# Patient Record
Sex: MALE | Race: BLACK OR AFRICAN AMERICAN | Employment: OTHER | URBAN - NONMETROPOLITAN AREA
[De-identification: names, ages, dates, MRNs, and addresses within clinical notes are randomized per-mention and may not be internally consistent; named-entity substitution may affect disease eponyms.]

---

## 2017-01-17 ENCOUNTER — OFFICE VISIT (OUTPATIENT)
Dept: URGENT CARE | Age: 47
End: 2017-01-17
Payer: MEDICAID

## 2017-01-17 VITALS
SYSTOLIC BLOOD PRESSURE: 160 MMHG | DIASTOLIC BLOOD PRESSURE: 100 MMHG | RESPIRATION RATE: 22 BRPM | TEMPERATURE: 98.8 F | HEIGHT: 72 IN | HEART RATE: 116 BPM | BODY MASS INDEX: 42.66 KG/M2 | WEIGHT: 315 LBS | OXYGEN SATURATION: 94 %

## 2017-01-17 DIAGNOSIS — E11.9 TYPE 2 DIABETES MELLITUS WITHOUT COMPLICATION, WITH LONG-TERM CURRENT USE OF INSULIN (HCC): Chronic | ICD-10-CM

## 2017-01-17 DIAGNOSIS — Z79.4 TYPE 2 DIABETES MELLITUS WITHOUT COMPLICATION, WITH LONG-TERM CURRENT USE OF INSULIN (HCC): Chronic | ICD-10-CM

## 2017-01-17 DIAGNOSIS — E66.01 MORBID OBESITY WITH BMI OF 45.0-49.9, ADULT (HCC): Chronic | ICD-10-CM

## 2017-01-17 DIAGNOSIS — I10 ESSENTIAL HYPERTENSION: Primary | ICD-10-CM

## 2017-01-17 DIAGNOSIS — E03.9 ACQUIRED HYPOTHYROIDISM: Chronic | ICD-10-CM

## 2017-01-17 PROCEDURE — 99213 OFFICE O/P EST LOW 20 MIN: CPT | Performed by: NURSE PRACTITIONER

## 2017-01-17 RX ORDER — MELOXICAM 15 MG/1
15 TABLET ORAL DAILY
Refills: 2 | COMMUNITY
Start: 2017-01-06 | End: 2019-06-27 | Stop reason: ALTCHOICE

## 2017-01-17 RX ORDER — SIMVASTATIN 20 MG
TABLET ORAL
Refills: 0 | COMMUNITY
Start: 2016-12-12 | End: 2017-01-17 | Stop reason: ALTCHOICE

## 2017-01-17 RX ORDER — LEVOTHYROXINE SODIUM 300 UG/1
300 TABLET ORAL DAILY
Qty: 30 TABLET | Refills: 0 | Status: SHIPPED | OUTPATIENT
Start: 2017-01-17 | End: 2017-02-22 | Stop reason: CLARIF

## 2017-01-17 RX ORDER — BLOOD-GLUCOSE METER
KIT MISCELLANEOUS
Refills: 11 | COMMUNITY
Start: 2016-10-17 | End: 2019-08-07

## 2017-01-17 RX ORDER — PEN NEEDLE, DIABETIC 31 GX5/16"
NEEDLE, DISPOSABLE MISCELLANEOUS
Refills: 1 | COMMUNITY
Start: 2017-01-07 | End: 2017-06-21 | Stop reason: SDUPTHER

## 2017-01-17 RX ORDER — FENOFIBRATE 145 MG/1
145 TABLET, COATED ORAL DAILY
Qty: 30 TABLET | Refills: 3 | Status: SHIPPED | OUTPATIENT
Start: 2017-01-17 | End: 2017-11-02 | Stop reason: SDUPTHER

## 2017-01-17 RX ORDER — OMEPRAZOLE 20 MG/1
20 CAPSULE, DELAYED RELEASE ORAL DAILY
Refills: 5 | COMMUNITY
Start: 2017-01-06 | End: 2017-01-31 | Stop reason: SDUPTHER

## 2017-01-17 RX ORDER — SIMVASTATIN 40 MG
20 TABLET ORAL EVERY EVENING
Qty: 30 TABLET | Refills: 0 | Status: SHIPPED | OUTPATIENT
Start: 2017-01-17 | End: 2017-03-25

## 2017-01-17 RX ORDER — ASPIRIN 81 MG/1
TABLET, COATED ORAL
Refills: 0 | COMMUNITY
Start: 2016-10-15 | End: 2017-01-31

## 2017-01-17 RX ORDER — INSULIN HUMAN 100 [IU]/ML
INJECTION, SUSPENSION SUBCUTANEOUS
Refills: 1 | COMMUNITY
Start: 2016-11-24 | End: 2017-01-17 | Stop reason: ALTCHOICE

## 2017-01-17 RX ORDER — HYDRALAZINE HYDROCHLORIDE 25 MG/1
25 TABLET, FILM COATED ORAL 3 TIMES DAILY
Qty: 90 TABLET | Refills: 0 | Status: SHIPPED | OUTPATIENT
Start: 2017-01-17 | End: 2017-02-22 | Stop reason: SDUPTHER

## 2017-01-17 ASSESSMENT — ENCOUNTER SYMPTOMS
GASTROINTESTINAL NEGATIVE: 1
RESPIRATORY NEGATIVE: 1

## 2017-01-31 ENCOUNTER — OFFICE VISIT (OUTPATIENT)
Dept: PRIMARY CARE CLINIC | Age: 47
End: 2017-01-31
Payer: MEDICAID

## 2017-01-31 VITALS
WEIGHT: 315 LBS | HEIGHT: 72 IN | HEART RATE: 102 BPM | BODY MASS INDEX: 42.66 KG/M2 | DIASTOLIC BLOOD PRESSURE: 90 MMHG | OXYGEN SATURATION: 96 % | SYSTOLIC BLOOD PRESSURE: 140 MMHG | RESPIRATION RATE: 20 BRPM

## 2017-01-31 DIAGNOSIS — E11.9 TYPE 2 DIABETES MELLITUS WITHOUT COMPLICATION, WITH LONG-TERM CURRENT USE OF INSULIN (HCC): Primary | Chronic | ICD-10-CM

## 2017-01-31 DIAGNOSIS — E03.9 HYPOTHYROIDISM, UNSPECIFIED TYPE: ICD-10-CM

## 2017-01-31 DIAGNOSIS — E66.01 MORBID OBESITY WITH BMI OF 45.0-49.9, ADULT (HCC): Chronic | ICD-10-CM

## 2017-01-31 DIAGNOSIS — K44.9 PARAESOPHAGEAL HIATAL HERNIA: ICD-10-CM

## 2017-01-31 DIAGNOSIS — Z79.4 TYPE 2 DIABETES MELLITUS WITHOUT COMPLICATION, WITH LONG-TERM CURRENT USE OF INSULIN (HCC): Primary | Chronic | ICD-10-CM

## 2017-01-31 DIAGNOSIS — I10 ESSENTIAL HYPERTENSION: Chronic | ICD-10-CM

## 2017-01-31 PROCEDURE — 99214 OFFICE O/P EST MOD 30 MIN: CPT | Performed by: FAMILY MEDICINE

## 2017-01-31 RX ORDER — OMEPRAZOLE 20 MG/1
20 CAPSULE, DELAYED RELEASE ORAL DAILY
Qty: 30 CAPSULE | Refills: 5 | Status: SHIPPED | OUTPATIENT
Start: 2017-01-31 | End: 2017-07-31 | Stop reason: SDUPTHER

## 2017-01-31 ASSESSMENT — PATIENT HEALTH QUESTIONNAIRE - PHQ9
5. POOR APPETITE OR OVEREATING: 3
6. FEELING BAD ABOUT YOURSELF - OR THAT YOU ARE A FAILURE OR HAVE LET YOURSELF OR YOUR FAMILY DOWN: 3
8. MOVING OR SPEAKING SO SLOWLY THAT OTHER PEOPLE COULD HAVE NOTICED. OR THE OPPOSITE, BEING SO FIGETY OR RESTLESS THAT YOU HAVE BEEN MOVING AROUND A LOT MORE THAN USUAL: 0
10. IF YOU CHECKED OFF ANY PROBLEMS, HOW DIFFICULT HAVE THESE PROBLEMS MADE IT FOR YOU TO DO YOUR WORK, TAKE CARE OF THINGS AT HOME, OR GET ALONG WITH OTHER PEOPLE: 3
9. THOUGHTS THAT YOU WOULD BE BETTER OFF DEAD, OR OF HURTING YOURSELF: 1
SUM OF ALL RESPONSES TO PHQ QUESTIONS 1-9: 16
4. FEELING TIRED OR HAVING LITTLE ENERGY: 0
1. LITTLE INTEREST OR PLEASURE IN DOING THINGS: 0
2. FEELING DOWN, DEPRESSED OR HOPELESS: 3
7. TROUBLE CONCENTRATING ON THINGS, SUCH AS READING THE NEWSPAPER OR WATCHING TELEVISION: 3
SUM OF ALL RESPONSES TO PHQ9 QUESTIONS 1 & 2: 3
3. TROUBLE FALLING OR STAYING ASLEEP: 3

## 2017-02-05 ASSESSMENT — ENCOUNTER SYMPTOMS
VOMITING: 0
BLOOD IN STOOL: 0
ANAL BLEEDING: 0
WHEEZING: 0
EYE PAIN: 0
NAUSEA: 0
SHORTNESS OF BREATH: 1
COLOR CHANGE: 0
ABDOMINAL PAIN: 1

## 2017-02-06 DIAGNOSIS — K44.9 PARAESOPHAGEAL HIATAL HERNIA: Primary | ICD-10-CM

## 2017-02-06 DIAGNOSIS — R06.02 SOB (SHORTNESS OF BREATH): ICD-10-CM

## 2017-02-06 RX ORDER — BLOOD-GLUCOSE METER
1 KIT MISCELLANEOUS DAILY
Qty: 1 KIT | Refills: 0 | Status: SHIPPED | OUTPATIENT
Start: 2017-02-06 | End: 2019-08-07

## 2017-02-07 ENCOUNTER — TRANSCRIBE ORDERS (OUTPATIENT)
Dept: ADMINISTRATIVE | Facility: HOSPITAL | Age: 47
End: 2017-02-07

## 2017-02-07 ENCOUNTER — HOSPITAL ENCOUNTER (OUTPATIENT)
Dept: GENERAL RADIOLOGY | Facility: HOSPITAL | Age: 47
Discharge: HOME | End: 2017-02-07
Attending: PAIN MEDICINE | Admitting: PAIN MEDICINE

## 2017-02-07 ENCOUNTER — APPOINTMENT (OUTPATIENT)
Dept: LAB | Facility: HOSPITAL | Age: 47
End: 2017-02-07
Attending: FAMILY MEDICINE

## 2017-02-07 DIAGNOSIS — Z79.4 ENCOUNTER FOR LONG-TERM (CURRENT) USE OF INSULIN (HCC): ICD-10-CM

## 2017-02-07 DIAGNOSIS — E11.9 DIABETES MELLITUS WITHOUT COMPLICATION (HCC): Primary | ICD-10-CM

## 2017-02-07 DIAGNOSIS — M51.36 DEGENERATION OF LUMBAR INTERVERTEBRAL DISC: Primary | ICD-10-CM

## 2017-02-07 DIAGNOSIS — E03.9 UNSPECIFIED HYPOTHYROIDISM: ICD-10-CM

## 2017-02-07 DIAGNOSIS — M51.37 DEGENERATION OF LUMBAR OR LUMBOSACRAL INTERVERTEBRAL DISC: ICD-10-CM

## 2017-02-07 LAB
ALBUMIN SERPL-MCNC: 4.3 G/DL (ref 3.5–5)
ALBUMIN/GLOB SERPL: 1.1 G/DL (ref 1.1–2.5)
ALP SERPL-CCNC: 76 U/L (ref 24–120)
ALT SERPL W P-5'-P-CCNC: 43 U/L (ref 0–54)
ANION GAP SERPL CALCULATED.3IONS-SCNC: 12 MMOL/L (ref 4–13)
AST SERPL-CCNC: 36 U/L (ref 7–45)
BILIRUB SERPL-MCNC: 0.4 MG/DL (ref 0.1–1)
BUN BLD-MCNC: 24 MG/DL (ref 5–21)
BUN/CREAT SERPL: 17.4 (ref 7–25)
CALCIUM SPEC-SCNC: 9.6 MG/DL (ref 8.4–10.4)
CHLORIDE SERPL-SCNC: 101 MMOL/L (ref 98–110)
CO2 SERPL-SCNC: 28 MMOL/L (ref 24–31)
CREAT BLD-MCNC: 1.38 MG/DL (ref 0.5–1.4)
GFR SERPL CREATININE-BSD FRML MDRD: 68 ML/MIN/1.73
GLOBULIN UR ELPH-MCNC: 3.8 GM/DL
GLUCOSE BLD-MCNC: 177 MG/DL (ref 70–100)
HBA1C MFR BLD: 8.6 %
POTASSIUM BLD-SCNC: 4.5 MMOL/L (ref 3.5–5.3)
PROT SERPL-MCNC: 8.1 G/DL (ref 6.3–8.7)
SODIUM BLD-SCNC: 141 MMOL/L (ref 135–145)
T4 FREE SERPL-MCNC: 1.61 NG/DL (ref 0.78–2.19)
TSH SERPL DL<=0.05 MIU/L-ACNC: 0.33 MIU/ML (ref 0.47–4.68)

## 2017-02-07 PROCEDURE — 84439 ASSAY OF FREE THYROXINE: CPT | Performed by: FAMILY MEDICINE

## 2017-02-07 PROCEDURE — 72110 X-RAY EXAM L-2 SPINE 4/>VWS: CPT

## 2017-02-07 PROCEDURE — 80053 COMPREHEN METABOLIC PANEL: CPT | Performed by: FAMILY MEDICINE

## 2017-02-07 PROCEDURE — 82570 ASSAY OF URINE CREATININE: CPT | Performed by: FAMILY MEDICINE

## 2017-02-07 PROCEDURE — 82043 UR ALBUMIN QUANTITATIVE: CPT | Performed by: FAMILY MEDICINE

## 2017-02-07 PROCEDURE — 84443 ASSAY THYROID STIM HORMONE: CPT | Performed by: FAMILY MEDICINE

## 2017-02-07 PROCEDURE — 36415 COLL VENOUS BLD VENIPUNCTURE: CPT | Performed by: FAMILY MEDICINE

## 2017-02-07 PROCEDURE — 83036 HEMOGLOBIN GLYCOSYLATED A1C: CPT | Performed by: FAMILY MEDICINE

## 2017-02-08 LAB
CREAT 24H UR-MCNC: 79.6 MG/DL
MICROALB/CRT. RATIO UR: 37.1 MG/G CREAT (ref 0–30)
MICROALBUMIN UR-MCNC: 29.5 UG/ML

## 2017-02-22 ENCOUNTER — TELEPHONE (OUTPATIENT)
Dept: PRIMARY CARE CLINIC | Age: 47
End: 2017-02-22

## 2017-02-22 DIAGNOSIS — I10 ESSENTIAL HYPERTENSION: Chronic | ICD-10-CM

## 2017-02-22 DIAGNOSIS — E03.9 ACQUIRED HYPOTHYROIDISM: Primary | Chronic | ICD-10-CM

## 2017-02-22 RX ORDER — HYDRALAZINE HYDROCHLORIDE 25 MG/1
25 TABLET, FILM COATED ORAL 3 TIMES DAILY
Qty: 90 TABLET | Refills: 0 | Status: SHIPPED | OUTPATIENT
Start: 2017-02-22 | End: 2017-03-23 | Stop reason: SDUPTHER

## 2017-02-22 RX ORDER — LEVOTHYROXINE SODIUM 300 UG/1
300 TABLET ORAL DAILY
Qty: 30 TABLET | Refills: 0 | Status: SHIPPED | OUTPATIENT
Start: 2017-02-22 | End: 2017-03-23 | Stop reason: SDUPTHER

## 2017-02-23 DIAGNOSIS — E11.9 TYPE 2 DIABETES MELLITUS WITHOUT COMPLICATION, WITH LONG-TERM CURRENT USE OF INSULIN (HCC): Chronic | ICD-10-CM

## 2017-02-23 DIAGNOSIS — Z79.4 TYPE 2 DIABETES MELLITUS WITHOUT COMPLICATION, WITH LONG-TERM CURRENT USE OF INSULIN (HCC): Chronic | ICD-10-CM

## 2017-02-24 ENCOUNTER — TELEPHONE (OUTPATIENT)
Dept: PRIMARY CARE CLINIC | Age: 47
End: 2017-02-24

## 2017-03-02 ENCOUNTER — OFFICE VISIT (OUTPATIENT)
Dept: PRIMARY CARE CLINIC | Age: 47
End: 2017-03-02
Payer: MEDICAID

## 2017-03-02 ENCOUNTER — TELEPHONE (OUTPATIENT)
Dept: PRIMARY CARE CLINIC | Age: 47
End: 2017-03-02

## 2017-03-02 VITALS
SYSTOLIC BLOOD PRESSURE: 142 MMHG | RESPIRATION RATE: 20 BRPM | HEIGHT: 72 IN | HEART RATE: 107 BPM | OXYGEN SATURATION: 96 % | WEIGHT: 315 LBS | DIASTOLIC BLOOD PRESSURE: 102 MMHG | BODY MASS INDEX: 42.66 KG/M2

## 2017-03-02 DIAGNOSIS — Z79.4 TYPE 2 DIABETES MELLITUS WITHOUT COMPLICATION, WITH LONG-TERM CURRENT USE OF INSULIN (HCC): Chronic | ICD-10-CM

## 2017-03-02 DIAGNOSIS — E11.9 TYPE 2 DIABETES MELLITUS WITHOUT COMPLICATION, WITH LONG-TERM CURRENT USE OF INSULIN (HCC): Chronic | ICD-10-CM

## 2017-03-02 DIAGNOSIS — M54.12 CERVICAL RADICULOPATHY: ICD-10-CM

## 2017-03-02 DIAGNOSIS — M54.2 NECK PAIN: Primary | ICD-10-CM

## 2017-03-02 PROCEDURE — 99214 OFFICE O/P EST MOD 30 MIN: CPT | Performed by: FAMILY MEDICINE

## 2017-03-02 RX ORDER — METHYLPREDNISOLONE SODIUM SUCCINATE 40 MG/ML
40 INJECTION, POWDER, LYOPHILIZED, FOR SOLUTION INTRAMUSCULAR; INTRAVENOUS ONCE
Status: COMPLETED | OUTPATIENT
Start: 2017-03-02 | End: 2017-03-02

## 2017-03-02 RX ORDER — PREDNISONE 20 MG/1
40 TABLET ORAL DAILY
Qty: 10 TABLET | Refills: 0 | Status: SHIPPED | OUTPATIENT
Start: 2017-03-02 | End: 2017-03-07

## 2017-03-02 RX ADMIN — METHYLPREDNISOLONE SODIUM SUCCINATE 40 MG: 40 INJECTION, POWDER, LYOPHILIZED, FOR SOLUTION INTRAMUSCULAR; INTRAVENOUS at 16:56

## 2017-03-05 ASSESSMENT — ENCOUNTER SYMPTOMS
BACK PAIN: 1
SHORTNESS OF BREATH: 0
COUGH: 0

## 2017-03-06 ENCOUNTER — TELEPHONE (OUTPATIENT)
Dept: PRIMARY CARE CLINIC | Age: 47
End: 2017-03-06

## 2017-03-14 ENCOUNTER — TELEPHONE (OUTPATIENT)
Dept: PRIMARY CARE CLINIC | Age: 47
End: 2017-03-14

## 2017-03-14 DIAGNOSIS — F41.0 PANIC ATTACKS: Primary | ICD-10-CM

## 2017-03-14 RX ORDER — DIAZEPAM 10 MG/1
10 TABLET ORAL EVERY 8 HOURS PRN
Qty: 90 TABLET | Refills: 0 | OUTPATIENT
Start: 2017-03-14 | End: 2017-03-24

## 2017-03-16 ENCOUNTER — TELEPHONE (OUTPATIENT)
Dept: PRIMARY CARE CLINIC | Age: 47
End: 2017-03-16

## 2017-03-22 ENCOUNTER — APPOINTMENT (OUTPATIENT)
Dept: GENERAL RADIOLOGY | Age: 47
End: 2017-03-22
Payer: MEDICAID

## 2017-03-22 ENCOUNTER — HOSPITAL ENCOUNTER (EMERGENCY)
Age: 47
Discharge: HOME | End: 2017-03-22
Attending: EMERGENCY MEDICINE
Payer: MEDICAID

## 2017-03-22 VITALS
BODY MASS INDEX: 42.66 KG/M2 | WEIGHT: 315 LBS | OXYGEN SATURATION: 93 % | SYSTOLIC BLOOD PRESSURE: 160 MMHG | RESPIRATION RATE: 18 BRPM | DIASTOLIC BLOOD PRESSURE: 92 MMHG | TEMPERATURE: 98.6 F | HEIGHT: 72 IN | HEART RATE: 102 BPM

## 2017-03-22 DIAGNOSIS — K44.9 HIATAL HERNIA: ICD-10-CM

## 2017-03-22 DIAGNOSIS — J18.9 PNEUMONIA DUE TO ORGANISM: Primary | ICD-10-CM

## 2017-03-22 LAB
ALBUMIN SERPL-MCNC: 4.2 G/DL (ref 3.5–5.2)
ALP BLD-CCNC: 58 U/L (ref 40–130)
ALT SERPL-CCNC: 29 U/L (ref 5–41)
ANION GAP SERPL CALCULATED.3IONS-SCNC: 18 MMOL/L (ref 7–19)
AST SERPL-CCNC: 28 U/L (ref 5–40)
BASOPHILS ABSOLUTE: 0 K/UL (ref 0–0.2)
BASOPHILS RELATIVE PERCENT: 0.2 % (ref 0–1)
BILIRUB SERPL-MCNC: <0.2 MG/DL (ref 0.2–1.2)
BUN BLDV-MCNC: 14 MG/DL (ref 6–20)
CALCIUM SERPL-MCNC: 9.2 MG/DL (ref 8.6–10)
CHLORIDE BLD-SCNC: 98 MMOL/L (ref 98–111)
CO2: 22 MMOL/L (ref 22–29)
CREAT SERPL-MCNC: 1.3 MG/DL (ref 0.5–1.2)
EOSINOPHILS ABSOLUTE: 0.1 K/UL (ref 0–0.6)
EOSINOPHILS RELATIVE PERCENT: 2.2 % (ref 0–5)
GFR NON-AFRICAN AMERICAN: 59
GLOBULIN: 2.7 G/DL
GLUCOSE BLD-MCNC: 171 MG/DL (ref 74–109)
HCT VFR BLD CALC: 43.2 % (ref 42–52)
HEMOGLOBIN: 14.4 G/DL (ref 14–18)
LIPASE: 32 U/L (ref 13–60)
LYMPHOCYTES ABSOLUTE: 2.2 K/UL (ref 1.1–4.5)
LYMPHOCYTES RELATIVE PERCENT: 40.5 % (ref 20–40)
MCH RBC QN AUTO: 29.1 PG (ref 27–31)
MCHC RBC AUTO-ENTMCNC: 33.3 G/DL (ref 33–37)
MCV RBC AUTO: 87.4 FL (ref 80–94)
MONOCYTES ABSOLUTE: 0.3 K/UL (ref 0–0.9)
MONOCYTES RELATIVE PERCENT: 6.2 % (ref 0–10)
NEUTROPHILS ABSOLUTE: 2.8 K/UL (ref 1.5–7.5)
NEUTROPHILS RELATIVE PERCENT: 50.9 % (ref 50–65)
PDW BLD-RTO: 12.6 % (ref 11.5–14.5)
PERFORMED ON: NORMAL
PERFORMED ON: NORMAL
PLATELET # BLD: 180 K/UL (ref 130–400)
PMV BLD AUTO: 11.6 FL (ref 7.4–10.4)
POC TROPONIN I: 0 NG/ML (ref 0–0.08)
POC TROPONIN I: 0.01 NG/ML (ref 0–0.08)
POTASSIUM SERPL-SCNC: 4.1 MMOL/L (ref 3.5–5)
PRO-BNP: 32 PG/ML (ref 0–450)
RAPID INFLUENZA  B AGN: NEGATIVE
RAPID INFLUENZA A AGN: NEGATIVE
RBC # BLD: 4.94 M/UL (ref 4.7–6.1)
SODIUM BLD-SCNC: 138 MMOL/L (ref 136–145)
TOTAL PROTEIN: 6.9 G/DL (ref 6.6–8.7)
WBC # BLD: 5.5 K/UL (ref 4.8–10.8)

## 2017-03-22 PROCEDURE — 87804 INFLUENZA ASSAY W/OPTIC: CPT

## 2017-03-22 PROCEDURE — 87040 BLOOD CULTURE FOR BACTERIA: CPT

## 2017-03-22 PROCEDURE — 6370000000 HC RX 637 (ALT 250 FOR IP): Performed by: PHYSICIAN ASSISTANT

## 2017-03-22 PROCEDURE — 2580000003 HC RX 258: Performed by: PHYSICIAN ASSISTANT

## 2017-03-22 PROCEDURE — 6360000002 HC RX W HCPCS: Performed by: PHYSICIAN ASSISTANT

## 2017-03-22 PROCEDURE — 83690 ASSAY OF LIPASE: CPT

## 2017-03-22 PROCEDURE — 83880 ASSAY OF NATRIURETIC PEPTIDE: CPT

## 2017-03-22 PROCEDURE — 94640 AIRWAY INHALATION TREATMENT: CPT

## 2017-03-22 PROCEDURE — 71010 XR CHEST PORTABLE: CPT

## 2017-03-22 PROCEDURE — 85025 COMPLETE CBC W/AUTO DIFF WBC: CPT

## 2017-03-22 PROCEDURE — 99285 EMERGENCY DEPT VISIT HI MDM: CPT

## 2017-03-22 PROCEDURE — 80053 COMPREHEN METABOLIC PANEL: CPT

## 2017-03-22 PROCEDURE — 36415 COLL VENOUS BLD VENIPUNCTURE: CPT

## 2017-03-22 PROCEDURE — 96365 THER/PROPH/DIAG IV INF INIT: CPT

## 2017-03-22 PROCEDURE — 84484 ASSAY OF TROPONIN QUANT: CPT

## 2017-03-22 PROCEDURE — 93005 ELECTROCARDIOGRAM TRACING: CPT

## 2017-03-22 PROCEDURE — 99284 EMERGENCY DEPT VISIT MOD MDM: CPT | Performed by: PHYSICIAN ASSISTANT

## 2017-03-22 RX ORDER — AZITHROMYCIN 250 MG/1
500 TABLET, FILM COATED ORAL ONCE
Status: COMPLETED | OUTPATIENT
Start: 2017-03-22 | End: 2017-03-22

## 2017-03-22 RX ORDER — CEFDINIR 300 MG/1
300 CAPSULE ORAL 2 TIMES DAILY
Qty: 20 CAPSULE | Refills: 0 | Status: SHIPPED | OUTPATIENT
Start: 2017-03-22 | End: 2017-04-01

## 2017-03-22 RX ORDER — BENZONATATE 100 MG/1
100 CAPSULE ORAL 3 TIMES DAILY PRN
Qty: 20 CAPSULE | Refills: 0 | Status: SHIPPED | OUTPATIENT
Start: 2017-03-22 | End: 2017-03-29

## 2017-03-22 RX ORDER — NITROGLYCERIN 0.4 MG/1
0.4 TABLET SUBLINGUAL ONCE
Status: COMPLETED | OUTPATIENT
Start: 2017-03-22 | End: 2017-03-22

## 2017-03-22 RX ORDER — ALBUTEROL SULFATE 2.5 MG/3ML
2.5 SOLUTION RESPIRATORY (INHALATION) EVERY 4 HOURS PRN
Status: DISCONTINUED | OUTPATIENT
Start: 2017-03-22 | End: 2017-03-22

## 2017-03-22 RX ORDER — AZITHROMYCIN 250 MG/1
250 TABLET, FILM COATED ORAL DAILY
Qty: 1 PACKET | Refills: 0 | Status: SHIPPED | OUTPATIENT
Start: 2017-03-22 | End: 2017-03-28

## 2017-03-22 RX ORDER — ASPIRIN 325 MG
325 TABLET ORAL ONCE
Status: COMPLETED | OUTPATIENT
Start: 2017-03-22 | End: 2017-03-22

## 2017-03-22 RX ORDER — IPRATROPIUM BROMIDE AND ALBUTEROL SULFATE 2.5; .5 MG/3ML; MG/3ML
1 SOLUTION RESPIRATORY (INHALATION) ONCE
Status: COMPLETED | OUTPATIENT
Start: 2017-03-22 | End: 2017-03-22

## 2017-03-22 RX ORDER — ALBUTEROL SULFATE 90 UG/1
2 AEROSOL, METERED RESPIRATORY (INHALATION) EVERY 6 HOURS PRN
Qty: 1 INHALER | Refills: 1 | Status: SHIPPED | OUTPATIENT
Start: 2017-03-22 | End: 2018-01-09

## 2017-03-22 RX ORDER — ALBUTEROL SULFATE 2.5 MG/3ML
2.5 SOLUTION RESPIRATORY (INHALATION) ONCE
Status: COMPLETED | OUTPATIENT
Start: 2017-03-22 | End: 2017-03-22

## 2017-03-22 RX ORDER — PREDNISONE 10 MG/1
50 TABLET ORAL DAILY
Qty: 25 TABLET | Refills: 0 | Status: SHIPPED | OUTPATIENT
Start: 2017-03-22 | End: 2017-03-27

## 2017-03-22 RX ADMIN — CEFTRIAXONE 1 G: 1 INJECTION, POWDER, FOR SOLUTION INTRAMUSCULAR; INTRAVENOUS at 12:30

## 2017-03-22 RX ADMIN — IPRATROPIUM BROMIDE AND ALBUTEROL SULFATE 1 AMPULE: .5; 3 SOLUTION RESPIRATORY (INHALATION) at 09:47

## 2017-03-22 RX ADMIN — AZITHROMYCIN 500 MG: 250 TABLET, FILM COATED ORAL at 12:30

## 2017-03-22 RX ADMIN — ASPIRIN 325 MG ORAL TABLET 325 MG: 325 PILL ORAL at 09:55

## 2017-03-22 RX ADMIN — ALBUTEROL SULFATE 2.5 MG: 2.5 SOLUTION RESPIRATORY (INHALATION) at 12:46

## 2017-03-22 RX ADMIN — NITROGLYCERIN 0.4 MG: 0.4 TABLET SUBLINGUAL at 09:55

## 2017-03-22 ASSESSMENT — PAIN DESCRIPTION - DESCRIPTORS: DESCRIPTORS: TIGHTNESS

## 2017-03-22 ASSESSMENT — PAIN DESCRIPTION - PAIN TYPE: TYPE: ACUTE PAIN

## 2017-03-22 ASSESSMENT — PAIN DESCRIPTION - LOCATION: LOCATION: CHEST

## 2017-03-22 ASSESSMENT — ENCOUNTER SYMPTOMS
WHEEZING: 0
BACK PAIN: 0
RHINORRHEA: 0
COUGH: 0
CHEST TIGHTNESS: 1
STRIDOR: 0
ABDOMINAL PAIN: 0
ABDOMINAL DISTENTION: 0
COLOR CHANGE: 0
VOMITING: 0
SHORTNESS OF BREATH: 1
SORE THROAT: 0
CONSTIPATION: 0
NAUSEA: 0

## 2017-03-22 ASSESSMENT — PAIN SCALES - GENERAL: PAINLEVEL_OUTOF10: 7

## 2017-03-22 ASSESSMENT — PAIN DESCRIPTION - FREQUENCY: FREQUENCY: CONTINUOUS

## 2017-03-23 LAB
EKG P AXIS: 68 DEGREES
EKG P AXIS: 71 DEGREES
EKG P-R INTERVAL: 130 MS
EKG P-R INTERVAL: 138 MS
EKG Q-T INTERVAL: 298 MS
EKG Q-T INTERVAL: 320 MS
EKG QRS DURATION: 78 MS
EKG QRS DURATION: 96 MS
EKG QTC CALCULATION (BAZETT): 389 MS
EKG QTC CALCULATION (BAZETT): 397 MS
EKG T AXIS: -69 DEGREES
EKG T AXIS: -76 DEGREES

## 2017-03-23 RX ORDER — SIMVASTATIN 40 MG
TABLET ORAL
Qty: 30 TABLET | Refills: 0 | OUTPATIENT
Start: 2017-03-23

## 2017-03-25 RX ORDER — SIMVASTATIN 20 MG
20 TABLET ORAL NIGHTLY
Qty: 30 TABLET | Refills: 5 | Status: SHIPPED | OUTPATIENT
Start: 2017-03-25 | End: 2017-09-11 | Stop reason: SDUPTHER

## 2017-03-27 LAB
BLOOD CULTURE, ROUTINE: NORMAL
CULTURE, BLOOD 2: NORMAL

## 2017-04-10 ENCOUNTER — TELEPHONE (OUTPATIENT)
Dept: PRIMARY CARE CLINIC | Age: 47
End: 2017-04-10

## 2017-04-13 ENCOUNTER — TELEPHONE (OUTPATIENT)
Dept: PRIMARY CARE CLINIC | Age: 47
End: 2017-04-13

## 2017-04-17 ENCOUNTER — TELEPHONE (OUTPATIENT)
Dept: PRIMARY CARE CLINIC | Age: 47
End: 2017-04-17

## 2017-04-17 RX ORDER — DIAZEPAM 10 MG/1
TABLET ORAL
Qty: 90 TABLET | Refills: 0 | Status: SHIPPED | OUTPATIENT
Start: 2017-04-17 | End: 2017-05-22 | Stop reason: SDUPTHER

## 2017-04-27 ENCOUNTER — OFFICE VISIT (OUTPATIENT)
Dept: PRIMARY CARE CLINIC | Age: 47
End: 2017-04-27
Payer: MEDICAID

## 2017-04-27 VITALS
TEMPERATURE: 97.5 F | DIASTOLIC BLOOD PRESSURE: 86 MMHG | SYSTOLIC BLOOD PRESSURE: 144 MMHG | BODY MASS INDEX: 42.66 KG/M2 | OXYGEN SATURATION: 95 % | HEART RATE: 110 BPM | WEIGHT: 315 LBS | HEIGHT: 72 IN | RESPIRATION RATE: 18 BRPM

## 2017-04-27 DIAGNOSIS — M54.12 CERVICAL RADICULOPATHY: Primary | ICD-10-CM

## 2017-04-27 DIAGNOSIS — Z79.4 TYPE 2 DIABETES MELLITUS WITHOUT COMPLICATION, WITH LONG-TERM CURRENT USE OF INSULIN (HCC): Chronic | ICD-10-CM

## 2017-04-27 DIAGNOSIS — Z23 NEED FOR PROPHYLACTIC VACCINATION AGAINST DIPHTHERIA-TETANUS-PERTUSSIS (DTP): ICD-10-CM

## 2017-04-27 DIAGNOSIS — Z23 NEED FOR PROPHYLACTIC VACCINATION AGAINST STREPTOCOCCUS PNEUMONIAE (PNEUMOCOCCUS): ICD-10-CM

## 2017-04-27 DIAGNOSIS — E11.9 TYPE 2 DIABETES MELLITUS WITHOUT COMPLICATION, WITH LONG-TERM CURRENT USE OF INSULIN (HCC): Chronic | ICD-10-CM

## 2017-04-27 LAB — HBA1C MFR BLD: 12.6 %

## 2017-04-27 PROCEDURE — 90472 IMMUNIZATION ADMIN EACH ADD: CPT | Performed by: NURSE PRACTITIONER

## 2017-04-27 PROCEDURE — 90715 TDAP VACCINE 7 YRS/> IM: CPT | Performed by: NURSE PRACTITIONER

## 2017-04-27 PROCEDURE — 90471 IMMUNIZATION ADMIN: CPT | Performed by: NURSE PRACTITIONER

## 2017-04-27 PROCEDURE — 83036 HEMOGLOBIN GLYCOSYLATED A1C: CPT | Performed by: NURSE PRACTITIONER

## 2017-04-27 PROCEDURE — 90670 PCV13 VACCINE IM: CPT | Performed by: NURSE PRACTITIONER

## 2017-04-27 PROCEDURE — 99214 OFFICE O/P EST MOD 30 MIN: CPT | Performed by: NURSE PRACTITIONER

## 2017-04-27 RX ORDER — GUAIFENESIN 600 MG/1
1200 TABLET, EXTENDED RELEASE ORAL 2 TIMES DAILY
Qty: 14 TABLET | Refills: 1 | Status: SHIPPED | OUTPATIENT
Start: 2017-04-27 | End: 2017-05-22 | Stop reason: SDUPTHER

## 2017-04-27 ASSESSMENT — ENCOUNTER SYMPTOMS
EYES NEGATIVE: 1
RESPIRATORY NEGATIVE: 1
ALLERGIC/IMMUNOLOGIC NEGATIVE: 1
GASTROINTESTINAL NEGATIVE: 1

## 2017-05-22 RX ORDER — DIAZEPAM 10 MG/1
TABLET ORAL
Qty: 90 TABLET | Refills: 0 | Status: SHIPPED | OUTPATIENT
Start: 2017-05-22 | End: 2017-06-17 | Stop reason: SDUPTHER

## 2017-06-12 DIAGNOSIS — E11.9 TYPE 2 DIABETES MELLITUS WITHOUT COMPLICATION, WITH LONG-TERM CURRENT USE OF INSULIN (HCC): Chronic | ICD-10-CM

## 2017-06-12 DIAGNOSIS — Z79.4 TYPE 2 DIABETES MELLITUS WITHOUT COMPLICATION, WITH LONG-TERM CURRENT USE OF INSULIN (HCC): Chronic | ICD-10-CM

## 2017-06-13 RX ORDER — DIAZEPAM 10 MG/1
TABLET ORAL
Qty: 90 TABLET | Refills: 0 | OUTPATIENT
Start: 2017-06-13

## 2017-06-15 RX ORDER — EMPAGLIFLOZIN 10 MG/1
TABLET, FILM COATED ORAL
Qty: 60 TABLET | Refills: 3 | Status: SHIPPED | OUTPATIENT
Start: 2017-06-15 | End: 2017-07-26 | Stop reason: DRUGHIGH

## 2017-06-15 RX ORDER — INSULIN GLARGINE 100 [IU]/ML
INJECTION, SOLUTION SUBCUTANEOUS
Qty: 15 ML | Refills: 3 | Status: SHIPPED | OUTPATIENT
Start: 2017-06-15 | End: 2017-10-12 | Stop reason: SDUPTHER

## 2017-06-20 ENCOUNTER — PATIENT MESSAGE (OUTPATIENT)
Dept: PRIMARY CARE CLINIC | Age: 47
End: 2017-06-20

## 2017-06-20 RX ORDER — DIAZEPAM 10 MG/1
TABLET ORAL
Qty: 90 TABLET | Refills: 0 | Status: SHIPPED | OUTPATIENT
Start: 2017-06-20 | End: 2017-07-26 | Stop reason: SDUPTHER

## 2017-06-21 ENCOUNTER — TELEPHONE (OUTPATIENT)
Dept: PRIMARY CARE CLINIC | Age: 47
End: 2017-06-21

## 2017-06-21 RX ORDER — PEN NEEDLE, DIABETIC 31 GX5/16"
NEEDLE, DISPOSABLE MISCELLANEOUS
Qty: 100 EACH | Refills: 1 | Status: SHIPPED | OUTPATIENT
Start: 2017-06-21 | End: 2017-09-22 | Stop reason: SDUPTHER

## 2017-06-28 ENCOUNTER — TELEPHONE (OUTPATIENT)
Dept: PRIMARY CARE CLINIC | Age: 47
End: 2017-06-28

## 2017-07-07 ENCOUNTER — TELEPHONE (OUTPATIENT)
Dept: PRIMARY CARE CLINIC | Age: 47
End: 2017-07-07

## 2017-07-17 RX ORDER — BLOOD-GLUCOSE METER
KIT MISCELLANEOUS
Qty: 1 DEVICE | Refills: 0 | Status: SHIPPED | OUTPATIENT
Start: 2017-07-17 | End: 2019-08-07

## 2017-07-25 ENCOUNTER — TELEPHONE (OUTPATIENT)
Dept: PRIMARY CARE CLINIC | Age: 47
End: 2017-07-25

## 2017-07-26 ENCOUNTER — OFFICE VISIT (OUTPATIENT)
Dept: PRIMARY CARE CLINIC | Age: 47
End: 2017-07-26
Payer: MEDICAID

## 2017-07-26 VITALS
OXYGEN SATURATION: 96 % | DIASTOLIC BLOOD PRESSURE: 86 MMHG | WEIGHT: 315 LBS | TEMPERATURE: 98.5 F | BODY MASS INDEX: 42.66 KG/M2 | HEART RATE: 89 BPM | HEIGHT: 72 IN | SYSTOLIC BLOOD PRESSURE: 126 MMHG

## 2017-07-26 DIAGNOSIS — R53.83 OTHER FATIGUE: ICD-10-CM

## 2017-07-26 DIAGNOSIS — Z23 NEED FOR PROPHYLACTIC VACCINATION AGAINST STREPTOCOCCUS PNEUMONIAE (PNEUMOCOCCUS): ICD-10-CM

## 2017-07-26 DIAGNOSIS — Z79.4 TYPE 2 DIABETES MELLITUS WITHOUT COMPLICATION, WITH LONG-TERM CURRENT USE OF INSULIN (HCC): Primary | Chronic | ICD-10-CM

## 2017-07-26 DIAGNOSIS — F41.9 ANXIETY: ICD-10-CM

## 2017-07-26 DIAGNOSIS — E11.9 TYPE 2 DIABETES MELLITUS WITHOUT COMPLICATION, WITH LONG-TERM CURRENT USE OF INSULIN (HCC): Primary | Chronic | ICD-10-CM

## 2017-07-26 DIAGNOSIS — Z13.220 SCREENING FOR HYPERLIPIDEMIA: ICD-10-CM

## 2017-07-26 LAB
AMPHETAMINE SCREEN, URINE: NORMAL
BARBITURATE SCREEN, URINE: NORMAL
BENZODIAZEPINE SCREEN, URINE: NORMAL
COCAINE METABOLITE SCREEN URINE: NORMAL
MDMA URINE: NORMAL
METHADONE SCREEN, URINE: NORMAL
METHAMPHETAMINE, URINE: NORMAL
OPIATE SCREEN URINE: NORMAL
OXYCODONE SCREEN URINE: NORMAL
PHENCYCLIDINE SCREEN URINE: NORMAL
PROPOXYPHENE SCREEN, URINE: NORMAL
THC: NORMAL
TRICYCLIC ANTIDEPRESSANTS, UR: NORMAL

## 2017-07-26 PROCEDURE — 80305 DRUG TEST PRSMV DIR OPT OBS: CPT | Performed by: NURSE PRACTITIONER

## 2017-07-26 PROCEDURE — 99214 OFFICE O/P EST MOD 30 MIN: CPT | Performed by: NURSE PRACTITIONER

## 2017-07-26 RX ORDER — DIAZEPAM 10 MG/1
TABLET ORAL
Qty: 90 TABLET | Refills: 0 | OUTPATIENT
Start: 2017-07-26

## 2017-07-26 RX ORDER — DIAZEPAM 10 MG/1
10 TABLET ORAL EVERY 8 HOURS PRN
Qty: 90 TABLET | Refills: 0 | Status: SHIPPED | OUTPATIENT
Start: 2017-07-26 | End: 2017-08-22 | Stop reason: SDUPTHER

## 2017-07-26 RX ORDER — PREGABALIN 25 MG/1
25 CAPSULE ORAL 2 TIMES DAILY
Qty: 60 CAPSULE | Refills: 0 | Status: SHIPPED | OUTPATIENT
Start: 2017-07-26 | End: 2017-09-26 | Stop reason: SDUPTHER

## 2017-07-26 ASSESSMENT — ENCOUNTER SYMPTOMS
RESPIRATORY NEGATIVE: 1
GASTROINTESTINAL NEGATIVE: 1
EYES NEGATIVE: 1

## 2017-07-27 ENCOUNTER — TELEPHONE (OUTPATIENT)
Dept: PRIMARY CARE CLINIC | Age: 47
End: 2017-07-27

## 2017-07-28 ENCOUNTER — TELEPHONE (OUTPATIENT)
Dept: PRIMARY CARE CLINIC | Age: 47
End: 2017-07-28

## 2017-07-31 ENCOUNTER — TELEPHONE (OUTPATIENT)
Dept: PRIMARY CARE CLINIC | Age: 47
End: 2017-07-31

## 2017-07-31 ENCOUNTER — APPOINTMENT (OUTPATIENT)
Dept: LAB | Facility: HOSPITAL | Age: 47
End: 2017-07-31

## 2017-07-31 ENCOUNTER — TRANSCRIBE ORDERS (OUTPATIENT)
Dept: ADMINISTRATIVE | Facility: HOSPITAL | Age: 47
End: 2017-07-31

## 2017-07-31 DIAGNOSIS — E11.9 TYPE 2 DIABETES MELLITUS WITHOUT COMPLICATION, WITH LONG-TERM CURRENT USE OF INSULIN (HCC): Chronic | ICD-10-CM

## 2017-07-31 DIAGNOSIS — Z13.220 SCREENING FOR HYPERLIPIDEMIA: ICD-10-CM

## 2017-07-31 DIAGNOSIS — E11.9 DIABETES MELLITUS WITHOUT COMPLICATION (HCC): ICD-10-CM

## 2017-07-31 DIAGNOSIS — R53.83 OTHER FATIGUE: Primary | ICD-10-CM

## 2017-07-31 DIAGNOSIS — Z79.4 TYPE 2 DIABETES MELLITUS WITHOUT COMPLICATION, WITH LONG-TERM CURRENT USE OF INSULIN (HCC): Chronic | ICD-10-CM

## 2017-07-31 LAB
25(OH)D3 SERPL-MCNC: 19.6 NG/ML (ref 30–100)
ALBUMIN SERPL-MCNC: 4.4 G/DL (ref 3.5–5)
ALBUMIN/GLOB SERPL: 1.5 G/DL (ref 1.1–2.5)
ALP SERPL-CCNC: 63 U/L (ref 24–120)
ALT SERPL W P-5'-P-CCNC: 53 U/L (ref 0–54)
ANION GAP SERPL CALCULATED.3IONS-SCNC: 14 MMOL/L (ref 4–13)
ARTICHOKE IGE QN: 82 MG/DL (ref 0–99)
AST SERPL-CCNC: 38 U/L (ref 7–45)
BILIRUB SERPL-MCNC: 0.5 MG/DL (ref 0.1–1)
BUN BLD-MCNC: 13 MG/DL (ref 5–21)
BUN/CREAT SERPL: 12.7 (ref 7–25)
CALCIUM SPEC-SCNC: 9.3 MG/DL (ref 8.4–10.4)
CHLORIDE SERPL-SCNC: 106 MMOL/L (ref 98–110)
CHOLEST SERPL-MCNC: 198 MG/DL (ref 130–200)
CO2 SERPL-SCNC: 22 MMOL/L (ref 24–31)
CREAT BLD-MCNC: 1.02 MG/DL (ref 0.5–1.4)
DEPRECATED RDW RBC AUTO: 40.3 FL (ref 40–54)
ERYTHROCYTE [DISTWIDTH] IN BLOOD BY AUTOMATED COUNT: 12.7 % (ref 12–15)
GFR SERPL CREATININE-BSD FRML MDRD: 95 ML/MIN/1.73
GLOBULIN UR ELPH-MCNC: 2.9 GM/DL
GLUCOSE BLD-MCNC: 177 MG/DL (ref 70–100)
HBA1C MFR BLD: 10.1 %
HCT VFR BLD AUTO: 46.1 % (ref 40–52)
HDLC SERPL-MCNC: 36 MG/DL
HGB BLD-MCNC: 15.4 G/DL (ref 14–18)
LDLC/HDLC SERPL: ABNORMAL {RATIO}
MCH RBC QN AUTO: 28.9 PG (ref 28–32)
MCHC RBC AUTO-ENTMCNC: 33.4 G/DL (ref 33–36)
MCV RBC AUTO: 86.7 FL (ref 82–95)
PLATELET # BLD AUTO: 260 10*3/MM3 (ref 130–400)
PMV BLD AUTO: 11.8 FL (ref 6–12)
POTASSIUM BLD-SCNC: 4 MMOL/L (ref 3.5–5.3)
PROT SERPL-MCNC: 7.3 G/DL (ref 6.3–8.7)
RBC # BLD AUTO: 5.32 10*6/MM3 (ref 4.8–5.9)
SODIUM BLD-SCNC: 142 MMOL/L (ref 135–145)
TRIGL SERPL-MCNC: 498 MG/DL (ref 0–149)
TSH SERPL DL<=0.05 MIU/L-ACNC: <0.02 MIU/ML (ref 0.47–4.68)
VIT B12 BLD-MCNC: 356 PG/ML (ref 239–931)
WBC NRBC COR # BLD: 6.04 10*3/MM3 (ref 4.8–10.8)

## 2017-07-31 PROCEDURE — 80053 COMPREHEN METABOLIC PANEL: CPT | Performed by: NURSE PRACTITIONER

## 2017-07-31 PROCEDURE — 85027 COMPLETE CBC AUTOMATED: CPT | Performed by: NURSE PRACTITIONER

## 2017-07-31 PROCEDURE — 82306 VITAMIN D 25 HYDROXY: CPT | Performed by: NURSE PRACTITIONER

## 2017-07-31 PROCEDURE — 36415 COLL VENOUS BLD VENIPUNCTURE: CPT | Performed by: NURSE PRACTITIONER

## 2017-07-31 PROCEDURE — 83036 HEMOGLOBIN GLYCOSYLATED A1C: CPT | Performed by: NURSE PRACTITIONER

## 2017-07-31 PROCEDURE — 80061 LIPID PANEL: CPT | Performed by: NURSE PRACTITIONER

## 2017-07-31 PROCEDURE — 80050 GENERAL HEALTH PANEL: CPT | Performed by: NURSE PRACTITIONER

## 2017-07-31 PROCEDURE — 82570 ASSAY OF URINE CREATININE: CPT | Performed by: NURSE PRACTITIONER

## 2017-07-31 PROCEDURE — 82043 UR ALBUMIN QUANTITATIVE: CPT | Performed by: NURSE PRACTITIONER

## 2017-07-31 PROCEDURE — 84403 ASSAY OF TOTAL TESTOSTERONE: CPT | Performed by: NURSE PRACTITIONER

## 2017-07-31 PROCEDURE — 82607 VITAMIN B-12: CPT | Performed by: NURSE PRACTITIONER

## 2017-07-31 RX ORDER — OMEPRAZOLE 20 MG/1
CAPSULE, DELAYED RELEASE ORAL
Qty: 30 CAPSULE | Refills: 5 | Status: SHIPPED | OUTPATIENT
Start: 2017-07-31 | End: 2018-01-01 | Stop reason: SDUPTHER

## 2017-08-01 ENCOUNTER — TELEPHONE (OUTPATIENT)
Dept: PRIMARY CARE CLINIC | Age: 47
End: 2017-08-01

## 2017-08-01 LAB
CREAT 24H UR-MCNC: 116.5 MG/DL
MICROALB/CRT. RATIO UR: 23.7 MG/G CREAT (ref 0–30)
MICROALBUMIN UR-MCNC: 27.6 UG/ML
TESTOST SERPL-MCNC: 401 NG/DL (ref 264–916)

## 2017-08-08 ENCOUNTER — TELEPHONE (OUTPATIENT)
Dept: PRIMARY CARE CLINIC | Age: 47
End: 2017-08-08

## 2017-08-08 DIAGNOSIS — E03.9 ACQUIRED HYPOTHYROIDISM: Chronic | ICD-10-CM

## 2017-08-08 RX ORDER — LEVOTHYROXINE SODIUM 0.2 MG/1
200 TABLET ORAL DAILY
Qty: 30 TABLET | Refills: 1 | Status: SHIPPED | OUTPATIENT
Start: 2017-08-08 | End: 2017-09-27 | Stop reason: SDUPTHER

## 2017-08-08 RX ORDER — ERGOCALCIFEROL (VITAMIN D2) 1250 MCG
50000 CAPSULE ORAL WEEKLY
Qty: 4 CAPSULE | Refills: 3 | Status: SHIPPED | OUTPATIENT
Start: 2017-08-08 | End: 2017-11-20 | Stop reason: SDUPTHER

## 2017-08-09 RX ORDER — DULOXETIN HYDROCHLORIDE 30 MG/1
30 CAPSULE, DELAYED RELEASE ORAL DAILY
Qty: 30 CAPSULE | Refills: 3 | Status: SHIPPED | OUTPATIENT
Start: 2017-08-09 | End: 2017-09-26

## 2017-08-09 RX ORDER — PREGABALIN 25 MG/1
25 CAPSULE ORAL 2 TIMES DAILY
Qty: 60 CAPSULE | Refills: 0 | OUTPATIENT
Start: 2017-08-09

## 2017-08-22 DIAGNOSIS — F41.9 ANXIETY: ICD-10-CM

## 2017-08-23 ENCOUNTER — TELEPHONE (OUTPATIENT)
Dept: PRIMARY CARE CLINIC | Age: 47
End: 2017-08-23

## 2017-08-24 RX ORDER — DIAZEPAM 10 MG/1
TABLET ORAL
Qty: 90 TABLET | Refills: 0 | Status: SHIPPED | OUTPATIENT
Start: 2017-08-24 | End: 2017-09-22 | Stop reason: SDUPTHER

## 2017-09-12 RX ORDER — SIMVASTATIN 20 MG
TABLET ORAL
Qty: 30 TABLET | Refills: 5 | Status: SHIPPED | OUTPATIENT
Start: 2017-09-12 | End: 2018-01-01 | Stop reason: SDUPTHER

## 2017-09-20 ENCOUNTER — TELEPHONE (OUTPATIENT)
Dept: PRIMARY CARE CLINIC | Age: 47
End: 2017-09-20

## 2017-09-22 DIAGNOSIS — F41.9 ANXIETY: ICD-10-CM

## 2017-09-26 ENCOUNTER — OFFICE VISIT (OUTPATIENT)
Dept: PRIMARY CARE CLINIC | Age: 47
End: 2017-09-26
Payer: MEDICAID

## 2017-09-26 VITALS
WEIGHT: 315 LBS | OXYGEN SATURATION: 97 % | BODY MASS INDEX: 42.66 KG/M2 | SYSTOLIC BLOOD PRESSURE: 124 MMHG | HEIGHT: 72 IN | DIASTOLIC BLOOD PRESSURE: 88 MMHG | TEMPERATURE: 97.5 F | HEART RATE: 75 BPM

## 2017-09-26 DIAGNOSIS — E03.9 ACQUIRED HYPOTHYROIDISM: Chronic | ICD-10-CM

## 2017-09-26 DIAGNOSIS — I10 ESSENTIAL HYPERTENSION: Chronic | ICD-10-CM

## 2017-09-26 DIAGNOSIS — Z71.3 DIETARY COUNSELING: ICD-10-CM

## 2017-09-26 DIAGNOSIS — E11.9 TYPE 2 DIABETES MELLITUS WITHOUT COMPLICATION, WITH LONG-TERM CURRENT USE OF INSULIN (HCC): Primary | Chronic | ICD-10-CM

## 2017-09-26 DIAGNOSIS — Z79.4 TYPE 2 DIABETES MELLITUS WITHOUT COMPLICATION, WITH LONG-TERM CURRENT USE OF INSULIN (HCC): Primary | Chronic | ICD-10-CM

## 2017-09-26 DIAGNOSIS — E66.01 MORBID OBESITY WITH BMI OF 45.0-49.9, ADULT (HCC): Chronic | ICD-10-CM

## 2017-09-26 DIAGNOSIS — E11.9 TYPE 2 DIABETES MELLITUS WITHOUT COMPLICATION, WITH LONG-TERM CURRENT USE OF INSULIN (HCC): Chronic | ICD-10-CM

## 2017-09-26 DIAGNOSIS — Z79.4 TYPE 2 DIABETES MELLITUS WITHOUT COMPLICATION, WITH LONG-TERM CURRENT USE OF INSULIN (HCC): Chronic | ICD-10-CM

## 2017-09-26 LAB
HBA1C MFR BLD: 10.5 %
TSH SERPL DL<=0.05 MIU/L-ACNC: 2.28 UIU/ML (ref 0.27–4.2)

## 2017-09-26 PROCEDURE — 99401 PREV MED CNSL INDIV APPRX 15: CPT | Performed by: NURSE PRACTITIONER

## 2017-09-26 PROCEDURE — 99214 OFFICE O/P EST MOD 30 MIN: CPT | Performed by: NURSE PRACTITIONER

## 2017-09-26 RX ORDER — PREGABALIN 25 MG/1
25 CAPSULE ORAL 2 TIMES DAILY
Qty: 60 CAPSULE | Refills: 0 | Status: SHIPPED | OUTPATIENT
Start: 2017-09-26 | End: 2017-11-29 | Stop reason: SDUPTHER

## 2017-09-26 RX ORDER — PREGABALIN 25 MG/1
25 CAPSULE ORAL 2 TIMES DAILY
Qty: 60 CAPSULE | Refills: 0 | Status: SHIPPED | OUTPATIENT
Start: 2017-09-26 | End: 2017-09-26 | Stop reason: CLARIF

## 2017-09-26 ASSESSMENT — ENCOUNTER SYMPTOMS
RESPIRATORY NEGATIVE: 1
GASTROINTESTINAL NEGATIVE: 1
EYES NEGATIVE: 1

## 2017-09-27 RX ORDER — PEN NEEDLE, DIABETIC 31 GX5/16"
NEEDLE, DISPOSABLE MISCELLANEOUS
Qty: 100 EACH | Refills: 1 | Status: SHIPPED | OUTPATIENT
Start: 2017-09-27 | End: 2017-12-15 | Stop reason: SDUPTHER

## 2017-09-27 RX ORDER — DIAZEPAM 10 MG/1
TABLET ORAL
Qty: 90 TABLET | Refills: 2 | Status: SHIPPED | OUTPATIENT
Start: 2017-09-27 | End: 2017-12-30 | Stop reason: SDUPTHER

## 2017-09-28 ENCOUNTER — TELEPHONE (OUTPATIENT)
Dept: PRIMARY CARE CLINIC | Age: 47
End: 2017-09-28

## 2017-10-03 RX ORDER — SILDENAFIL CITRATE 20 MG/1
20 TABLET ORAL PRN
Qty: 30 TABLET | Refills: 3 | Status: SHIPPED | OUTPATIENT
Start: 2017-10-03 | End: 2018-04-01 | Stop reason: ALTCHOICE

## 2017-10-12 RX ORDER — INSULIN GLARGINE 100 [IU]/ML
INJECTION, SOLUTION SUBCUTANEOUS
Qty: 15 ML | Refills: 3 | Status: SHIPPED | OUTPATIENT
Start: 2017-10-12 | End: 2017-11-07 | Stop reason: SDUPTHER

## 2017-11-07 ENCOUNTER — OFFICE VISIT (OUTPATIENT)
Dept: PRIMARY CARE CLINIC | Age: 47
End: 2017-11-07
Payer: MEDICAID

## 2017-11-07 VITALS
BODY MASS INDEX: 42.66 KG/M2 | OXYGEN SATURATION: 94 % | HEART RATE: 110 BPM | HEIGHT: 72 IN | TEMPERATURE: 97.4 F | SYSTOLIC BLOOD PRESSURE: 126 MMHG | DIASTOLIC BLOOD PRESSURE: 82 MMHG | WEIGHT: 315 LBS

## 2017-11-07 DIAGNOSIS — K44.9 HIATAL HERNIA WITH GERD: Chronic | ICD-10-CM

## 2017-11-07 DIAGNOSIS — E66.01 MORBID OBESITY WITH BMI OF 45.0-49.9, ADULT (HCC): Chronic | ICD-10-CM

## 2017-11-07 DIAGNOSIS — K21.9 HIATAL HERNIA WITH GERD: Chronic | ICD-10-CM

## 2017-11-07 DIAGNOSIS — E11.9 TYPE 2 DIABETES MELLITUS WITHOUT COMPLICATION, WITH LONG-TERM CURRENT USE OF INSULIN (HCC): Primary | Chronic | ICD-10-CM

## 2017-11-07 DIAGNOSIS — Z79.4 TYPE 2 DIABETES MELLITUS WITHOUT COMPLICATION, WITH LONG-TERM CURRENT USE OF INSULIN (HCC): Primary | Chronic | ICD-10-CM

## 2017-11-07 DIAGNOSIS — Z23 NEEDS FLU SHOT: ICD-10-CM

## 2017-11-07 DIAGNOSIS — R26.9 GAIT ABNORMALITY: ICD-10-CM

## 2017-11-07 PROCEDURE — G8484 FLU IMMUNIZE NO ADMIN: HCPCS | Performed by: FAMILY MEDICINE

## 2017-11-07 PROCEDURE — G8417 CALC BMI ABV UP PARAM F/U: HCPCS | Performed by: FAMILY MEDICINE

## 2017-11-07 PROCEDURE — 90471 IMMUNIZATION ADMIN: CPT | Performed by: FAMILY MEDICINE

## 2017-11-07 PROCEDURE — 99213 OFFICE O/P EST LOW 20 MIN: CPT | Performed by: FAMILY MEDICINE

## 2017-11-07 PROCEDURE — 1036F TOBACCO NON-USER: CPT | Performed by: FAMILY MEDICINE

## 2017-11-07 PROCEDURE — 3046F HEMOGLOBIN A1C LEVEL >9.0%: CPT | Performed by: FAMILY MEDICINE

## 2017-11-07 PROCEDURE — G8427 DOCREV CUR MEDS BY ELIG CLIN: HCPCS | Performed by: FAMILY MEDICINE

## 2017-11-07 PROCEDURE — 90688 IIV4 VACCINE SPLT 0.5 ML IM: CPT | Performed by: FAMILY MEDICINE

## 2017-11-07 ASSESSMENT — ENCOUNTER SYMPTOMS
SHORTNESS OF BREATH: 1
WHEEZING: 0
ABDOMINAL PAIN: 0
COUGH: 0

## 2017-11-07 NOTE — PROGRESS NOTES
After obtaining consent, and per orders of Dr. Oul Dasilva, injection of Flu given in Left deltoid by [facility]. Patient instructed to remain in clinic for 20 minutes afterwards, and to report any adverse reaction to me immediately.
SC injection Inject 1.8 mg into the skin daily 9/26/17  Yes DEMETRIUS Adames   pregabalin (LYRICA) 25 MG capsule Take 1 capsule by mouth 2 times daily 9/26/17  Yes DEMETRIUS Adames   simvastatin (ZOCOR) 20 MG tablet TAKE 1 TABLET BY MOUTH NIGHTLY 9/12/17  Yes Rohini Valencia MD   ergocalciferol (ERGOCALCIFEROL) 16141 units capsule Take 1 capsule by mouth once a week 8/8/17  Yes DEMETRIUS Adames   omeprazole (PRILOSEC) 20 MG delayed release capsule TAKE 1 CAPSULE BY MOUTH DAILY 7/31/17  Yes Rohini Valencia MD   metFORMIN (GLUCOPHAGE) 1000 MG tablet TAKE 1 TABLET BY MOUTH 2 TIMES DAILY (WITH MEALS) 7/31/17  Yes Rohini Valencia MD   empagliflozin (JARDIANCE) 25 MG tablet Take 25 mg by mouth daily 7/26/17  Yes DEMETRIUS Adames   Blood Glucose Monitoring Suppl (FREESTYLE LITE) DON USE AS DIRECTED 7/17/17  Yes Rohini Valencia MD   hydrALAZINE (APRESOLINE) 25 MG tablet TAKE 1 TABLET BY MOUTH 3 TIMES DAILY 3/23/17  Yes Rohini Valencia MD   albuterol sulfate HFA (PROAIR HFA) 108 (90 BASE) MCG/ACT inhaler Inhale 2 puffs into the lungs every 6 hours as needed for Wheezing 3/22/17  Yes MARIANO Seay   glucose monitoring kit (FREESTYLE) monitoring kit 1 kit by Does not apply route daily 2/6/17  Yes Rohini Valencia MD   FREESTYLE LITE strip  10/17/16  Yes Historical Provider, MD   meloxicam (MOBIC) 15 MG tablet Take 15 mg by mouth daily  1/6/17  Yes Historical Provider, MD   allopurinol (ZYLOPRIM) 300 MG tablet Take 300 mg by mouth daily   Yes Historical Provider, MD   tiZANidine (ZANAFLEX) 4 MG tablet Take 4 mg by mouth every 8 hours as needed   Yes Historical Provider, MD   HYDROcodone-acetaminophen (NORCO)  MG per tablet Take 1 tablet by mouth every 8 hours as needed for Pain   Yes Historical Provider, MD       Past Medical History:   Diagnosis Date    Depression     Diabetes mellitus ([facility]     GERD (gastroesophageal reflux disease)     Gout     Hernia    

## 2017-11-08 DIAGNOSIS — E66.01 MORBID OBESITY WITH BMI OF 45.0-49.9, ADULT (HCC): Primary | Chronic | ICD-10-CM

## 2017-11-15 DIAGNOSIS — E11.9 TYPE 2 DIABETES MELLITUS WITHOUT COMPLICATION, WITH LONG-TERM CURRENT USE OF INSULIN (HCC): Chronic | ICD-10-CM

## 2017-11-15 DIAGNOSIS — Z79.4 TYPE 2 DIABETES MELLITUS WITHOUT COMPLICATION, WITH LONG-TERM CURRENT USE OF INSULIN (HCC): Chronic | ICD-10-CM

## 2017-11-16 RX ORDER — EMPAGLIFLOZIN 25 MG/1
TABLET, FILM COATED ORAL
Qty: 30 TABLET | Refills: 3 | Status: SHIPPED | OUTPATIENT
Start: 2017-11-16 | End: 2018-01-01 | Stop reason: SDUPTHER

## 2017-11-20 RX ORDER — ERGOCALCIFEROL 1.25 MG/1
50000 CAPSULE ORAL WEEKLY
Qty: 4 CAPSULE | Refills: 3 | Status: SHIPPED | OUTPATIENT
Start: 2017-11-20 | End: 2018-01-01 | Stop reason: SDUPTHER

## 2017-12-15 RX ORDER — PEN NEEDLE, DIABETIC 31 GX5/16"
NEEDLE, DISPOSABLE MISCELLANEOUS
Qty: 100 EACH | Refills: 1 | Status: SHIPPED | OUTPATIENT
Start: 2017-12-15 | End: 2018-01-01 | Stop reason: SDUPTHER

## 2017-12-30 DIAGNOSIS — F41.9 ANXIETY: ICD-10-CM

## 2018-01-01 DIAGNOSIS — E11.9 TYPE 2 DIABETES MELLITUS WITHOUT COMPLICATION, WITH LONG-TERM CURRENT USE OF INSULIN (HCC): Chronic | ICD-10-CM

## 2018-01-01 DIAGNOSIS — I10 ESSENTIAL HYPERTENSION: Chronic | ICD-10-CM

## 2018-01-01 DIAGNOSIS — Z79.4 TYPE 2 DIABETES MELLITUS WITHOUT COMPLICATION, WITH LONG-TERM CURRENT USE OF INSULIN (HCC): Chronic | ICD-10-CM

## 2018-01-01 DIAGNOSIS — F41.9 ANXIETY: ICD-10-CM

## 2018-01-01 DIAGNOSIS — E03.9 ACQUIRED HYPOTHYROIDISM: Chronic | ICD-10-CM

## 2018-01-01 NOTE — TELEPHONE ENCOUNTER
From: Juanita Rivas  Sent: 12/29/2017 10:27 PM CST  Subject: Medication Renewal Request    Kavin Lock would like a refill of the following medications:  Liraglutide (VICTOZA) 18 MG/3ML SOPN SC injection Jamin Rubi, APRN]  levothyroxine (SYNTHROID) 200 MCG tablet Jamin Rubi, APRN]  JARDIANCE 25 MG tablet Jamin Rubi, APRN]  vitamin D (ERGOCALCIFEROL) 68129 units CAPS capsule Jamin Rubi, APRN]  MUCINEX 600 MG extended release tablet Jamin Rubi, APRN]    Preferred pharmacy: [facility]    Comment:      Medication renewals requested in this message routed separately:  hydrALAZINE (APRESOLINE) 25 MG tablet [Deion Haney MD]  omeprazole (PRILOSEC) 20 MG delayed release capsule [Deion Haney MD]  metFORMIN (GLUCOPHAGE) 1000 MG tablet [Deion Haney MD]  simvastatin (ZOCOR) 20 MG tablet [Deion Haney MD]  diazepam (VALIUM) 10 MG tablet [Deion Haney MD]  fenofibrate (TRICOR) 145 MG tablet [Deion Haney MD]  insulin glargine (BASAGLAR KWIKPEN) 100 UNIT/ML injection pen [Deion Haney MD]  LYRICA 25 MG capsule [Deion Haney MD]  B-D ULTRAFINE III SHORT PEN 31G X 8 MM MISC [Deion Haney MD]

## 2018-01-01 NOTE — TELEPHONE ENCOUNTER
From: Magan Joseluis  Sent: 12/29/2017 10:27 PM CST  Subject: Medication Renewal Request    Kavin Lock would like a refill of the following medications:  hydrALAZINE (APRESOLINE) 25 MG tablet [Deion Cabrera MD]  omeprazole (PRILOSEC) 20 MG delayed release capsule [Deion Cabrera MD]  metFORMIN (GLUCOPHAGE) 1000 MG tablet [Deion Cabrera MD]  simvastatin (ZOCOR) 20 MG tablet [Deion Cabrera MD]  diazepam (VALIUM) 10 MG tablet [Deion Cabrera MD]  fenofibrate (TRICOR) 145 MG tablet [Deion Cabrera MD]  insulin glargine (BASAGLAR KWIKPEN) 100 UNIT/ML injection pen [Deion Cabrera MD]  LYRICA 25 MG capsule [Deion Cabrera MD]  B-D ULTRAFINE III SHORT PEN 31G X 8 MM MISC [Deion Cabrera MD]    Preferred pharmacy: [address], [address] [facility] [address] [phone] - F [phone]    Comment:      Medication renewals requested in this message routed separately:  Liraglutide ([address]) 18 MG/3ML SOPN SC injection DEMETRIUS Meadows]  levothyroxine (SYNTHROID) 200 MCG tablet DEMETRIUS Meadows]  JARDIANCE 25 MG tablet DEMETRIUS Meadows]  vitamin D (ERGOCALCIFEROL) 07278 units CAPS capsule DEMETRIUS Meadows]  MUCINEX 600 MG extended release tablet DEMETRIUS Meadows]

## 2018-01-02 RX ORDER — ERGOCALCIFEROL 1.25 MG/1
50000 CAPSULE ORAL WEEKLY
Qty: 4 CAPSULE | Refills: 3 | Status: SHIPPED | OUTPATIENT
Start: 2018-01-02 | End: 2018-06-14

## 2018-01-02 RX ORDER — DIAZEPAM 10 MG/1
TABLET ORAL
Qty: 90 TABLET | Refills: 2 | Status: SHIPPED | OUTPATIENT
Start: 2018-01-02 | End: 2018-04-09 | Stop reason: SDUPTHER

## 2018-01-02 RX ORDER — PREGABALIN 25 MG/1
25 CAPSULE ORAL 2 TIMES DAILY
Qty: 60 CAPSULE | Refills: 0 | OUTPATIENT
Start: 2018-01-02 | End: 2018-02-01

## 2018-01-02 RX ORDER — OMEPRAZOLE 20 MG/1
20 CAPSULE, DELAYED RELEASE ORAL DAILY
Qty: 30 CAPSULE | Refills: 5 | Status: SHIPPED | OUTPATIENT
Start: 2018-01-02 | End: 2018-04-01

## 2018-01-02 RX ORDER — LEVOTHYROXINE SODIUM 0.2 MG/1
200 TABLET ORAL DAILY
Qty: 30 TABLET | Refills: 11 | Status: SHIPPED | OUTPATIENT
Start: 2018-01-02 | End: 2019-01-24 | Stop reason: SDUPTHER

## 2018-01-02 RX ORDER — PEN NEEDLE, DIABETIC 31 GX5/16"
NEEDLE, DISPOSABLE MISCELLANEOUS
Qty: 100 EACH | Refills: 1 | Status: SHIPPED | OUTPATIENT
Start: 2018-01-02 | End: 2019-02-05 | Stop reason: SDUPTHER

## 2018-01-02 RX ORDER — HYDRALAZINE HYDROCHLORIDE 25 MG/1
25 TABLET, FILM COATED ORAL 3 TIMES DAILY
Qty: 90 TABLET | Refills: 11 | Status: SHIPPED | OUTPATIENT
Start: 2018-01-02 | End: 2018-01-09 | Stop reason: SDUPTHER

## 2018-01-02 RX ORDER — SIMVASTATIN 20 MG
20 TABLET ORAL NIGHTLY
Qty: 30 TABLET | Refills: 5 | Status: SHIPPED | OUTPATIENT
Start: 2018-01-02 | End: 2018-05-02 | Stop reason: ALTCHOICE

## 2018-01-02 RX ORDER — FENOFIBRATE 145 MG/1
145 TABLET, COATED ORAL DAILY
Qty: 30 TABLET | Refills: 3 | Status: SHIPPED | OUTPATIENT
Start: 2018-01-02 | End: 2018-07-31 | Stop reason: SDUPTHER

## 2018-01-02 RX ORDER — DIAZEPAM 10 MG/1
10 TABLET ORAL EVERY 8 HOURS PRN
Qty: 90 TABLET | Refills: 2 | OUTPATIENT
Start: 2018-01-02 | End: 2018-02-01

## 2018-01-02 RX ORDER — GUAIFENESIN 600 MG/1
600 TABLET, EXTENDED RELEASE ORAL 2 TIMES DAILY
Qty: 14 TABLET | Refills: 1 | Status: SHIPPED | OUTPATIENT
Start: 2018-01-02 | End: 2018-04-01 | Stop reason: ALTCHOICE

## 2018-01-09 ENCOUNTER — OFFICE VISIT (OUTPATIENT)
Dept: PRIMARY CARE CLINIC | Age: 48
End: 2018-01-09
Payer: MEDICAID

## 2018-01-09 VITALS
HEIGHT: 72 IN | BODY MASS INDEX: 42.66 KG/M2 | OXYGEN SATURATION: 97 % | SYSTOLIC BLOOD PRESSURE: 138 MMHG | DIASTOLIC BLOOD PRESSURE: 88 MMHG | TEMPERATURE: 97 F | HEART RATE: 103 BPM | WEIGHT: 315 LBS

## 2018-01-09 DIAGNOSIS — Z11.4 ENCOUNTER FOR SCREENING FOR HIV: ICD-10-CM

## 2018-01-09 DIAGNOSIS — E66.01 MORBID OBESITY WITH BMI OF 45.0-49.9, ADULT (HCC): Chronic | ICD-10-CM

## 2018-01-09 DIAGNOSIS — Z79.4 TYPE 2 DIABETES MELLITUS WITHOUT COMPLICATION, WITH LONG-TERM CURRENT USE OF INSULIN (HCC): Primary | Chronic | ICD-10-CM

## 2018-01-09 DIAGNOSIS — E11.9 TYPE 2 DIABETES MELLITUS WITHOUT COMPLICATION, WITH LONG-TERM CURRENT USE OF INSULIN (HCC): Primary | Chronic | ICD-10-CM

## 2018-01-09 DIAGNOSIS — J44.9 CHRONIC OBSTRUCTIVE PULMONARY DISEASE, UNSPECIFIED COPD TYPE (HCC): ICD-10-CM

## 2018-01-09 DIAGNOSIS — I10 ESSENTIAL HYPERTENSION: Chronic | ICD-10-CM

## 2018-01-09 LAB — HBA1C MFR BLD: 7.9 %

## 2018-01-09 PROCEDURE — 3045F PR MOST RECENT HEMOGLOBIN A1C LEVEL 7.0-9.0%: CPT | Performed by: NURSE PRACTITIONER

## 2018-01-09 PROCEDURE — 99214 OFFICE O/P EST MOD 30 MIN: CPT | Performed by: NURSE PRACTITIONER

## 2018-01-09 PROCEDURE — 3023F SPIROM DOC REV: CPT | Performed by: NURSE PRACTITIONER

## 2018-01-09 PROCEDURE — G8926 SPIRO NO PERF OR DOC: HCPCS | Performed by: NURSE PRACTITIONER

## 2018-01-09 PROCEDURE — 1036F TOBACCO NON-USER: CPT | Performed by: NURSE PRACTITIONER

## 2018-01-09 PROCEDURE — 90471 IMMUNIZATION ADMIN: CPT | Performed by: NURSE PRACTITIONER

## 2018-01-09 PROCEDURE — G8427 DOCREV CUR MEDS BY ELIG CLIN: HCPCS | Performed by: NURSE PRACTITIONER

## 2018-01-09 PROCEDURE — 90732 PPSV23 VACC 2 YRS+ SUBQ/IM: CPT | Performed by: NURSE PRACTITIONER

## 2018-01-09 PROCEDURE — 83036 HEMOGLOBIN GLYCOSYLATED A1C: CPT | Performed by: NURSE PRACTITIONER

## 2018-01-09 PROCEDURE — G8484 FLU IMMUNIZE NO ADMIN: HCPCS | Performed by: NURSE PRACTITIONER

## 2018-01-09 PROCEDURE — G8417 CALC BMI ABV UP PARAM F/U: HCPCS | Performed by: NURSE PRACTITIONER

## 2018-01-09 RX ORDER — HYDRALAZINE HYDROCHLORIDE 50 MG/1
50 TABLET, FILM COATED ORAL 3 TIMES DAILY
Qty: 90 TABLET | Refills: 1 | Status: SHIPPED | OUTPATIENT
Start: 2018-01-09 | End: 2018-05-01 | Stop reason: SDUPTHER

## 2018-01-09 RX ORDER — ALBUTEROL SULFATE 90 UG/1
2 AEROSOL, METERED RESPIRATORY (INHALATION) EVERY 6 HOURS PRN
Qty: 1 INHALER | Refills: 3 | Status: SHIPPED | OUTPATIENT
Start: 2018-01-09 | End: 2019-08-07 | Stop reason: ALTCHOICE

## 2018-01-09 ASSESSMENT — ENCOUNTER SYMPTOMS
RESPIRATORY NEGATIVE: 1
GASTROINTESTINAL NEGATIVE: 1
EYES NEGATIVE: 1

## 2018-01-22 DIAGNOSIS — E11.9 TYPE 2 DIABETES MELLITUS WITHOUT COMPLICATION, WITH LONG-TERM CURRENT USE OF INSULIN (HCC): Chronic | ICD-10-CM

## 2018-01-22 DIAGNOSIS — Z79.4 TYPE 2 DIABETES MELLITUS WITHOUT COMPLICATION, WITH LONG-TERM CURRENT USE OF INSULIN (HCC): Chronic | ICD-10-CM

## 2018-01-22 NOTE — TELEPHONE ENCOUNTER
From: Rony Keyes  Sent: 1/17/2018 9:01 PM CST  Subject: Medication Renewal Request    Kavin Lock would like a refill of the following medications:  Liraglutide (VICTOZA) 18 MG/3ML SOPN SC injection Gloria Fairbanks MD]    Preferred pharmacy: [address]    Comment:

## 2018-02-08 NOTE — TELEPHONE ENCOUNTER
[facility] sent request for refill on lyrica-.  Last office visit 1-9 with next scheduled appointment 4-10    Last UDS 7-26  Last fill 1-2  Component Results     Component Collected Lab   Amphetamine Screen, Urine 07/26/2017 12:18 PM Unknown   Neg    Barbiturate Screen, Urine 07/26/2017 12:18 PM Unknown   Neg    Benzodiazepine Screen, Urine 07/26/2017 12:18 PM Unknown   Pos    COCAINE METABOLITE SCREEN URINE 07/26/2017 12:18 PM Unknown   Neg    THC 07/26/2017 12:18 PM Unknown   Neg    MDMA URINE 07/26/2017 12:18 PM Unknown   Neg    Methadone Screen, Urine 07/26/2017 12:18 PM Unknown   Neg    Opiate Scrn, Ur 07/26/2017 12:18 PM Unknown   Pos    Oxycodone Screen, Ur 07/26/2017 12:18 PM Unknown   Neg    PCP Scrn, Ur 07/26/2017 12:18 PM Unknown   Neg    Propoxyphene Screen, Urine 07/26/2017 12:18 PM Unknown   Neg    Tricyclic Antidepressants, Ur 07/26/2017 12:18 PM Unknown   Neg    Methamphetamine, Urine 07/26/2017 12:18 PM Unknown   Neg    Lab and Collection     POCT Rapid Drug Screen on 7/26/2017

## 2018-02-09 NOTE — TELEPHONE ENCOUNTER
KURT was reviewed today per office protocol. Report shows No discrepancies. Fill pattern is consistent from single provider(s) at single pharmacy(s). Prescription escribed.  Patient is aware to check within the pharmacy

## 2018-02-10 RX ORDER — PREGABALIN 25 MG/1
CAPSULE ORAL
Qty: 60 CAPSULE | Refills: 0 | Status: CANCELLED | OUTPATIENT
Start: 2018-02-10 | End: 2018-03-12

## 2018-02-10 NOTE — TELEPHONE ENCOUNTER
From: Marci Pill  Sent: 2/8/2018 5:08 PM CST  Subject: Medication Renewal Request    K[place]uille Pelaezse would like a refill of the following medications:  LYRICA 25 MG capsule Sarah Gee MD]    Preferred pharmacy: [facility]    Comment:  6 DAYS PAST DUE

## 2018-02-13 ENCOUNTER — OFFICE VISIT (OUTPATIENT)
Dept: PRIMARY CARE CLINIC | Age: 48
End: 2018-02-13
Payer: MEDICAID

## 2018-02-13 VITALS
DIASTOLIC BLOOD PRESSURE: 82 MMHG | TEMPERATURE: 96.7 F | BODY MASS INDEX: 42.66 KG/M2 | HEIGHT: 72 IN | WEIGHT: 315 LBS | HEART RATE: 92 BPM | SYSTOLIC BLOOD PRESSURE: 130 MMHG

## 2018-02-13 DIAGNOSIS — E66.01 MORBID OBESITY WITH BMI OF 45.0-49.9, ADULT (HCC): Chronic | ICD-10-CM

## 2018-02-13 DIAGNOSIS — E11.9 TYPE 2 DIABETES MELLITUS WITHOUT COMPLICATION, WITHOUT LONG-TERM CURRENT USE OF INSULIN (HCC): Chronic | ICD-10-CM

## 2018-02-13 DIAGNOSIS — G62.9 NEUROPATHY: Primary | ICD-10-CM

## 2018-02-13 DIAGNOSIS — K44.9 HIATAL HERNIA WITH GERD: Chronic | ICD-10-CM

## 2018-02-13 DIAGNOSIS — K21.9 HIATAL HERNIA WITH GERD: Chronic | ICD-10-CM

## 2018-02-13 PROCEDURE — 3045F PR MOST RECENT HEMOGLOBIN A1C LEVEL 7.0-9.0%: CPT | Performed by: NURSE PRACTITIONER

## 2018-02-13 PROCEDURE — 1036F TOBACCO NON-USER: CPT | Performed by: NURSE PRACTITIONER

## 2018-02-13 PROCEDURE — 99214 OFFICE O/P EST MOD 30 MIN: CPT | Performed by: NURSE PRACTITIONER

## 2018-02-13 PROCEDURE — G8484 FLU IMMUNIZE NO ADMIN: HCPCS | Performed by: NURSE PRACTITIONER

## 2018-02-13 PROCEDURE — G8427 DOCREV CUR MEDS BY ELIG CLIN: HCPCS | Performed by: NURSE PRACTITIONER

## 2018-02-13 PROCEDURE — G8417 CALC BMI ABV UP PARAM F/U: HCPCS | Performed by: NURSE PRACTITIONER

## 2018-02-13 RX ORDER — PREGABALIN 25 MG/1
25 CAPSULE ORAL 2 TIMES DAILY
Qty: 60 CAPSULE | Refills: 2 | Status: SHIPPED | OUTPATIENT
Start: 2018-02-13 | End: 2018-06-14

## 2018-02-13 ASSESSMENT — ENCOUNTER SYMPTOMS
DIARRHEA: 0
VOMITING: 0
CONSTIPATION: 0
BLOOD IN STOOL: 0
ABDOMINAL PAIN: 0
SHORTNESS OF BREATH: 0
EYE REDNESS: 0
COUGH: 0
VOICE CHANGE: 0
TROUBLE SWALLOWING: 1
CHEST TIGHTNESS: 0
NAUSEA: 0
RHINORRHEA: 0
SORE THROAT: 0
WHEEZING: 0

## 2018-02-14 ENCOUNTER — OUTSIDE FACILITY SERVICE (OUTPATIENT)
Dept: CARDIOLOGY | Facility: CLINIC | Age: 48
End: 2018-02-14

## 2018-02-14 PROCEDURE — 93010 ELECTROCARDIOGRAM REPORT: CPT | Performed by: INTERNAL MEDICINE

## 2018-03-02 ENCOUNTER — OUTSIDE FACILITY SERVICE (OUTPATIENT)
Dept: CARDIOLOGY | Facility: CLINIC | Age: 48
End: 2018-03-02

## 2018-03-02 PROCEDURE — 99254 IP/OBS CNSLTJ NEW/EST MOD 60: CPT | Performed by: NURSE PRACTITIONER

## 2018-03-05 ENCOUNTER — OUTSIDE FACILITY SERVICE (OUTPATIENT)
Dept: CARDIOLOGY | Facility: CLINIC | Age: 48
End: 2018-03-05

## 2018-03-05 PROCEDURE — 99232 SBSQ HOSP IP/OBS MODERATE 35: CPT | Performed by: NURSE PRACTITIONER

## 2018-03-06 ENCOUNTER — OUTSIDE FACILITY SERVICE (OUTPATIENT)
Dept: CARDIOLOGY | Facility: CLINIC | Age: 48
End: 2018-03-06

## 2018-03-06 PROCEDURE — 99232 SBSQ HOSP IP/OBS MODERATE 35: CPT | Performed by: NURSE PRACTITIONER

## 2018-03-06 PROCEDURE — 93306 TTE W/DOPPLER COMPLETE: CPT | Performed by: INTERNAL MEDICINE

## 2018-03-07 ENCOUNTER — OUTSIDE FACILITY SERVICE (OUTPATIENT)
Dept: CARDIOLOGY | Facility: CLINIC | Age: 48
End: 2018-03-07

## 2018-03-07 PROCEDURE — 99232 SBSQ HOSP IP/OBS MODERATE 35: CPT | Performed by: INTERNAL MEDICINE

## 2018-04-01 ENCOUNTER — APPOINTMENT (OUTPATIENT)
Dept: GENERAL RADIOLOGY | Age: 48
End: 2018-04-01
Payer: MEDICAID

## 2018-04-01 ENCOUNTER — HOSPITAL ENCOUNTER (EMERGENCY)
Facility: HOSPITAL | Age: 48
Discharge: LEFT BEFORE DISCHARGE | End: 2018-04-01
Admitting: NURSE PRACTITIONER

## 2018-04-01 ENCOUNTER — HOSPITAL ENCOUNTER (EMERGENCY)
Age: 48
Discharge: HOME | End: 2018-04-02
Attending: EMERGENCY MEDICINE
Payer: MEDICAID

## 2018-04-01 ENCOUNTER — APPOINTMENT (OUTPATIENT)
Dept: CT IMAGING | Facility: HOSPITAL | Age: 48
End: 2018-04-01

## 2018-04-01 ENCOUNTER — APPOINTMENT (OUTPATIENT)
Dept: GENERAL RADIOLOGY | Facility: HOSPITAL | Age: 48
End: 2018-04-01

## 2018-04-01 VITALS
WEIGHT: 315 LBS | RESPIRATION RATE: 24 BRPM | OXYGEN SATURATION: 95 % | TEMPERATURE: 102.2 F | DIASTOLIC BLOOD PRESSURE: 86 MMHG | HEART RATE: 127 BPM | SYSTOLIC BLOOD PRESSURE: 137 MMHG | HEIGHT: 72 IN | BODY MASS INDEX: 42.66 KG/M2

## 2018-04-01 DIAGNOSIS — J18.9 PNEUMONIA OF RIGHT LOWER LOBE DUE TO INFECTIOUS ORGANISM: Primary | ICD-10-CM

## 2018-04-01 DIAGNOSIS — J18.9 PNEUMONIA OF RIGHT LUNG DUE TO INFECTIOUS ORGANISM, UNSPECIFIED PART OF LUNG: Primary | ICD-10-CM

## 2018-04-01 LAB
ALBUMIN SERPL-MCNC: 3.4 G/DL (ref 3.5–5.2)
ALP BLD-CCNC: 85 U/L (ref 40–130)
ALT SERPL-CCNC: 19 U/L (ref 5–41)
ANION GAP SERPL CALCULATED.3IONS-SCNC: 12 MMOL/L (ref 7–19)
APTT: 33.2 SEC (ref 26–36.2)
AST SERPL-CCNC: 20 U/L (ref 5–40)
BILIRUB SERPL-MCNC: 0.4 MG/DL (ref 0.2–1.2)
BILIRUB UR QL STRIP: NEGATIVE
BUN BLDV-MCNC: 6 MG/DL (ref 6–20)
CALCIUM SERPL-MCNC: 9 MG/DL (ref 8.6–10)
CHLORIDE BLD-SCNC: 95 MMOL/L (ref 98–111)
CLARITY UR: CLEAR
CO2: 28 MMOL/L (ref 22–29)
COLOR UR: YELLOW
CREAT SERPL-MCNC: 0.8 MG/DL (ref 0.5–1.2)
FLUAV AG NPH QL: NEGATIVE
FLUBV AG NPH QL IA: NEGATIVE
GFR NON-AFRICAN AMERICAN: >60
GLUCOSE BLD-MCNC: 196 MG/DL (ref 74–109)
GLUCOSE UR STRIP-MCNC: ABNORMAL MG/DL
HCT VFR BLD CALC: 39.6 % (ref 42–52)
HEMOGLOBIN: 12.5 G/DL (ref 14–18)
HGB UR QL STRIP.AUTO: NEGATIVE
INR BLD: 1 (ref 0.88–1.18)
KETONES UR QL STRIP: NEGATIVE
LEUKOCYTE ESTERASE UR QL STRIP.AUTO: NEGATIVE
MCH RBC QN AUTO: 28.7 PG (ref 27–31)
MCHC RBC AUTO-ENTMCNC: 31.6 G/DL (ref 33–37)
MCV RBC AUTO: 91 FL (ref 80–94)
NITRITE UR QL STRIP: NEGATIVE
PDW BLD-RTO: 14.4 % (ref 11.5–14.5)
PH UR STRIP.AUTO: 8 [PH] (ref 5–8)
PLATELET # BLD: 325 K/UL (ref 130–400)
PMV BLD AUTO: 10.4 FL (ref 9.4–12.4)
POTASSIUM SERPL-SCNC: 4.8 MMOL/L (ref 3.5–5)
PRO-BNP: 177 PG/ML (ref 0–450)
PROT UR QL STRIP: NEGATIVE
PROTHROMBIN TIME: 13.1 SEC (ref 12–14.6)
RBC # BLD: 4.35 M/UL (ref 4.7–6.1)
SODIUM BLD-SCNC: 135 MMOL/L (ref 136–145)
SP GR UR STRIP: 1.02 (ref 1–1.03)
TOTAL PROTEIN: 6.5 G/DL (ref 6.6–8.7)
TROPONIN: 0.02 NG/ML (ref 0–0.03)
UROBILINOGEN UR QL STRIP: ABNORMAL
WBC # BLD: 9.3 K/UL (ref 4.8–10.8)

## 2018-04-01 PROCEDURE — 93005 ELECTROCARDIOGRAM TRACING: CPT | Performed by: NURSE PRACTITIONER

## 2018-04-01 PROCEDURE — 93005 ELECTROCARDIOGRAM TRACING: CPT

## 2018-04-01 PROCEDURE — 85610 PROTHROMBIN TIME: CPT

## 2018-04-01 PROCEDURE — 85027 COMPLETE CBC AUTOMATED: CPT

## 2018-04-01 PROCEDURE — 71045 X-RAY EXAM CHEST 1 VIEW: CPT

## 2018-04-01 PROCEDURE — 85730 THROMBOPLASTIN TIME PARTIAL: CPT

## 2018-04-01 PROCEDURE — 99285 EMERGENCY DEPT VISIT HI MDM: CPT

## 2018-04-01 PROCEDURE — 93010 ELECTROCARDIOGRAM REPORT: CPT | Performed by: INTERNAL MEDICINE

## 2018-04-01 PROCEDURE — 80053 COMPREHEN METABOLIC PANEL: CPT

## 2018-04-01 PROCEDURE — 99283 EMERGENCY DEPT VISIT LOW MDM: CPT

## 2018-04-01 PROCEDURE — 6360000002 HC RX W HCPCS: Performed by: EMERGENCY MEDICINE

## 2018-04-01 PROCEDURE — 81003 URINALYSIS AUTO W/O SCOPE: CPT | Performed by: NURSE PRACTITIONER

## 2018-04-01 PROCEDURE — 36415 COLL VENOUS BLD VENIPUNCTURE: CPT

## 2018-04-01 PROCEDURE — 87804 INFLUENZA ASSAY W/OPTIC: CPT | Performed by: NURSE PRACTITIONER

## 2018-04-01 PROCEDURE — 83880 ASSAY OF NATRIURETIC PEPTIDE: CPT

## 2018-04-01 PROCEDURE — 87040 BLOOD CULTURE FOR BACTERIA: CPT

## 2018-04-01 PROCEDURE — 84484 ASSAY OF TROPONIN QUANT: CPT

## 2018-04-01 PROCEDURE — 96365 THER/PROPH/DIAG IV INF INIT: CPT

## 2018-04-01 PROCEDURE — 36415 COLL VENOUS BLD VENIPUNCTURE: CPT | Performed by: NURSE PRACTITIONER

## 2018-04-01 PROCEDURE — 87040 BLOOD CULTURE FOR BACTERIA: CPT | Performed by: NURSE PRACTITIONER

## 2018-04-01 RX ORDER — SODIUM CHLORIDE 0.9 % (FLUSH) 0.9 %
10 SYRINGE (ML) INJECTION AS NEEDED
Status: DISCONTINUED | OUTPATIENT
Start: 2018-04-01 | End: 2018-04-01 | Stop reason: HOSPADM

## 2018-04-01 RX ORDER — PANTOPRAZOLE SODIUM 40 MG/1
40 TABLET, DELAYED RELEASE ORAL DAILY
COMMUNITY
End: 2018-06-14

## 2018-04-01 RX ORDER — IBUPROFEN 200 MG
800 TABLET ORAL ONCE
Status: COMPLETED | OUTPATIENT
Start: 2018-04-01 | End: 2018-04-02

## 2018-04-01 RX ORDER — METOCLOPRAMIDE 10 MG/1
10 TABLET ORAL 4 TIMES DAILY
COMMUNITY
End: 2018-06-14

## 2018-04-01 RX ORDER — MECLIZINE HYDROCHLORIDE 25 MG/1
25 TABLET ORAL 3 TIMES DAILY PRN
COMMUNITY
End: 2018-05-02 | Stop reason: ALTCHOICE

## 2018-04-01 RX ORDER — PROMETHAZINE HYDROCHLORIDE 12.5 MG/1
12.5 TABLET ORAL EVERY 4 HOURS
COMMUNITY

## 2018-04-01 RX ORDER — PANTOPRAZOLE SODIUM 40 MG/1
40 TABLET, DELAYED RELEASE ORAL DAILY
COMMUNITY
End: 2019-08-23

## 2018-04-01 RX ORDER — OXYCODONE AND ACETAMINOPHEN 10; 325 MG/1; MG/1
1 TABLET ORAL EVERY 4 HOURS PRN
COMMUNITY
End: 2018-04-24

## 2018-04-01 RX ORDER — PROMETHAZINE HYDROCHLORIDE 25 MG/1
25 TABLET ORAL EVERY 6 HOURS PRN
COMMUNITY
End: 2018-06-14

## 2018-04-01 RX ORDER — METOCLOPRAMIDE 10 MG/1
10 TABLET ORAL
COMMUNITY
End: 2019-04-24

## 2018-04-01 RX ORDER — LEVOFLOXACIN 5 MG/ML
750 INJECTION, SOLUTION INTRAVENOUS ONCE
Status: DISCONTINUED | OUTPATIENT
Start: 2018-04-01 | End: 2018-04-01 | Stop reason: HOSPADM

## 2018-04-01 RX ORDER — ACETAMINOPHEN 500 MG
1000 TABLET ORAL ONCE
Status: DISCONTINUED | OUTPATIENT
Start: 2018-04-01 | End: 2018-04-01 | Stop reason: HOSPADM

## 2018-04-01 RX ORDER — ONDANSETRON 4 MG/1
4 TABLET, ORALLY DISINTEGRATING ORAL EVERY 8 HOURS PRN
COMMUNITY
End: 2019-04-24

## 2018-04-01 RX ORDER — ONDANSETRON 4 MG/1
4 TABLET, FILM COATED ORAL EVERY 8 HOURS PRN
COMMUNITY
End: 2018-05-02 | Stop reason: ALTCHOICE

## 2018-04-01 RX ORDER — ACETAMINOPHEN 500 MG
1000 TABLET ORAL ONCE
Status: COMPLETED | OUTPATIENT
Start: 2018-04-01 | End: 2018-04-02

## 2018-04-01 RX ADMIN — TAZOBACTAM SODIUM AND PIPERACILLIN SODIUM 4.5 G: 500; 4 INJECTION, SOLUTION INTRAVENOUS at 22:43

## 2018-04-01 ASSESSMENT — ENCOUNTER SYMPTOMS
DIARRHEA: 0
COLOR CHANGE: 0
ABDOMINAL PAIN: 0
NAUSEA: 0
CONSTIPATION: 0
PHOTOPHOBIA: 0
BACK PAIN: 0
SHORTNESS OF BREATH: 1
EYE PAIN: 0
TROUBLE SWALLOWING: 0
SINUS PRESSURE: 0
CHEST TIGHTNESS: 0
WHEEZING: 0
VOMITING: 0
COUGH: 1

## 2018-04-01 ASSESSMENT — PAIN SCALES - GENERAL: PAINLEVEL_OUTOF10: 8

## 2018-04-01 ASSESSMENT — PAIN DESCRIPTION - LOCATION: LOCATION: ABDOMEN;CHEST

## 2018-04-02 VITALS
TEMPERATURE: 98.4 F | WEIGHT: 315 LBS | BODY MASS INDEX: 42.66 KG/M2 | HEART RATE: 90 BPM | DIASTOLIC BLOOD PRESSURE: 83 MMHG | SYSTOLIC BLOOD PRESSURE: 111 MMHG | OXYGEN SATURATION: 95 % | HEIGHT: 72 IN | RESPIRATION RATE: 16 BRPM

## 2018-04-02 PROCEDURE — 6360000002 HC RX W HCPCS: Performed by: EMERGENCY MEDICINE

## 2018-04-02 PROCEDURE — 99284 EMERGENCY DEPT VISIT MOD MDM: CPT | Performed by: EMERGENCY MEDICINE

## 2018-04-02 PROCEDURE — 96375 TX/PRO/DX INJ NEW DRUG ADDON: CPT

## 2018-04-02 PROCEDURE — 6370000000 HC RX 637 (ALT 250 FOR IP): Performed by: EMERGENCY MEDICINE

## 2018-04-02 RX ORDER — METOCLOPRAMIDE HYDROCHLORIDE 5 MG/ML
10 INJECTION INTRAMUSCULAR; INTRAVENOUS ONCE
Status: COMPLETED | OUTPATIENT
Start: 2018-04-02 | End: 2018-04-02

## 2018-04-02 RX ORDER — PROMETHAZINE HYDROCHLORIDE 25 MG/ML
25 INJECTION, SOLUTION INTRAMUSCULAR; INTRAVENOUS ONCE
Status: COMPLETED | OUTPATIENT
Start: 2018-04-02 | End: 2018-04-02

## 2018-04-02 RX ORDER — AZITHROMYCIN 250 MG/1
TABLET, FILM COATED ORAL
Qty: 1 PACKET | Refills: 0 | Status: SHIPPED | OUTPATIENT
Start: 2018-04-02 | End: 2018-04-12

## 2018-04-02 RX ADMIN — METOCLOPRAMIDE 10 MG: 5 INJECTION, SOLUTION INTRAMUSCULAR; INTRAVENOUS at 00:44

## 2018-04-02 RX ADMIN — IBUPROFEN 800 MG: 200 TABLET, FILM COATED ORAL at 00:31

## 2018-04-02 RX ADMIN — PROMETHAZINE HYDROCHLORIDE 25 MG: 25 INJECTION INTRAMUSCULAR; INTRAVENOUS at 00:44

## 2018-04-02 RX ADMIN — ACETAMINOPHEN 1000 MG: 500 TABLET ORAL at 00:31

## 2018-04-02 ASSESSMENT — PAIN SCALES - GENERAL
PAINLEVEL_OUTOF10: 8
PAINLEVEL_OUTOF10: 2

## 2018-04-02 NOTE — ED PROVIDER NOTES
Subjective   Patient is a 48-year-old -American male that presents to the ER today with complaint of fever and cough.  The patient reports that he had a hiatal hernia repair and gastric sleeve surgery done on 02/27/2018 at [facility] by Drs. the patient states that since that surgery he has been in and out of the hospital and has been admitted for a total of 23 days.  He states that he was admitted last week for fever, tachycardia, and nausea vomiting.  Patient reports that he feels like there is something into his esophagus.  He states that when his hernia was repaired visit that mesh in his esophagus to help stretch this.  Patient states that he did talk with the surgeon, Dr. Cox about this.  The patient states that he continues to feel like something is caught in his esophagus.  Reports that for the past 3 days he has had a cough with thick green phlegm.  The patient reports that today he had a fever of 102.  He states since his surgery he has had fevers.  The patient reports that he is having pain to his epigastric area.  He states that he again has had recent surgery however has been worse over the past several days.  He states that he is still having some vomiting at home however it is not as bad as last week.  The patient states that today he developed a temperature of 102 at home and so he was brought to this ER for further evaluation.  Patient has history of hypertension, morbid obesity, diabetes, and atrial fibrillation.  He states that he is not currently on any medications for this illness.  The following up with his primary care provider, Dr. Escoto, next week for this.  Patient presents to the ER today for further evaluation.         History provided by:  Patient   used: No    Fever   Max temp prior to arrival:  102.0  Temp source:  Oral  Severity:  Mild  Onset quality:  Sudden  Duration: intermittently for 1 week.  Timing:  Intermittent  Progression:   Unchanged  Chronicity:  New  Relieved by:  Nothing  Worsened by:  Nothing  Ineffective treatments:  None tried  Associated symptoms: chest pain, cough and sore throat    Associated symptoms: no chills, no confusion, no congestion, no diarrhea, no dysuria, no ear pain, no headaches, no myalgias, no nausea, no rash, no rhinorrhea, no somnolence and no vomiting    Risk factors: no contaminated food, no contaminated water, no hx of cancer, no immunosuppression, no occupational exposure, no recent sickness, no recent travel and no sick contacts        Review of Systems   Constitutional: Positive for fever. Negative for chills.   HENT: Positive for sore throat. Negative for congestion, ear pain and rhinorrhea.    Respiratory: Positive for cough.    Cardiovascular: Positive for chest pain.   Gastrointestinal: Negative for diarrhea, nausea and vomiting.   Genitourinary: Negative for dysuria.   Musculoskeletal: Negative for myalgias.   Skin: Negative for rash.   Neurological: Negative for headaches.   Psychiatric/Behavioral: Negative for confusion.   All other systems reviewed and are negative.      Past Medical History:   Diagnosis Date   • Arthritis    • Diabetes mellitus    • Disease of thyroid gland    • GERD (gastroesophageal reflux disease)    • Hernia    • Hyperlipidemia    • Hypertension        No Known Allergies    Past Surgical History:   Procedure Laterality Date   • GASTRIC SLEEVE LAPAROSCOPIC     • NISSEN FUNDOPLICATION         Family History   Problem Relation Age of Onset   • No Known Problems Mother    • Diabetes Father    • Hyperlipidemia Father    • Hypertension Father    • No Known Problems Sister    • Cancer Brother        Social History     Social History   • Marital status:      Social History Main Topics   • Smoking status: Never Smoker   • Alcohol use No   • Drug use: No   • Sexual activity: Yes     Partners: Female     Other Topics Concern   • Not on file           Objective   Physical Exam    Constitutional: He is oriented to person, place, and time. He appears well-developed and well-nourished.   HENT:   Head: Normocephalic and atraumatic.   Cardiovascular: Regular rhythm and normal heart sounds.  Tachycardia present.    Pulmonary/Chest: Effort normal. He has wheezes in the right middle field, the right lower field and the left lower field.   Abdominal: Soft. Bowel sounds are normal.       Neurological: He is alert and oriented to person, place, and time.   Skin: Skin is warm and dry.   Psychiatric: He has a normal mood and affect.   Nursing note and vitals reviewed.      Procedures         ED Course  ED Course   Comment By Time   The nursing staff has attempted several time for IV placement; unable to obtain IV access or lab work. Discussed with pt and discussed placing central line for labs, IV fluids and ABX. The pt has declined. He states that he will not have a central line placed. The pt states that he was admitted at [facility] without an IV placed and that he does not feel like a central line is needed. The pt states that he will go to [facility] instead at this time. I have advised the pt that Xray shows probably pnemonia and that his HR is elevated, is febrile, he feels short of breath and is having epigastric pain and chest pain.  Edith Arreguin, APRN 04/01 2103   Both I and Dr. Mcfadden went in to speak with the pt about his decision to leave. I, as well as Dr. Mcfadden, have advised the pt that I would recommend that he stay for further treatment. This was discussed with pt and his wife. Both understand the risk of leaving AMA including permanent impairement or death. They both understand this , are competent to make this decision and have decided to leave to go to [facility]. Pt has refused to sign AMA form, as has pt wife per charge nurse and will leave A at this time.  Edith Arreguin, APRN 04/01 2107      XR Chest 1 View   Final Result   Findings concerning for developing  pneumonia on the right.   This report was finalized on 04/01/2018 19:37 by Dr. Faheem Gamboa MD.      CT Angiogram Chest With Contrast    (Results Pending)   CT Abdomen Pelvis With Contrast    (Results Pending)     Lab Results (last 24 hours)     Procedure Component Value Units Date/Time    Influenza Antigen, Rapid - Swab, Nasopharynx [[id number]]  (Normal) Collected:  04/01/18 2031    Specimen:  Swab from Nasopharynx Updated:  04/01/18 2057     Influenza A Ag, EIA Negative     Influenza B Ag, EIA Negative    Narrative:         Recommend confirmation of negative results by viral culture or molecular assay.    Urinalysis With / Culture If Indicated - Urine, Clean Catch [[id number]]  (Abnormal) Collected:  04/01/18 2033    Specimen:  Urine from Urine, Clean Catch Updated:  04/01/18 2047     Color, UA Yellow     Appearance, UA Clear     pH, UA 8.0     Specific Gravity, UA 1.020     Glucose, UA >=1000 mg/dL (3+) (A)     Ketones, UA Negative     Bilirubin, UA Negative     Blood, UA Negative     Protein, UA Negative     Leuk Esterase, UA Negative     Nitrite, UA Negative     Urobilinogen, UA 0.2 E.U./dL    Narrative:       Urine microscopic not indicated.    Blood Culture - Blood, [[id number]] Collected:  04/01/18 2059    Specimen:  Blood from Arm, Right Updated:  04/01/18 2105    Blood Culture - Blood, [[id number]] Collected:  04/01/18 2059    Specimen:  Blood from Arm, Right Updated:  04/01/18 2105                  MDM  Number of Diagnoses or Management Options  Pneumonia of right lung due to infectious organism, unspecified part of lung: new and requires workup     Amount and/or Complexity of Data Reviewed  Clinical lab tests: ordered  Tests in the radiology section of CPT®: ordered and reviewed  Tests in the medicine section of CPT®: ordered and reviewed  Discuss the patient with other providers: yes    Risk of Complications, Morbidity, and/or Mortality  General comments: Pt left AMA        Final diagnoses:   Pneumonia  of right lung due to infectious organism, unspecified part of lung            Edith Arreguin, APRN  04/01/18 9277

## 2018-04-02 NOTE — ED NOTES
"Pt had been stuck multiple times by multiple staff members. Pt very agitated. Explained to pt that we need to get IV access. Pt states \"Nope I'm going to [place] I didn't know you all was quitters. If you aren't getting it in my arms then you aren't getting it.\" Dr. Mcfadden and MEME Sharma at bedside to explain risks of leaving. Pt still very agitated and putting on clothes. Pt presented with AMA paper, and states \"I ain't signing that.\" This was documented on the AMA form and the pt left ambulatory with a family member.      Joe Cabrales RN  04/01/18 2115    "

## 2018-04-04 LAB
EKG P AXIS: 53 DEGREES
EKG P-R INTERVAL: 130 MS
EKG Q-T INTERVAL: 268 MS
EKG QRS DURATION: 80 MS
EKG QTC CALCULATION (BAZETT): 385 MS
EKG T AXIS: -59 DEGREES

## 2018-04-06 LAB
BACTERIA SPEC AEROBE CULT: NORMAL
BACTERIA SPEC AEROBE CULT: NORMAL

## 2018-04-07 LAB
BLOOD CULTURE, ROUTINE: NORMAL
CULTURE, BLOOD 2: NORMAL

## 2018-04-09 DIAGNOSIS — F41.9 ANXIETY: ICD-10-CM

## 2018-04-09 RX ORDER — DIAZEPAM 10 MG/1
TABLET ORAL
Qty: 90 TABLET | Refills: 2 | Status: SHIPPED | OUTPATIENT
Start: 2018-04-09 | End: 2018-07-09 | Stop reason: SDUPTHER

## 2018-04-24 ENCOUNTER — TELEPHONE (OUTPATIENT)
Dept: PRIMARY CARE CLINIC | Age: 48
End: 2018-04-24

## 2018-04-24 ENCOUNTER — OFFICE VISIT (OUTPATIENT)
Dept: PRIMARY CARE CLINIC | Age: 48
End: 2018-04-24
Payer: MEDICAID

## 2018-04-24 ENCOUNTER — HOSPITAL ENCOUNTER (OUTPATIENT)
Dept: GENERAL RADIOLOGY | Age: 48
Discharge: HOME | End: 2018-04-24
Payer: MEDICAID

## 2018-04-24 VITALS
HEIGHT: 72 IN | SYSTOLIC BLOOD PRESSURE: 122 MMHG | WEIGHT: 299 LBS | HEART RATE: 115 BPM | TEMPERATURE: 97.7 F | DIASTOLIC BLOOD PRESSURE: 76 MMHG | OXYGEN SATURATION: 97 % | BODY MASS INDEX: 40.5 KG/M2

## 2018-04-24 DIAGNOSIS — Z13.220 SCREENING FOR HYPERLIPIDEMIA: ICD-10-CM

## 2018-04-24 DIAGNOSIS — I48.0 PAROXYSMAL ATRIAL FIBRILLATION (HCC): ICD-10-CM

## 2018-04-24 DIAGNOSIS — Z11.4 ENCOUNTER FOR SCREENING FOR HIV: ICD-10-CM

## 2018-04-24 DIAGNOSIS — E11.9 TYPE 2 DIABETES MELLITUS WITHOUT COMPLICATION, WITHOUT LONG-TERM CURRENT USE OF INSULIN (HCC): Chronic | ICD-10-CM

## 2018-04-24 DIAGNOSIS — Z90.3 HISTORY OF SLEEVE GASTRECTOMY: ICD-10-CM

## 2018-04-24 DIAGNOSIS — E11.9 TYPE 2 DIABETES MELLITUS WITHOUT COMPLICATION, WITHOUT LONG-TERM CURRENT USE OF INSULIN (HCC): Primary | Chronic | ICD-10-CM

## 2018-04-24 LAB
ALBUMIN SERPL-MCNC: 4.1 G/DL (ref 3.5–5.2)
ALP BLD-CCNC: 53 U/L (ref 40–130)
ALT SERPL-CCNC: 11 U/L (ref 5–41)
ANION GAP SERPL CALCULATED.3IONS-SCNC: 18 MMOL/L (ref 7–19)
AST SERPL-CCNC: 15 U/L (ref 5–40)
BILIRUB SERPL-MCNC: <0.2 MG/DL (ref 0.2–1.2)
BUN BLDV-MCNC: 9 MG/DL (ref 6–20)
CALCIUM SERPL-MCNC: 9.8 MG/DL (ref 8.6–10)
CHLORIDE BLD-SCNC: 103 MMOL/L (ref 98–111)
CHOLESTEROL, TOTAL: 156 MG/DL (ref 160–199)
CO2: 22 MMOL/L (ref 22–29)
CREAT SERPL-MCNC: 0.9 MG/DL (ref 0.5–1.2)
CREATININE URINE: 171 MG/DL (ref 4.2–622)
GFR NON-AFRICAN AMERICAN: >60
GLUCOSE BLD-MCNC: 103 MG/DL (ref 74–109)
HBA1C MFR BLD: 6.6 %
HCT VFR BLD CALC: 42.3 % (ref 42–52)
HDLC SERPL-MCNC: 36 MG/DL (ref 55–121)
HEMOGLOBIN: 13.1 G/DL (ref 14–18)
LDL CHOLESTEROL CALCULATED: 74 MG/DL
MCH RBC QN AUTO: 28.5 PG (ref 27–31)
MCHC RBC AUTO-ENTMCNC: 31 G/DL (ref 33–37)
MCV RBC AUTO: 92.2 FL (ref 80–94)
MICROALBUMIN UR-MCNC: <1.2 MG/DL (ref 0–19)
MICROALBUMIN/CREAT UR-RTO: NORMAL MG/G
PDW BLD-RTO: 14.2 % (ref 11.5–14.5)
PLATELET # BLD: 383 K/UL (ref 130–400)
PMV BLD AUTO: 11.3 FL (ref 9.4–12.4)
POTASSIUM SERPL-SCNC: 4.3 MMOL/L (ref 3.5–5)
RAPID HIV 1&2: NORMAL
RBC # BLD: 4.59 M/UL (ref 4.7–6.1)
SODIUM BLD-SCNC: 143 MMOL/L (ref 136–145)
TOTAL PROTEIN: 7.6 G/DL (ref 6.6–8.7)
TRIGL SERPL-MCNC: 231 MG/DL (ref 0–149)
TSH SERPL DL<=0.05 MIU/L-ACNC: 2.07 UIU/ML (ref 0.27–4.2)
WBC # BLD: 5 K/UL (ref 4.8–10.8)

## 2018-04-24 PROCEDURE — G8417 CALC BMI ABV UP PARAM F/U: HCPCS | Performed by: NURSE PRACTITIONER

## 2018-04-24 PROCEDURE — 71046 X-RAY EXAM CHEST 2 VIEWS: CPT

## 2018-04-24 PROCEDURE — 2022F DILAT RTA XM EVC RTNOPTHY: CPT | Performed by: NURSE PRACTITIONER

## 2018-04-24 PROCEDURE — 99214 OFFICE O/P EST MOD 30 MIN: CPT | Performed by: NURSE PRACTITIONER

## 2018-04-24 PROCEDURE — 93000 ELECTROCARDIOGRAM COMPLETE: CPT | Performed by: NURSE PRACTITIONER

## 2018-04-24 PROCEDURE — 1036F TOBACCO NON-USER: CPT | Performed by: NURSE PRACTITIONER

## 2018-04-24 PROCEDURE — G8427 DOCREV CUR MEDS BY ELIG CLIN: HCPCS | Performed by: NURSE PRACTITIONER

## 2018-04-24 PROCEDURE — 3044F HG A1C LEVEL LT 7.0%: CPT | Performed by: NURSE PRACTITIONER

## 2018-04-24 RX ORDER — ALLOPURINOL 300 MG/1
300 TABLET ORAL DAILY
Qty: 30 TABLET | Refills: 1 | Status: SHIPPED | OUTPATIENT
Start: 2018-04-24 | End: 2018-05-22 | Stop reason: SDUPTHER

## 2018-04-24 RX ORDER — INSULIN GLARGINE 100 [IU]/ML
INJECTION, SOLUTION SUBCUTANEOUS
Refills: 3 | COMMUNITY
Start: 2018-01-30 | End: 2018-05-02 | Stop reason: ALTCHOICE

## 2018-04-24 ASSESSMENT — PATIENT HEALTH QUESTIONNAIRE - PHQ9
1. LITTLE INTEREST OR PLEASURE IN DOING THINGS: 1
2. FEELING DOWN, DEPRESSED OR HOPELESS: 1
SUM OF ALL RESPONSES TO PHQ9 QUESTIONS 1 & 2: 2
SUM OF ALL RESPONSES TO PHQ QUESTIONS 1-9: 2

## 2018-04-25 ENCOUNTER — TELEPHONE (OUTPATIENT)
Dept: CARDIOLOGY | Age: 48
End: 2018-04-25

## 2018-04-25 ASSESSMENT — ENCOUNTER SYMPTOMS
RESPIRATORY NEGATIVE: 1
GASTROINTESTINAL NEGATIVE: 1
EYES NEGATIVE: 1

## 2018-05-02 ENCOUNTER — HOSPITAL ENCOUNTER (OUTPATIENT)
Dept: NON INVASIVE DIAGNOSTICS | Age: 48
Discharge: HOME | End: 2018-05-02
Payer: MEDICAID

## 2018-05-02 ENCOUNTER — OFFICE VISIT (OUTPATIENT)
Dept: CARDIOLOGY | Age: 48
End: 2018-05-02
Payer: MEDICAID

## 2018-05-02 VITALS
SYSTOLIC BLOOD PRESSURE: 120 MMHG | WEIGHT: 294 LBS | BODY MASS INDEX: 39.82 KG/M2 | HEART RATE: 90 BPM | HEIGHT: 72 IN | DIASTOLIC BLOOD PRESSURE: 80 MMHG

## 2018-05-02 DIAGNOSIS — E03.9 ACQUIRED HYPOTHYROIDISM: Chronic | ICD-10-CM

## 2018-05-02 DIAGNOSIS — R94.31 ABNORMAL EKG: ICD-10-CM

## 2018-05-02 DIAGNOSIS — I48.0 PAROXYSMAL A-FIB (HCC): ICD-10-CM

## 2018-05-02 DIAGNOSIS — G47.33 OBSTRUCTIVE SLEEP APNEA: ICD-10-CM

## 2018-05-02 DIAGNOSIS — I48.0 PAROXYSMAL A-FIB (HCC): Primary | ICD-10-CM

## 2018-05-02 DIAGNOSIS — R07.89 CHEST PAIN, ATYPICAL: ICD-10-CM

## 2018-05-02 LAB
ALBUMIN SERPL-MCNC: 4.3 G/DL (ref 3.5–5.2)
ALP BLD-CCNC: 37 U/L (ref 40–130)
ALT SERPL-CCNC: 13 U/L (ref 5–41)
AST SERPL-CCNC: 17 U/L (ref 5–40)
BILIRUB SERPL-MCNC: 0.3 MG/DL (ref 0.2–1.2)
BILIRUBIN DIRECT: 0.1 MG/DL (ref 0–0.3)
BILIRUBIN, INDIRECT: 0.2 MG/DL (ref 0.1–1)
TOTAL PROTEIN: 7.1 G/DL (ref 6.6–8.7)

## 2018-05-02 PROCEDURE — 1036F TOBACCO NON-USER: CPT | Performed by: INTERNAL MEDICINE

## 2018-05-02 PROCEDURE — 93000 ELECTROCARDIOGRAM COMPLETE: CPT | Performed by: INTERNAL MEDICINE

## 2018-05-02 PROCEDURE — G8427 DOCREV CUR MEDS BY ELIG CLIN: HCPCS | Performed by: INTERNAL MEDICINE

## 2018-05-02 PROCEDURE — 99203 OFFICE O/P NEW LOW 30 MIN: CPT | Performed by: INTERNAL MEDICINE

## 2018-05-02 PROCEDURE — 93229 REMOTE 30 DAY ECG TECH SUPP: CPT

## 2018-05-02 PROCEDURE — G8417 CALC BMI ABV UP PARAM F/U: HCPCS | Performed by: INTERNAL MEDICINE

## 2018-05-02 RX ORDER — METOPROLOL TARTRATE AND HYDROCHLOROTHIAZIDE 50; 25 MG/1; MG/1
1 TABLET ORAL 2 TIMES DAILY
COMMUNITY
End: 2018-05-02 | Stop reason: CLARIF

## 2018-05-02 ASSESSMENT — ENCOUNTER SYMPTOMS
BACK PAIN: 0
CHOKING: 0
STRIDOR: 0
CHEST TIGHTNESS: 1
WHEEZING: 0
BLOOD IN STOOL: 0
ABDOMINAL DISTENTION: 0
NAUSEA: 0
APNEA: 0
SHORTNESS OF BREATH: 1

## 2018-05-07 ENCOUNTER — TELEPHONE (OUTPATIENT)
Dept: CARDIOLOGY | Age: 48
End: 2018-05-07

## 2018-05-15 ENCOUNTER — TELEPHONE (OUTPATIENT)
Dept: CARDIOLOGY | Age: 48
End: 2018-05-15

## 2018-05-15 DIAGNOSIS — I48.0 PAROXYSMAL A-FIB (HCC): Primary | ICD-10-CM

## 2018-05-15 RX ORDER — WARFARIN SODIUM 5 MG/1
5 TABLET ORAL EVERY EVENING
Qty: 30 TABLET | Refills: 3 | Status: SHIPPED | OUTPATIENT
Start: 2018-05-15 | End: 2018-09-05 | Stop reason: SDUPTHER

## 2018-05-18 ENCOUNTER — HOSPITAL ENCOUNTER (OUTPATIENT)
Dept: NON INVASIVE DIAGNOSTICS | Age: 48
Discharge: HOME | End: 2018-05-18
Payer: MEDICAID

## 2018-05-18 DIAGNOSIS — R94.31 ABNORMAL EKG: ICD-10-CM

## 2018-05-18 DIAGNOSIS — I48.0 PAROXYSMAL A-FIB (HCC): ICD-10-CM

## 2018-05-18 DIAGNOSIS — G47.33 OBSTRUCTIVE SLEEP APNEA: ICD-10-CM

## 2018-05-18 LAB
LV EF: 63 %
LVEF MODALITY: NORMAL

## 2018-05-18 PROCEDURE — 93306 TTE W/DOPPLER COMPLETE: CPT

## 2018-05-21 ENCOUNTER — TELEPHONE (OUTPATIENT)
Dept: CARDIOLOGY | Age: 48
End: 2018-05-21

## 2018-05-22 ENCOUNTER — ANTI-COAG VISIT (OUTPATIENT)
Dept: CARDIOLOGY | Age: 48
End: 2018-05-22
Payer: MEDICAID

## 2018-05-22 DIAGNOSIS — I48.0 PAROXYSMAL A-FIB (HCC): Primary | ICD-10-CM

## 2018-05-22 LAB
INTERNATIONAL NORMALIZATION RATIO, POC: 1.5
PROTHROMBIN TIME, POC: NORMAL

## 2018-05-22 PROCEDURE — 85610 PROTHROMBIN TIME: CPT | Performed by: CLINICAL NURSE SPECIALIST

## 2018-05-22 RX ORDER — ALLOPURINOL 300 MG/1
TABLET ORAL
Qty: 30 TABLET | Refills: 1 | Status: SHIPPED | OUTPATIENT
Start: 2018-05-22 | End: 2018-09-07 | Stop reason: SDUPTHER

## 2018-05-29 ENCOUNTER — TELEPHONE (OUTPATIENT)
Dept: CARDIOLOGY | Age: 48
End: 2018-05-29

## 2018-06-01 ENCOUNTER — ANTI-COAG VISIT (OUTPATIENT)
Dept: CARDIOLOGY | Age: 48
End: 2018-06-01
Payer: MEDICAID

## 2018-06-01 DIAGNOSIS — I48.0 PAROXYSMAL A-FIB (HCC): Primary | ICD-10-CM

## 2018-06-01 LAB
ALBUMIN SERPL-MCNC: 4.3 G/DL (ref 3.5–5.2)
ALP BLD-CCNC: 42 U/L (ref 40–130)
ALT SERPL-CCNC: 15 U/L (ref 5–41)
ANION GAP SERPL CALCULATED.3IONS-SCNC: 15 MMOL/L (ref 7–19)
AST SERPL-CCNC: 18 U/L (ref 5–40)
BASOPHILS ABSOLUTE: 0 K/UL (ref 0–0.2)
BASOPHILS RELATIVE PERCENT: 0.4 % (ref 0–1)
BILIRUB SERPL-MCNC: <0.2 MG/DL (ref 0.2–1.2)
BUN BLDV-MCNC: 14 MG/DL (ref 6–20)
CALCIUM SERPL-MCNC: 9.7 MG/DL (ref 8.6–10)
CHLORIDE BLD-SCNC: 107 MMOL/L (ref 98–111)
CO2: 24 MMOL/L (ref 22–29)
CREAT SERPL-MCNC: 1.1 MG/DL (ref 0.5–1.2)
EOSINOPHILS ABSOLUTE: 0.1 K/UL (ref 0–0.6)
EOSINOPHILS RELATIVE PERCENT: 2 % (ref 0–5)
FERRITIN: 105.4 NG/ML (ref 30–400)
GFR NON-AFRICAN AMERICAN: >60
GLUCOSE BLD-MCNC: 143 MG/DL (ref 74–109)
HCT VFR BLD CALC: 44.7 % (ref 42–52)
HEMOGLOBIN: 13.7 G/DL (ref 14–18)
INTERNATIONAL NORMALIZATION RATIO, POC: 1.9
IRON: 79 UG/DL (ref 59–158)
LYMPHOCYTES ABSOLUTE: 1.6 K/UL (ref 1.1–4.5)
LYMPHOCYTES RELATIVE PERCENT: 34.6 % (ref 20–40)
MCH RBC QN AUTO: 28.7 PG (ref 27–31)
MCHC RBC AUTO-ENTMCNC: 30.6 G/DL (ref 33–37)
MCV RBC AUTO: 93.5 FL (ref 80–94)
MONOCYTES ABSOLUTE: 0.3 K/UL (ref 0–0.9)
MONOCYTES RELATIVE PERCENT: 7.5 % (ref 0–10)
NEUTROPHILS ABSOLUTE: 2.5 K/UL (ref 1.5–7.5)
NEUTROPHILS RELATIVE PERCENT: 55.1 % (ref 50–65)
PDW BLD-RTO: 13.8 % (ref 11.5–14.5)
PLATELET # BLD: 281 K/UL (ref 130–400)
PMV BLD AUTO: 11 FL (ref 9.4–12.4)
POTASSIUM SERPL-SCNC: 4.4 MMOL/L (ref 3.5–5)
PROTHROMBIN TIME, POC: NORMAL
RBC # BLD: 4.78 M/UL (ref 4.7–6.1)
SODIUM BLD-SCNC: 146 MMOL/L (ref 136–145)
TOTAL PROTEIN: 7.2 G/DL (ref 6.6–8.7)
VITAMIN B-12: 608 PG/ML (ref 211–946)
VITAMIN D 25-HYDROXY: 38.5 NG/ML
WBC # BLD: 4.5 K/UL (ref 4.8–10.8)

## 2018-06-01 PROCEDURE — 85610 PROTHROMBIN TIME: CPT | Performed by: CLINICAL NURSE SPECIALIST

## 2018-06-04 PROBLEM — E78.5 DYSLIPIDEMIA: Status: ACTIVE | Noted: 2018-06-04

## 2018-06-06 LAB — VITAMIN B1 WHOLE BLOOD: 169 NMOL/L (ref 70–180)

## 2018-06-14 ENCOUNTER — OFFICE VISIT (OUTPATIENT)
Dept: PRIMARY CARE CLINIC | Age: 48
End: 2018-06-14
Payer: MEDICAID

## 2018-06-14 VITALS
DIASTOLIC BLOOD PRESSURE: 84 MMHG | OXYGEN SATURATION: 97 % | WEIGHT: 294 LBS | HEART RATE: 87 BPM | SYSTOLIC BLOOD PRESSURE: 122 MMHG | HEIGHT: 72 IN | BODY MASS INDEX: 39.82 KG/M2 | TEMPERATURE: 97.7 F

## 2018-06-14 DIAGNOSIS — E11.9 TYPE 2 DIABETES MELLITUS WITHOUT COMPLICATION, WITHOUT LONG-TERM CURRENT USE OF INSULIN (HCC): Chronic | ICD-10-CM

## 2018-06-14 DIAGNOSIS — I48.0 PAROXYSMAL A-FIB (HCC): ICD-10-CM

## 2018-06-14 DIAGNOSIS — E66.01 MORBID OBESITY WITH BMI OF 45.0-49.9, ADULT (HCC): Primary | Chronic | ICD-10-CM

## 2018-06-14 DIAGNOSIS — I10 ESSENTIAL HYPERTENSION: Chronic | ICD-10-CM

## 2018-06-14 PROCEDURE — 1036F TOBACCO NON-USER: CPT | Performed by: NURSE PRACTITIONER

## 2018-06-14 PROCEDURE — G8427 DOCREV CUR MEDS BY ELIG CLIN: HCPCS | Performed by: NURSE PRACTITIONER

## 2018-06-14 PROCEDURE — 3044F HG A1C LEVEL LT 7.0%: CPT | Performed by: NURSE PRACTITIONER

## 2018-06-14 PROCEDURE — 2022F DILAT RTA XM EVC RTNOPTHY: CPT | Performed by: NURSE PRACTITIONER

## 2018-06-14 PROCEDURE — 99214 OFFICE O/P EST MOD 30 MIN: CPT | Performed by: NURSE PRACTITIONER

## 2018-06-14 PROCEDURE — G8417 CALC BMI ABV UP PARAM F/U: HCPCS | Performed by: NURSE PRACTITIONER

## 2018-06-14 RX ORDER — HYDRALAZINE HYDROCHLORIDE 50 MG/1
50 TABLET, FILM COATED ORAL 2 TIMES DAILY
Qty: 90 TABLET | Refills: 11 | Status: SHIPPED
Start: 2018-06-14 | End: 2020-06-28

## 2018-06-14 ASSESSMENT — ENCOUNTER SYMPTOMS
RESPIRATORY NEGATIVE: 1
EYES NEGATIVE: 1
ABDOMINAL PAIN: 1

## 2018-06-25 ENCOUNTER — OFFICE VISIT (OUTPATIENT)
Dept: CARDIOLOGY | Age: 48
End: 2018-06-25
Payer: MEDICAID

## 2018-06-25 VITALS
HEIGHT: 72 IN | DIASTOLIC BLOOD PRESSURE: 78 MMHG | HEART RATE: 92 BPM | BODY MASS INDEX: 39.01 KG/M2 | WEIGHT: 288 LBS | SYSTOLIC BLOOD PRESSURE: 110 MMHG

## 2018-06-25 DIAGNOSIS — I48.0 PAROXYSMAL A-FIB (HCC): Primary | ICD-10-CM

## 2018-06-25 DIAGNOSIS — R07.89 CHEST PAIN, ATYPICAL: ICD-10-CM

## 2018-06-25 PROCEDURE — 93000 ELECTROCARDIOGRAM COMPLETE: CPT | Performed by: INTERNAL MEDICINE

## 2018-06-25 PROCEDURE — G8417 CALC BMI ABV UP PARAM F/U: HCPCS | Performed by: INTERNAL MEDICINE

## 2018-06-25 PROCEDURE — G8427 DOCREV CUR MEDS BY ELIG CLIN: HCPCS | Performed by: INTERNAL MEDICINE

## 2018-06-25 PROCEDURE — 99212 OFFICE O/P EST SF 10 MIN: CPT | Performed by: INTERNAL MEDICINE

## 2018-06-25 PROCEDURE — 1036F TOBACCO NON-USER: CPT | Performed by: INTERNAL MEDICINE

## 2018-07-09 DIAGNOSIS — F41.9 ANXIETY: ICD-10-CM

## 2018-07-09 RX ORDER — DIAZEPAM 10 MG/1
TABLET ORAL
Qty: 90 TABLET | Refills: 3 | OUTPATIENT
Start: 2018-07-09 | End: 2018-10-07

## 2018-07-12 ENCOUNTER — TELEPHONE (OUTPATIENT)
Dept: CARDIOLOGY | Age: 48
End: 2018-07-12

## 2018-07-31 RX ORDER — FENOFIBRATE 145 MG/1
TABLET, COATED ORAL
Qty: 30 TABLET | Refills: 3 | Status: SHIPPED | OUTPATIENT
Start: 2018-07-31 | End: 2018-10-29 | Stop reason: SDUPTHER

## 2018-08-09 DIAGNOSIS — E11.9 TYPE 2 DIABETES MELLITUS WITHOUT COMPLICATION, WITH LONG-TERM CURRENT USE OF INSULIN (HCC): Chronic | ICD-10-CM

## 2018-08-09 DIAGNOSIS — Z79.4 TYPE 2 DIABETES MELLITUS WITHOUT COMPLICATION, WITH LONG-TERM CURRENT USE OF INSULIN (HCC): Chronic | ICD-10-CM

## 2018-08-10 RX ORDER — EMPAGLIFLOZIN 25 MG/1
TABLET, FILM COATED ORAL
Qty: 30 TABLET | Refills: 3 | Status: SHIPPED | OUTPATIENT
Start: 2018-08-10 | End: 2018-11-27 | Stop reason: SDUPTHER

## 2018-09-05 DIAGNOSIS — I48.0 PAROXYSMAL A-FIB (HCC): ICD-10-CM

## 2018-09-05 RX ORDER — WARFARIN SODIUM 5 MG/1
5 TABLET ORAL EVERY EVENING
Qty: 30 TABLET | Refills: 3 | Status: SHIPPED | OUTPATIENT
Start: 2018-09-05 | End: 2018-12-27 | Stop reason: SDUPTHER

## 2018-09-07 DIAGNOSIS — E11.9 TYPE 2 DIABETES MELLITUS WITHOUT COMPLICATION, WITH LONG-TERM CURRENT USE OF INSULIN (HCC): Chronic | ICD-10-CM

## 2018-09-07 DIAGNOSIS — Z79.4 TYPE 2 DIABETES MELLITUS WITHOUT COMPLICATION, WITH LONG-TERM CURRENT USE OF INSULIN (HCC): Chronic | ICD-10-CM

## 2018-09-07 RX ORDER — ALLOPURINOL 300 MG/1
TABLET ORAL
Qty: 30 TABLET | Refills: 1 | Status: SHIPPED | OUTPATIENT
Start: 2018-09-07 | End: 2018-10-29 | Stop reason: SDUPTHER

## 2018-09-28 ENCOUNTER — OFFICE VISIT (OUTPATIENT)
Dept: PRIMARY CARE CLINIC | Age: 48
End: 2018-09-28
Payer: MEDICAID

## 2018-09-28 VITALS
HEART RATE: 70 BPM | HEIGHT: 72 IN | DIASTOLIC BLOOD PRESSURE: 80 MMHG | TEMPERATURE: 97.8 F | SYSTOLIC BLOOD PRESSURE: 124 MMHG | BODY MASS INDEX: 39.74 KG/M2 | WEIGHT: 293.4 LBS | OXYGEN SATURATION: 97 %

## 2018-09-28 DIAGNOSIS — Z79.899 DRUG THERAPY: ICD-10-CM

## 2018-09-28 DIAGNOSIS — E11.9 TYPE 2 DIABETES MELLITUS WITHOUT COMPLICATION, WITHOUT LONG-TERM CURRENT USE OF INSULIN (HCC): Chronic | ICD-10-CM

## 2018-09-28 DIAGNOSIS — F51.01 PRIMARY INSOMNIA: Primary | ICD-10-CM

## 2018-09-28 DIAGNOSIS — I10 ESSENTIAL HYPERTENSION: Chronic | ICD-10-CM

## 2018-09-28 LAB
AMPHETAMINE SCREEN, URINE: NORMAL
BARBITURATE SCREEN, URINE: NORMAL
BENZODIAZEPINE SCREEN, URINE: NORMAL
BUPRENORPHINE URINE: NORMAL
COCAINE METABOLITE SCREEN URINE: NORMAL
GABAPENTIN SCREEN, URINE: NORMAL
HBA1C MFR BLD: 6.1 %
METHADONE SCREEN, URINE: NORMAL
METHAMPHETAMINE, URINE: NORMAL
OPIATE SCREEN URINE: NORMAL
OXYCODONE SCREEN URINE: NORMAL
PHENCYCLIDINE SCREEN URINE: NORMAL
PROPOXYPHENE SCREEN, URINE: NORMAL
THC SCREEN, URINE: NORMAL
TRICYCLIC ANTIDEPRESSANTS, UR: NORMAL

## 2018-09-28 PROCEDURE — G8427 DOCREV CUR MEDS BY ELIG CLIN: HCPCS | Performed by: NURSE PRACTITIONER

## 2018-09-28 PROCEDURE — 2022F DILAT RTA XM EVC RTNOPTHY: CPT | Performed by: NURSE PRACTITIONER

## 2018-09-28 PROCEDURE — G8417 CALC BMI ABV UP PARAM F/U: HCPCS | Performed by: NURSE PRACTITIONER

## 2018-09-28 PROCEDURE — 83036 HEMOGLOBIN GLYCOSYLATED A1C: CPT | Performed by: NURSE PRACTITIONER

## 2018-09-28 PROCEDURE — 99214 OFFICE O/P EST MOD 30 MIN: CPT | Performed by: NURSE PRACTITIONER

## 2018-09-28 PROCEDURE — 1036F TOBACCO NON-USER: CPT | Performed by: NURSE PRACTITIONER

## 2018-09-28 PROCEDURE — 3044F HG A1C LEVEL LT 7.0%: CPT | Performed by: NURSE PRACTITIONER

## 2018-09-28 PROCEDURE — 80305 DRUG TEST PRSMV DIR OPT OBS: CPT | Performed by: NURSE PRACTITIONER

## 2018-09-28 RX ORDER — ZOLPIDEM TARTRATE 5 MG/1
5 TABLET ORAL NIGHTLY PRN
Qty: 30 TABLET | Refills: 1 | Status: SHIPPED | OUTPATIENT
Start: 2018-09-28 | End: 2018-11-27 | Stop reason: SDUPTHER

## 2018-09-28 NOTE — PROGRESS NOTES
tablet 5    allopurinol (ZYLOPRIM) 300 MG tablet TAKE ONE TABLET BY MOUTH ONCE DAILY 30 tablet 1    warfarin (COUMADIN) 5 MG tablet TAKE 1 TABLET BY MOUTH EVERY EVENING 30 tablet 3    JARDIANCE 25 MG tablet TAKE ONE TABLET BY MOUTH ONCE DAILY 30 tablet 3    fenofibrate (TRICOR) 145 MG tablet TAKE ONE TABLET BY MOUTH DAILY 30 tablet 3    diazepam (VALIUM) 10 MG tablet TAKE ONE TABLET BY MOUTH EVERY EIGHT HOURS AS NEEDED FOR ANXIETY 90 tablet 3    LYRICA 25 MG capsule TAKE ONE CAPSULE BY MOUTH TWO TIMES A DAY  3    hydrALAZINE (APRESOLINE) 50 MG tablet Take 1 tablet by mouth 2 times daily 90 tablet 11    MUCINEX 600 MG extended release tablet 600 mg as needed   1    Liraglutide (VICTOZA) 18 MG/3ML SOPN SC injection Inject 1.8 mg into the skin daily 2 pen 3    B-D ULTRAFINE III SHORT PEN 31G X 8 MM MISC Disp 100 needles 100 each 1    levothyroxine (SYNTHROID) 200 MCG tablet Take 1 tablet by mouth daily 30 tablet 11    Blood Glucose Monitoring Suppl (FREESTYLE LITE) DON USE AS DIRECTED 1 Device 0    glucose monitoring kit (FREESTYLE) monitoring kit 1 kit by Does not apply route daily 1 kit 0    FREESTYLE LITE strip   11    meloxicam (MOBIC) 15 MG tablet Take 15 mg by mouth daily   2    tiZANidine (ZANAFLEX) 4 MG tablet Take 4 mg by mouth every 8 hours as needed      HYDROcodone-acetaminophen (NORCO)  MG per tablet Take 1 tablet by mouth every 8 hours as needed for Pain      albuterol sulfate HFA (VENTOLIN HFA) 108 (90 Base) MCG/ACT inhaler Inhale 2 puffs into the lungs every 6 hours as needed for Wheezing 1 Inhaler 3     No current facility-administered medications for this visit. Allergies   Allergen Reactions    Milk-Related Compounds        Family History   Problem Relation Age of Onset    Diabetes Other     Heart Disease Other            Subjective:      Review of Systems   Constitutional: Negative. HENT: Negative. Eyes: Negative. Respiratory: Negative.     Cardiovascular:

## 2018-10-02 ASSESSMENT — ENCOUNTER SYMPTOMS
EYES NEGATIVE: 1
GASTROINTESTINAL NEGATIVE: 1
RESPIRATORY NEGATIVE: 1

## 2018-10-29 RX ORDER — ALLOPURINOL 300 MG/1
TABLET ORAL
Qty: 30 TABLET | Refills: 5 | Status: SHIPPED | OUTPATIENT
Start: 2018-10-29 | End: 2019-05-23 | Stop reason: SDUPTHER

## 2018-10-29 RX ORDER — FENOFIBRATE 145 MG/1
TABLET, COATED ORAL
Qty: 30 TABLET | Refills: 3 | Status: SHIPPED | OUTPATIENT
Start: 2018-10-29 | End: 2019-03-27 | Stop reason: SDUPTHER

## 2018-11-05 DIAGNOSIS — F41.9 ANXIETY: Primary | ICD-10-CM

## 2018-11-06 RX ORDER — DIAZEPAM 10 MG/1
TABLET ORAL
Qty: 90 TABLET | Refills: 2 | Status: SHIPPED | OUTPATIENT
Start: 2018-11-06 | End: 2019-02-05 | Stop reason: SDUPTHER

## 2018-11-14 ENCOUNTER — OFFICE VISIT (OUTPATIENT)
Dept: PRIMARY CARE CLINIC | Age: 48
End: 2018-11-14
Payer: MEDICAID

## 2018-11-14 ENCOUNTER — HOSPITAL ENCOUNTER (OUTPATIENT)
Dept: CT IMAGING | Age: 48
Discharge: HOME | End: 2018-11-14
Payer: MEDICAID

## 2018-11-14 VITALS
TEMPERATURE: 98.4 F | OXYGEN SATURATION: 97 % | BODY MASS INDEX: 38.19 KG/M2 | SYSTOLIC BLOOD PRESSURE: 126 MMHG | HEIGHT: 72 IN | WEIGHT: 282 LBS | HEART RATE: 89 BPM | RESPIRATION RATE: 14 BRPM | DIASTOLIC BLOOD PRESSURE: 80 MMHG

## 2018-11-14 DIAGNOSIS — R11.2 NAUSEA AND VOMITING, INTRACTABILITY OF VOMITING NOT SPECIFIED, UNSPECIFIED VOMITING TYPE: ICD-10-CM

## 2018-11-14 DIAGNOSIS — K44.9 HIATAL HERNIA WITH GERD: Chronic | ICD-10-CM

## 2018-11-14 DIAGNOSIS — E66.01 MORBID OBESITY WITH BMI OF 45.0-49.9, ADULT (HCC): Chronic | ICD-10-CM

## 2018-11-14 DIAGNOSIS — K21.9 HIATAL HERNIA WITH GERD: Chronic | ICD-10-CM

## 2018-11-14 DIAGNOSIS — R11.2 NAUSEA AND VOMITING, INTRACTABILITY OF VOMITING NOT SPECIFIED, UNSPECIFIED VOMITING TYPE: Primary | ICD-10-CM

## 2018-11-14 DIAGNOSIS — R10.13 EPIGASTRIC PAIN: ICD-10-CM

## 2018-11-14 DIAGNOSIS — Z98.890 HISTORY OF GASTRIC SURGERY: ICD-10-CM

## 2018-11-14 PROCEDURE — 99214 OFFICE O/P EST MOD 30 MIN: CPT | Performed by: NURSE PRACTITIONER

## 2018-11-14 PROCEDURE — G8484 FLU IMMUNIZE NO ADMIN: HCPCS | Performed by: NURSE PRACTITIONER

## 2018-11-14 PROCEDURE — 74160 CT ABDOMEN W/CONTRAST: CPT

## 2018-11-14 PROCEDURE — 6360000004 HC RX CONTRAST MEDICATION: Performed by: NURSE PRACTITIONER

## 2018-11-14 PROCEDURE — G8427 DOCREV CUR MEDS BY ELIG CLIN: HCPCS | Performed by: NURSE PRACTITIONER

## 2018-11-14 PROCEDURE — 1036F TOBACCO NON-USER: CPT | Performed by: NURSE PRACTITIONER

## 2018-11-14 PROCEDURE — 74177 CT ABD & PELVIS W/CONTRAST: CPT

## 2018-11-14 PROCEDURE — G8417 CALC BMI ABV UP PARAM F/U: HCPCS | Performed by: NURSE PRACTITIONER

## 2018-11-14 RX ORDER — ZOLPIDEM TARTRATE 5 MG/1
5 TABLET ORAL NIGHTLY PRN
COMMUNITY
End: 2018-11-27 | Stop reason: SDUPTHER

## 2018-11-14 RX ADMIN — IOPAMIDOL 75 ML: 755 INJECTION, SOLUTION INTRAVENOUS at 11:48

## 2018-11-14 ASSESSMENT — ENCOUNTER SYMPTOMS
ABDOMINAL PAIN: 1
CONSTIPATION: 0
NAUSEA: 1
RESPIRATORY NEGATIVE: 1
VOMITING: 1
EYES NEGATIVE: 1
DIARRHEA: 0

## 2018-11-14 NOTE — PROGRESS NOTES
[facility]  [address]  [address] [place] [address]  Dept: [phone]  Dept Fax: [phone]: [phone]    Priti Adam is a 53 y.o. male who presents today for his medical conditions/complaints as noted below. Priti Delay is c/o of Emesis (x1 month and getting worse, daily) and Discuss Medications (Ambien)      Chief Complaint   Patient presents with    Emesis     x1 month and getting worse, daily    Discuss Medications     Ambien       HPI:     HPI   Patient here with complaints of vomiting that has been ongoing for greater than 1 month. He reports that it does occur daily and has been getting worse. Patient has had gastric surgery by Dr Raffaele Bob in Feb of 2018. Patient reports that abd pain is severe and stabbing pain in epigastric region. He has wanted to go to ER multiple times due to the pain, but waited to see PCP. Past Medical History:   Diagnosis Date    Depression     Diabetes mellitus (HCC)     GERD (gastroesophageal reflux disease)     Gout     Hernia     Hyperlipidemia     Hypertension     Hypothyroid     Myocardial infarct, old     [place] - Cardiac Cath - no blockage    Neuropathy     Osteoarthritis         Past Surgical History:   Procedure Laterality Date    ANKLE SURGERY  1990    fracture repair   [address] - No blockage    ESOPHAGUS SURGERY      GASTRIC BAND      HERNIA REPAIR      STOMACH SURGERY  02/2018    gastric sleeve       Social History   Substance Use Topics    Smoking status: Never Smoker    Smokeless tobacco: Never Used    Alcohol use No        Current Outpatient Prescriptions   Medication Sig Dispense Refill    zolpidem (AMBIEN) 5 MG tablet Take 5 mg by mouth nightly as needed for Sleep. True [place] diazepam (VALIUM) 10 MG tablet TAKE ONE TABLET BY MOUTH EVERY 8 HOURS AS NEEDED FOR ANXIETY 90 tablet 2    allopurinol (ZYLOPRIM) 300 MG tablet TAKE ONE TABLET BY

## 2018-11-15 ENCOUNTER — TELEPHONE (OUTPATIENT)
Dept: PRIMARY CARE CLINIC | Age: 48
End: 2018-11-15

## 2018-11-27 DIAGNOSIS — Z79.4 TYPE 2 DIABETES MELLITUS WITHOUT COMPLICATION, WITH LONG-TERM CURRENT USE OF INSULIN (HCC): Chronic | ICD-10-CM

## 2018-11-27 DIAGNOSIS — F51.01 PRIMARY INSOMNIA: ICD-10-CM

## 2018-11-27 DIAGNOSIS — E11.9 TYPE 2 DIABETES MELLITUS WITHOUT COMPLICATION, WITH LONG-TERM CURRENT USE OF INSULIN (HCC): Chronic | ICD-10-CM

## 2018-11-27 RX ORDER — ZOLPIDEM TARTRATE 5 MG/1
5 TABLET ORAL NIGHTLY PRN
Qty: 30 TABLET | Refills: 1 | Status: SHIPPED | OUTPATIENT
Start: 2018-11-27 | End: 2019-01-24 | Stop reason: SDUPTHER

## 2018-11-27 RX ORDER — EMPAGLIFLOZIN 25 MG/1
TABLET, FILM COATED ORAL
Qty: 30 TABLET | Refills: 5 | Status: SHIPPED | OUTPATIENT
Start: 2018-11-27 | End: 2019-05-28 | Stop reason: SDUPTHER

## 2018-11-27 RX ORDER — LIRAGLUTIDE 6 MG/ML
1.8 INJECTION SUBCUTANEOUS DAILY
Qty: 9 ML | Refills: 3 | Status: SHIPPED | OUTPATIENT
Start: 2018-11-27 | End: 2019-03-27 | Stop reason: SDUPTHER

## 2018-12-27 DIAGNOSIS — I48.0 PAROXYSMAL A-FIB (HCC): ICD-10-CM

## 2018-12-27 RX ORDER — WARFARIN SODIUM 5 MG/1
5 TABLET ORAL EVERY EVENING
Qty: 30 TABLET | Refills: 5 | Status: SHIPPED | OUTPATIENT
Start: 2018-12-27 | End: 2019-07-05 | Stop reason: SDUPTHER

## 2019-01-22 ENCOUNTER — TELEPHONE (OUTPATIENT)
Dept: CARDIOLOGY | Age: 49
End: 2019-01-22

## 2019-01-23 ENCOUNTER — TELEPHONE (OUTPATIENT)
Dept: CARDIOLOGY | Age: 49
End: 2019-01-23

## 2019-01-24 DIAGNOSIS — E03.9 ACQUIRED HYPOTHYROIDISM: Chronic | ICD-10-CM

## 2019-01-24 DIAGNOSIS — F51.01 PRIMARY INSOMNIA: ICD-10-CM

## 2019-01-24 RX ORDER — LEVOTHYROXINE SODIUM 0.2 MG/1
TABLET ORAL
Qty: 30 TABLET | Refills: 11 | Status: SHIPPED | OUTPATIENT
Start: 2019-01-24 | End: 2020-03-19 | Stop reason: SDUPTHER

## 2019-01-25 ENCOUNTER — TELEPHONE (OUTPATIENT)
Dept: CARDIOLOGY | Age: 49
End: 2019-01-25

## 2019-01-25 RX ORDER — ZOLPIDEM TARTRATE 5 MG/1
TABLET ORAL
Qty: 30 TABLET | Refills: 1 | Status: SHIPPED | OUTPATIENT
Start: 2019-01-27 | End: 2019-02-26

## 2019-01-26 DIAGNOSIS — F51.01 PRIMARY INSOMNIA: ICD-10-CM

## 2019-01-27 RX ORDER — ZOLPIDEM TARTRATE 5 MG/1
TABLET ORAL
Qty: 30 TABLET | Refills: 0 | OUTPATIENT
Start: 2019-01-27 | End: 2019-02-26

## 2019-02-05 DIAGNOSIS — F41.9 ANXIETY: ICD-10-CM

## 2019-02-06 RX ORDER — PEN NEEDLE, DIABETIC 31 GX5/16"
NEEDLE, DISPOSABLE MISCELLANEOUS
Qty: 100 EACH | Refills: 1 | Status: SHIPPED | OUTPATIENT
Start: 2019-02-06 | End: 2019-12-23

## 2019-02-06 RX ORDER — DIAZEPAM 10 MG/1
TABLET ORAL
Qty: 90 TABLET | Refills: 2 | Status: SHIPPED | OUTPATIENT
Start: 2019-02-06 | End: 2019-05-07 | Stop reason: SDUPTHER

## 2019-02-13 DIAGNOSIS — F41.9 ANXIETY: ICD-10-CM

## 2019-02-13 DIAGNOSIS — F32.A DEPRESSIVE DISORDER: Primary | ICD-10-CM

## 2019-02-13 DIAGNOSIS — F33.2 SEVERE EPISODE OF RECURRENT MAJOR DEPRESSIVE DISORDER, WITHOUT PSYCHOTIC FEATURES (HCC): ICD-10-CM

## 2019-02-13 PROBLEM — M54.50 LOW BACK PAIN: Status: ACTIVE | Noted: 2019-02-13

## 2019-02-13 PROBLEM — E11.9 DIABETES MELLITUS (HCC): Status: ACTIVE | Noted: 2019-02-13

## 2019-02-13 PROBLEM — E66.01 MORBID OBESITY (HCC): Status: ACTIVE | Noted: 2019-02-13

## 2019-02-22 ENCOUNTER — TELEPHONE (OUTPATIENT)
Dept: PRIMARY CARE CLINIC | Age: 49
End: 2019-02-22

## 2019-02-22 ENCOUNTER — OFFICE VISIT (OUTPATIENT)
Dept: PRIMARY CARE CLINIC | Age: 49
End: 2019-02-22
Payer: MEDICAID

## 2019-02-22 VITALS
RESPIRATION RATE: 14 BRPM | DIASTOLIC BLOOD PRESSURE: 80 MMHG | OXYGEN SATURATION: 99 % | HEART RATE: 87 BPM | BODY MASS INDEX: 37.3 KG/M2 | TEMPERATURE: 97 F | HEIGHT: 72 IN | SYSTOLIC BLOOD PRESSURE: 118 MMHG | WEIGHT: 275.4 LBS

## 2019-02-22 DIAGNOSIS — E03.9 ACQUIRED HYPOTHYROIDISM: ICD-10-CM

## 2019-02-22 DIAGNOSIS — E11.9 TYPE 2 DIABETES MELLITUS WITHOUT COMPLICATION, WITHOUT LONG-TERM CURRENT USE OF INSULIN (HCC): Chronic | ICD-10-CM

## 2019-02-22 DIAGNOSIS — E78.2 MIXED HYPERLIPIDEMIA: ICD-10-CM

## 2019-02-22 DIAGNOSIS — Z12.5 SCREENING PSA (PROSTATE SPECIFIC ANTIGEN): ICD-10-CM

## 2019-02-22 DIAGNOSIS — F41.9 ANXIETY AND DEPRESSION: ICD-10-CM

## 2019-02-22 DIAGNOSIS — F32.A ANXIETY AND DEPRESSION: ICD-10-CM

## 2019-02-22 DIAGNOSIS — Z00.00 ANNUAL PHYSICAL EXAM: Primary | ICD-10-CM

## 2019-02-22 DIAGNOSIS — F51.01 PRIMARY INSOMNIA: ICD-10-CM

## 2019-02-22 LAB
ALBUMIN SERPL-MCNC: 4.3 G/DL (ref 3.5–5.2)
ALP BLD-CCNC: 52 U/L (ref 40–130)
ALT SERPL-CCNC: 15 U/L (ref 5–41)
ANION GAP SERPL CALCULATED.3IONS-SCNC: 12 MMOL/L (ref 7–19)
AST SERPL-CCNC: 22 U/L (ref 5–40)
BILIRUB SERPL-MCNC: <0.2 MG/DL (ref 0.2–1.2)
BUN BLDV-MCNC: 22 MG/DL (ref 6–20)
CALCIUM SERPL-MCNC: 9.3 MG/DL (ref 8.6–10)
CHLORIDE BLD-SCNC: 106 MMOL/L (ref 98–111)
CHOLESTEROL, TOTAL: 142 MG/DL (ref 160–199)
CO2: 25 MMOL/L (ref 22–29)
CREAT SERPL-MCNC: 1.4 MG/DL (ref 0.5–1.2)
GFR NON-AFRICAN AMERICAN: 54
GLUCOSE BLD-MCNC: 110 MG/DL (ref 74–109)
HBA1C MFR BLD: 5.9 % (ref 4–6)
HCT VFR BLD CALC: 42.4 % (ref 42–52)
HDLC SERPL-MCNC: 51 MG/DL (ref 55–121)
HEMOGLOBIN: 13 G/DL (ref 14–18)
LDL CHOLESTEROL CALCULATED: 73 MG/DL
MCH RBC QN AUTO: 28.3 PG (ref 27–31)
MCHC RBC AUTO-ENTMCNC: 30.7 G/DL (ref 33–37)
MCV RBC AUTO: 92.4 FL (ref 80–94)
PDW BLD-RTO: 13.9 % (ref 11.5–14.5)
PLATELET # BLD: 265 K/UL (ref 130–400)
PMV BLD AUTO: 11.2 FL (ref 9.4–12.4)
POTASSIUM SERPL-SCNC: 4.3 MMOL/L (ref 3.5–5)
PROSTATE SPECIFIC ANTIGEN: 0.44 NG/ML (ref 0–4)
RBC # BLD: 4.59 M/UL (ref 4.7–6.1)
SODIUM BLD-SCNC: 143 MMOL/L (ref 136–145)
TOTAL PROTEIN: 7.3 G/DL (ref 6.6–8.7)
TRIGL SERPL-MCNC: 91 MG/DL (ref 0–149)
TSH SERPL DL<=0.05 MIU/L-ACNC: 1.18 UIU/ML (ref 0.27–4.2)
WBC # BLD: 4.7 K/UL (ref 4.8–10.8)

## 2019-02-22 PROCEDURE — G8484 FLU IMMUNIZE NO ADMIN: HCPCS | Performed by: NURSE PRACTITIONER

## 2019-02-22 PROCEDURE — 99396 PREV VISIT EST AGE 40-64: CPT | Performed by: NURSE PRACTITIONER

## 2019-02-22 RX ORDER — OMEPRAZOLE 40 MG/1
40 CAPSULE, DELAYED RELEASE ORAL 2 TIMES DAILY
COMMUNITY
End: 2022-03-24

## 2019-02-22 RX ORDER — ESCITALOPRAM OXALATE 10 MG/1
10 TABLET ORAL DAILY
Qty: 30 TABLET | Refills: 3 | Status: SHIPPED | OUTPATIENT
Start: 2019-02-22 | End: 2019-03-14

## 2019-02-22 ASSESSMENT — ENCOUNTER SYMPTOMS
GASTROINTESTINAL NEGATIVE: 1
EYES NEGATIVE: 1
RESPIRATORY NEGATIVE: 1

## 2019-02-28 ENCOUNTER — OFFICE VISIT (OUTPATIENT)
Dept: PSYCHIATRY | Age: 49
End: 2019-02-28
Payer: MEDICAID

## 2019-02-28 DIAGNOSIS — F41.1 GAD (GENERALIZED ANXIETY DISORDER): ICD-10-CM

## 2019-02-28 DIAGNOSIS — F33.3 SEVERE EPISODE OF RECURRENT MAJOR DEPRESSIVE DISORDER, WITH PSYCHOTIC FEATURES (HCC): Primary | ICD-10-CM

## 2019-02-28 PROCEDURE — 90791 PSYCH DIAGNOSTIC EVALUATION: CPT | Performed by: COUNSELOR

## 2019-03-06 ENCOUNTER — OFFICE VISIT (OUTPATIENT)
Dept: URGENT CARE | Age: 49
End: 2019-03-06
Payer: MEDICAID

## 2019-03-06 VITALS
DIASTOLIC BLOOD PRESSURE: 82 MMHG | SYSTOLIC BLOOD PRESSURE: 130 MMHG | BODY MASS INDEX: 36.84 KG/M2 | HEART RATE: 78 BPM | OXYGEN SATURATION: 98 % | RESPIRATION RATE: 18 BRPM | TEMPERATURE: 97.8 F | WEIGHT: 272 LBS | HEIGHT: 72 IN

## 2019-03-06 DIAGNOSIS — H00.014 HORDEOLUM EXTERNUM OF LEFT UPPER EYELID: Primary | ICD-10-CM

## 2019-03-06 PROCEDURE — 1036F TOBACCO NON-USER: CPT | Performed by: SPECIALIST

## 2019-03-06 PROCEDURE — G8427 DOCREV CUR MEDS BY ELIG CLIN: HCPCS | Performed by: SPECIALIST

## 2019-03-06 PROCEDURE — 99213 OFFICE O/P EST LOW 20 MIN: CPT | Performed by: SPECIALIST

## 2019-03-06 PROCEDURE — G8484 FLU IMMUNIZE NO ADMIN: HCPCS | Performed by: SPECIALIST

## 2019-03-06 PROCEDURE — G8417 CALC BMI ABV UP PARAM F/U: HCPCS | Performed by: SPECIALIST

## 2019-03-06 RX ORDER — CLINDAMYCIN HYDROCHLORIDE 300 MG/1
300 CAPSULE ORAL 3 TIMES DAILY
Qty: 30 CAPSULE | Refills: 0 | Status: SHIPPED | OUTPATIENT
Start: 2019-03-06 | End: 2019-03-16

## 2019-03-06 ASSESSMENT — ENCOUNTER SYMPTOMS
RESPIRATORY NEGATIVE: 1
FOREIGN BODY SENSATION: 1
EYE ITCHING: 1
EYE REDNESS: 1
EYE DISCHARGE: 1

## 2019-03-13 ENCOUNTER — OFFICE VISIT (OUTPATIENT)
Dept: PSYCHIATRY | Age: 49
End: 2019-03-13
Payer: MEDICAID

## 2019-03-13 VITALS
HEIGHT: 72 IN | DIASTOLIC BLOOD PRESSURE: 94 MMHG | WEIGHT: 267 LBS | SYSTOLIC BLOOD PRESSURE: 134 MMHG | HEART RATE: 76 BPM | BODY MASS INDEX: 36.16 KG/M2 | OXYGEN SATURATION: 99 %

## 2019-03-13 DIAGNOSIS — F31.60 BIPOLAR AFFECTIVE DISORDER, CURRENT EPISODE MIXED, WITHOUT PSYCHOTIC FEATURES (HCC): ICD-10-CM

## 2019-03-13 DIAGNOSIS — F41.1 GAD (GENERALIZED ANXIETY DISORDER): ICD-10-CM

## 2019-03-13 DIAGNOSIS — F33.3 SEVERE EPISODE OF RECURRENT MAJOR DEPRESSIVE DISORDER, WITH PSYCHOTIC FEATURES (HCC): Primary | ICD-10-CM

## 2019-03-13 DIAGNOSIS — F43.10 PTSD (POST-TRAUMATIC STRESS DISORDER): ICD-10-CM

## 2019-03-13 PROCEDURE — 99215 OFFICE O/P EST HI 40 MIN: CPT | Performed by: CLINICAL NURSE SPECIALIST

## 2019-03-13 RX ORDER — PRAZOSIN HYDROCHLORIDE 1 MG/1
1 CAPSULE ORAL NIGHTLY
Qty: 30 CAPSULE | Refills: 2 | Status: SHIPPED | OUTPATIENT
Start: 2019-03-13 | End: 2019-06-26 | Stop reason: SDUPTHER

## 2019-03-14 ENCOUNTER — OFFICE VISIT (OUTPATIENT)
Dept: PSYCHIATRY | Age: 49
End: 2019-03-14
Payer: MEDICAID

## 2019-03-14 ENCOUNTER — OFFICE VISIT (OUTPATIENT)
Dept: CARDIOLOGY | Age: 49
End: 2019-03-14
Payer: MEDICAID

## 2019-03-14 VITALS
HEIGHT: 72 IN | BODY MASS INDEX: 36.7 KG/M2 | HEART RATE: 70 BPM | SYSTOLIC BLOOD PRESSURE: 124 MMHG | WEIGHT: 271 LBS | DIASTOLIC BLOOD PRESSURE: 78 MMHG

## 2019-03-14 VITALS
SYSTOLIC BLOOD PRESSURE: 142 MMHG | WEIGHT: 271 LBS | HEIGHT: 72 IN | HEART RATE: 83 BPM | OXYGEN SATURATION: 97 % | DIASTOLIC BLOOD PRESSURE: 91 MMHG | BODY MASS INDEX: 36.7 KG/M2

## 2019-03-14 DIAGNOSIS — F33.3 SEVERE EPISODE OF RECURRENT MAJOR DEPRESSIVE DISORDER, WITH PSYCHOTIC FEATURES (HCC): Primary | ICD-10-CM

## 2019-03-14 DIAGNOSIS — F43.10 PTSD (POST-TRAUMATIC STRESS DISORDER): ICD-10-CM

## 2019-03-14 DIAGNOSIS — E78.5 DYSLIPIDEMIA: ICD-10-CM

## 2019-03-14 DIAGNOSIS — I48.0 PAROXYSMAL A-FIB (HCC): Primary | ICD-10-CM

## 2019-03-14 DIAGNOSIS — G47.33 OBSTRUCTIVE SLEEP APNEA: ICD-10-CM

## 2019-03-14 DIAGNOSIS — I10 ESSENTIAL HYPERTENSION: Chronic | ICD-10-CM

## 2019-03-14 DIAGNOSIS — E66.01 MORBID OBESITY (HCC): ICD-10-CM

## 2019-03-14 DIAGNOSIS — F41.1 GAD (GENERALIZED ANXIETY DISORDER): ICD-10-CM

## 2019-03-14 LAB
INTERNATIONAL NORMALIZATION RATIO, POC: 1.8
PROTHROMBIN TIME, POC: 22.3

## 2019-03-14 PROCEDURE — G8484 FLU IMMUNIZE NO ADMIN: HCPCS | Performed by: CLINICAL NURSE SPECIALIST

## 2019-03-14 PROCEDURE — 1036F TOBACCO NON-USER: CPT | Performed by: CLINICAL NURSE SPECIALIST

## 2019-03-14 PROCEDURE — 90832 PSYTX W PT 30 MINUTES: CPT | Performed by: COUNSELOR

## 2019-03-14 PROCEDURE — 99214 OFFICE O/P EST MOD 30 MIN: CPT | Performed by: CLINICAL NURSE SPECIALIST

## 2019-03-14 PROCEDURE — 85610 PROTHROMBIN TIME: CPT | Performed by: CLINICAL NURSE SPECIALIST

## 2019-03-14 PROCEDURE — G8417 CALC BMI ABV UP PARAM F/U: HCPCS | Performed by: CLINICAL NURSE SPECIALIST

## 2019-03-14 PROCEDURE — G8427 DOCREV CUR MEDS BY ELIG CLIN: HCPCS | Performed by: CLINICAL NURSE SPECIALIST

## 2019-03-14 ASSESSMENT — ENCOUNTER SYMPTOMS
SHORTNESS OF BREATH: 0
WHEEZING: 0
VOMITING: 0
FACIAL SWELLING: 0
ABDOMINAL PAIN: 0
NAUSEA: 0
COUGH: 0
CHEST TIGHTNESS: 0
EYE REDNESS: 0

## 2019-03-22 ENCOUNTER — OFFICE VISIT (OUTPATIENT)
Dept: PRIMARY CARE CLINIC | Age: 49
End: 2019-03-22
Payer: MEDICAID

## 2019-03-22 ENCOUNTER — TELEPHONE (OUTPATIENT)
Dept: PRIMARY CARE CLINIC | Age: 49
End: 2019-03-22

## 2019-03-22 VITALS
TEMPERATURE: 97.8 F | HEART RATE: 73 BPM | HEIGHT: 72 IN | OXYGEN SATURATION: 98 % | BODY MASS INDEX: 36.3 KG/M2 | WEIGHT: 268 LBS | DIASTOLIC BLOOD PRESSURE: 84 MMHG | SYSTOLIC BLOOD PRESSURE: 130 MMHG

## 2019-03-22 DIAGNOSIS — F41.9 ANXIETY AND DEPRESSION: Primary | ICD-10-CM

## 2019-03-22 DIAGNOSIS — L60.0 INGROWN NAIL: ICD-10-CM

## 2019-03-22 DIAGNOSIS — F32.A ANXIETY AND DEPRESSION: Primary | ICD-10-CM

## 2019-03-22 DIAGNOSIS — K21.9 HIATAL HERNIA WITH GERD: ICD-10-CM

## 2019-03-22 DIAGNOSIS — Z98.890 HISTORY OF GASTRIC SURGERY: ICD-10-CM

## 2019-03-22 DIAGNOSIS — K44.9 HIATAL HERNIA WITH GERD: ICD-10-CM

## 2019-03-22 PROCEDURE — 99214 OFFICE O/P EST MOD 30 MIN: CPT | Performed by: NURSE PRACTITIONER

## 2019-03-22 PROCEDURE — G8427 DOCREV CUR MEDS BY ELIG CLIN: HCPCS | Performed by: NURSE PRACTITIONER

## 2019-03-22 PROCEDURE — G8484 FLU IMMUNIZE NO ADMIN: HCPCS | Performed by: NURSE PRACTITIONER

## 2019-03-22 PROCEDURE — 1036F TOBACCO NON-USER: CPT | Performed by: NURSE PRACTITIONER

## 2019-03-22 PROCEDURE — G8417 CALC BMI ABV UP PARAM F/U: HCPCS | Performed by: NURSE PRACTITIONER

## 2019-03-22 RX ORDER — ESCITALOPRAM OXALATE 20 MG/1
10 TABLET ORAL DAILY
Qty: 30 TABLET | Refills: 2 | Status: SHIPPED | OUTPATIENT
Start: 2019-03-22 | End: 2019-04-12 | Stop reason: SDUPTHER

## 2019-03-22 ASSESSMENT — ENCOUNTER SYMPTOMS
EYES NEGATIVE: 1
RESPIRATORY NEGATIVE: 1
GASTROINTESTINAL NEGATIVE: 1

## 2019-03-27 DIAGNOSIS — Z79.4 TYPE 2 DIABETES MELLITUS WITHOUT COMPLICATION, WITH LONG-TERM CURRENT USE OF INSULIN (HCC): Chronic | ICD-10-CM

## 2019-03-27 DIAGNOSIS — E11.9 TYPE 2 DIABETES MELLITUS WITHOUT COMPLICATION, WITH LONG-TERM CURRENT USE OF INSULIN (HCC): Chronic | ICD-10-CM

## 2019-03-27 RX ORDER — FENOFIBRATE 145 MG/1
TABLET, COATED ORAL
Qty: 30 TABLET | Refills: 3 | Status: SHIPPED | OUTPATIENT
Start: 2019-03-27 | End: 2019-09-09 | Stop reason: SDUPTHER

## 2019-03-27 RX ORDER — LIRAGLUTIDE 6 MG/ML
1.8 INJECTION SUBCUTANEOUS DAILY
Qty: 9 ML | Refills: 3 | Status: SHIPPED | OUTPATIENT
Start: 2019-03-27 | End: 2019-07-25 | Stop reason: SDUPTHER

## 2019-03-28 ENCOUNTER — OFFICE VISIT (OUTPATIENT)
Dept: PSYCHIATRY | Age: 49
End: 2019-03-28
Payer: MEDICAID

## 2019-03-28 ENCOUNTER — TELEPHONE (OUTPATIENT)
Dept: PODIATRY | Facility: CLINIC | Age: 49
End: 2019-03-28

## 2019-03-28 VITALS
DIASTOLIC BLOOD PRESSURE: 93 MMHG | OXYGEN SATURATION: 97 % | HEIGHT: 72 IN | SYSTOLIC BLOOD PRESSURE: 138 MMHG | HEART RATE: 121 BPM | BODY MASS INDEX: 36.16 KG/M2 | WEIGHT: 267 LBS

## 2019-03-28 DIAGNOSIS — F43.10 PTSD (POST-TRAUMATIC STRESS DISORDER): ICD-10-CM

## 2019-03-28 DIAGNOSIS — F33.3 SEVERE EPISODE OF RECURRENT MAJOR DEPRESSIVE DISORDER, WITH PSYCHOTIC FEATURES (HCC): ICD-10-CM

## 2019-03-28 DIAGNOSIS — F41.1 GAD (GENERALIZED ANXIETY DISORDER): Primary | ICD-10-CM

## 2019-03-28 PROCEDURE — 90832 PSYTX W PT 30 MINUTES: CPT | Performed by: COUNSELOR

## 2019-04-11 ENCOUNTER — ANTI-COAG VISIT (OUTPATIENT)
Dept: CARDIOLOGY | Age: 49
End: 2019-04-11
Payer: MEDICAID

## 2019-04-11 DIAGNOSIS — I48.0 PAROXYSMAL A-FIB (HCC): Primary | ICD-10-CM

## 2019-04-11 LAB
INTERNATIONAL NORMALIZATION RATIO, POC: 1.4
PROTHROMBIN TIME, POC: NORMAL

## 2019-04-11 PROCEDURE — 85610 PROTHROMBIN TIME: CPT | Performed by: CLINICAL NURSE SPECIALIST

## 2019-04-12 ENCOUNTER — OFFICE VISIT (OUTPATIENT)
Dept: PSYCHIATRY | Age: 49
End: 2019-04-12
Payer: MEDICAID

## 2019-04-12 VITALS
HEIGHT: 72 IN | SYSTOLIC BLOOD PRESSURE: 131 MMHG | OXYGEN SATURATION: 100 % | HEART RATE: 71 BPM | BODY MASS INDEX: 34.81 KG/M2 | DIASTOLIC BLOOD PRESSURE: 89 MMHG | WEIGHT: 257 LBS

## 2019-04-12 DIAGNOSIS — F32.A ANXIETY AND DEPRESSION: ICD-10-CM

## 2019-04-12 DIAGNOSIS — F41.9 ANXIETY AND DEPRESSION: ICD-10-CM

## 2019-04-12 DIAGNOSIS — F31.60 BIPOLAR AFFECTIVE DISORDER, MIXED (HCC): ICD-10-CM

## 2019-04-12 PROCEDURE — 99214 OFFICE O/P EST MOD 30 MIN: CPT | Performed by: CLINICAL NURSE SPECIALIST

## 2019-04-12 RX ORDER — ESCITALOPRAM OXALATE 20 MG/1
20 TABLET ORAL DAILY
Qty: 30 TABLET | Refills: 2 | Status: SHIPPED | OUTPATIENT
Start: 2019-04-12 | End: 2020-12-16 | Stop reason: ALTCHOICE

## 2019-04-12 NOTE — PROGRESS NOTES
4/12/2019 8:39 AM   Progress Note        Kavin Lock 1970  Psychotherapy Time Spent: 30 min      Psychotherapy Topics: health    PCP IS:        Subjective:  Patient is a  diagnosed with Bipolar disorder, anxiety, depression and presents today for follow-up. Last seen in clinic on 3/13 and prior records were reviewed. History obtained via chart review and patient    CHIEF COMPLAINT:    Today patient states, \"Panding surgery next Tuesday to repair esophagus, diaphram. Had  Had hiatial hernia last year, \"so big nobody would touch it. \" Had several consultations, but finally got a Dr Arpit Gomez in [facility] to do the surgery, surgery took 3 sequential days. 23 days inpatient after those surgeries, recovery was prolonged, \"horrible. \". Hernia has returned. Voices are still troubling him. \"Latuda put me in a zombie state. \" Went back to 1/2 tablet (40 mg). Now taking 1/2 tablet every other day. Voices are \"somewhat\" better. Problem is sedation. Not getting good enough control over voices. Mood up and down. \"it's no fun waking up miserable every day. \" queried: both physically and mentally miserable. Today patient rates depression as 5 on 0-->10 VAS  Today patient rates anxiety as 8 on 0-->10 VAS   Anxiety is always higher than depression    Has used Valium for quite some time, started at 10 mg dose. Addition of prazosin has allowed him to sleep deeper, fewer nightmares. SUBSTANCE USE/ABUSE:   Tobacco: denied   Alcohol: denied   Marijuana: denied   Street/recreational drugs: denied    DANGEROUSNESS:   Suicide ideation: denied   Homicidal ideation/dangerousness to others: denied        Review of Systems - 14 point review negative today except pending surgery Tuesday for hiatial hernia        Current Meds:    Prior to Admission medications    Medication Sig Start Date End Date Taking?  Authorizing Provider   VICTOZA 18 MG/3ML SOPN SC injection INJECT 1.8 MG INTO THE SKIN DAILY 3/27/19  Yes Simi DALE Pedro Luis Allen MD   fenofibrate (TRICOR) 145 MG tablet TAKE ONE TABLET BY MOUTH DAILY 3/27/19  Yes Sylwia Reynolds MD   escitalopram (LEXAPRO) 20 MG tablet Take 0.5 tablets by mouth daily 3/22/19  Yes DEMETRIUS Chaidez   lurasidone (LATUDA) 80 MG TABS tablet Take 1 tablet by mouth daily 3/13/19  Yes DEMETRIUS Angeles CNP   prazosin (MINIPRESS) 1 MG capsule Take 1 capsule by mouth nightly 3/13/19  Yes DEMETRIUS Angeles CNP   metFORMIN (GLUCOPHAGE) 1000 MG tablet TAKE ONE TABLET BY MOUTH TWO TIMES DAILY (WITH MEALS) 3/5/19  Yes Sylwia Reynolds MD   omeprazole (PRILOSEC) 40 MG delayed release capsule omeprazole 40 mg capsule,delayed release   Take 1 capsule twice a day by oral route for 30 days.    Yes Historical Provider, MD   diazepam (VALIUM) 10 MG tablet TAKE ONE TABLET BY MOUTH EVERY 8 HOURS AS NEEDED FOR ANXIETY 2/6/19 5/7/19 Yes DEMETRIUS Chaidez   B-D ULTRAFINE III SHORT PEN 31G X 8 MM MISC USE WITH INSULIN 2/6/19  Yes DEMETRIUS Chaidez   levothyroxine (SYNTHROID) 200 MCG tablet TAKE ONE TABLET BY MOUTH ONCE DAILY 1/24/19  Yes Sylwia Reynolds MD   warfarin (COUMADIN) 5 MG tablet TAKE 1 TABLET BY MOUTH EVERY EVENING 12/27/18  Yes DEMETRIUS Lopez   JARDIANCE 25 MG tablet TAKE ONE TABLET BY MOUTH ONCE DAILY 11/27/18  Yes Sylwia Reynolds MD   allopurinol (ZYLOPRIM) 300 MG tablet TAKE ONE TABLET BY MOUTH ONCE DAILY 10/29/18  Yes Sylwia Reynolds MD   MUCINEX 600 MG extended release tablet TAKE 2 TABLETS BY MOUTH 2 TIMES DAILY  Patient taking differently: TAKE 2 TABLETS BY MOUTH 2 TIMES DAILY as needed 10/29/18  Yes Sylwia Reynolds MD   LYRICA 25 MG capsule TAKE ONE CAPSULE BY MOUTH TWO TIMES A DAY 5/22/18  Yes Historical Provider, MD   hydrALAZINE (APRESOLINE) 50 MG tablet Take 1 tablet by mouth 2 times daily 6/14/18  Yes DEMETRIUS Chaidez   albuterol sulfate HFA (VENTOLIN HFA) 108 (90 Base) MCG/ACT inhaler Inhale 2 puffs into the lungs every 6 hours as needed for Wheezing 1/9/18 With surgery upcoming next week would hesitate to make significant changes at this time. Will reassess when he is back from surgery; another agent may be more useful for him. Increase Lexapro to 20 mg po daily for anxiety and mood    Discussed use of Valium. No changes recommended now, pre-op, but reconsideration due next visit. Discussed potential for tolerance/addiction with long term use of benzodiazepines; also potential for increased falls risk, especially with aging; potential for confusion and/or memory difficulty, especially with aging, potential for increased depression. Also additive and potential bad outcome with use of benzos and pain medications concurrently. . Advised to use prudently and to consult with providers about concurrent use of diazepam with pain medicines post-op. The risks, benefits, side effects, indications, contraindications, and adverse effects of the medications have been discussed. The pt has verbalized understanding and has capacity to give informed consent. The Lupe Bears report has been reviewed according to [facility] regulations. Controlled substance Treatment Plan: this is a maintenance dose. . Not applicable    Supportive therapy offered. The patient has been advised to call with any problems.       Follow up: 2 months/prn        DEMETRIUS Booker - MANDIE

## 2019-04-16 NOTE — PROGRESS NOTES
[facility] - PODIATRY    Today's Date: 04/24/19    Patient Name: Campso Hale  MRN: [id number]  [facility]: [id number]  PCP: Brennan Escoto MD  Referring Provider: No ref. provider found    SUBJECTIVE     Chief Complaint   Patient presents with   • Establish Care     Patient is here to establish care. Patient complains of possible ingrown toenails on great toes. Pain scale: 6/10 and describes aching and throbbing.   • Diabetes     Last stated BG level of 87mg/dl. PCP: Dr. Brennan Escoto last visit 03/22/2019     HPI: Campos Hale, a 49 y.o.male, comes to clinic as a(n) new patient presenting for diabetic foot exam and complaining of ingrown toenail. Patient has h/o arthritis, DM2, thyroid disease, GERD, hernia, HLD, HTN. Patient is IDDM with last stated BG level of 87mg/dl. Admits to numbness in his feet. Denies open wounds or sores. States that his toenails are long, thick, and discolored. Feels that he has ingrown nails to both great toes. Notes previous bleeding but none current and no purulence. Admits pain at 6/10 level and described as aching, throbbing, numbness and tingling. Relates previous treatment(s) including foot care by podiatrist. Denies any constitutional symptoms. No other pedal complaints at this time.    Past Medical History:   Diagnosis Date   • Arthritis    • Diabetes mellitus (CMS/HCC)    • Disease of thyroid gland    • GERD (gastroesophageal reflux disease)    • Hernia    • Hyperlipidemia    • Hypertension      Past Surgical History:   Procedure Laterality Date   • GASTRIC SLEEVE LAPAROSCOPIC     • NISSEN FUNDOPLICATION       Family History   Problem Relation Age of Onset   • No Known Problems Mother    • Diabetes Father    • Hyperlipidemia Father    • Hypertension Father    • No Known Problems Sister    • Cancer Brother      Social History     Socioeconomic History   • Marital status:      Spouse name: Not on file   • Number of children: Not on file   •  Years of education: Not on file   • Highest education level: Not on file   Tobacco Use   • Smoking status: Never Smoker   Substance and Sexual Activity   • Alcohol use: No   • Drug use: No   • Sexual activity: Yes     Partners: Female     No Known Allergies  Current Outpatient Medications   Medication Sig Dispense Refill   • allopurinol (ZYLOPRIM) 300 MG tablet Take 300 mg by mouth daily     • hydrALAZINE (APRESOLINE) 25 MG tablet Take 1 tablet by mouth 3 times daily     • HYDROcodone-acetaminophen (NORCO)  MG per tablet Every 8 (Eight) Hours.     • levothyroxine (SYNTHROID, LEVOTHROID) 300 MCG tablet Take 1 tablet by mouth daily     • Liraglutide (VICTOZA SC) Inject  under the skin into the appropriate area as directed.     • omeprazole (PriLOSEC) 10 MG capsule Take 10 mg by mouth daily     • pantoprazole (PROTONIX) 40 MG EC tablet Take 40 mg by mouth Daily.     • promethazine (PHENERGAN) 12.5 MG tablet Take 12.5 mg by mouth Every 4 (Four) Hours.     • tiZANidine (ZANAFLEX) 4 MG tablet Every 8 (Eight) Hours.     • simvastatin (ZOCOR) 40 MG tablet Take 0.5 tablets by mouth every evening       No current facility-administered medications for this visit.      Review of Systems   Constitutional: Negative for chills and fever.   HENT: Negative for congestion.    Respiratory: Negative for shortness of breath.    Cardiovascular: Negative for chest pain and leg swelling.   Gastrointestinal: Negative for constipation, diarrhea, nausea and vomiting.   Musculoskeletal:        Foot pain   Skin: Negative for wound.   Neurological: Positive for numbness.       OBJECTIVE     Vitals:    04/24/19 0827   BP: 124/76   Pulse: 94   SpO2: 96%       PHYSICAL EXAM  GEN:   Accompanied by none.     General Exam  Diabetic Foot Exam: performed  Appearance: appears stated age and healthy   Orientation: alert and oriented to person, place, and time   Affect: appropriate   Gait: unimpaired   Assistance: independent   Shoe Gear: casual  shoes      Foot/Ankle Exam  Inspection and Palpation  Ecchymosis - Right: none Left: none  Tenderness - Right: none   Left: none   Swelling - Right: none   Left: none    Arch - Right: pes planus Left: pes planus  Hammertoes - Right: absent Left: absent  Claw Toes - Right: absent Left: absent  Mallet Toes - Right: absent Left: absent  Hallux Valgus - Right: no Left: no  Hallux Limitus - Right: no Left: no  Skin - Right: skin intact  Left: skin intact   Nails -  Right: abnormally thick and ingrown toenail Left: ingrown toenail     Vascular  Dorsalis Pedis - Right: 2+ Left: 2+  Posterior Tibial - Right: 2+ Left: 2+  Skin Temperature -  Right: warm  Left: warm    CFT - Right: 3 Left: 3  Pedal Hair Growth - Right: present Left: present  Varicosities -  Right: no varicosities  Left: no varicosities      Neurologic  Saphenous Nerve Sensation  - Right: diminished Left: normal  Tibial Nerve Sensation - Right: diminished Left: diminished  Superficial Peroneal Nerve Sensation - Right: diminished Left: diminished  Deep Peroneal Nerve Sensation - Right: diminished Left: diminished  Sural Nerve Sensation - Right: diminished Left: diminished  Protective Sensation using Cambria-Toi Monofilament - Right: 2 Left: 1    Muscle Strength  Dorsiflexion - Right: 5 Left: 5  Plantar Flexion - Right: 5 Left: 5  Eversion - Right: 5 Left: 5  Inversion - Right: 5 Left: 5  Great Toe Extension - Right: 5 Left: 5  Great Toe Flexion - Right: 5 Left: 5    Range of Motion  Normal right ankle ROM  Normal left ankle ROM            RADIOLOGY/NUCLEAR:  No results found.    LABORATORY/CULTURE RESULTS:      PATHOLOGY RESULTS:       ASSESSMENT/PLAN     Campos was seen today for establish care and diabetes.    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  Discussed findings and treatment plan including risks, benefits, and treatment options with patient in detail. Patient agreed with treatment plan.  After verbal consent obtained, nail(s) x10 debrided of length and thickness with nail nipper without incidence  After verbal consent obtained, nail(s) x2 debrided of offending borders with nail nipper without incidence  Patient may maintain nails and calluses at home utilizing emery board or pumice stone between visits as needed  Reviewed at home diabetic foot care including daily foot checks   An After Visit Summary was printed and given to the patient at discharge, including (if requested) any available informative/educational handouts regarding diagnosis, treatment, or medications. All questions were answered to patient/family satisfaction. Should symptoms fail to improve or worsen they agree to call or return to clinic or to go to the Emergency Department. Discussed the importance of following up with any needed screening tests/labs/specialist appointments and any requested follow-up recommended by me today. Importance of maintaining follow-up discussed and patient accepts that missed appointments can delay diagnosis and potentially lead to worsening of conditions.  Return in about 3 months (around 7/24/2019)., or sooner if acute issues arise.        This document has been electronically signed by Shun Barone DPM on April 24, 2019 8:53 AM

## 2019-04-23 ENCOUNTER — TELEPHONE (OUTPATIENT)
Dept: PODIATRY | Facility: CLINIC | Age: 49
End: 2019-04-23

## 2019-04-23 NOTE — TELEPHONE ENCOUNTER
Unable to leave message reminding patient of his appointment with Dr. Ervin Barone on April 24, 2019 at 8:45 am.

## 2019-04-24 ENCOUNTER — OFFICE VISIT (OUTPATIENT)
Dept: PODIATRY | Facility: CLINIC | Age: 49
End: 2019-04-24

## 2019-04-24 VITALS
WEIGHT: 315 LBS | HEART RATE: 94 BPM | DIASTOLIC BLOOD PRESSURE: 76 MMHG | BODY MASS INDEX: 42.66 KG/M2 | HEIGHT: 72 IN | OXYGEN SATURATION: 96 % | SYSTOLIC BLOOD PRESSURE: 124 MMHG

## 2019-04-24 DIAGNOSIS — E11.49 DIABETES MELLITUS TYPE 2 WITH NEUROLOGICAL MANIFESTATIONS (HCC): ICD-10-CM

## 2019-04-24 DIAGNOSIS — M79.672 FOOT PAIN, BILATERAL: ICD-10-CM

## 2019-04-24 DIAGNOSIS — M79.671 FOOT PAIN, BILATERAL: ICD-10-CM

## 2019-04-24 DIAGNOSIS — L60.2 THICKENED NAILS: ICD-10-CM

## 2019-04-24 DIAGNOSIS — L60.0 INGROWN TOENAIL: Primary | ICD-10-CM

## 2019-04-24 PROCEDURE — 11721 DEBRIDE NAIL 6 OR MORE: CPT | Performed by: PODIATRIST

## 2019-04-24 PROCEDURE — 99203 OFFICE O/P NEW LOW 30 MIN: CPT | Performed by: PODIATRIST

## 2019-05-07 ENCOUNTER — OFFICE VISIT (OUTPATIENT)
Dept: PRIMARY CARE CLINIC | Age: 49
End: 2019-05-07
Payer: MEDICAID

## 2019-05-07 VITALS
HEIGHT: 72 IN | HEART RATE: 95 BPM | DIASTOLIC BLOOD PRESSURE: 78 MMHG | BODY MASS INDEX: 32.91 KG/M2 | WEIGHT: 243 LBS | TEMPERATURE: 97.7 F | SYSTOLIC BLOOD PRESSURE: 128 MMHG | OXYGEN SATURATION: 100 % | RESPIRATION RATE: 17 BRPM

## 2019-05-07 DIAGNOSIS — Z79.899 DRUG THERAPY: ICD-10-CM

## 2019-05-07 DIAGNOSIS — F41.9 ANXIETY: Primary | ICD-10-CM

## 2019-05-07 DIAGNOSIS — R79.89 ELEVATED LFTS: ICD-10-CM

## 2019-05-07 LAB
ALBUMIN SERPL-MCNC: 4.1 G/DL (ref 3.5–5.2)
ALP BLD-CCNC: 58 U/L (ref 40–130)
ALT SERPL-CCNC: 24 U/L (ref 5–41)
AMPHETAMINE SCREEN, URINE: NORMAL
ANION GAP SERPL CALCULATED.3IONS-SCNC: 13 MMOL/L (ref 7–19)
AST SERPL-CCNC: 27 U/L (ref 5–40)
BARBITURATE SCREEN, URINE: NORMAL
BENZODIAZEPINE SCREEN, URINE: NORMAL
BILIRUB SERPL-MCNC: 0.4 MG/DL (ref 0.2–1.2)
BUN BLDV-MCNC: 17 MG/DL (ref 6–20)
BUPRENORPHINE URINE: NORMAL
CALCIUM SERPL-MCNC: 9.6 MG/DL (ref 8.6–10)
CHLORIDE BLD-SCNC: 110 MMOL/L (ref 98–111)
CO2: 22 MMOL/L (ref 22–29)
COCAINE METABOLITE SCREEN URINE: NORMAL
CREAT SERPL-MCNC: 1.1 MG/DL (ref 0.5–1.2)
GABAPENTIN SCREEN, URINE: NORMAL
GFR NON-AFRICAN AMERICAN: >60
GLUCOSE BLD-MCNC: 122 MG/DL (ref 74–109)
HCT VFR BLD CALC: 40.2 % (ref 42–52)
HEMOGLOBIN: 12.6 G/DL (ref 14–18)
MCH RBC QN AUTO: 28.6 PG (ref 27–31)
MCHC RBC AUTO-ENTMCNC: 31.3 G/DL (ref 33–37)
MCV RBC AUTO: 91.2 FL (ref 80–94)
MDMA URINE: NORMAL
METHADONE SCREEN, URINE: NORMAL
METHAMPHETAMINE, URINE: NORMAL
OPIATE SCREEN URINE: NORMAL
OXYCODONE SCREEN URINE: NORMAL
PDW BLD-RTO: 14.5 % (ref 11.5–14.5)
PHENCYCLIDINE SCREEN URINE: NORMAL
PLATELET # BLD: 311 K/UL (ref 130–400)
PMV BLD AUTO: 11.4 FL (ref 9.4–12.4)
POTASSIUM SERPL-SCNC: 3.6 MMOL/L (ref 3.5–5)
PROPOXYPHENE SCREEN, URINE: NORMAL
RBC # BLD: 4.41 M/UL (ref 4.7–6.1)
SODIUM BLD-SCNC: 145 MMOL/L (ref 136–145)
THC SCREEN, URINE: NORMAL
TOTAL PROTEIN: 7.5 G/DL (ref 6.6–8.7)
TRICYCLIC ANTIDEPRESSANTS, UR: NORMAL
WBC # BLD: 4.2 K/UL (ref 4.8–10.8)

## 2019-05-07 PROCEDURE — 80305 DRUG TEST PRSMV DIR OPT OBS: CPT | Performed by: NURSE PRACTITIONER

## 2019-05-07 PROCEDURE — G8427 DOCREV CUR MEDS BY ELIG CLIN: HCPCS | Performed by: NURSE PRACTITIONER

## 2019-05-07 PROCEDURE — G8417 CALC BMI ABV UP PARAM F/U: HCPCS | Performed by: NURSE PRACTITIONER

## 2019-05-07 PROCEDURE — 99214 OFFICE O/P EST MOD 30 MIN: CPT | Performed by: NURSE PRACTITIONER

## 2019-05-07 PROCEDURE — 1036F TOBACCO NON-USER: CPT | Performed by: NURSE PRACTITIONER

## 2019-05-07 RX ORDER — DIAZEPAM 10 MG/1
TABLET ORAL
Qty: 90 TABLET | Refills: 2 | Status: SHIPPED | OUTPATIENT
Start: 2019-05-07 | End: 2019-08-05 | Stop reason: SDUPTHER

## 2019-05-07 RX ORDER — DIAZEPAM 10 MG/1
TABLET ORAL
Qty: 90 TABLET | Refills: 0 | Status: CANCELLED | OUTPATIENT
Start: 2019-05-07 | End: 2019-06-07

## 2019-05-07 ASSESSMENT — ENCOUNTER SYMPTOMS
EYES NEGATIVE: 1
GASTROINTESTINAL NEGATIVE: 1
RESPIRATORY NEGATIVE: 1

## 2019-05-07 NOTE — PROGRESS NOTES
[facility] PRIMARY CARE  [address]  [address] [place] [address]  Dept: [phone]  Dept Fax: [phone]: [phone]    Nelida Man is a 51 y.o. male who presents today for his medical conditions/complaints as noted below. Kavin Lock is c/o of Anxiety (6 week follow up)      Chief Complaint   Patient presents with    Anxiety     6 week follow up       HPI:     HPI   Patient here for follow up on anxiety. He was started on Lexapro about 6 weeks ago. Patient also had surgery last month with Dr Jerilyn Wesley for the hernia repair. He states that Dr Jerilyn Wesley told him that during surgery he had a colapsed lung and that the \"knicked his liver during surgery. \"  Patient reports that since surgery things have improved. He denies any abnormal bleeding or abd pain. Overall he has been doing well.        Past Medical History:   Diagnosis Date    Anxiety     Chronic pain     Depression     Diabetes mellitus (HCC)     GERD (gastroesophageal reflux disease)     Gout     Hernia     Hyperlipidemia     Hypertension     Hypothyroid     Myocardial infarct, old     [place] - Cardiac Cath - no blockage    Neuropathy     Osteoarthritis     Psychiatric illness         Past Surgical History:   Procedure Laterality Date    ANKLE SURGERY  1990    fracture repair   [address] - No blockage    ESOPHAGUS SURGERY      GASTRIC BAND      HERNIA REPAIR      STOMACH SURGERY  02/2018    gastric sleeve       Social History     Tobacco Use    Smoking status: Never Smoker    Smokeless tobacco: Never Used   Substance Use Topics    Alcohol use: No     Alcohol/week: 0.0 oz        Current Outpatient Medications   Medication Sig Dispense Refill    diazepam (VALIUM) 10 MG tablet TAKE ONE TABLET BY MOUTH EVERY 8 HOURS AS NEEDED FOR ANXIETY 90 tablet 2    escitalopram (LEXAPRO) 20 MG tablet Take 1 tablet by mouth daily 30 tablet 2    VICTOZA 18 MG/3ML SOPN SC injection INJECT 1.8 MG INTO THE SKIN DAILY 9 mL 3    fenofibrate (TRICOR) 145 MG tablet TAKE ONE TABLET BY MOUTH DAILY 30 tablet 3    prazosin (MINIPRESS) 1 MG capsule Take 1 capsule by mouth nightly 30 capsule 2    omeprazole (PRILOSEC) 40 MG delayed release capsule omeprazole 40 mg capsule,delayed release   Take 1 capsule twice a day by oral route for 30 days.  B-D ULTRAFINE III SHORT PEN 31G X 8 MM MISC USE WITH INSULIN 100 each 1    levothyroxine (SYNTHROID) 200 MCG tablet TAKE ONE TABLET BY MOUTH ONCE DAILY 30 tablet 11    JARDIANCE 25 MG tablet TAKE ONE TABLET BY MOUTH ONCE DAILY 30 tablet 5    allopurinol (ZYLOPRIM) 300 MG tablet TAKE ONE TABLET BY MOUTH ONCE DAILY 30 tablet 5    MUCINEX 600 MG extended release tablet TAKE 2 TABLETS BY MOUTH 2 TIMES DAILY (Patient taking differently: TAKE 2 TABLETS BY MOUTH 2 TIMES DAILY as needed) 14 tablet 1    LYRICA 25 MG capsule TAKE ONE CAPSULE BY MOUTH TWO TIMES A DAY  3    hydrALAZINE (APRESOLINE) 50 MG tablet Take 1 tablet by mouth 2 times daily 90 tablet 11    albuterol sulfate HFA (VENTOLIN HFA) 108 (90 Base) MCG/ACT inhaler Inhale 2 puffs into the lungs every 6 hours as needed for Wheezing 1 Inhaler 3    Blood Glucose Monitoring Suppl (FREESTYLE LITE) DON USE AS DIRECTED 1 Device 0    glucose monitoring kit (FREESTYLE) monitoring kit 1 kit by Does not apply route daily 1 kit 0    FREESTYLE LITE strip   11    meloxicam (MOBIC) 15 MG tablet Take 15 mg by mouth daily   2    tiZANidine (ZANAFLEX) 4 MG tablet Take 4 mg by mouth every 8 hours as needed      HYDROcodone-acetaminophen (NORCO)  MG per tablet Take 1 tablet by mouth every 8 hours as needed for Pain      metFORMIN (GLUCOPHAGE) 1000 MG tablet TAKE ONE TABLET BY MOUTH TWO TIMES DAILY (WITH MEALS) 60 tablet 5    warfarin (COUMADIN) 5 MG tablet TAKE 1 TABLET BY MOUTH EVERY EVENING 30 tablet 5     No current facility-administered medications for this visit.         Allergies Allergen Reactions    Milk-Related Compounds        Family History   Problem Relation Age of Onset    Diabetes Other     Heart Disease Other          Subjective:      Review of Systems   Constitutional: Negative. HENT: Negative. Eyes: Negative. Respiratory: Negative. Cardiovascular: Negative. Gastrointestinal: Negative. Endocrine: Negative. Genitourinary: Negative. Musculoskeletal: Negative. Skin: Negative. Neurological: Negative. Hematological: Negative. Psychiatric/Behavioral: The patient is nervous/anxious (improving since last visit). Objective:     Physical Exam   Constitutional: He is oriented to person, place, and time. Vital signs are normal. He appears well-developed and well-nourished. obese   HENT:   Head: Normocephalic and atraumatic. Right Ear: Hearing and external ear normal.   Left Ear: Hearing and external ear normal.   Nose: Nose normal.   Mouth/Throat: Uvula is midline, oropharynx is clear and moist and mucous membranes are normal.   Eyes: Pupils are equal, round, and reactive to light. Conjunctivae, EOM and lids are normal.   Neck: Trachea normal and normal range of motion. Neck supple. No thyroid mass and no thyromegaly present. Cardiovascular: Normal rate, regular rhythm, normal heart sounds and intact distal pulses. Pulmonary/Chest: Effort normal and breath sounds normal.   Abdominal: Soft. Normal appearance and bowel sounds are normal.       Musculoskeletal: Normal range of motion. Cervical back: Normal. He exhibits normal range of motion and no tenderness. Thoracic back: Normal. He exhibits normal range of motion and no tenderness. Lumbar back: Normal. He exhibits normal range of motion and no tenderness. Neurological: He is alert and oriented to person, place, and time. He has normal strength. Skin: Skin is warm, dry and intact. Psychiatric: He has a normal mood and affect.  His speech is normal and behavior is normal. Judgment and thought content normal. Cognition and memory are normal.   Nursing note and vitals reviewed. /78   Pulse 95   Temp 97.7 °F (36.5 °C) (Temporal)   Resp 17   Ht 6' (1.829 m)   Wt 243 lb (110.2 kg)   SpO2 100%   BMI 32.96 kg/m²     Assessment:      Diagnosis Orders   1. Anxiety  diazepam (VALIUM) 10 MG tablet   2. Drug therapy  POCT Rapid Drug Screen   3. Elevated LFTs  Comprehensive Metabolic Panel    CBC   4. BMI 32.0-32.9,adult         Results for orders placed or performed in visit on 05/07/19   POCT Rapid Drug Screen   Result Value Ref Range    Amphetamine Screen, Urine Neg     Barbiturate Screen, Urine Neg     Benzodiazepine Screen, Urine Pos     Buprenorphine Urine Neg     Cocaine Metabolite Screen, Urine Neg     Gabapentin Screen, Urine Neg     MDMA, Urine Neg     Methamphetamine, Urine Neg     Methadone Screen, Urine Neg     Opiate Scrn, Ur Neg     Oxycodone Screen, Ur Neg     PCP Screen, Urine Neg     Propoxyphene Screen, Urine Neg     THC Screen, Urine Neg     Tricyclic Antidepressants, Urine Neg        Plan: We will continue current regimen of Lexapro and Valium refilled. He is to follow up with Dr Gabe Fenton as scheduled. Patient to call cardiology in regards to coumadin and having INR checked. I am checking labs today to monitor liver enzymes. Return in about 3 months (around 8/7/2019) for Anxiety, Diabetic Follow up.     Orders Placed This Encounter   Procedures    Comprehensive Metabolic Panel     Standing Status:   Future     Number of Occurrences:   1     Standing Expiration Date:   5/7/2020    CBC     Standing Status:   Future     Number of Occurrences:   1     Standing Expiration Date:   5/7/2020    POCT Rapid Drug Screen       Orders Placed This Encounter   Medications    diazepam (VALIUM) 10 MG tablet     Sig: TAKE ONE TABLET BY MOUTH EVERY 8 HOURS AS NEEDED FOR ANXIETY     Dispense:  90 tablet     Refill:  2        Patient offered educational handouts and has had all questions answered. Patient voices understanding and agrees to plans along with risks and benefits of plan. Patient is instructed to continue prior meds, diet, and exercise plans as instructed. Patient agrees to follow up as instructed and sooner if needed. Patient agrees to go to ER if condition becomes emergent. EMR Dragon/transcription disclaimer: Some of this encounter note is an electronic transcription/translation of spoken language to printed text. The electronic translation of spoken language may permit erroneous, or at times, nonsensical words or phrases to be inadvertently transcribed.  Although I have reviewed the note for such errors, some may still exist.    Electronically signed by DEMETRIUS Healy on 5/7/2019 at 9:28 AM

## 2019-05-08 ENCOUNTER — ANTI-COAG VISIT (OUTPATIENT)
Dept: CARDIOLOGY | Age: 49
End: 2019-05-08
Payer: MEDICAID

## 2019-05-08 ENCOUNTER — OFFICE VISIT (OUTPATIENT)
Dept: PSYCHIATRY | Age: 49
End: 2019-05-08
Payer: MEDICAID

## 2019-05-08 ENCOUNTER — TELEPHONE (OUTPATIENT)
Dept: PRIMARY CARE CLINIC | Age: 49
End: 2019-05-08

## 2019-05-08 VITALS
BODY MASS INDEX: 32.37 KG/M2 | HEART RATE: 107 BPM | SYSTOLIC BLOOD PRESSURE: 118 MMHG | WEIGHT: 239 LBS | HEIGHT: 72 IN | DIASTOLIC BLOOD PRESSURE: 80 MMHG | OXYGEN SATURATION: 98 %

## 2019-05-08 DIAGNOSIS — I48.0 PAROXYSMAL A-FIB (HCC): Primary | ICD-10-CM

## 2019-05-08 DIAGNOSIS — F99 INSOMNIA DUE TO OTHER MENTAL DISORDER: ICD-10-CM

## 2019-05-08 DIAGNOSIS — Z79.899 HIGH RISK MEDICATION USE: ICD-10-CM

## 2019-05-08 DIAGNOSIS — F41.1 GENERALIZED ANXIETY DISORDER: ICD-10-CM

## 2019-05-08 DIAGNOSIS — F51.05 INSOMNIA DUE TO OTHER MENTAL DISORDER: ICD-10-CM

## 2019-05-08 DIAGNOSIS — F31.5 BIPOLAR DISORDER, CURR EPISODE DEPRESSED, SEVERE, W/PSYCHOTIC FEATURES (HCC): Primary | ICD-10-CM

## 2019-05-08 LAB
INTERNATIONAL NORMALIZATION RATIO, POC: 1.2
PROTHROMBIN TIME, POC: NORMAL

## 2019-05-08 PROCEDURE — 85610 PROTHROMBIN TIME: CPT | Performed by: CLINICAL NURSE SPECIALIST

## 2019-05-08 PROCEDURE — 99214 OFFICE O/P EST MOD 30 MIN: CPT | Performed by: NURSE PRACTITIONER

## 2019-05-08 NOTE — TELEPHONE ENCOUNTER
SUBJECTIVE:   CC: Georgette Sanchez is an 35 year old woman who presents for preventive health visit.     Physical   Annual:     Getting at least 3 servings of Calcium per day::  Yes    Bi-annual eye exam::  Yes    Dental care twice a year::  Yes    Sleep apnea or symptoms of sleep apnea::  None    Diet::  Regular (no restrictions) and Low salt    Frequency of exercise::  6-7 days/week    Duration of exercise::  30-45 minutes    Taking medications regularly::  Yes    Medication side effects::  None    Additional concerns today::  YES                  Today's PHQ-2 Score:   PHQ-2 ( 1999 [employer]) 3/27/2018   Q1: Little interest or pleasure in doing things 0   Q2: Feeling down, depressed or hopeless 0   PHQ-2 Score 0   Q1: Little interest or pleasure in doing things Not at all   Q2: Feeling down, depressed or hopeless Not at all   PHQ-2 Score 0       Abuse: Current or Past(Physical, Sexual or Emotional)- No  Do you feel safe in your environment - Yes    Social History   Substance Use Topics     Smoking status: Former Smoker     Types: Cigarettes     Smokeless tobacco: Never Used     Alcohol use 0.0 oz/week     0 Standard drinks or equivalent per week      Comment: socially     Alcohol Use 3/27/2018   If you drink alcohol do you typically have greater than 3 drinks per day OR greater than 7 drinks per week? No   No flowsheet data found.    Reviewed orders with patient.  Reviewed health maintenance and updated orders accordingly - Yes      Pertinent mammograms are reviewed under the imaging tab.  History of abnormal Pap smear: NO - age 30- 65 PAP every 3 years recommended    Reviewed and updated as needed this visit by clinical staff  Tobacco  Allergies  Meds  Problems  Med Hx  Surg Hx  Fam Hx  Soc Hx          Reviewed and updated as needed this visit by Provider  Allergies  Meds  Problems            Review of Systems  C: NEGATIVE for fever, chills, change in weight  I: NEGATIVE for worrisome rashes, moles or  ----- Message from DEMETRIUS Valiente sent at 5/8/2019  1:39 PM CDT -----  Please let patient know that lab results were normal, specifically the liver enzymes! Thanks! "lesions  E: NEGATIVE for vision changes or irritation  ENT: NEGATIVE for ear, mouth and throat problems  R: NEGATIVE for significant cough or SOB  B: NEGATIVE for masses, tenderness or discharge  CV: NEGATIVE for chest pain, palpitations or peripheral edema  GI: NEGATIVE for nausea, abdominal pain, heartburn, or change in bowel habits  : NEGATIVE for unusual urinary or vaginal symptoms. Periods are regular, but painful cramps- chronic.  M: NEGATIVE for significant arthralgias or myalgia  N: NEGATIVE for weakness, dizziness or paresthesias  P: NEGATIVE for changes in mood or affect     OBJECTIVE:   /80 (Cuff Size: Adult Large)  Pulse 97  Temp 98.4  F (36.9  C) (Oral)  Ht 5' 9.2\" (1.758 m)  Wt 251 lb 3.2 oz (113.9 kg)  LMP 03/16/2018  SpO2 98%  BMI 36.88 kg/m2  Physical Exam  GENERAL: healthy, alert and no distress  EYES: Eyes grossly normal to inspection, PERRL and conjunctivae and sclerae normal  HENT: ear canals and TM's normal, nose and mouth without ulcers or lesions  NECK: no adenopathy, no asymmetry, masses, or scars and thyroid normal to palpation  RESP: lungs clear to auscultation - no rales, rhonchi or wheezes  BREAST: normal without masses, tenderness or nipple discharge and no palpable axillary masses or adenopathy  CV: regular rate and rhythm, normal S1 S2, no S3 or S4, no murmur, click or rub, no peripheral edema and peripheral pulses strong  ABDOMEN: soft, nontender, no hepatosplenomegaly, no masses and bowel sounds normal  Pelvic exam: normal vagina and vulva, normal cervix without lesions or tenderness, uterus normal size anteverted, adenxa normal in size without tenderness, pap smear done  MS: no gross musculoskeletal defects noted, no edema  SKIN: no suspicious lesions or rashes  NEURO: Normal strength and tone, mentation intact and speech normal  PSYCH: mentation appears normal, affect normal/bright    ASSESSMENT/PLAN:       (Z00.00) Encounter for routine adult health examination " "without abnormal findings  (primary encounter diagnosis)  Comment: fasting  Plan: Lipid panel reflex to direct LDL Fasting, CBC         with platelets differential, Hepatic panel         (Albumin, ALT, AST, Bili, Alk Phos, TP), TSH         with free T4 reflex, CANCER RISK MGMT/CANCER         GENETIC COUNSELING REFERRAL, Pap imaged thin         layer screen with HPV - recommended age 30 - 65        years (select HPV order below), HPV High Risk         Types DNA Cervical            (N93.8) Dysfunctional uterine bleeding  Comment:   Plan: OB/GYN REFERRAL            (N20.0) Nephrolithiasis  Comment:   Plan:     (Z80.3) Family history of breast cancer  Comment:  Plan: CANCER RISK MGMT/CANCER GENETIC COUNSELING         REFERRAL                COUNSELING:  Reviewed preventive health counseling, as reflected in patient instructions         reports that she has quit smoking. Her smoking use included Cigarettes. She has never used smokeless tobacco.    Estimated body mass index is 36.88 kg/(m^2) as calculated from the following:    Height as of this encounter: 5' 9.2\" (1.758 m).    Weight as of this encounter: 251 lb 3.2 oz (113.9 kg).       Counseling Resources:  ATP IV Guidelines  Pooled Cohorts Equation Calculator  Breast Cancer Risk Calculator  FRAX Risk Assessment  ICSI Preventive Guidelines  Dietary Guidelines for Americans, 2010  [employer]'s My[employer]ate  ASA Prophylaxis  Lung CA Screening    Krystle Suárez MD  [facility]  Answers for HPI/ROS submitted by the patient on 3/27/2018   PHQ-2 Score: 0    "

## 2019-05-08 NOTE — PROGRESS NOTES
5/19/2019 2:59 PM   Progress Note    IN:  1350  OUT: 1430      Kavin Lock 1970      Chief Complaint   Patient presents with    Other     psychosis    Depression         Subjective:  Patient is a 51 y.o. male diagnosed with Bipolar Disorder, mixed and presents today for follow-up. Last seen in clinic on 4/12/19 with Raúl Akers and prior records were reviewed. Today patient states, \"I have had a hiatal hernia repair, they stretched my esophagus. \" He says that he is having visual and auditory hallucinations daily. He says that he has had psychosis for years. He says it has never been controlled. Patient reports side effects as follows: none. No evidence of EPS, no cogwheeling or abnormal motor movements. Absent  suicidal ideation. Reports compliance with medications as good .      Current Substance Use:  See history    BP: 118/80  Wt.: 239 lb    PREVIOUS MEDICATION TRIALS  Valium (current, 10mg)  Lexapro (current, increased to 20mg on 4/12/19)  Ambien (current past, 5mg)  Cymbalta (wasn't effective, 30mg)  Latuda (at last visit had decreased the dose to 20mg, higher dose had put him in a zombie state)  Prozac  Lamictal (wasn't effective, took for 6 months, doesn't remember the dose)  Lithium (wasn't effective)  Seroquel (wasn't effective)  Trazodone (didn't help, unknown dose)  Abilify (didn't help)  Zyprexa (didn't help)  Amitriptyline (didn't help)      Review of Systems - 14 point review:  Negative except for: auditory and visual hallucinations, T2DM, obesity, hypothyroidism,     Constitutional: (fevers, chills, night sweats, wt loss/gain, change in appetite, fatigue, somnolence)    HEENT: (ear pain or discharge, hearing loss, ear ringing, sinus pressure, nosebleed, nasal discharge, sore throat, oral sores, tooth pain, bleeding gums, hoarse voice, neck pain)      Cardiovascular: (HTN, chest pain, elevated cholesterol/lipids, palpitations, leg swelling, leg pain with walking)    Respiratory: (cough, wheezing, snoring, SOB with activity (dyspnea), SOB while lying flat (orthopnea), awakening with severe SOB (paroxysmal nocturnal dyspnea))    Gastrointestinal: (NVD, constipation, abdominal pain, bright red stools, black tarry stools, stool incontinence)     Genitourinary:  (pelvic pain, burning or frequency of urination, urinary urgency, blood in urine incomplete bladder emptying, urinary incontinence, STD; MEN: testicular pain or swelling, erectile dysfunction; WOMEN: LMP, heavy menstrual bleeding (menorrhagia), irregular periods, postmenopausal bleeding, menstrual pain (dymenorrhea, vaginal discharge)    Musculoskeletal: (bone pain/fracture, joint pain or swelling, musle pain)    Integumentary: (rashes, acne, non-healing sores, itching, breast lumps, breast pain, nipple discharge, hair loss)    Neurologic: (HA, muscle weakness, paresthesias (numbness, coldness, crawling or prickling), memory loss, seizure, dizziness)    Psychiatric:  (anxiety, sadness, irritability/anger, insomnia, suicidality)    Endocrine: (heat or cold intolerance, excessive thirst (polydipsia), excessive hunger (polyphagia))    Immune/Allergic: (hives, seasonal or environmental allergies, HIV exposure)    Hematologic/Lymphatic: (lymph node enlargement, easy bleeding or bruising)    History obtained via chart review and patient    PCP is Katelin Panchal MD       Current Meds:    Prior to Admission medications    Medication Sig Start Date End Date Taking?  Authorizing Provider   diazepam (VALIUM) 10 MG tablet TAKE ONE TABLET BY MOUTH EVERY 8 HOURS AS NEEDED FOR ANXIETY 5/7/19 6/7/19 Yes DEMETRIUS Segundo   escitalopram (LEXAPRO) 20 MG tablet Take 1 tablet by mouth daily 4/12/19  Yes DEMETRIUS Perez - CNP   VICTOZA 18 MG/3ML SOPN SC injection INJECT 1.8 MG INTO THE SKIN DAILY 3/27/19  Yes Katelin Panchal MD   fenofibrate (TRICOR) 145 MG tablet TAKE ONE TABLET BY MOUTH DAILY 3/27/19  Yes Katelin Panchal MD   prazosin (MINIPRESS) 1 MG capsule Take 1 capsule by mouth nightly 3/13/19  Yes DEMETRIUS Akbar - CNP   metFORMIN (GLUCOPHAGE) 1000 MG tablet TAKE ONE TABLET BY MOUTH TWO TIMES DAILY (WITH MEALS) 3/5/19  Yes Edi Gil MD   omeprazole (PRILOSEC) 40 MG delayed release capsule omeprazole 40 mg capsule,delayed release   Take 1 capsule twice a day by oral route for 30 days.    Yes Historical Provider, MD HAMMER ULTRAFINE III SHORT PEN 31G X 8 MM MISC USE WITH INSULIN 2/6/19  Yes DEMETRIUS Valiente   levothyroxine (SYNTHROID) 200 MCG tablet TAKE ONE TABLET BY MOUTH ONCE DAILY 1/24/19  Yes Edi Gil MD   warfarin (COUMADIN) 5 MG tablet TAKE 1 TABLET BY MOUTH EVERY EVENING 12/27/18  Yes DEMETRIUS Baron   JARDIANCE 25 MG tablet TAKE ONE TABLET BY MOUTH ONCE DAILY 11/27/18  Yes Edi Gil MD   allopurinol (ZYLOPRIM) 300 MG tablet TAKE ONE TABLET BY MOUTH ONCE DAILY 10/29/18  Yes Edi Gil MD   MUCINEX 600 MG extended release tablet TAKE 2 TABLETS BY MOUTH 2 TIMES DAILY  Patient taking differently: TAKE 2 TABLETS BY MOUTH 2 TIMES DAILY as needed 10/29/18  Yes Edi Gil MD   LYRICA 25 MG capsule TAKE ONE CAPSULE BY MOUTH TWO TIMES A DAY 5/22/18  Yes Historical Provider, MD   hydrALAZINE (APRESOLINE) 50 MG tablet Take 1 tablet by mouth 2 times daily 6/14/18  Yes DEMETRIUS Valiente   albuterol sulfate HFA (VENTOLIN HFA) 108 (90 Base) MCG/ACT inhaler Inhale 2 puffs into the lungs every 6 hours as needed for Wheezing 1/9/18  Yes DEMETRIUS Valiente   Blood Glucose Monitoring Suppl (FREESTYLE LITE) DON USE AS DIRECTED 7/17/17  Yes Edi Gil MD   glucose monitoring kit (FREESTYLE) monitoring kit 1 kit by Does not apply route daily 2/6/17  Yes Edi Gil MD   FREESTYLE LITE strip  10/17/16  Yes Historical Provider, MD   meloxicam (MOBIC) 15 MG tablet Take 15 mg by mouth daily  1/6/17  Yes Historical Provider, MD   tiZANidine (ZANAFLEX) 4 MG tablet Take 4 mg by mouth every 8 hours as needed   Yes Historical Provider, MD   HYDROcodone-acetaminophen (NORCO)  MG per tablet Take 1 tablet by mouth every 8 hours as needed for Pain   Yes Historical Provider, MD         MSE:  Patient is  A & O x 4. Appearance:  well-appearing and street clothes appropriately dressed for season and age. Cognition:  Recent memory intact , remote memory intact , good fund of knowledge, average  intelligence level. Speech:  normal  Language: Naming: intact; Word Finding: intact  Conversation no evidence of delusions  Behavior:  Cooperative and Good eye contact  Mood: anxious  Affect: congruent with mood  Thought Content: no evidence of overt psychosis, delusional thought or suicidal /homicidal ideation or plan  Thought Process: linear, goal directed and coherent  Associations: logical connections  Attention Span and concentration: Impaired (difficulty concentrating)  Judgement Insight:  normal and appropriate  Gait and Station:normal gait and station   Sleep: avg 2 hrs   Appetite: loss of appetite (just has come off a liquid diet)    Cognition: Can spell \"world\" backwards: Yes                    Can do serial 7's: Yes    Assessment:   1. Bipolar disorder, curr episode depressed, severe, w/psychotic features ([facility] [address]    2. Generalized anxiety disorder    3. Insomnia due to other mental disorder    4. High risk medication use        Assessments Administered:    PHQ9: 25, severe  HAM-A: 30, severe    Plan:  Continue:  Prazosin, 1mg, nightly  Valium, 10mg, daily (being prescribed by Brittany Gutierres)  Lexapro, 20mg, daily    Follow up: Return in about 1 month (around 6/5/2019). 1. The risks, benefits, side effects, indications, contraindications, and adverse effects of the medications have been discussed. Yes.  2. The pt has verbalized understanding and has capacity to give informed consent. 3. The Jean Bless report has been reviewed according to [facility] regulations. 4. Supportive therapy offered.   5. time of   30  minutes was spent on counseling and/or coordination of care of  Bipolar Disorder/ALCIDES/Insomnia.                                     Psychotherapy Topics: mood/medication effectiveness       [facility]

## 2019-05-16 ENCOUNTER — OFFICE VISIT (OUTPATIENT)
Dept: PSYCHIATRY | Age: 49
End: 2019-05-16
Payer: MEDICAID

## 2019-05-16 VITALS
HEART RATE: 101 BPM | BODY MASS INDEX: 32.23 KG/M2 | WEIGHT: 238 LBS | HEIGHT: 72 IN | OXYGEN SATURATION: 96 % | SYSTOLIC BLOOD PRESSURE: 132 MMHG | DIASTOLIC BLOOD PRESSURE: 83 MMHG

## 2019-05-16 DIAGNOSIS — F41.1 GAD (GENERALIZED ANXIETY DISORDER): Primary | ICD-10-CM

## 2019-05-16 DIAGNOSIS — F43.10 PTSD (POST-TRAUMATIC STRESS DISORDER): ICD-10-CM

## 2019-05-16 DIAGNOSIS — F32.A DEPRESSION, UNSPECIFIED DEPRESSION TYPE: ICD-10-CM

## 2019-05-16 PROCEDURE — 90832 PSYTX W PT 30 MINUTES: CPT | Performed by: COUNSELOR

## 2019-05-16 NOTE — PROGRESS NOTES
Therapy Progress Note  [facility]  5/16/2019  1:30 PM      Time spent with Patient: 29 minutes  This is patient's fourth  Therapy appointment. Reason for Consult:  depression, anxiety and stress  Referring Provider: No referring provider defined for this encounter. Kavin Lock ,a 51 y.o. male, for initial evaluation visit. Pt provided informed consent for the behavioral health program. Discussed with patient model of service to include the limits of confidentiality (i.e. abuse reporting, suicide intervention, etc.) and short-term intervention focused approach. Discussed no show and late cancellation policy. Pt indicated understanding. S:  Pt has brighter affect today- smiling and laughing when appropriate. He reports his hernia surgery went well and they ended up stretching his esophagus as well. He is following his bariatric diet and has been getting out of the house to do yard work. Therapist educated pt on sleep hygiene and passive muscle relaxation and guided meditation were discussed as options to help with sleep. He is only getting about 2 hours of sleep. Denies SI, HI and AVH at this time.     MSE:    Appearance    alert, cooperative, smiling; casually dressed  Appetite Good  Sleep disturbance 2 hours on average  Fatigue Yes  Loss of pleasure No  Impulsive behavior No  Speech    normal rate and normal volume  Mood    Anxious  Depressed  Affect    depressed affect  Thought Content    cognitive distortions  Thought Process    linear  Associations    logical connections  Insight    Good  Judgment    Intact  Orientation    oriented to person, place, time, and general circumstances  Memory    recent and remote memory intact  Attention/Concentration    intact  Morbid ideation No  Suicide Assessment    no suicidal ideation      History:  Social History     Socioeconomic History    Marital status:      Spouse name: None    Number of children: None    Years of education: None    Highest education level: None   Occupational History    None   Social Needs    Financial resource strain: None    Food insecurity:     Worry: None     Inability: None    Transportation needs:     Medical: None     Non-medical: None   Tobacco Use    Smoking status: Never Smoker    Smokeless tobacco: Never Used   Substance and Sexual Activity    Alcohol use: No     Alcohol/week: 0.0 oz    Drug use: No     Comment: History of Cocaine and Marijuana usage 2010    Sexual activity: None   Lifestyle    Physical activity:     Days per week: None     Minutes per session: None    Stress: None   Relationships    Social connections:     Talks on phone: None     Gets together: None     Attends [employer] service: None     Active member of club or organization: None     Attends meetings of clubs or organizations: None     Relationship status: None    Intimate partner violence:     Fear of current or ex partner: None     Emotionally abused: None     Physically abused: None     Forced sexual activity: None   Other Topics Concern    None   Social History Narrative    None       Medications:   Current Outpatient Medications   Medication Sig Dispense Refill    diazepam (VALIUM) 10 MG tablet TAKE ONE TABLET BY MOUTH EVERY 8 HOURS AS NEEDED FOR ANXIETY 90 tablet 2    escitalopram (LEXAPRO) 20 MG tablet Take 1 tablet by mouth daily 30 tablet 2    VICTOZA 18 MG/3ML SOPN SC injection INJECT 1.8 MG INTO THE SKIN DAILY 9 mL 3    fenofibrate (TRICOR) 145 MG tablet TAKE ONE TABLET BY MOUTH DAILY 30 tablet 3    prazosin (MINIPRESS) 1 MG capsule Take 1 capsule by mouth nightly 30 capsule 2    metFORMIN (GLUCOPHAGE) 1000 MG tablet TAKE ONE TABLET BY MOUTH TWO TIMES DAILY (WITH MEALS) 60 tablet 5    omeprazole (PRILOSEC) 40 MG delayed release capsule omeprazole 40 mg capsule,delayed release   Take 1 capsule twice a day by oral route for 30 days.       B-D ULTRAFINE III SHORT PEN 31G X 8 MM MISC USE WITH INSULIN 100 each 1    levothyroxine (SYNTHROID) 200 MCG tablet TAKE ONE TABLET BY MOUTH ONCE DAILY 30 tablet 11    warfarin (COUMADIN) 5 MG tablet TAKE 1 TABLET BY MOUTH EVERY EVENING 30 tablet 5    JARDIANCE 25 MG tablet TAKE ONE TABLET BY MOUTH ONCE DAILY 30 tablet 5    allopurinol (ZYLOPRIM) 300 MG tablet TAKE ONE TABLET BY MOUTH ONCE DAILY 30 tablet 5    MUCINEX 600 MG extended release tablet TAKE 2 TABLETS BY MOUTH 2 TIMES DAILY (Patient taking differently: TAKE 2 TABLETS BY MOUTH 2 TIMES DAILY as needed) 14 tablet 1    LYRICA 25 MG capsule TAKE ONE CAPSULE BY MOUTH TWO TIMES A DAY  3    hydrALAZINE (APRESOLINE) 50 MG tablet Take 1 tablet by mouth 2 times daily 90 tablet 11    albuterol sulfate HFA (VENTOLIN HFA) 108 (90 Base) MCG/ACT inhaler Inhale 2 puffs into the lungs every 6 hours as needed for Wheezing 1 Inhaler 3    Blood Glucose Monitoring Suppl (FREESTYLE LITE) DON USE AS DIRECTED 1 Device 0    glucose monitoring kit (FREESTYLE) monitoring kit 1 kit by Does not apply route daily 1 kit 0    FREESTYLE LITE strip   11    meloxicam (MOBIC) 15 MG tablet Take 15 mg by mouth daily   2    tiZANidine (ZANAFLEX) 4 MG tablet Take 4 mg by mouth every 8 hours as needed      HYDROcodone-acetaminophen (NORCO)  MG per tablet Take 1 tablet by mouth every 8 hours as needed for Pain       No current facility-administered medications for this visit.         Social History:   Social History     Socioeconomic History    Marital status:      Spouse name: Not on file    Number of children: Not on file    Years of education: Not on file    Highest education level: Not on file   Occupational History    Not on file   Social Needs    Financial resource strain: Not on file    Food insecurity:     Worry: Not on file     Inability: Not on file    Transportation needs:     Medical: Not on file     Non-medical: Not on file   Tobacco Use    Smoking status: Never Smoker    Smokeless tobacco: Never Used   Substance and Sexual Activity    Alcohol use: No     Alcohol/week: 0.0 oz    Drug use: No     Comment: History of Cocaine and Marijuana usage 2010    Sexual activity: Not on file   Lifestyle    Physical activity:     Days per week: Not on file     Minutes per session: Not on file    Stress: Not on file   Relationships    Social connections:     Talks on phone: Not on file     Gets together: Not on file     Attends Shinto service: Not on file     Active member of club or organization: Not on file     Attends meetings of clubs or organizations: Not on file     Relationship status: Not on file    Intimate partner violence:     Fear of current or ex partner: Not on file     Emotionally abused: Not on file     Physically abused: Not on file     Forced sexual activity: Not on file   Other Topics Concern    Not on file   Social History Narrative    Not on file       TOBACCO:   reports that he has never smoked. He has never used smokeless tobacco.  ETOH:   reports that he does not drink alcohol. Family History:   Family History   Problem Relation Age of Onset    Diabetes Other     Heart Disease Other        Diagnosis:        Diagnosis Date    Anxiety     Chronic pain     Depression     Diabetes mellitus ([facility] [address]     GERD (gastroesophageal reflux disease)     Gout     Hernia     Hyperlipidemia     Hypertension     Hypothyroid     Myocardial infarct, old     [place] - Cardiac Cath - no blockage    Neuropathy     Osteoarthritis     Psychiatric illness      Problems related to the social environment, Economic problems and Other psychosocial and environmental problems    Plan:  1. Continue medication management  2. CBT to target depression/anxiety  3.  Discuss sleep hygiene and PMR    Pt interventions:  Trained in relaxation strategies, Provided education, Discussed self-care (sleep, nutrition, rewarding activities, social support, exercise), Discussed use of imagery, distractions, relaxation, mood management, communication training, questioning unhelpful thinking, problem-solving, and behavioral activation to manage pain, Supportive techniques, Emphasized self-care as important for managing overall health, CBT to target depression/anxiety and Identified maladaptive thoughts      [address]

## 2019-05-23 RX ORDER — ALLOPURINOL 300 MG/1
TABLET ORAL
Qty: 30 TABLET | Refills: 5 | Status: SHIPPED | OUTPATIENT
Start: 2019-05-23 | End: 2019-12-12 | Stop reason: SDUPTHER

## 2019-05-28 DIAGNOSIS — Z79.4 TYPE 2 DIABETES MELLITUS WITHOUT COMPLICATION, WITH LONG-TERM CURRENT USE OF INSULIN (HCC): Chronic | ICD-10-CM

## 2019-05-28 DIAGNOSIS — E11.9 TYPE 2 DIABETES MELLITUS WITHOUT COMPLICATION, WITH LONG-TERM CURRENT USE OF INSULIN (HCC): Chronic | ICD-10-CM

## 2019-05-28 RX ORDER — EMPAGLIFLOZIN 25 MG/1
TABLET, FILM COATED ORAL
Qty: 30 TABLET | Refills: 5 | Status: SHIPPED | OUTPATIENT
Start: 2019-05-28 | End: 2020-01-10

## 2019-06-05 DIAGNOSIS — F51.01 PRIMARY INSOMNIA: Primary | ICD-10-CM

## 2019-06-05 RX ORDER — ZOLPIDEM TARTRATE 5 MG/1
TABLET ORAL
Qty: 30 TABLET | Refills: 1 | OUTPATIENT
Start: 2019-06-05 | End: 2019-08-07 | Stop reason: SDUPTHER

## 2019-06-05 NOTE — TELEPHONE ENCOUNTER
Kavin Lock called to request a refill on his medication. Last office visit : 2019   Next office visit : 2019     Last UDS:   Amphetamine Screen, Urine   Date Value Ref Range Status   2019 Neg  Final     Comment:     MOP- Pos (Norco)     Barbiturate Screen, Urine   Date Value Ref Range Status   2019 Neg  Final     Benzodiazepine Screen, Urine   Date Value Ref Range Status   2019 Pos  Final     Buprenorphine Urine   Date Value Ref Range Status   2019 Neg  Final     Cocaine Metabolite Screen, Urine   Date Value Ref Range Status   2019 Neg  Final     Gabapentin Screen, Urine   Date Value Ref Range Status   2019 Neg  Final     MDMA, Urine   Date Value Ref Range Status   2019 Neg  Final     Methamphetamine, Urine   Date Value Ref Range Status   2019 Neg  Final     Opiate Scrn, Ur   Date Value Ref Range Status   2019 Neg  Final     Oxycodone Screen, Ur   Date Value Ref Range Status   2019 Neg  Final     PCP Screen, Urine   Date Value Ref Range Status   2019 Neg  Final     Propoxyphene Screen, Urine   Date Value Ref Range Status   2019 Neg  Final     THC Screen, Urine   Date Value Ref Range Status   2019 Neg  Final     Tricyclic Antidepressants, Urine   Date Value Ref Range Status   2019 Neg  Final       Last Laura In2018   Medication Contract: not on file    Last Fill: 2019    Requested Prescriptions     Pending Prescriptions Disp Refills    zolpidem (AMBIEN) 5 MG tablet [Pharmacy Med Name: ZOLPIDEM TARTRATE 5 MG TABS 5 TAB] 30 tablet 1     Sig: TAKE ONE TABLET BY MOUTH AT BEDTIME AS NEEDED FOR SLEEP     Signed Prescriptions Disp Refills    MUCINEX 600 MG extended release tablet 14 tablet 1     Sig: TAKE 2 TABLETS BY MOUTH 2 TIMES DAILY     Authorizing Provider: [address] [place] [address]     Ordering User: Mandie Durbin         Please approve or refuse this medication.    Nicki Bartholomew

## 2019-06-06 DIAGNOSIS — I10 ESSENTIAL HYPERTENSION: Chronic | ICD-10-CM

## 2019-06-06 RX ORDER — HYDRALAZINE HYDROCHLORIDE 50 MG/1
50 TABLET, FILM COATED ORAL 3 TIMES DAILY
Qty: 90 TABLET | Refills: 11 | Status: SHIPPED | OUTPATIENT
Start: 2019-06-06 | End: 2019-06-27

## 2019-06-27 ENCOUNTER — OFFICE VISIT (OUTPATIENT)
Dept: PSYCHIATRY | Age: 49
End: 2019-06-27
Payer: MEDICAID

## 2019-06-27 VITALS
SYSTOLIC BLOOD PRESSURE: 134 MMHG | WEIGHT: 233 LBS | RESPIRATION RATE: 18 BRPM | BODY MASS INDEX: 31.6 KG/M2 | DIASTOLIC BLOOD PRESSURE: 80 MMHG | TEMPERATURE: 64.4 F | HEART RATE: 97 BPM | OXYGEN SATURATION: 99 %

## 2019-06-27 DIAGNOSIS — F31.5 BIPOLAR DISORDER, CURR EPISODE DEPRESSED, SEVERE, W/PSYCHOTIC FEATURES (HCC): Primary | ICD-10-CM

## 2019-06-27 PROCEDURE — 99212 OFFICE O/P EST SF 10 MIN: CPT | Performed by: NURSE PRACTITIONER

## 2019-06-27 NOTE — PROGRESS NOTES
6/27/2019 2:43 PM   Progress Note    IN:  1430  OUT: 1440      Kavin Lock 1970      Chief Complaint   Patient presents with    Depression    Anxiety    Other     hearing voices         Subjective:  Patient is a 51 y.o. male diagnosed with Bipolar Disorder and presents today for follow-up. Last seen in clinic on 5/8/19 and prior records were reviewed. Today patient states, \"I'm doing about the same. \" He reports that he hears voices every day. Patient reports side effects as follows: none. No evidence of EPS, no cogwheeling or abnormal motor movements. Absent  suicidal ideation (fleeting thoughts \"every so often\". He doesn't know what keeps him from acting out on it.)  Reports compliance with medications as good .      Current Substance Use:  See history    BP: /80 (Site: Right Upper Arm, Position: Sitting)   Pulse 97   Temp (!) 64.4 °F (18 °C)   Resp 18   Wt 233 lb (105.7 kg)   SpO2 99%   BMI 31.60 kg/m²       Review of Systems - 14 point review:  Negative except for: auditory and visual hallucinations, T2DM, obesity, hypothyroidism      Constitutional: (fevers, chills, night sweats, wt loss/gain, change in appetite, fatigue, somnolence)    HEENT: (ear pain or discharge, hearing loss, ear ringing, sinus pressure, nosebleed, nasal discharge, sore throat, oral sores, tooth pain, bleeding gums, hoarse voice, neck pain)      Cardiovascular: (HTN, chest pain, elevated cholesterol/lipids, palpitations, leg swelling, leg pain with walking)    Respiratory: (cough, wheezing, snoring, SOB with activity (dyspnea), SOB while lying flat (orthopnea), awakening with severe SOB (paroxysmal nocturnal dyspnea))    Gastrointestinal: (NVD, constipation, abdominal pain, bright red stools, black tarry stools, stool incontinence)     Genitourinary:  (pelvic pain, burning or frequency of urination, urinary urgency, blood in urine incomplete bladder emptying, urinary incontinence, STD; MEN: testicular pain or swelling, erectile dysfunction; WOMEN: LMP, heavy menstrual bleeding (menorrhagia), irregular periods, postmenopausal bleeding, menstrual pain (dymenorrhea, vaginal discharge)    Musculoskeletal: (bone pain/fracture, joint pain or swelling, musle pain)    Integumentary: (rashes, acne, non-healing sores, itching, breast lumps, breast pain, nipple discharge, hair loss)    Neurologic: (HA, muscle weakness, paresthesias (numbness, coldness, crawling or prickling), memory loss, seizure, dizziness)    Psychiatric:  (anxiety, sadness, irritability/anger, insomnia, suicidality)    Endocrine: (heat or cold intolerance, excessive thirst (polydipsia), excessive hunger (polyphagia))    Immune/Allergic: (hives, seasonal or environmental allergies, HIV exposure)    Hematologic/Lymphatic: (lymph node enlargement, easy bleeding or bruising)    History obtained via chart review and patient    PCP is Violetta Kramer MD       Current Meds:    Prior to Admission medications    Medication Sig Start Date End Date Taking?  Authorizing Provider   MUCINEX 600 MG extended release tablet TAKE 2 TABLETS BY MOUTH 2 TIMES DAILY 6/5/19   Violetta Kramer MD   zolpidem (AMBIEN) 5 MG tablet TAKE ONE TABLET BY MOUTH AT BEDTIME AS NEEDED FOR SLEEP 6/5/19 7/5/19  Violetta Kramer MD   JARDIANCE 25 MG tablet TAKE ONE TABLET BY MOUTH ONCE DAILY 5/28/19   Violetta Kramer MD   allopurinol (ZYLOPRIM) 300 MG tablet TAKE ONE TABLET BY MOUTH ONCE DAILY 5/23/19   Violetta Kramer MD   escitalopram (LEXAPRO) 20 MG tablet Take 1 tablet by mouth daily 4/12/19   DEMETRIUS Kim CNP   VICTOZA 18 MG/3ML SOPN SC injection INJECT 1.8 MG INTO THE SKIN DAILY 3/27/19   Violetta Kramer MD   fenofibrate (TRICOR) 145 MG tablet TAKE ONE TABLET BY MOUTH DAILY 3/27/19   Violetta Kramer MD   prazosin (MINIPRESS) 1 MG capsule Take 1 capsule by mouth nightly 3/13/19   DEMETRIUS Kim CNP   metFORMIN (GLUCOPHAGE) 1000 MG tablet TAKE ONE auditory and visual hallucinations continuous more or less hallucinations, negative obsessions,  negativehomicidal and negative suicidal   Thought Process: linear, goal directed and coherent  Associations: logical connections  Attention Span and concentration: Normal   Judgement Insight:  normal and appropriate  Gait and Station:normal gait and station   Sleep: avg 2 hrs  Appetite: ok      Lab Results   Component Value Date     05/07/2019    K 3.6 05/07/2019     05/07/2019    CO2 22 05/07/2019    BUN 17 05/07/2019    CREATININE 1.1 05/07/2019    GLUCOSE 122 (H) 05/07/2019    CALCIUM 9.6 05/07/2019    PROT 7.5 05/07/2019    LABALBU 4.1 05/07/2019    BILITOT 0.4 05/07/2019    ALKPHOS 58 05/07/2019    AST 27 05/07/2019    ALT 24 05/07/2019    LABGLOM >60 05/07/2019    GLOB 2.7 03/22/2017     Lab Results   Component Value Date     05/07/2019     02/09/2011    K 3.6 05/07/2019    K 3.8 02/09/2011     05/07/2019    CL 97 02/09/2011    CO2 22 05/07/2019    BUN 17 05/07/2019    CREATININE 1.1 05/07/2019    CREATININE 1.2 02/09/2011    GLUCOSE 122 05/07/2019    CALCIUM 9.6 05/07/2019      Lab Results   Component Value Date    CHOL 142 (L) 02/22/2019     Lab Results   Component Value Date    TRIG 91 02/22/2019     Lab Results   Component Value Date    HDL 51 (L) 02/22/2019     Lab Results   Component Value Date    LDLCALC 73 02/22/2019     No results found for: LABVLDL, VLDL  No results found for: [facility]  Lab Results   Component Value Date    LABA1C 5.9 02/22/2019     No results found for: EAG  Lab Results   Component Value Date    TSH 1.180 02/22/2019     Lab Results   Component Value Date    VITD25 38.5 06/01/2018       Assessments Administered:    PHQ9: 25, severe  HAM-A: 44, severe    Assessment:   1.  Bipolar disorder, curr episode depressed, severe, w/psychotic features ([facility] [address]        Plan:  Continue:  Prazosin, 1mg, nightly  Valium, 10mg, daily (prescribed by Brenda Mccarthy)  Lexapro, 20mg, daily      Follow up: No follow-ups on file. 1. The risks, benefits, side effects, indications, contraindications, and adverse effects of the medications have been discussed. Yes.  2. The pt has verbalized understanding and has capacity to give informed consent. 3. The Marci Caraballo report has been reviewed according to [facility] regulations. 4. Supportive therapy offered. 5. Follow up: No follow-ups on file. 6. The patient has been advised to call with any problems. 7. Controlled substance Treatment Plan: this is a maintenance dose. .  8. The above listed medications have been continued, modifications in meds and other orders/labs as follows: No orders of the defined types were placed in this encounter. No orders of the defined types were placed in this encounter. 9. Additional comments: Patient was agitated with being late with the appointment. He said that coming here was like a circus. He may decide not to follow up here with his care. He has missed 4 therapy appointments. We were to have an hour appt from 2-3pm but he scheduled another doctor appt at 2:45pm. He left the appt upset. No medication changes were accomplished. He would benefit from starting on an antipsychotic medication. He said that Kavita Najera was handling his medications. When this provider said that it would not be appropriate for 2 different providers to be handling his medications, he said something like, \"well, then you deal with it. \" I tried to explain that it wouldn't work for 2 different providers to be prescribing his psychiatric medications but he just became more agitated and said that this provider didn't have the proper attitude. When he checked out, he expressed that he didn't want to see this provider again.         10.Over 50% of the total visit time of   10  minutes was spent on counseling and/or coordination of care of:                        1. Bipolar disorder, curr episode depressed, severe, w/psychotic features [facility]                      Psychotherapy Topics: mood/medication effectiveness       Hilario Luevano PMHNP-BC

## 2019-06-30 ENCOUNTER — APPOINTMENT (OUTPATIENT)
Dept: CT IMAGING | Age: 49
End: 2019-06-30
Payer: MEDICAID

## 2019-06-30 ENCOUNTER — HOSPITAL ENCOUNTER (EMERGENCY)
Age: 49
Discharge: ANOTHER HOSPITAL | End: 2019-06-30
Attending: EMERGENCY MEDICINE
Payer: MEDICAID

## 2019-06-30 ENCOUNTER — APPOINTMENT (OUTPATIENT)
Dept: GENERAL RADIOLOGY | Age: 49
End: 2019-06-30
Payer: MEDICAID

## 2019-06-30 VITALS
SYSTOLIC BLOOD PRESSURE: 120 MMHG | OXYGEN SATURATION: 96 % | DIASTOLIC BLOOD PRESSURE: 82 MMHG | RESPIRATION RATE: 12 BRPM | HEART RATE: 71 BPM | TEMPERATURE: 98.7 F | HEIGHT: 72 IN | WEIGHT: 233 LBS | BODY MASS INDEX: 31.56 KG/M2

## 2019-06-30 DIAGNOSIS — R93.89 ABNORMAL CT SCAN: ICD-10-CM

## 2019-06-30 DIAGNOSIS — K59.00 CONSTIPATION, UNSPECIFIED CONSTIPATION TYPE: ICD-10-CM

## 2019-06-30 DIAGNOSIS — R07.89 ATYPICAL CHEST PAIN: ICD-10-CM

## 2019-06-30 DIAGNOSIS — R79.1 SUBTHERAPEUTIC INTERNATIONAL NORMALIZED RATIO (INR): Primary | ICD-10-CM

## 2019-06-30 DIAGNOSIS — R10.84 GENERALIZED ABDOMINAL PAIN: ICD-10-CM

## 2019-06-30 DIAGNOSIS — K45.8 INTERNAL HERNIA: ICD-10-CM

## 2019-06-30 LAB
ALBUMIN SERPL-MCNC: 4.2 G/DL (ref 3.5–5.2)
ALP BLD-CCNC: 42 U/L (ref 40–130)
ALT SERPL-CCNC: 11 U/L (ref 5–41)
ANION GAP SERPL CALCULATED.3IONS-SCNC: 16 MMOL/L (ref 7–19)
AST SERPL-CCNC: 20 U/L (ref 5–40)
BILIRUB SERPL-MCNC: <0.2 MG/DL (ref 0.2–1.2)
BILIRUBIN URINE: NEGATIVE
BLOOD, URINE: NEGATIVE
BUN BLDV-MCNC: 14 MG/DL (ref 6–20)
CALCIUM SERPL-MCNC: 8.6 MG/DL (ref 8.6–10)
CHLORIDE BLD-SCNC: 108 MMOL/L (ref 98–111)
CLARITY: CLEAR
CO2: 19 MMOL/L (ref 22–29)
COLOR: YELLOW
CREAT SERPL-MCNC: 1.1 MG/DL (ref 0.5–1.2)
EKG P AXIS: 70 DEGREES
EKG P AXIS: 71 DEGREES
EKG P-R INTERVAL: 118 MS
EKG P-R INTERVAL: 142 MS
EKG Q-T INTERVAL: 360 MS
EKG Q-T INTERVAL: 366 MS
EKG QRS DURATION: 106 MS
EKG QRS DURATION: 106 MS
EKG QTC CALCULATION (BAZETT): 394 MS
EKG QTC CALCULATION (BAZETT): 395 MS
EKG T AXIS: -53 DEGREES
EKG T AXIS: -70 DEGREES
GFR NON-AFRICAN AMERICAN: >60
GLUCOSE BLD-MCNC: 179 MG/DL (ref 74–109)
GLUCOSE URINE: >=1000 MG/DL
HCT VFR BLD CALC: 42.9 % (ref 42–52)
HEMOGLOBIN: 13.2 G/DL (ref 14–18)
INR BLD: 1.14 (ref 0.88–1.18)
KETONES, URINE: NEGATIVE MG/DL
LACTIC ACID: 1 MMOL/L (ref 0.5–1.9)
LACTIC ACID: 2.6 MMOL/L (ref 0.5–1.9)
LEUKOCYTE ESTERASE, URINE: NEGATIVE
LIPASE: 36 U/L (ref 13–60)
MCH RBC QN AUTO: 29.7 PG (ref 27–31)
MCHC RBC AUTO-ENTMCNC: 30.8 G/DL (ref 33–37)
MCV RBC AUTO: 96.6 FL (ref 80–94)
NITRITE, URINE: NEGATIVE
PDW BLD-RTO: 14.7 % (ref 11.5–14.5)
PH UA: 5.5 (ref 5–8)
PLATELET # BLD: 248 K/UL (ref 130–400)
PMV BLD AUTO: 11 FL (ref 9.4–12.4)
POTASSIUM SERPL-SCNC: 3.8 MMOL/L (ref 3.5–5)
PROTEIN UA: NEGATIVE MG/DL
PROTHROMBIN TIME: 14 SEC (ref 12–14.6)
RBC # BLD: 4.44 M/UL (ref 4.7–6.1)
SODIUM BLD-SCNC: 143 MMOL/L (ref 136–145)
SPECIFIC GRAVITY UA: >1.045 (ref 1–1.03)
TOTAL PROTEIN: 7 G/DL (ref 6.6–8.7)
TROPONIN: <0.01 NG/ML (ref 0–0.03)
TROPONIN: <0.01 NG/ML (ref 0–0.03)
URINE REFLEX TO CULTURE: ABNORMAL
UROBILINOGEN, URINE: 0.2 E.U./DL
WBC # BLD: 11.7 K/UL (ref 4.8–10.8)

## 2019-06-30 PROCEDURE — 81003 URINALYSIS AUTO W/O SCOPE: CPT

## 2019-06-30 PROCEDURE — 74022 RADEX COMPL AQT ABD SERIES: CPT

## 2019-06-30 PROCEDURE — 83690 ASSAY OF LIPASE: CPT

## 2019-06-30 PROCEDURE — 6360000004 HC RX CONTRAST MEDICATION: Performed by: EMERGENCY MEDICINE

## 2019-06-30 PROCEDURE — 85027 COMPLETE CBC AUTOMATED: CPT

## 2019-06-30 PROCEDURE — 96374 THER/PROPH/DIAG INJ IV PUSH: CPT

## 2019-06-30 PROCEDURE — 99285 EMERGENCY DEPT VISIT HI MDM: CPT

## 2019-06-30 PROCEDURE — 85610 PROTHROMBIN TIME: CPT

## 2019-06-30 PROCEDURE — 2580000003 HC RX 258: Performed by: EMERGENCY MEDICINE

## 2019-06-30 PROCEDURE — 84484 ASSAY OF TROPONIN QUANT: CPT

## 2019-06-30 PROCEDURE — 96376 TX/PRO/DX INJ SAME DRUG ADON: CPT

## 2019-06-30 PROCEDURE — 6360000002 HC RX W HCPCS: Performed by: EMERGENCY MEDICINE

## 2019-06-30 PROCEDURE — 6360000002 HC RX W HCPCS

## 2019-06-30 PROCEDURE — 93005 ELECTROCARDIOGRAM TRACING: CPT

## 2019-06-30 PROCEDURE — 71260 CT THORAX DX C+: CPT

## 2019-06-30 PROCEDURE — 83605 ASSAY OF LACTIC ACID: CPT

## 2019-06-30 PROCEDURE — 36415 COLL VENOUS BLD VENIPUNCTURE: CPT

## 2019-06-30 PROCEDURE — 80053 COMPREHEN METABOLIC PANEL: CPT

## 2019-06-30 PROCEDURE — 96375 TX/PRO/DX INJ NEW DRUG ADDON: CPT

## 2019-06-30 PROCEDURE — 74177 CT ABD & PELVIS W/CONTRAST: CPT

## 2019-06-30 PROCEDURE — 99285 EMERGENCY DEPT VISIT HI MDM: CPT | Performed by: EMERGENCY MEDICINE

## 2019-06-30 RX ORDER — MORPHINE SULFATE 4 MG/ML
4 INJECTION, SOLUTION INTRAMUSCULAR; INTRAVENOUS ONCE
Status: COMPLETED | OUTPATIENT
Start: 2019-06-30 | End: 2019-06-30

## 2019-06-30 RX ORDER — MORPHINE SULFATE 4 MG/ML
INJECTION, SOLUTION INTRAMUSCULAR; INTRAVENOUS
Status: COMPLETED
Start: 2019-06-30 | End: 2019-06-30

## 2019-06-30 RX ORDER — 0.9 % SODIUM CHLORIDE 0.9 %
500 INTRAVENOUS SOLUTION INTRAVENOUS ONCE
Status: COMPLETED | OUTPATIENT
Start: 2019-06-30 | End: 2019-06-30

## 2019-06-30 RX ORDER — ONDANSETRON 2 MG/ML
4 INJECTION INTRAMUSCULAR; INTRAVENOUS ONCE
Status: COMPLETED | OUTPATIENT
Start: 2019-06-30 | End: 2019-06-30

## 2019-06-30 RX ADMIN — MORPHINE SULFATE 4 MG: 4 INJECTION, SOLUTION INTRAMUSCULAR; INTRAVENOUS at 12:33

## 2019-06-30 RX ADMIN — MORPHINE SULFATE 4 MG: 4 INJECTION INTRAVENOUS at 08:11

## 2019-06-30 RX ADMIN — MORPHINE SULFATE 4 MG: 4 INJECTION, SOLUTION INTRAMUSCULAR; INTRAVENOUS at 14:54

## 2019-06-30 RX ADMIN — MORPHINE SULFATE 4 MG: 4 INJECTION INTRAVENOUS at 12:33

## 2019-06-30 RX ADMIN — IOPAMIDOL 90 ML: 755 INJECTION, SOLUTION INTRAVENOUS at 09:04

## 2019-06-30 RX ADMIN — SODIUM CHLORIDE 500 ML: 9 INJECTION, SOLUTION INTRAVENOUS at 10:24

## 2019-06-30 RX ADMIN — MORPHINE SULFATE 4 MG: 4 INJECTION INTRAVENOUS at 14:54

## 2019-06-30 RX ADMIN — ONDANSETRON 4 MG: 2 INJECTION INTRAMUSCULAR; INTRAVENOUS at 08:11

## 2019-06-30 ASSESSMENT — ENCOUNTER SYMPTOMS
NAUSEA: 1
VOMITING: 0
SHORTNESS OF BREATH: 1
EYE PAIN: 0
DIARRHEA: 1
ABDOMINAL PAIN: 1

## 2019-06-30 ASSESSMENT — PAIN SCALES - GENERAL
PAINLEVEL_OUTOF10: 10
PAINLEVEL_OUTOF10: 10
PAINLEVEL_OUTOF10: 5
PAINLEVEL_OUTOF10: 9
PAINLEVEL_OUTOF10: 7
PAINLEVEL_OUTOF10: 6

## 2019-06-30 ASSESSMENT — PAIN DESCRIPTION - LOCATION: LOCATION: ABDOMEN

## 2019-06-30 ASSESSMENT — PAIN DESCRIPTION - PAIN TYPE: TYPE: ACUTE PAIN

## 2019-07-05 DIAGNOSIS — I48.0 PAROXYSMAL A-FIB (HCC): ICD-10-CM

## 2019-07-05 RX ORDER — WARFARIN SODIUM 5 MG/1
5 TABLET ORAL EVERY EVENING
Qty: 30 TABLET | Refills: 5 | Status: SHIPPED | OUTPATIENT
Start: 2019-07-05 | End: 2020-05-08

## 2019-07-08 ENCOUNTER — TELEPHONE (OUTPATIENT)
Dept: ADMINISTRATIVE | Age: 49
End: 2019-07-08

## 2019-07-08 ENCOUNTER — ANTI-COAG VISIT (OUTPATIENT)
Dept: CARDIOLOGY | Age: 49
End: 2019-07-08
Payer: MEDICAID

## 2019-07-08 DIAGNOSIS — I48.0 PAROXYSMAL A-FIB (HCC): Primary | ICD-10-CM

## 2019-07-08 LAB
INTERNATIONAL NORMALIZATION RATIO, POC: 3.4
PROTHROMBIN TIME, POC: NORMAL

## 2019-07-08 PROCEDURE — 85610 PROTHROMBIN TIME: CPT | Performed by: CLINICAL NURSE SPECIALIST

## 2019-07-08 NOTE — TELEPHONE ENCOUNTER
Called the pt said he was in the ER this weekend and his pt was a 2. He wanted his pt/inr checked today. Scheduled today for pt/inr only.

## 2019-07-25 DIAGNOSIS — F43.10 PTSD (POST-TRAUMATIC STRESS DISORDER): ICD-10-CM

## 2019-07-25 DIAGNOSIS — Z79.4 TYPE 2 DIABETES MELLITUS WITHOUT COMPLICATION, WITH LONG-TERM CURRENT USE OF INSULIN (HCC): Chronic | ICD-10-CM

## 2019-07-25 DIAGNOSIS — E11.9 TYPE 2 DIABETES MELLITUS WITHOUT COMPLICATION, WITH LONG-TERM CURRENT USE OF INSULIN (HCC): Chronic | ICD-10-CM

## 2019-07-25 RX ORDER — PRAZOSIN HYDROCHLORIDE 1 MG/1
1 CAPSULE ORAL NIGHTLY
Qty: 30 CAPSULE | Refills: 0 | OUTPATIENT
Start: 2019-07-25

## 2019-07-25 RX ORDER — LIRAGLUTIDE 6 MG/ML
1.8 INJECTION SUBCUTANEOUS DAILY
Qty: 9 ML | Refills: 3 | Status: SHIPPED | OUTPATIENT
Start: 2019-07-25 | End: 2019-12-12 | Stop reason: SDUPTHER

## 2019-08-05 DIAGNOSIS — F41.9 ANXIETY: ICD-10-CM

## 2019-08-06 RX ORDER — DIAZEPAM 10 MG/1
TABLET ORAL
Qty: 90 TABLET | Refills: 0 | OUTPATIENT
Start: 2019-08-06 | End: 2019-09-04 | Stop reason: SDUPTHER

## 2019-08-07 ENCOUNTER — OFFICE VISIT (OUTPATIENT)
Dept: PRIMARY CARE CLINIC | Age: 49
End: 2019-08-07
Payer: MEDICAID

## 2019-08-07 ENCOUNTER — ANTI-COAG VISIT (OUTPATIENT)
Dept: CARDIOLOGY | Age: 49
End: 2019-08-07
Payer: MEDICAID

## 2019-08-07 VITALS
HEIGHT: 72 IN | SYSTOLIC BLOOD PRESSURE: 130 MMHG | BODY MASS INDEX: 31.37 KG/M2 | HEART RATE: 76 BPM | OXYGEN SATURATION: 98 % | DIASTOLIC BLOOD PRESSURE: 76 MMHG | TEMPERATURE: 97.8 F | WEIGHT: 231.6 LBS

## 2019-08-07 DIAGNOSIS — Z79.4 TYPE 2 DIABETES MELLITUS WITHOUT COMPLICATION, WITH LONG-TERM CURRENT USE OF INSULIN (HCC): Primary | ICD-10-CM

## 2019-08-07 DIAGNOSIS — F51.01 PRIMARY INSOMNIA: ICD-10-CM

## 2019-08-07 DIAGNOSIS — Z98.890 HISTORY OF GASTRIC SURGERY: ICD-10-CM

## 2019-08-07 DIAGNOSIS — I10 ESSENTIAL HYPERTENSION: ICD-10-CM

## 2019-08-07 DIAGNOSIS — E11.9 TYPE 2 DIABETES MELLITUS WITHOUT COMPLICATION, WITH LONG-TERM CURRENT USE OF INSULIN (HCC): Primary | ICD-10-CM

## 2019-08-07 DIAGNOSIS — I48.0 PAROXYSMAL A-FIB (HCC): Primary | ICD-10-CM

## 2019-08-07 LAB
HBA1C MFR BLD: 5.6 %
INTERNATIONAL NORMALIZATION RATIO, POC: 2.7
PROTHROMBIN TIME, POC: NORMAL

## 2019-08-07 PROCEDURE — 1036F TOBACCO NON-USER: CPT | Performed by: NURSE PRACTITIONER

## 2019-08-07 PROCEDURE — 2022F DILAT RTA XM EVC RTNOPTHY: CPT | Performed by: NURSE PRACTITIONER

## 2019-08-07 PROCEDURE — 83036 HEMOGLOBIN GLYCOSYLATED A1C: CPT | Performed by: NURSE PRACTITIONER

## 2019-08-07 PROCEDURE — G8427 DOCREV CUR MEDS BY ELIG CLIN: HCPCS | Performed by: NURSE PRACTITIONER

## 2019-08-07 PROCEDURE — 85610 PROTHROMBIN TIME: CPT | Performed by: NURSE PRACTITIONER

## 2019-08-07 PROCEDURE — 99214 OFFICE O/P EST MOD 30 MIN: CPT | Performed by: NURSE PRACTITIONER

## 2019-08-07 PROCEDURE — 3044F HG A1C LEVEL LT 7.0%: CPT | Performed by: NURSE PRACTITIONER

## 2019-08-07 PROCEDURE — G8417 CALC BMI ABV UP PARAM F/U: HCPCS | Performed by: NURSE PRACTITIONER

## 2019-08-07 RX ORDER — CYCLOBENZAPRINE HCL 10 MG
TABLET ORAL
Refills: 1 | COMMUNITY
Start: 2019-08-05 | End: 2020-01-10

## 2019-08-07 RX ORDER — ZOLPIDEM TARTRATE 10 MG/1
TABLET ORAL
Qty: 30 TABLET | Refills: 5 | Status: SHIPPED | OUTPATIENT
Start: 2019-08-07 | End: 2019-09-06

## 2019-08-07 ASSESSMENT — ENCOUNTER SYMPTOMS
ABDOMINAL PAIN: 1
RESPIRATORY NEGATIVE: 1
EYES NEGATIVE: 1

## 2019-08-07 NOTE — PROGRESS NOTES
Alcohol/week: 0.0 standard drinks        Current Outpatient Medications   Medication Sig Dispense Refill    zolpidem (AMBIEN) 10 MG tablet TAKE ONE TABLET BY MOUTH AT BEDTIME AS NEEDED FOR SLEEP 30 tablet 5    diazepam (VALIUM) 10 MG tablet TAKE ONE TABLET BY MOUTH EVERY 8 HOURS AS NEEDED FOR ANXIETY 90 tablet 0    VICTOZA 18 MG/3ML SOPN SC injection INJECT 1.8 MG INTO THE SKIN DAILY 9 mL 3    warfarin (COUMADIN) 5 MG tablet TAKE 1 TABLET BY MOUTH EVERY EVENING 30 tablet 5    prazosin (MINIPRESS) 1 MG capsule TAKE 1 CAPSULE BY MOUTH NIGHTLY 30 capsule 0    MUCINEX 600 MG extended release tablet TAKE 2 TABLETS BY MOUTH 2 TIMES DAILY 14 tablet 1    JARDIANCE 25 MG tablet TAKE ONE TABLET BY MOUTH ONCE DAILY 30 tablet 5    allopurinol (ZYLOPRIM) 300 MG tablet TAKE ONE TABLET BY MOUTH ONCE DAILY 30 tablet 5    escitalopram (LEXAPRO) 20 MG tablet Take 1 tablet by mouth daily 30 tablet 2    fenofibrate (TRICOR) 145 MG tablet TAKE ONE TABLET BY MOUTH DAILY 30 tablet 3    metFORMIN (GLUCOPHAGE) 1000 MG tablet TAKE ONE TABLET BY MOUTH TWO TIMES DAILY (WITH MEALS) 60 tablet 5    omeprazole (PRILOSEC) 40 MG delayed release capsule omeprazole 40 mg capsule,delayed release   Take 1 capsule twice a day by oral route for 30 days.  levothyroxine (SYNTHROID) 200 MCG tablet TAKE ONE TABLET BY MOUTH ONCE DAILY 30 tablet 11    LYRICA 25 MG capsule TAKE ONE CAPSULE BY MOUTH TWO TIMES A DAY  3    hydrALAZINE (APRESOLINE) 50 MG tablet Take 1 tablet by mouth 2 times daily 90 tablet 11    HYDROcodone-acetaminophen (NORCO)  MG per tablet Take 1 tablet by mouth every 8 hours as needed for Pain      cyclobenzaprine (FLEXERIL) 10 MG tablet TAKE ONE TABLET BY MOUTH THREE TIMES A DAY EFFECTIVE DATE 6/14/19  1    B-D ULTRAFINE III SHORT PEN 31G X 8 MM MISC USE WITH INSULIN 100 each 1     No current facility-administered medications for this visit.         Allergies   Allergen Reactions    Milk-Related Compounds

## 2019-08-20 NOTE — PROGRESS NOTES
[facility] - PODIATRY    Today's Date: 08/23/19    Patient Name: Campos Hale  MRN: [id number]  [facility]: [id number]  PCP: Mesha Parker APRN  Referring Provider: No ref. provider found    SUBJECTIVE     Chief Complaint   Patient presents with   • Follow-up     pt c/o painful, long thickened toenails - bilateral great toenail pain- pain scale 7/10   • Diabetes     last stated blood sugar 87  mg/dl- PCP Mesha [facility] last visit 3/22/19     HPI: Campos Hale, a 49 y.o.male, comes to clinic as a(n) established patient presenting for diabetic foot exam and complaining of painful toenails. Patient has h/o arthritis, DM2, thyroid disease, GERD, hernia, HLD, HTN. Patient is IDDM with last stated BG level of 87mg/dl. Admits to numbness in his feet. Denies open wounds or sores. States that his toenails are long, thick, and discolored. Feels that he has ingrown nails to both great toes. Relates improvement since last visit. Admits pain at 7/10 level and described as aching, throbbing, numbness and tingling. Relates previous treatment(s) including foot care by podiatrist. Denies any constitutional symptoms. No other pedal complaints at this time.    Past Medical History:   Diagnosis Date   • Arthritis    • Diabetes mellitus (CMS/HCC)    • Disease of thyroid gland    • GERD (gastroesophageal reflux disease)    • Hernia    • Hyperlipidemia    • Hypertension      Past Surgical History:   Procedure Laterality Date   • GASTRIC SLEEVE LAPAROSCOPIC     • NISSEN FUNDOPLICATION       Family History   Problem Relation Age of Onset   • No Known Problems Mother    • Diabetes Father    • Hyperlipidemia Father    • Hypertension Father    • No Known Problems Sister    • Cancer Brother      Social History     Socioeconomic History   • Marital status:      Spouse name: Not on file   • Number of children: Not on file   • Years of education: Not on file   • Highest education level: Not on file   Tobacco Use   •  Smoking status: Never Smoker   Substance and Sexual Activity   • Alcohol use: No   • Drug use: No   • Sexual activity: Yes     Partners: Female     No Known Allergies  Current Outpatient Medications   Medication Sig Dispense Refill   • allopurinol (ZYLOPRIM) 300 MG tablet Take 300 mg by mouth daily     • cyclobenzaprine (FLEXERIL) 10 MG tablet cyclobenzaprine 10 mg tablet   1 tab daily as needed     • diazePAM (VALIUM) 10 MG tablet diazepam 10 mg tablet   Take 1 tablet 3 times a day by oral route for 30 days.     • Empagliflozin (JARDIANCE) 25 MG tablet Jardiance 25 mg tablet   Take 1 tablet every day by oral route for 30 days.     • fenofibrate (TRICOR) 145 MG tablet fenofibrate nanocrystallized 145 mg tablet   Take 1 tablet every day by oral route for 30 days.     • hydrALAZINE (APRESOLINE) 25 MG tablet Take 1 tablet by mouth 3 times daily     • HYDROcodone-acetaminophen (NORCO)  MG per tablet Every 8 (Eight) Hours.     • levothyroxine (SYNTHROID, LEVOTHROID) 300 MCG tablet Take 1 tablet by mouth daily     • Liraglutide (VICTOZA SC) Inject  under the skin into the appropriate area as directed.     • metFORMIN (GLUCOPHAGE) 1000 MG tablet metformin 1,000 mg tablet   Take 1 tablet twice a day by oral route for 30 days.     • omeprazole (PriLOSEC) 10 MG capsule Take 10 mg by mouth daily     • pregabalin (LYRICA) 25 MG capsule TAKE ONE CAPSULE BY MOUTH TWO TIMES A DAY     • promethazine (PHENERGAN) 12.5 MG tablet Take 12.5 mg by mouth Every 4 (Four) Hours.     • simvastatin (ZOCOR) 40 MG tablet Take 0.5 tablets by mouth every evening     • tiZANidine (ZANAFLEX) 4 MG tablet Every 8 (Eight) Hours.     • warfarin (COUMADIN) 5 MG tablet warfarin 5 mg tablet   Take 1 tablet every day by oral route for 30 days.     • zolpidem (AMBIEN) 10 MG tablet Take 10 mg by mouth At Night As Needed. for sleep  5   • ciclopirox (PENLAC) 8 % solution Apply  topically to the appropriate area as directed Every Night for 336 days. Apply  as directed to affected toenails. 6 mL 5     No current facility-administered medications for this visit.      Review of Systems   Constitutional: Negative for chills and fever.   HENT: Negative for congestion.    Respiratory: Negative for shortness of breath.    Cardiovascular: Negative for chest pain and leg swelling.   Gastrointestinal: Negative for constipation, diarrhea, nausea and vomiting.   Musculoskeletal:        Foot pain   Skin: Negative for wound.   Neurological: Positive for numbness.       OBJECTIVE     Vitals:    08/23/19 0811   BP: 120/80   Pulse: 97   SpO2: 97%       PHYSICAL EXAM  GEN:   Accompanied by none.     General Exam  Diabetic Foot Exam: performed  Appearance: appears stated age and healthy   Orientation: alert and oriented to person, place, and time   Affect: appropriate   Gait: unimpaired   Assistance: independent   Shoe Gear: casual shoes      Foot/Ankle Exam  Inspection and Palpation  Ecchymosis - Right: none Left: none  Tenderness - Right: (Hallux nail) Left: (Hallux nail)  Swelling - Right: none   Left: none    Arch - Right: pes planus Left: pes planus  Hammertoes - Right: absent Left: absent  Claw Toes - Right: absent Left: absent  Mallet Toes - Right: absent Left: absent  Hallux Valgus - Right: no Left: no  Hallux Limitus - Right: no Left: no  Skin - Right: skin intact  Left: skin intact   Nails -  Right: abnormally thick, ingrown toenail and onychomycosis (2nd toe) Left: ingrown toenail and onychomycosis (2nd toe)     Vascular  Dorsalis Pedis - Right: 2+ Left: 2+  Posterior Tibial - Right: 2+ Left: 2+  Skin Temperature -  Right: warm  Left: warm    CFT - Right: 3 Left: 3  Pedal Hair Growth - Right: present Left: present  Varicosities -  Right: no varicosities  Left: no varicosities      Neurologic  Saphenous Nerve Sensation  - Right: diminished Left: normal  Tibial Nerve Sensation - Right: diminished Left: diminished  Superficial Peroneal Nerve Sensation - Right: diminished Left:  diminished  Deep Peroneal Nerve Sensation - Right: diminished Left: diminished  Sural Nerve Sensation - Right: diminished Left: diminished  Protective Sensation using Chester-Toi Monofilament - Right: 2 Left: 1    Muscle Strength  Dorsiflexion - Right: 5 Left: 5  Plantar Flexion - Right: 5 Left: 5  Eversion - Right: 5 Left: 5  Inversion - Right: 5 Left: 5    Range of Motion  Normal right ankle ROM  Normal left ankle ROM            RADIOLOGY/NUCLEAR:  No results found.    LABORATORY/CULTURE RESULTS:      PATHOLOGY RESULTS:       ASSESSMENT/PLAN     Campos was seen today for follow-up and diabetes.    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 Apply as directed to affected toenails.      Comprehensive lower extremity examination and evaluation was performed.  Discussed findings and treatment plan including risks, benefits, and treatment options with patient in detail. Patient agreed with treatment plan.  After verbal consent obtained, nail(s) x10 debrided of length and thickness with nail nipper without incidence  After verbal consent obtained, nail(s) x2 debrided of offending borders with nail nipper without incidence  Patient may maintain nails and calluses at home utilizing emery board or pumice stone between visits as needed  Reviewed at home diabetic foot care including daily foot checks   An After Visit Summary was printed and given to the patient at discharge, including (if requested) any available informative/educational handouts regarding diagnosis, treatment, or medications. All questions were answered to patient/family satisfaction. Should symptoms fail to improve or worsen they agree to call or return to clinic or to go to  the Emergency Department. Discussed the importance of following up with any needed screening tests/labs/specialist appointments and any requested follow-up recommended by me today. Importance of maintaining follow-up discussed and patient accepts that missed appointments can delay diagnosis and potentially lead to worsening of conditions.  Return in about 3 months (around 11/23/2019)., or sooner if acute issues arise.        This document has been electronically signed by Shun Barone DPM on August 23, 2019 8:24 AM

## 2019-08-22 ENCOUNTER — TELEPHONE (OUTPATIENT)
Dept: PODIATRY | Facility: CLINIC | Age: 49
End: 2019-08-22

## 2019-08-22 NOTE — TELEPHONE ENCOUNTER
Unable to reach patient to remind him of the appointment with Dr. Ervin Barone tomorrow morning at 8 am.

## 2019-08-23 ENCOUNTER — OFFICE VISIT (OUTPATIENT)
Dept: PODIATRY | Facility: CLINIC | Age: 49
End: 2019-08-23

## 2019-08-23 VITALS
HEIGHT: 72 IN | HEART RATE: 97 BPM | WEIGHT: 232 LBS | SYSTOLIC BLOOD PRESSURE: 120 MMHG | OXYGEN SATURATION: 97 % | BODY MASS INDEX: 31.42 KG/M2 | DIASTOLIC BLOOD PRESSURE: 80 MMHG

## 2019-08-23 DIAGNOSIS — B35.1 ONYCHOMYCOSIS: ICD-10-CM

## 2019-08-23 DIAGNOSIS — E11.49 DIABETES MELLITUS TYPE 2 WITH NEUROLOGICAL MANIFESTATIONS (HCC): ICD-10-CM

## 2019-08-23 DIAGNOSIS — M79.671 FOOT PAIN, BILATERAL: ICD-10-CM

## 2019-08-23 DIAGNOSIS — M79.672 FOOT PAIN, BILATERAL: ICD-10-CM

## 2019-08-23 DIAGNOSIS — L60.2 THICKENED NAILS: ICD-10-CM

## 2019-08-23 DIAGNOSIS — L60.0 INGROWN TOENAIL: Primary | ICD-10-CM

## 2019-08-23 PROCEDURE — 99212 OFFICE O/P EST SF 10 MIN: CPT | Performed by: PODIATRIST

## 2019-08-23 PROCEDURE — 11721 DEBRIDE NAIL 6 OR MORE: CPT | Performed by: PODIATRIST

## 2019-08-23 RX ORDER — DIAZEPAM 10 MG/1
TABLET ORAL
COMMUNITY
Start: 2019-08-06

## 2019-08-23 RX ORDER — WARFARIN SODIUM 5 MG/1
TABLET ORAL
COMMUNITY
Start: 2019-07-05

## 2019-08-23 RX ORDER — FENOFIBRATE 145 MG/1
TABLET, COATED ORAL
COMMUNITY
Start: 2019-03-27

## 2019-08-23 RX ORDER — PREGABALIN 25 MG/1
CAPSULE ORAL
COMMUNITY
Start: 2018-05-22

## 2019-08-23 RX ORDER — ZOLPIDEM TARTRATE 10 MG/1
10 TABLET ORAL NIGHTLY PRN
Refills: 5 | COMMUNITY
Start: 2019-08-07

## 2019-08-23 RX ORDER — CYCLOBENZAPRINE HCL 10 MG
TABLET ORAL
COMMUNITY
Start: 2019-08-05

## 2019-09-04 DIAGNOSIS — F41.9 ANXIETY: ICD-10-CM

## 2019-09-05 RX ORDER — DIAZEPAM 10 MG/1
10 TABLET ORAL EVERY 8 HOURS PRN
Qty: 90 TABLET | Refills: 0 | OUTPATIENT
Start: 2019-09-05 | End: 2019-10-05 | Stop reason: SDUPTHER

## 2019-09-05 NOTE — TELEPHONE ENCOUNTER
Kavin Lock called to request a refill on his medication.       Last office visit : 8/7/2019   Next office visit : 11/12/2019     Last UDS:   Amphetamine Screen, Urine   Date Value Ref Range Status   05/07/2019 Neg  Final     Comment:     MOP- Pos (Norco)     Barbiturate Screen, Urine   Date Value Ref Range Status   05/07/2019 Neg  Final     Benzodiazepine Screen, Urine   Date Value Ref Range Status   05/07/2019 Pos  Final     Buprenorphine Urine   Date Value Ref Range Status   05/07/2019 Neg  Final     Cocaine Metabolite Screen, Urine   Date Value Ref Range Status   05/07/2019 Neg  Final     Gabapentin Screen, Urine   Date Value Ref Range Status   05/07/2019 Neg  Final     MDMA, Urine   Date Value Ref Range Status   05/07/2019 Neg  Final     Methamphetamine, Urine   Date Value Ref Range Status   05/07/2019 Neg  Final     Opiate Scrn, Ur   Date Value Ref Range Status   05/07/2019 Neg  Final     Oxycodone Screen, Ur   Date Value Ref Range Status   05/07/2019 Neg  Final     PCP Screen, Urine   Date Value Ref Range Status   05/07/2019 Neg  Final     Propoxyphene Screen, Urine   Date Value Ref Range Status   05/07/2019 Neg  Final     THC Screen, Urine   Date Value Ref Range Status   05/07/2019 Neg  Final     Tricyclic Antidepressants, Urine   Date Value Ref Range Status   05/07/2019 Neg  Final        Last Rony Gobble: 9-2018 new one pending  Medication Contract: [id number]   Last Fill: 8-6    Requested Prescriptions     Pending Prescriptions Disp Refills    diazepam (VALIUM) 10 MG tablet 90 tablet 0         Rx called to pharmacy   Ruth Rivera LPN

## 2019-09-09 DIAGNOSIS — E11.9 TYPE 2 DIABETES MELLITUS WITHOUT COMPLICATION, WITH LONG-TERM CURRENT USE OF INSULIN (HCC): Chronic | ICD-10-CM

## 2019-09-09 DIAGNOSIS — E03.9 ACQUIRED HYPOTHYROIDISM: Primary | Chronic | ICD-10-CM

## 2019-09-09 DIAGNOSIS — Z79.4 TYPE 2 DIABETES MELLITUS WITHOUT COMPLICATION, WITH LONG-TERM CURRENT USE OF INSULIN (HCC): Chronic | ICD-10-CM

## 2019-09-09 RX ORDER — FENOFIBRATE 145 MG/1
TABLET, COATED ORAL
Qty: 30 TABLET | Refills: 3 | Status: SHIPPED | OUTPATIENT
Start: 2019-09-09 | End: 2019-12-04 | Stop reason: ALTCHOICE

## 2019-09-16 ENCOUNTER — TELEPHONE (OUTPATIENT)
Dept: CARDIOLOGY | Age: 49
End: 2019-09-16

## 2019-09-24 ENCOUNTER — TELEPHONE (OUTPATIENT)
Dept: PODIATRY | Facility: CLINIC | Age: 49
End: 2019-09-24

## 2019-10-05 DIAGNOSIS — F41.9 ANXIETY: ICD-10-CM

## 2019-10-07 RX ORDER — DIAZEPAM 10 MG/1
10 TABLET ORAL EVERY 8 HOURS PRN
Qty: 90 TABLET | Refills: 0 | OUTPATIENT
Start: 2019-10-07 | End: 2019-11-06 | Stop reason: SDUPTHER

## 2019-10-15 ENCOUNTER — PATIENT MESSAGE (OUTPATIENT)
Dept: PRIMARY CARE CLINIC | Age: 49
End: 2019-10-15

## 2019-10-15 DIAGNOSIS — N52.9 INABILITY TO MAINTAIN ERECTION: Primary | ICD-10-CM

## 2019-10-16 DIAGNOSIS — N52.9 DIFFICULTY ATTAINING ERECTION: Primary | ICD-10-CM

## 2019-10-16 RX ORDER — SILDENAFIL CITRATE 20 MG/1
20 TABLET ORAL PRN
Qty: 30 TABLET | Refills: 3 | Status: CANCELLED | OUTPATIENT
Start: 2019-10-16

## 2019-10-16 RX ORDER — SILDENAFIL CITRATE 20 MG/1
20 TABLET ORAL PRN
Qty: 30 TABLET | Refills: 0 | Status: CANCELLED | OUTPATIENT
Start: 2019-10-16

## 2019-10-16 RX ORDER — SILDENAFIL 50 MG/1
50 TABLET, FILM COATED ORAL PRN
Qty: 30 TABLET | Refills: 3 | Status: SHIPPED | OUTPATIENT
Start: 2019-10-16 | End: 2020-03-10

## 2019-10-24 LAB
ALBUMIN SERPL-MCNC: 3.9 G/DL (ref 3.5–5.2)
ALP BLD-CCNC: 43 U/L (ref 40–130)
ALT SERPL-CCNC: 21 U/L (ref 5–41)
ANION GAP SERPL CALCULATED.3IONS-SCNC: 13 MMOL/L (ref 7–19)
AST SERPL-CCNC: 30 U/L (ref 5–40)
BASOPHILS ABSOLUTE: 0 K/UL (ref 0–0.2)
BASOPHILS RELATIVE PERCENT: 0.8 % (ref 0–1)
BILIRUB SERPL-MCNC: 0.4 MG/DL (ref 0.2–1.2)
BUN BLDV-MCNC: 14 MG/DL (ref 6–20)
CALCIUM SERPL-MCNC: 8.6 MG/DL (ref 8.6–10)
CHLORIDE BLD-SCNC: 106 MMOL/L (ref 98–111)
CO2: 27 MMOL/L (ref 22–29)
CREAT SERPL-MCNC: 0.9 MG/DL (ref 0.5–1.2)
EOSINOPHILS ABSOLUTE: 0.1 K/UL (ref 0–0.6)
EOSINOPHILS RELATIVE PERCENT: 1.3 % (ref 0–5)
FERRITIN: 37.9 NG/ML (ref 30–400)
GFR NON-AFRICAN AMERICAN: >60
GLUCOSE BLD-MCNC: 102 MG/DL (ref 74–109)
HCT VFR BLD CALC: 39.7 % (ref 42–52)
HEMOGLOBIN: 12.4 G/DL (ref 14–18)
IMMATURE GRANULOCYTES #: 0 K/UL
IRON: 127 UG/DL (ref 59–158)
LYMPHOCYTES ABSOLUTE: 1.7 K/UL (ref 1.1–4.5)
LYMPHOCYTES RELATIVE PERCENT: 44 % (ref 20–40)
MCH RBC QN AUTO: 29.9 PG (ref 27–31)
MCHC RBC AUTO-ENTMCNC: 31.2 G/DL (ref 33–37)
MCV RBC AUTO: 95.7 FL (ref 80–94)
MONOCYTES ABSOLUTE: 0.5 K/UL (ref 0–0.9)
MONOCYTES RELATIVE PERCENT: 12 % (ref 0–10)
NEUTROPHILS ABSOLUTE: 1.6 K/UL (ref 1.5–7.5)
NEUTROPHILS RELATIVE PERCENT: 41.6 % (ref 50–65)
PDW BLD-RTO: 14.7 % (ref 11.5–14.5)
PLATELET # BLD: 297 K/UL (ref 130–400)
PMV BLD AUTO: 10.1 FL (ref 9.4–12.4)
POTASSIUM SERPL-SCNC: 3.7 MMOL/L (ref 3.5–5)
RBC # BLD: 4.15 M/UL (ref 4.7–6.1)
SODIUM BLD-SCNC: 146 MMOL/L (ref 136–145)
TOTAL PROTEIN: 6.4 G/DL (ref 6.6–8.7)
VITAMIN B-12: 776 PG/ML (ref 211–946)
VITAMIN D 25-HYDROXY: 27.3 NG/ML
WBC # BLD: 3.9 K/UL (ref 4.8–10.8)

## 2019-10-28 LAB — VITAMIN B1, PLASMA: 17 NMOL/L (ref 4–15)

## 2019-11-06 DIAGNOSIS — F41.9 ANXIETY: ICD-10-CM

## 2019-11-06 RX ORDER — DIAZEPAM 10 MG/1
TABLET ORAL
Qty: 90 TABLET | Refills: 0 | Status: SHIPPED | OUTPATIENT
Start: 2019-11-06 | End: 2019-12-19

## 2019-12-03 ENCOUNTER — TELEPHONE (OUTPATIENT)
Dept: INTERNAL MEDICINE CLINIC | Age: 49
End: 2019-12-03

## 2019-12-04 ENCOUNTER — TELEPHONE (OUTPATIENT)
Dept: PODIATRY | Facility: CLINIC | Age: 49
End: 2019-12-04

## 2019-12-04 ENCOUNTER — HOSPITAL ENCOUNTER (EMERGENCY)
Age: 49
Discharge: HOME | End: 2019-12-04
Payer: MEDICAID

## 2019-12-04 VITALS
HEART RATE: 78 BPM | DIASTOLIC BLOOD PRESSURE: 78 MMHG | HEIGHT: 72 IN | SYSTOLIC BLOOD PRESSURE: 120 MMHG | BODY MASS INDEX: 28.31 KG/M2 | OXYGEN SATURATION: 98 % | RESPIRATION RATE: 18 BRPM | TEMPERATURE: 98 F | WEIGHT: 209 LBS

## 2019-12-04 DIAGNOSIS — L24.A9 DRAINAGE FROM WOUND: ICD-10-CM

## 2019-12-04 DIAGNOSIS — Z98.890 POST-OPERATIVE STATE: Primary | ICD-10-CM

## 2019-12-04 PROCEDURE — 87070 CULTURE OTHR SPECIMN AEROBIC: CPT

## 2019-12-04 PROCEDURE — 87077 CULTURE AEROBIC IDENTIFY: CPT

## 2019-12-04 PROCEDURE — 87205 SMEAR GRAM STAIN: CPT

## 2019-12-04 PROCEDURE — 87186 SC STD MICRODIL/AGAR DIL: CPT

## 2019-12-04 PROCEDURE — 99283 EMERGENCY DEPT VISIT LOW MDM: CPT

## 2019-12-04 RX ORDER — SULFAMETHOXAZOLE AND TRIMETHOPRIM 800; 160 MG/1; MG/1
1 TABLET ORAL 2 TIMES DAILY
COMMUNITY
End: 2020-01-10

## 2019-12-05 ASSESSMENT — ENCOUNTER SYMPTOMS: ABDOMINAL PAIN: 0

## 2019-12-06 LAB
GRAM STAIN RESULT: ABNORMAL
ORGANISM: ABNORMAL
ORGANISM: ABNORMAL
WOUND/ABSCESS: ABNORMAL
WOUND/ABSCESS: ABNORMAL

## 2019-12-09 ENCOUNTER — OFFICE VISIT (OUTPATIENT)
Dept: CARDIOLOGY | Age: 49
End: 2019-12-09
Payer: MEDICAID

## 2019-12-09 VITALS
HEIGHT: 72 IN | OXYGEN SATURATION: 98 % | HEART RATE: 108 BPM | DIASTOLIC BLOOD PRESSURE: 72 MMHG | SYSTOLIC BLOOD PRESSURE: 122 MMHG | BODY MASS INDEX: 26.95 KG/M2 | WEIGHT: 199 LBS

## 2019-12-09 DIAGNOSIS — E78.5 DYSLIPIDEMIA: ICD-10-CM

## 2019-12-09 DIAGNOSIS — I48.0 PAF (PAROXYSMAL ATRIAL FIBRILLATION) (HCC): Primary | ICD-10-CM

## 2019-12-09 DIAGNOSIS — I10 ESSENTIAL HYPERTENSION: ICD-10-CM

## 2019-12-09 LAB
INTERNATIONAL NORMALIZATION RATIO, POC: 5.6
PROTHROMBIN TIME, POC: NORMAL

## 2019-12-09 PROCEDURE — G8417 CALC BMI ABV UP PARAM F/U: HCPCS | Performed by: NURSE PRACTITIONER

## 2019-12-09 PROCEDURE — G8427 DOCREV CUR MEDS BY ELIG CLIN: HCPCS | Performed by: NURSE PRACTITIONER

## 2019-12-09 PROCEDURE — 85610 PROTHROMBIN TIME: CPT | Performed by: NURSE PRACTITIONER

## 2019-12-09 PROCEDURE — 99214 OFFICE O/P EST MOD 30 MIN: CPT | Performed by: NURSE PRACTITIONER

## 2019-12-09 PROCEDURE — G8484 FLU IMMUNIZE NO ADMIN: HCPCS | Performed by: NURSE PRACTITIONER

## 2019-12-09 PROCEDURE — 1036F TOBACCO NON-USER: CPT | Performed by: NURSE PRACTITIONER

## 2019-12-09 PROCEDURE — 93000 ELECTROCARDIOGRAM COMPLETE: CPT | Performed by: NURSE PRACTITIONER

## 2019-12-09 RX ORDER — PREGABALIN 75 MG/1
75 CAPSULE ORAL 2 TIMES DAILY
COMMUNITY

## 2019-12-12 ENCOUNTER — OFFICE VISIT (OUTPATIENT)
Dept: PRIMARY CARE CLINIC | Age: 49
End: 2019-12-12
Payer: MEDICAID

## 2019-12-12 ENCOUNTER — ANTI-COAG VISIT (OUTPATIENT)
Dept: CARDIOLOGY | Age: 49
End: 2019-12-12
Payer: MEDICAID

## 2019-12-12 VITALS
SYSTOLIC BLOOD PRESSURE: 122 MMHG | BODY MASS INDEX: 27.77 KG/M2 | RESPIRATION RATE: 17 BRPM | WEIGHT: 205 LBS | HEIGHT: 72 IN | HEART RATE: 101 BPM | DIASTOLIC BLOOD PRESSURE: 80 MMHG | OXYGEN SATURATION: 98 % | TEMPERATURE: 98.6 F

## 2019-12-12 DIAGNOSIS — Z79.4 TYPE 2 DIABETES MELLITUS WITHOUT COMPLICATION, WITH LONG-TERM CURRENT USE OF INSULIN (HCC): Chronic | ICD-10-CM

## 2019-12-12 DIAGNOSIS — F41.9 ANXIETY: ICD-10-CM

## 2019-12-12 DIAGNOSIS — E03.9 ACQUIRED HYPOTHYROIDISM: ICD-10-CM

## 2019-12-12 DIAGNOSIS — Z79.4 TYPE 2 DIABETES MELLITUS WITHOUT COMPLICATION, WITH LONG-TERM CURRENT USE OF INSULIN (HCC): Primary | Chronic | ICD-10-CM

## 2019-12-12 DIAGNOSIS — E78.2 MIXED HYPERLIPIDEMIA: ICD-10-CM

## 2019-12-12 DIAGNOSIS — I10 ESSENTIAL HYPERTENSION: ICD-10-CM

## 2019-12-12 DIAGNOSIS — I48.0 PAF (PAROXYSMAL ATRIAL FIBRILLATION) (HCC): Primary | ICD-10-CM

## 2019-12-12 DIAGNOSIS — Z79.899 DRUG THERAPY: ICD-10-CM

## 2019-12-12 DIAGNOSIS — E11.9 TYPE 2 DIABETES MELLITUS WITHOUT COMPLICATION, WITH LONG-TERM CURRENT USE OF INSULIN (HCC): Primary | Chronic | ICD-10-CM

## 2019-12-12 DIAGNOSIS — E11.9 TYPE 2 DIABETES MELLITUS WITHOUT COMPLICATION, WITH LONG-TERM CURRENT USE OF INSULIN (HCC): Chronic | ICD-10-CM

## 2019-12-12 LAB
ALBUMIN SERPL-MCNC: 3.6 G/DL (ref 3.5–5.2)
ALP BLD-CCNC: 52 U/L (ref 40–130)
ALT SERPL-CCNC: 11 U/L (ref 5–41)
AMPHETAMINE SCREEN, URINE: ABNORMAL
ANION GAP SERPL CALCULATED.3IONS-SCNC: 13 MMOL/L (ref 7–19)
AST SERPL-CCNC: 15 U/L (ref 5–40)
BARBITURATE SCREEN, URINE: ABNORMAL
BASOPHILS ABSOLUTE: 0 K/UL (ref 0–0.2)
BASOPHILS RELATIVE PERCENT: 0.6 % (ref 0–1)
BENZODIAZEPINE SCREEN, URINE: POSITIVE
BILIRUB SERPL-MCNC: <0.2 MG/DL (ref 0.2–1.2)
BUN BLDV-MCNC: 12 MG/DL (ref 6–20)
BUPRENORPHINE URINE: POSITIVE
CALCIUM SERPL-MCNC: 8.6 MG/DL (ref 8.6–10)
CHLORIDE BLD-SCNC: 106 MMOL/L (ref 98–111)
CHOLESTEROL, FASTING: 128 MG/DL (ref 160–199)
CO2: 20 MMOL/L (ref 22–29)
COCAINE METABOLITE SCREEN URINE: POSITIVE
CREAT SERPL-MCNC: 0.9 MG/DL (ref 0.5–1.2)
EOSINOPHILS ABSOLUTE: 0.1 K/UL (ref 0–0.6)
EOSINOPHILS RELATIVE PERCENT: 1.9 % (ref 0–5)
GABAPENTIN SCREEN, URINE: ABNORMAL
GFR NON-AFRICAN AMERICAN: >60
GLUCOSE BLD-MCNC: 94 MG/DL (ref 74–109)
HBA1C MFR BLD: 5.6 % (ref 4–6)
HCT VFR BLD CALC: 32.7 % (ref 42–52)
HDLC SERPL-MCNC: 63 MG/DL (ref 55–121)
HEMOGLOBIN: 9.9 G/DL (ref 14–18)
IMMATURE GRANULOCYTES #: 0.2 K/UL
INTERNATIONAL NORMALIZATION RATIO, POC: 2.3
LDL CHOLESTEROL CALCULATED: 49 MG/DL
LYMPHOCYTES ABSOLUTE: 1.9 K/UL (ref 1.1–4.5)
LYMPHOCYTES RELATIVE PERCENT: 27.2 % (ref 20–40)
MCH RBC QN AUTO: 29.1 PG (ref 27–31)
MCHC RBC AUTO-ENTMCNC: 30.3 G/DL (ref 33–37)
MCV RBC AUTO: 96.2 FL (ref 80–94)
MDMA URINE: ABNORMAL
METHADONE SCREEN, URINE: ABNORMAL
METHAMPHETAMINE, URINE: ABNORMAL
MONOCYTES ABSOLUTE: 0.5 K/UL (ref 0–0.9)
MONOCYTES RELATIVE PERCENT: 7 % (ref 0–10)
NEUTROPHILS ABSOLUTE: 4.2 K/UL (ref 1.5–7.5)
NEUTROPHILS RELATIVE PERCENT: 60.7 % (ref 50–65)
OPIATE SCREEN URINE: POSITIVE
OXYCODONE SCREEN URINE: POSITIVE
PDW BLD-RTO: 13.7 % (ref 11.5–14.5)
PHENCYCLIDINE SCREEN URINE: ABNORMAL
PLATELET # BLD: 667 K/UL (ref 130–400)
PMV BLD AUTO: 8.7 FL (ref 9.4–12.4)
POTASSIUM SERPL-SCNC: 4.3 MMOL/L (ref 3.5–5)
PROPOXYPHENE SCREEN, URINE: ABNORMAL
PROTHROMBIN TIME, POC: NORMAL
RBC # BLD: 3.4 M/UL (ref 4.7–6.1)
SODIUM BLD-SCNC: 139 MMOL/L (ref 136–145)
THC SCREEN, URINE: ABNORMAL
TOTAL PROTEIN: 6.8 G/DL (ref 6.6–8.7)
TRICYCLIC ANTIDEPRESSANTS, UR: ABNORMAL
TRIGLYCERIDE, FASTING: 81 MG/DL (ref 0–149)
TSH REFLEX FT4: 2.33 UIU/ML (ref 0.35–5.5)
WBC # BLD: 6.8 K/UL (ref 4.8–10.8)

## 2019-12-12 PROCEDURE — 3044F HG A1C LEVEL LT 7.0%: CPT | Performed by: NURSE PRACTITIONER

## 2019-12-12 PROCEDURE — 85610 PROTHROMBIN TIME: CPT | Performed by: CLINICAL NURSE SPECIALIST

## 2019-12-12 PROCEDURE — 1036F TOBACCO NON-USER: CPT | Performed by: NURSE PRACTITIONER

## 2019-12-12 PROCEDURE — G8484 FLU IMMUNIZE NO ADMIN: HCPCS | Performed by: NURSE PRACTITIONER

## 2019-12-12 PROCEDURE — 99214 OFFICE O/P EST MOD 30 MIN: CPT | Performed by: NURSE PRACTITIONER

## 2019-12-12 PROCEDURE — G8417 CALC BMI ABV UP PARAM F/U: HCPCS | Performed by: NURSE PRACTITIONER

## 2019-12-12 PROCEDURE — 80305 DRUG TEST PRSMV DIR OPT OBS: CPT | Performed by: NURSE PRACTITIONER

## 2019-12-12 PROCEDURE — 2022F DILAT RTA XM EVC RTNOPTHY: CPT | Performed by: NURSE PRACTITIONER

## 2019-12-12 PROCEDURE — G8427 DOCREV CUR MEDS BY ELIG CLIN: HCPCS | Performed by: NURSE PRACTITIONER

## 2019-12-12 RX ORDER — GUAIFENESIN 600 MG/1
TABLET, EXTENDED RELEASE ORAL
Qty: 14 TABLET | Refills: 1 | Status: SHIPPED | OUTPATIENT
Start: 2019-12-12 | End: 2019-12-30

## 2019-12-12 RX ORDER — DIAZEPAM 10 MG/1
10 TABLET ORAL EVERY 8 HOURS PRN
Qty: 45 TABLET | Refills: 0 | Status: CANCELLED | OUTPATIENT
Start: 2019-12-12 | End: 2019-12-27

## 2019-12-12 RX ORDER — DIAZEPAM 10 MG/1
TABLET ORAL
Qty: 90 TABLET | Refills: 0 | Status: CANCELLED | OUTPATIENT
Start: 2019-12-12 | End: 2020-01-11

## 2019-12-12 RX ORDER — ALLOPURINOL 300 MG/1
TABLET ORAL
Qty: 30 TABLET | Refills: 5 | Status: SHIPPED | OUTPATIENT
Start: 2019-12-12 | End: 2020-07-07

## 2019-12-17 ENCOUNTER — TELEPHONE (OUTPATIENT)
Dept: PRIMARY CARE CLINIC | Age: 49
End: 2019-12-17

## 2019-12-17 DIAGNOSIS — D64.9 ANEMIA, UNSPECIFIED TYPE: Primary | ICD-10-CM

## 2019-12-18 DIAGNOSIS — F41.9 ANXIETY: ICD-10-CM

## 2019-12-18 ASSESSMENT — ENCOUNTER SYMPTOMS
RESPIRATORY NEGATIVE: 1
EYES NEGATIVE: 1
ABDOMINAL PAIN: 1

## 2019-12-19 ENCOUNTER — TELEPHONE (OUTPATIENT)
Dept: PRIMARY CARE CLINIC | Age: 49
End: 2019-12-19

## 2019-12-19 RX ORDER — DIAZEPAM 10 MG/1
TABLET ORAL
Qty: 45 TABLET | Refills: 0 | Status: SHIPPED | OUTPATIENT
Start: 2019-12-19 | End: 2019-12-20 | Stop reason: CLARIF

## 2019-12-30 RX ORDER — GUAIFENESIN 600 MG/1
TABLET, EXTENDED RELEASE ORAL
Qty: 28 TABLET | Refills: 0 | Status: SHIPPED | OUTPATIENT
Start: 2019-12-30 | End: 2020-01-10

## 2020-01-03 ENCOUNTER — ANESTHESIA (OUTPATIENT)
Dept: OPERATING ROOM | Age: 50
DRG: 329 | End: 2020-01-03
Payer: MEDICAID

## 2020-01-03 ENCOUNTER — APPOINTMENT (OUTPATIENT)
Dept: CT IMAGING | Age: 50
DRG: 329 | End: 2020-01-03
Payer: MEDICAID

## 2020-01-03 ENCOUNTER — APPOINTMENT (OUTPATIENT)
Dept: GENERAL RADIOLOGY | Age: 50
DRG: 329 | End: 2020-01-03
Payer: MEDICAID

## 2020-01-03 ENCOUNTER — ANESTHESIA EVENT (OUTPATIENT)
Dept: OPERATING ROOM | Age: 50
DRG: 329 | End: 2020-01-03
Payer: MEDICAID

## 2020-01-03 ENCOUNTER — HOSPITAL ENCOUNTER (INPATIENT)
Age: 50
LOS: 4 days | Discharge: HOME | DRG: 329 | End: 2020-01-07
Attending: EMERGENCY MEDICINE | Admitting: SURGERY
Payer: MEDICAID

## 2020-01-03 VITALS
SYSTOLIC BLOOD PRESSURE: 122 MMHG | DIASTOLIC BLOOD PRESSURE: 74 MMHG | RESPIRATION RATE: 20 BRPM | TEMPERATURE: 92.8 F | OXYGEN SATURATION: 100 %

## 2020-01-03 PROBLEM — K56.2 VOLVULUS OF SMALL INTESTINE (HCC): Status: ACTIVE | Noted: 2020-01-03

## 2020-01-03 PROBLEM — K56.2 SMALL BOWEL VOLVULUS (HCC): Status: ACTIVE | Noted: 2020-01-03

## 2020-01-03 LAB
ABO/RH: NORMAL
ALBUMIN SERPL-MCNC: 3.4 G/DL (ref 3.5–5.2)
ALP BLD-CCNC: 88 U/L (ref 40–130)
ALT SERPL-CCNC: 17 U/L (ref 5–41)
ANION GAP SERPL CALCULATED.3IONS-SCNC: 16 MMOL/L (ref 7–19)
ANTIBODY SCREEN: NORMAL
APTT: 33.4 SEC (ref 26–36.2)
AST SERPL-CCNC: 25 U/L (ref 5–40)
BASOPHILS ABSOLUTE: 0.1 K/UL (ref 0–0.2)
BASOPHILS RELATIVE PERCENT: 0.4 % (ref 0–1)
BILIRUB SERPL-MCNC: <0.2 MG/DL (ref 0.2–1.2)
BUN BLDV-MCNC: 12 MG/DL (ref 6–20)
CALCIUM SERPL-MCNC: 7.7 MG/DL (ref 8.6–10)
CHLORIDE BLD-SCNC: 105 MMOL/L (ref 98–111)
CO2: 16 MMOL/L (ref 22–29)
CREAT SERPL-MCNC: 1.1 MG/DL (ref 0.5–1.2)
EOSINOPHILS ABSOLUTE: 0.1 K/UL (ref 0–0.6)
EOSINOPHILS RELATIVE PERCENT: 0.8 % (ref 0–5)
GFR NON-AFRICAN AMERICAN: >60
GLUCOSE BLD-MCNC: 128 MG/DL (ref 70–99)
GLUCOSE BLD-MCNC: 185 MG/DL (ref 70–99)
GLUCOSE BLD-MCNC: 193 MG/DL (ref 70–99)
GLUCOSE BLD-MCNC: 291 MG/DL (ref 74–109)
HCT VFR BLD CALC: 39.5 % (ref 42–52)
HEMOGLOBIN: 11.6 G/DL (ref 14–18)
IMMATURE GRANULOCYTES #: 0.1 K/UL
INR BLD: 3.12 (ref 0.88–1.18)
LACTIC ACID: 2.6 MMOL/L (ref 0.5–1.9)
LIPASE: 26 U/L (ref 13–60)
LYMPHOCYTES ABSOLUTE: 2 K/UL (ref 1.1–4.5)
LYMPHOCYTES RELATIVE PERCENT: 15.5 % (ref 20–40)
MCH RBC QN AUTO: 27.6 PG (ref 27–31)
MCHC RBC AUTO-ENTMCNC: 29.4 G/DL (ref 33–37)
MCV RBC AUTO: 93.8 FL (ref 80–94)
MONOCYTES ABSOLUTE: 0.7 K/UL (ref 0–0.9)
MONOCYTES RELATIVE PERCENT: 5.4 % (ref 0–10)
NEUTROPHILS ABSOLUTE: 9.9 K/UL (ref 1.5–7.5)
NEUTROPHILS RELATIVE PERCENT: 77.2 % (ref 50–65)
PDW BLD-RTO: 14.2 % (ref 11.5–14.5)
PERFORMED ON: ABNORMAL
PLATELET # BLD: 498 K/UL (ref 130–400)
PMV BLD AUTO: 9.7 FL (ref 9.4–12.4)
POTASSIUM SERPL-SCNC: 3.5 MMOL/L (ref 3.5–5)
PROTHROMBIN TIME: 31.3 SEC (ref 12–14.6)
RBC # BLD: 4.21 M/UL (ref 4.7–6.1)
SODIUM BLD-SCNC: 137 MMOL/L (ref 136–145)
TOTAL PROTEIN: 6.9 G/DL (ref 6.6–8.7)
TROPONIN: <0.01 NG/ML (ref 0–0.03)
WBC # BLD: 12.9 K/UL (ref 4.8–10.8)

## 2020-01-03 PROCEDURE — P9017 PLASMA 1 DONOR FRZ W/IN 8 HR: HCPCS

## 2020-01-03 PROCEDURE — 2000000000 HC ICU R&B

## 2020-01-03 PROCEDURE — 2580000003 HC RX 258: Performed by: NURSE ANESTHETIST, CERTIFIED REGISTERED

## 2020-01-03 PROCEDURE — 6360000002 HC RX W HCPCS: Performed by: NURSE ANESTHETIST, CERTIFIED REGISTERED

## 2020-01-03 PROCEDURE — 6360000002 HC RX W HCPCS: Performed by: EMERGENCY MEDICINE

## 2020-01-03 PROCEDURE — 3600000004 HC SURGERY LEVEL 4 BASE: Performed by: SURGERY

## 2020-01-03 PROCEDURE — 80053 COMPREHEN METABOLIC PANEL: CPT

## 2020-01-03 PROCEDURE — 2580000003 HC RX 258: Performed by: SURGERY

## 2020-01-03 PROCEDURE — 71045 X-RAY EXAM CHEST 1 VIEW: CPT

## 2020-01-03 PROCEDURE — 7100000001 HC PACU RECOVERY - ADDTL 15 MIN: Performed by: SURGERY

## 2020-01-03 PROCEDURE — C9290 INJ, BUPIVACAINE LIPOSOME: HCPCS | Performed by: SURGERY

## 2020-01-03 PROCEDURE — 2500000003 HC RX 250 WO HCPCS: Performed by: NURSE ANESTHETIST, CERTIFIED REGISTERED

## 2020-01-03 PROCEDURE — 99223 1ST HOSP IP/OBS HIGH 75: CPT | Performed by: SURGERY

## 2020-01-03 PROCEDURE — 74175 CTA ABDOMEN W/CONTRAST: CPT

## 2020-01-03 PROCEDURE — 99291 CRITICAL CARE FIRST HOUR: CPT

## 2020-01-03 PROCEDURE — 36415 COLL VENOUS BLD VENIPUNCTURE: CPT

## 2020-01-03 PROCEDURE — 86901 BLOOD TYPING SEROLOGIC RH(D): CPT

## 2020-01-03 PROCEDURE — 3600000014 HC SURGERY LEVEL 4 ADDTL 15MIN: Performed by: SURGERY

## 2020-01-03 PROCEDURE — 2500000003 HC RX 250 WO HCPCS: Performed by: SURGERY

## 2020-01-03 PROCEDURE — 7100000000 HC PACU RECOVERY - FIRST 15 MIN: Performed by: SURGERY

## 2020-01-03 PROCEDURE — 83605 ASSAY OF LACTIC ACID: CPT

## 2020-01-03 PROCEDURE — 96375 TX/PRO/DX INJ NEW DRUG ADDON: CPT

## 2020-01-03 PROCEDURE — 71275 CT ANGIOGRAPHY CHEST: CPT

## 2020-01-03 PROCEDURE — 85025 COMPLETE CBC W/AUTO DIFF WBC: CPT

## 2020-01-03 PROCEDURE — 36430 TRANSFUSION BLD/BLD COMPNT: CPT

## 2020-01-03 PROCEDURE — 6360000002 HC RX W HCPCS: Performed by: SURGERY

## 2020-01-03 PROCEDURE — 2580000003 HC RX 258: Performed by: EMERGENCY MEDICINE

## 2020-01-03 PROCEDURE — 2500000003 HC RX 250 WO HCPCS: Performed by: ANESTHESIOLOGY

## 2020-01-03 PROCEDURE — 82948 REAGENT STRIP/BLOOD GLUCOSE: CPT

## 2020-01-03 PROCEDURE — 0DQV0ZZ REPAIR MESENTERY, OPEN APPROACH: ICD-10-PCS | Performed by: SURGERY

## 2020-01-03 PROCEDURE — 44050 REDUCE BOWEL OBSTRUCTION: CPT | Performed by: SURGERY

## 2020-01-03 PROCEDURE — 83690 ASSAY OF LIPASE: CPT

## 2020-01-03 PROCEDURE — 96365 THER/PROPH/DIAG IV INF INIT: CPT

## 2020-01-03 PROCEDURE — 2709999900 HC NON-CHARGEABLE SUPPLY: Performed by: SURGERY

## 2020-01-03 PROCEDURE — 0DS80ZZ REPOSITION SMALL INTESTINE, OPEN APPROACH: ICD-10-PCS | Performed by: SURGERY

## 2020-01-03 PROCEDURE — 3700000000 HC ANESTHESIA ATTENDED CARE: Performed by: SURGERY

## 2020-01-03 PROCEDURE — 3700000001 HC ADD 15 MINUTES (ANESTHESIA): Performed by: SURGERY

## 2020-01-03 PROCEDURE — 6360000004 HC RX CONTRAST MEDICATION: Performed by: EMERGENCY MEDICINE

## 2020-01-03 PROCEDURE — 86900 BLOOD TYPING SEROLOGIC ABO: CPT

## 2020-01-03 PROCEDURE — 6360000002 HC RX W HCPCS: Performed by: ANESTHESIOLOGY

## 2020-01-03 PROCEDURE — 84484 ASSAY OF TROPONIN QUANT: CPT

## 2020-01-03 PROCEDURE — 86850 RBC ANTIBODY SCREEN: CPT

## 2020-01-03 PROCEDURE — 85610 PROTHROMBIN TIME: CPT

## 2020-01-03 PROCEDURE — 93005 ELECTROCARDIOGRAM TRACING: CPT | Performed by: EMERGENCY MEDICINE

## 2020-01-03 PROCEDURE — 85730 THROMBOPLASTIN TIME PARTIAL: CPT

## 2020-01-03 PROCEDURE — 2580000003 HC RX 258: Performed by: ANESTHESIOLOGY

## 2020-01-03 PROCEDURE — 6360000002 HC RX W HCPCS

## 2020-01-03 RX ORDER — MORPHINE SULFATE 4 MG/ML
2 INJECTION, SOLUTION INTRAMUSCULAR; INTRAVENOUS EVERY 5 MIN PRN
Status: DISCONTINUED | OUTPATIENT
Start: 2020-01-03 | End: 2020-01-03

## 2020-01-03 RX ORDER — DEXTROSE MONOHYDRATE 50 MG/ML
100 INJECTION, SOLUTION INTRAVENOUS PRN
Status: DISCONTINUED | OUTPATIENT
Start: 2020-01-03 | End: 2020-01-08 | Stop reason: HOSPADM

## 2020-01-03 RX ORDER — FENTANYL CITRATE 50 UG/ML
INJECTION, SOLUTION INTRAMUSCULAR; INTRAVENOUS PRN
Status: DISCONTINUED | OUTPATIENT
Start: 2020-01-03 | End: 2020-01-03 | Stop reason: SDUPTHER

## 2020-01-03 RX ORDER — ONDANSETRON 2 MG/ML
4 INJECTION INTRAMUSCULAR; INTRAVENOUS ONCE
Status: COMPLETED | OUTPATIENT
Start: 2020-01-03 | End: 2020-01-03

## 2020-01-03 RX ORDER — PROMETHAZINE HYDROCHLORIDE 25 MG/ML
6.25 INJECTION, SOLUTION INTRAMUSCULAR; INTRAVENOUS
Status: DISCONTINUED | OUTPATIENT
Start: 2020-01-03 | End: 2020-01-03

## 2020-01-03 RX ORDER — SODIUM CHLORIDE 0.9 % (FLUSH) 0.9 %
10 SYRINGE (ML) INJECTION EVERY 12 HOURS SCHEDULED
Status: DISCONTINUED | OUTPATIENT
Start: 2020-01-03 | End: 2020-01-08 | Stop reason: HOSPADM

## 2020-01-03 RX ORDER — BUPIVACAINE HYDROCHLORIDE 2.5 MG/ML
INJECTION, SOLUTION INFILTRATION; PERINEURAL PRN
Status: DISCONTINUED | OUTPATIENT
Start: 2020-01-03 | End: 2020-01-03 | Stop reason: HOSPADM

## 2020-01-03 RX ORDER — MORPHINE SULFATE 4 MG/ML
4 INJECTION, SOLUTION INTRAMUSCULAR; INTRAVENOUS
Status: DISCONTINUED | OUTPATIENT
Start: 2020-01-03 | End: 2020-01-08 | Stop reason: HOSPADM

## 2020-01-03 RX ORDER — MORPHINE SULFATE 4 MG/ML
2 INJECTION, SOLUTION INTRAMUSCULAR; INTRAVENOUS
Status: DISCONTINUED | OUTPATIENT
Start: 2020-01-03 | End: 2020-01-08 | Stop reason: HOSPADM

## 2020-01-03 RX ORDER — SUCCINYLCHOLINE CHLORIDE 20 MG/ML
INJECTION INTRAMUSCULAR; INTRAVENOUS PRN
Status: DISCONTINUED | OUTPATIENT
Start: 2020-01-03 | End: 2020-01-03 | Stop reason: SDUPTHER

## 2020-01-03 RX ORDER — SODIUM CHLORIDE 0.9 % (FLUSH) 0.9 %
10 SYRINGE (ML) INJECTION PRN
Status: DISCONTINUED | OUTPATIENT
Start: 2020-01-03 | End: 2020-01-08 | Stop reason: HOSPADM

## 2020-01-03 RX ORDER — LABETALOL 20 MG/4 ML (5 MG/ML) INTRAVENOUS SYRINGE
5 EVERY 10 MIN PRN
Status: DISCONTINUED | OUTPATIENT
Start: 2020-01-03 | End: 2020-01-03

## 2020-01-03 RX ORDER — DIPHENHYDRAMINE HYDROCHLORIDE 50 MG/ML
12.5 INJECTION INTRAMUSCULAR; INTRAVENOUS
Status: DISCONTINUED | OUTPATIENT
Start: 2020-01-03 | End: 2020-01-03

## 2020-01-03 RX ORDER — HYDRALAZINE HYDROCHLORIDE 20 MG/ML
10 INJECTION INTRAMUSCULAR; INTRAVENOUS EVERY 4 HOURS PRN
Status: DISCONTINUED | OUTPATIENT
Start: 2020-01-03 | End: 2020-01-08 | Stop reason: HOSPADM

## 2020-01-03 RX ORDER — ENALAPRILAT 2.5 MG/2ML
1.25 INJECTION INTRAVENOUS
Status: DISCONTINUED | OUTPATIENT
Start: 2020-01-03 | End: 2020-01-03

## 2020-01-03 RX ORDER — MORPHINE SULFATE 4 MG/ML
4 INJECTION, SOLUTION INTRAMUSCULAR; INTRAVENOUS EVERY 5 MIN PRN
Status: DISCONTINUED | OUTPATIENT
Start: 2020-01-03 | End: 2020-01-03

## 2020-01-03 RX ORDER — HYDRALAZINE HYDROCHLORIDE 20 MG/ML
5 INJECTION INTRAMUSCULAR; INTRAVENOUS EVERY 10 MIN PRN
Status: DISCONTINUED | OUTPATIENT
Start: 2020-01-03 | End: 2020-01-03

## 2020-01-03 RX ORDER — NICOTINE POLACRILEX 4 MG
15 LOZENGE BUCCAL PRN
Status: DISCONTINUED | OUTPATIENT
Start: 2020-01-03 | End: 2020-01-08 | Stop reason: HOSPADM

## 2020-01-03 RX ORDER — PIPERACILLIN SODIUM, TAZOBACTAM SODIUM 3; .375 G/15ML; G/15ML
INJECTION, POWDER, LYOPHILIZED, FOR SOLUTION INTRAVENOUS PRN
Status: DISCONTINUED | OUTPATIENT
Start: 2020-01-03 | End: 2020-01-03 | Stop reason: SDUPTHER

## 2020-01-03 RX ORDER — 0.9 % SODIUM CHLORIDE 0.9 %
1000 INTRAVENOUS SOLUTION INTRAVENOUS ONCE
Status: COMPLETED | OUTPATIENT
Start: 2020-01-03 | End: 2020-01-03

## 2020-01-03 RX ORDER — SODIUM CHLORIDE, SODIUM LACTATE, POTASSIUM CHLORIDE, CALCIUM CHLORIDE 600; 310; 30; 20 MG/100ML; MG/100ML; MG/100ML; MG/100ML
INJECTION, SOLUTION INTRAVENOUS CONTINUOUS PRN
Status: DISCONTINUED | OUTPATIENT
Start: 2020-01-03 | End: 2020-01-03 | Stop reason: SDUPTHER

## 2020-01-03 RX ORDER — INSULIN GLARGINE 100 [IU]/ML
15 INJECTION, SOLUTION SUBCUTANEOUS NIGHTLY
Status: DISCONTINUED | OUTPATIENT
Start: 2020-01-03 | End: 2020-01-04

## 2020-01-03 RX ORDER — SODIUM CHLORIDE, SODIUM LACTATE, POTASSIUM CHLORIDE, CALCIUM CHLORIDE 600; 310; 30; 20 MG/100ML; MG/100ML; MG/100ML; MG/100ML
INJECTION, SOLUTION INTRAVENOUS CONTINUOUS
Status: DISCONTINUED | OUTPATIENT
Start: 2020-01-03 | End: 2020-01-04

## 2020-01-03 RX ORDER — DEXTROSE MONOHYDRATE 25 G/50ML
12.5 INJECTION, SOLUTION INTRAVENOUS PRN
Status: DISCONTINUED | OUTPATIENT
Start: 2020-01-03 | End: 2020-01-08 | Stop reason: HOSPADM

## 2020-01-03 RX ORDER — ZOLPIDEM TARTRATE 10 MG/1
TABLET ORAL NIGHTLY PRN
COMMUNITY
End: 2020-04-01 | Stop reason: ALTCHOICE

## 2020-01-03 RX ORDER — MIDAZOLAM HYDROCHLORIDE 1 MG/ML
INJECTION INTRAMUSCULAR; INTRAVENOUS PRN
Status: DISCONTINUED | OUTPATIENT
Start: 2020-01-03 | End: 2020-01-03 | Stop reason: SDUPTHER

## 2020-01-03 RX ORDER — ONDANSETRON 2 MG/ML
INJECTION INTRAMUSCULAR; INTRAVENOUS PRN
Status: DISCONTINUED | OUTPATIENT
Start: 2020-01-03 | End: 2020-01-03 | Stop reason: SDUPTHER

## 2020-01-03 RX ORDER — DEXAMETHASONE SODIUM PHOSPHATE 10 MG/ML
INJECTION INTRAMUSCULAR; INTRAVENOUS PRN
Status: DISCONTINUED | OUTPATIENT
Start: 2020-01-03 | End: 2020-01-03 | Stop reason: SDUPTHER

## 2020-01-03 RX ORDER — PROPOFOL 10 MG/ML
INJECTION, EMULSION INTRAVENOUS PRN
Status: DISCONTINUED | OUTPATIENT
Start: 2020-01-03 | End: 2020-01-03 | Stop reason: SDUPTHER

## 2020-01-03 RX ORDER — ONDANSETRON 2 MG/ML
4 INJECTION INTRAMUSCULAR; INTRAVENOUS EVERY 6 HOURS PRN
Status: DISCONTINUED | OUTPATIENT
Start: 2020-01-03 | End: 2020-01-08 | Stop reason: HOSPADM

## 2020-01-03 RX ORDER — ROCURONIUM BROMIDE 10 MG/ML
INJECTION, SOLUTION INTRAVENOUS PRN
Status: DISCONTINUED | OUTPATIENT
Start: 2020-01-03 | End: 2020-01-03 | Stop reason: SDUPTHER

## 2020-01-03 RX ORDER — DIAZEPAM 10 MG/1
10 TABLET ORAL DAILY PRN
COMMUNITY
End: 2020-04-01

## 2020-01-03 RX ORDER — 0.9 % SODIUM CHLORIDE 0.9 %
1000 INTRAVENOUS SOLUTION INTRAVENOUS ONCE
Status: DISCONTINUED | OUTPATIENT
Start: 2020-01-03 | End: 2020-01-03

## 2020-01-03 RX ORDER — MEPERIDINE HYDROCHLORIDE 50 MG/ML
12.5 INJECTION INTRAMUSCULAR; INTRAVENOUS; SUBCUTANEOUS EVERY 5 MIN PRN
Status: DISCONTINUED | OUTPATIENT
Start: 2020-01-03 | End: 2020-01-03

## 2020-01-03 RX ORDER — SODIUM CHLORIDE 9 MG/ML
INJECTION, SOLUTION INTRAVENOUS CONTINUOUS PRN
Status: DISCONTINUED | OUTPATIENT
Start: 2020-01-03 | End: 2020-01-03 | Stop reason: SDUPTHER

## 2020-01-03 RX ORDER — LIDOCAINE HYDROCHLORIDE 10 MG/ML
INJECTION, SOLUTION INFILTRATION; PERINEURAL PRN
Status: DISCONTINUED | OUTPATIENT
Start: 2020-01-03 | End: 2020-01-03 | Stop reason: SDUPTHER

## 2020-01-03 RX ORDER — FENOFIBRATE 145 MG/1
TABLET, COATED ORAL
Qty: 30 TABLET | Refills: 3 | Status: SHIPPED | OUTPATIENT
Start: 2020-01-03 | End: 2020-01-10

## 2020-01-03 RX ORDER — METOCLOPRAMIDE HYDROCHLORIDE 5 MG/ML
10 INJECTION INTRAMUSCULAR; INTRAVENOUS
Status: DISCONTINUED | OUTPATIENT
Start: 2020-01-03 | End: 2020-01-03

## 2020-01-03 RX ADMIN — ONDANSETRON 4 MG: 2 INJECTION INTRAMUSCULAR; INTRAVENOUS at 17:46

## 2020-01-03 RX ADMIN — HYDROMORPHONE HYDROCHLORIDE 1 MG: 1 INJECTION, SOLUTION INTRAMUSCULAR; INTRAVENOUS; SUBCUTANEOUS at 17:28

## 2020-01-03 RX ADMIN — MIDAZOLAM 2 MG: 1 INJECTION INTRAMUSCULAR; INTRAVENOUS at 19:33

## 2020-01-03 RX ADMIN — SODIUM CHLORIDE, SODIUM LACTATE, POTASSIUM CHLORIDE, AND CALCIUM CHLORIDE: 600; 310; 30; 20 INJECTION, SOLUTION INTRAVENOUS at 20:08

## 2020-01-03 RX ADMIN — Medication 2 G: at 23:49

## 2020-01-03 RX ADMIN — HYDROMORPHONE HYDROCHLORIDE 0.5 MG: 1 INJECTION, SOLUTION INTRAMUSCULAR; INTRAVENOUS; SUBCUTANEOUS at 21:33

## 2020-01-03 RX ADMIN — FENTANYL CITRATE 50 MCG: 50 INJECTION INTRAMUSCULAR; INTRAVENOUS at 19:41

## 2020-01-03 RX ADMIN — HYDRALAZINE HYDROCHLORIDE 5 MG: 20 INJECTION INTRAMUSCULAR; INTRAVENOUS at 21:50

## 2020-01-03 RX ADMIN — PHENYLEPHRINE HYDROCHLORIDE 300 MCG: 10 INJECTION INTRAVENOUS at 19:49

## 2020-01-03 RX ADMIN — HYDROMORPHONE HYDROCHLORIDE 0.5 MG: 1 INJECTION, SOLUTION INTRAMUSCULAR; INTRAVENOUS; SUBCUTANEOUS at 21:26

## 2020-01-03 RX ADMIN — Medication 1000 MG: at 18:20

## 2020-01-03 RX ADMIN — IOPAMIDOL 90 ML: 755 INJECTION, SOLUTION INTRAVENOUS at 17:59

## 2020-01-03 RX ADMIN — SODIUM CHLORIDE: 9 INJECTION, SOLUTION INTRAVENOUS at 19:33

## 2020-01-03 RX ADMIN — HYDROMORPHONE HYDROCHLORIDE 1 MG: 1 INJECTION, SOLUTION INTRAMUSCULAR; INTRAVENOUS; SUBCUTANEOUS at 18:44

## 2020-01-03 RX ADMIN — SUGAMMADEX 400 MG: 100 INJECTION, SOLUTION INTRAVENOUS at 20:47

## 2020-01-03 RX ADMIN — LIDOCAINE HYDROCHLORIDE 50 MG: 10 INJECTION, SOLUTION INFILTRATION; PERINEURAL at 19:41

## 2020-01-03 RX ADMIN — ONDANSETRON HYDROCHLORIDE 4 MG: 2 INJECTION, SOLUTION INTRAMUSCULAR; INTRAVENOUS at 20:33

## 2020-01-03 RX ADMIN — PHENYLEPHRINE HYDROCHLORIDE 200 MCG: 10 INJECTION INTRAVENOUS at 20:08

## 2020-01-03 RX ADMIN — PHENYLEPHRINE HYDROCHLORIDE 400 MCG: 10 INJECTION INTRAVENOUS at 19:56

## 2020-01-03 RX ADMIN — SODIUM CHLORIDE 1000 ML: 9 INJECTION, SOLUTION INTRAVENOUS at 18:45

## 2020-01-03 RX ADMIN — DEXAMETHASONE SODIUM PHOSPHATE 10 MG: 10 INJECTION INTRAMUSCULAR; INTRAVENOUS at 20:06

## 2020-01-03 RX ADMIN — ROCURONIUM BROMIDE 20 MG: 10 SOLUTION INTRAVENOUS at 20:11

## 2020-01-03 RX ADMIN — PROPOFOL 170 MG: 10 INJECTION, EMULSION INTRAVENOUS at 19:41

## 2020-01-03 RX ADMIN — HYDROMORPHONE HYDROCHLORIDE 1 MG: 1 INJECTION, SOLUTION INTRAMUSCULAR; INTRAVENOUS; SUBCUTANEOUS at 17:46

## 2020-01-03 RX ADMIN — PIPERACILLIN AND TAZOBACTAM 3.38 G: 3; .375 INJECTION, POWDER, LYOPHILIZED, FOR SOLUTION INTRAVENOUS at 19:47

## 2020-01-03 RX ADMIN — ROCURONIUM BROMIDE 50 MG: 10 SOLUTION INTRAVENOUS at 19:50

## 2020-01-03 RX ADMIN — SUCCINYLCHOLINE CHLORIDE 140 MG: 20 INJECTION, SOLUTION INTRAMUSCULAR; INTRAVENOUS at 19:41

## 2020-01-03 RX ADMIN — FENTANYL CITRATE 50 MCG: 50 INJECTION INTRAMUSCULAR; INTRAVENOUS at 20:51

## 2020-01-03 RX ADMIN — PIPERACILLIN AND TAZOBACTAM 3.38 G: 3; .375 INJECTION, POWDER, LYOPHILIZED, FOR SOLUTION INTRAVENOUS at 18:20

## 2020-01-03 RX ADMIN — MORPHINE SULFATE 4 MG: 4 INJECTION, SOLUTION INTRAMUSCULAR; INTRAVENOUS at 22:34

## 2020-01-03 RX ADMIN — SODIUM CHLORIDE, POTASSIUM CHLORIDE, SODIUM LACTATE AND CALCIUM CHLORIDE: 600; 310; 30; 20 INJECTION, SOLUTION INTRAVENOUS at 22:58

## 2020-01-03 RX ADMIN — PHENYLEPHRINE HYDROCHLORIDE 400 MCG: 10 INJECTION INTRAVENOUS at 19:53

## 2020-01-03 RX ADMIN — SODIUM CHLORIDE: 9 INJECTION, SOLUTION INTRAVENOUS at 20:08

## 2020-01-03 ASSESSMENT — ENCOUNTER SYMPTOMS
SHORTNESS OF BREATH: 0
VOMITING: 0
RHINORRHEA: 0
DIARRHEA: 0
SORE THROAT: 0
COUGH: 0
BACK PAIN: 0
NAUSEA: 1
ABDOMINAL PAIN: 1

## 2020-01-03 ASSESSMENT — PAIN DESCRIPTION - LOCATION
LOCATION: ABDOMEN

## 2020-01-03 ASSESSMENT — PAIN DESCRIPTION - PAIN TYPE
TYPE: SURGICAL PAIN

## 2020-01-03 ASSESSMENT — PAIN SCALES - GENERAL
PAINLEVEL_OUTOF10: 6
PAINLEVEL_OUTOF10: 9
PAINLEVEL_OUTOF10: 10
PAINLEVEL_OUTOF10: 8
PAINLEVEL_OUTOF10: 8
PAINLEVEL_OUTOF10: 7
PAINLEVEL_OUTOF10: 7
PAINLEVEL_OUTOF10: 6
PAINLEVEL_OUTOF10: 4
PAINLEVEL_OUTOF10: 6
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
## 2020-01-03 NOTE — ED NOTES
Bed: 14  Expected date:   Expected time:   Means of arrival:   Comments:  EMS     Domenica Thornton RN  01/03/20 9848

## 2020-01-04 LAB
ALBUMIN SERPL-MCNC: 3.2 G/DL (ref 3.5–5.2)
ALP BLD-CCNC: 74 U/L (ref 40–130)
ALT SERPL-CCNC: 17 U/L (ref 5–41)
ANION GAP SERPL CALCULATED.3IONS-SCNC: 10 MMOL/L (ref 7–19)
AST SERPL-CCNC: 22 U/L (ref 5–40)
BASOPHILS ABSOLUTE: 0 K/UL (ref 0–0.2)
BASOPHILS RELATIVE PERCENT: 0.1 % (ref 0–1)
BILIRUB SERPL-MCNC: <0.2 MG/DL (ref 0.2–1.2)
BUN BLDV-MCNC: 11 MG/DL (ref 6–20)
CALCIUM SERPL-MCNC: 7.8 MG/DL (ref 8.6–10)
CHLORIDE BLD-SCNC: 106 MMOL/L (ref 98–111)
CO2: 24 MMOL/L (ref 22–29)
CREAT SERPL-MCNC: 0.7 MG/DL (ref 0.5–1.2)
EOSINOPHILS ABSOLUTE: 0 K/UL (ref 0–0.6)
EOSINOPHILS RELATIVE PERCENT: 0 % (ref 0–5)
GFR NON-AFRICAN AMERICAN: >60
GLUCOSE BLD-MCNC: 114 MG/DL (ref 70–99)
GLUCOSE BLD-MCNC: 119 MG/DL (ref 70–99)
GLUCOSE BLD-MCNC: 122 MG/DL (ref 70–99)
GLUCOSE BLD-MCNC: 124 MG/DL (ref 70–99)
GLUCOSE BLD-MCNC: 138 MG/DL (ref 70–99)
GLUCOSE BLD-MCNC: 148 MG/DL (ref 74–109)
HCT VFR BLD CALC: 31.8 % (ref 42–52)
HCT VFR BLD CALC: 32.7 % (ref 42–52)
HEMOGLOBIN: 10.2 G/DL (ref 14–18)
HEMOGLOBIN: 9.8 G/DL (ref 14–18)
IMMATURE GRANULOCYTES #: 0 K/UL
INR BLD: 2.37 (ref 0.88–1.18)
LYMPHOCYTES ABSOLUTE: 0.5 K/UL (ref 1.1–4.5)
LYMPHOCYTES RELATIVE PERCENT: 3.1 % (ref 20–40)
MCH RBC QN AUTO: 27.9 PG (ref 27–31)
MCH RBC QN AUTO: 28.4 PG (ref 27–31)
MCHC RBC AUTO-ENTMCNC: 30.8 G/DL (ref 33–37)
MCHC RBC AUTO-ENTMCNC: 31.2 G/DL (ref 33–37)
MCV RBC AUTO: 90.6 FL (ref 80–94)
MCV RBC AUTO: 91.1 FL (ref 80–94)
MONOCYTES ABSOLUTE: 0.5 K/UL (ref 0–0.9)
MONOCYTES RELATIVE PERCENT: 3.3 % (ref 0–10)
NEUTROPHILS ABSOLUTE: 13.6 K/UL (ref 1.5–7.5)
NEUTROPHILS RELATIVE PERCENT: 93.2 % (ref 50–65)
PDW BLD-RTO: 14.1 % (ref 11.5–14.5)
PDW BLD-RTO: 14.3 % (ref 11.5–14.5)
PERFORMED ON: ABNORMAL
PLATELET # BLD: 304 K/UL (ref 130–400)
PLATELET # BLD: 328 K/UL (ref 130–400)
PMV BLD AUTO: 9.4 FL (ref 9.4–12.4)
PMV BLD AUTO: 9.9 FL (ref 9.4–12.4)
POTASSIUM SERPL-SCNC: 4.1 MMOL/L (ref 3.5–5)
PROTHROMBIN TIME: 25.1 SEC (ref 12–14.6)
RAPID INFLUENZA  B AGN: NEGATIVE
RAPID INFLUENZA A AGN: NEGATIVE
RBC # BLD: 3.51 M/UL (ref 4.7–6.1)
RBC # BLD: 3.59 M/UL (ref 4.7–6.1)
SODIUM BLD-SCNC: 140 MMOL/L (ref 136–145)
TOTAL PROTEIN: 6 G/DL (ref 6.6–8.7)
WBC # BLD: 14.6 K/UL (ref 4.8–10.8)
WBC # BLD: 14.6 K/UL (ref 4.8–10.8)

## 2020-01-04 PROCEDURE — 6360000002 HC RX W HCPCS: Performed by: HOSPITALIST

## 2020-01-04 PROCEDURE — 90686 IIV4 VACC NO PRSV 0.5 ML IM: CPT | Performed by: SURGERY

## 2020-01-04 PROCEDURE — 6360000002 HC RX W HCPCS: Performed by: SURGERY

## 2020-01-04 PROCEDURE — 2580000003 HC RX 258: Performed by: INTERNAL MEDICINE

## 2020-01-04 PROCEDURE — 82948 REAGENT STRIP/BLOOD GLUCOSE: CPT

## 2020-01-04 PROCEDURE — G0008 ADMIN INFLUENZA VIRUS VAC: HCPCS | Performed by: SURGERY

## 2020-01-04 PROCEDURE — 2000000000 HC ICU R&B

## 2020-01-04 PROCEDURE — 2580000003 HC RX 258: Performed by: SURGERY

## 2020-01-04 PROCEDURE — 80053 COMPREHEN METABOLIC PANEL: CPT

## 2020-01-04 PROCEDURE — 85025 COMPLETE CBC W/AUTO DIFF WBC: CPT

## 2020-01-04 PROCEDURE — 87804 INFLUENZA ASSAY W/OPTIC: CPT

## 2020-01-04 PROCEDURE — 85027 COMPLETE CBC AUTOMATED: CPT

## 2020-01-04 PROCEDURE — 85610 PROTHROMBIN TIME: CPT

## 2020-01-04 PROCEDURE — 36592 COLLECT BLOOD FROM PICC: CPT

## 2020-01-04 PROCEDURE — 99024 POSTOP FOLLOW-UP VISIT: CPT | Performed by: SURGERY

## 2020-01-04 PROCEDURE — 6360000002 HC RX W HCPCS: Performed by: INTERNAL MEDICINE

## 2020-01-04 PROCEDURE — 87081 CULTURE SCREEN ONLY: CPT

## 2020-01-04 RX ORDER — NALOXONE HYDROCHLORIDE 0.4 MG/ML
0.4 INJECTION, SOLUTION INTRAMUSCULAR; INTRAVENOUS; SUBCUTANEOUS PRN
Status: DISCONTINUED | OUTPATIENT
Start: 2020-01-04 | End: 2020-01-08 | Stop reason: HOSPADM

## 2020-01-04 RX ORDER — DEXTROSE AND SODIUM CHLORIDE 5; .45 G/100ML; G/100ML
INJECTION, SOLUTION INTRAVENOUS CONTINUOUS
Status: DISCONTINUED | OUTPATIENT
Start: 2020-01-04 | End: 2020-01-06

## 2020-01-04 RX ORDER — LORAZEPAM 2 MG/ML
1 INJECTION INTRAMUSCULAR EVERY 4 HOURS
Status: DISCONTINUED | OUTPATIENT
Start: 2020-01-04 | End: 2020-01-04

## 2020-01-04 RX ORDER — LORAZEPAM 2 MG/ML
1 INJECTION INTRAMUSCULAR EVERY 4 HOURS PRN
Status: DISCONTINUED | OUTPATIENT
Start: 2020-01-04 | End: 2020-01-08 | Stop reason: HOSPADM

## 2020-01-04 RX ADMIN — HYDROMORPHONE HYDROCHLORIDE 1 MG: 1 INJECTION, SOLUTION INTRAMUSCULAR; INTRAVENOUS; SUBCUTANEOUS at 09:12

## 2020-01-04 RX ADMIN — HYDROMORPHONE HYDROCHLORIDE 1 MG: 1 INJECTION, SOLUTION INTRAMUSCULAR; INTRAVENOUS; SUBCUTANEOUS at 12:08

## 2020-01-04 RX ADMIN — HYDRALAZINE HYDROCHLORIDE 10 MG: 20 INJECTION INTRAMUSCULAR; INTRAVENOUS at 21:25

## 2020-01-04 RX ADMIN — Medication 2 G: at 08:18

## 2020-01-04 RX ADMIN — LORAZEPAM 1 MG: 2 INJECTION INTRAMUSCULAR; INTRAVENOUS at 16:52

## 2020-01-04 RX ADMIN — MORPHINE SULFATE 4 MG: 4 INJECTION, SOLUTION INTRAMUSCULAR; INTRAVENOUS at 00:34

## 2020-01-04 RX ADMIN — HYDROMORPHONE HYDROCHLORIDE 1 MG: 1 INJECTION, SOLUTION INTRAMUSCULAR; INTRAVENOUS; SUBCUTANEOUS at 14:57

## 2020-01-04 RX ADMIN — Medication 10 ML: at 08:19

## 2020-01-04 RX ADMIN — Medication 10 ML: at 20:01

## 2020-01-04 RX ADMIN — INFLUENZA VIRUS VACCINE 0.5 ML: 15; 15; 15; 15 SUSPENSION INTRAMUSCULAR at 08:18

## 2020-01-04 RX ADMIN — CALCIUM GLUCONATE 2 G: 98 INJECTION, SOLUTION INTRAVENOUS at 11:30

## 2020-01-04 RX ADMIN — HYDROMORPHONE HYDROCHLORIDE 1 MG: 1 INJECTION, SOLUTION INTRAMUSCULAR; INTRAVENOUS; SUBCUTANEOUS at 21:22

## 2020-01-04 RX ADMIN — HYDROMORPHONE HYDROCHLORIDE 1 MG: 1 INJECTION, SOLUTION INTRAMUSCULAR; INTRAVENOUS; SUBCUTANEOUS at 06:31

## 2020-01-04 RX ADMIN — SODIUM CHLORIDE, POTASSIUM CHLORIDE, SODIUM LACTATE AND CALCIUM CHLORIDE: 600; 310; 30; 20 INJECTION, SOLUTION INTRAVENOUS at 05:17

## 2020-01-04 RX ADMIN — MORPHINE SULFATE 4 MG: 4 INJECTION, SOLUTION INTRAMUSCULAR; INTRAVENOUS at 04:38

## 2020-01-04 RX ADMIN — MORPHINE SULFATE 4 MG: 4 INJECTION, SOLUTION INTRAMUSCULAR; INTRAVENOUS at 02:35

## 2020-01-04 RX ADMIN — LORAZEPAM 1 MG: 2 INJECTION INTRAMUSCULAR; INTRAVENOUS at 10:43

## 2020-01-04 RX ADMIN — DEXTROSE AND SODIUM CHLORIDE: 5; 450 INJECTION, SOLUTION INTRAVENOUS at 10:58

## 2020-01-04 RX ADMIN — HYDROMORPHONE HYDROCHLORIDE 1 MG: 1 INJECTION, SOLUTION INTRAMUSCULAR; INTRAVENOUS; SUBCUTANEOUS at 18:10

## 2020-01-04 RX ADMIN — MORPHINE SULFATE 4 MG: 4 INJECTION, SOLUTION INTRAMUSCULAR; INTRAVENOUS at 20:28

## 2020-01-04 ASSESSMENT — PAIN SCALES - GENERAL
PAINLEVEL_OUTOF10: 8
PAINLEVEL_OUTOF10: 10
PAINLEVEL_OUTOF10: 8
PAINLEVEL_OUTOF10: 5
PAINLEVEL_OUTOF10: 4
PAINLEVEL_OUTOF10: 8
PAINLEVEL_OUTOF10: 6
PAINLEVEL_OUTOF10: 4
PAINLEVEL_OUTOF10: 8
PAINLEVEL_OUTOF10: 9
PAINLEVEL_OUTOF10: 9
PAINLEVEL_OUTOF10: 8
PAINLEVEL_OUTOF10: 5
PAINLEVEL_OUTOF10: 5
PAINLEVEL_OUTOF10: 8
PAINLEVEL_OUTOF10: 10
PAINLEVEL_OUTOF10: 9

## 2020-01-04 ASSESSMENT — PAIN DESCRIPTION - PAIN TYPE: TYPE: SURGICAL PAIN

## 2020-01-04 ASSESSMENT — PAIN DESCRIPTION - LOCATION: LOCATION: ABDOMEN

## 2020-01-04 NOTE — PLAN OF CARE
Nutrition Problem: Inadequate oral intake  Intervention: Food and/or Nutrient Delivery: Continue NPO  Nutritional Goals: meet nutritional needs through po intake or alternate source of nutrition Patient's chart reviewed, please contact to schedule OA colonoscopy with .Patient Preference    Screening Criteria  Age: 54 year old Patient meets age criteria.  PCP:  Prateek Hooks MD  Personal H/O Colon CA or Polyps: Patient does NOT have a personal history of colon polyps or colon cancer  Family H/O Colon CA: No family history of colon cancer.  GI Symptoms: No  Most recent colon procedure No prior Flexi or Colonoscopy  Concerns for taking prep at home(mobility, etc): No    ALLERGIES:   Allergen Reactions   • Penicillins CARDIAC DISTURBANCES       Medications:   Current Medications    CHOLESTYRAMINE (QUESTRAN) 4 G PACKET    TAKE 1 PACKET BY MOUTH TWICE DAILY    CLOPIDOGREL (PLAVIX) 75 MG TABLET    TAKE 1 TABLET BY MOUTH EVERY DAY    DIVALPROEX (DEPAKOTE ER) 500 MG 24 HR ER TABLET    Take 2 tablets by mouth nightly.    FENTANYL (DURAGESIC) 75 MCG/HR PATCH    Place 1 patch onto the skin every 72 hours.    FERROUS SULFATE 325 (65 FE) MG TABLET    Take 325 mg by mouth daily (with breakfast).    FLUTICASONE (FLONASE) 50 MCG/ACT NASAL SPRAY    SPRAY 1 SPRAY IN EACH NOSTRIL EVERY 12 HOURS    FOLIC ACID (FOLATE) 1 MG TABLET    TAKE 1 TABLET BY MOUTH EVERY DAY    HYDROCHLOROTHIAZIDE (HYDRODIURIL) 25 MG TABLET    Take 25 mg by mouth daily.    LORATADINE (CLARITIN) 10 MG TABLET    Take 1 tablet by mouth daily.    LORAZEPAM (ATIVAN) 2 MG TABLET    Take 1 tablet by mouth every 6 hours as needed for Anxiety.    LURASIDONE (LATUDA) 20 MG TABLET    Take 1 tablet by mouth daily (with breakfast).    ONDANSETRON (ZOFRAN ODT) 4 MG DISINTEGRATING TABLET    Place 1 tablet onto the tongue every 8 hours as needed for Nausea.    OXYCODONE/APAP (PERCOCET) 5-325 MG PER TABLET    Take 2 tablets by mouth every 4 hours as needed for Pain.    PANCRELIPASE, LIPASE-PROTEASE-AMYLASE, 71287 UNITS PER CAPSULE        PANTOPRAZOLE (PROTONIX) 40 MG TABLET    TAKE 1 TABLET BY MOUTH EVERY DAY    PREGABALIN (LYRICA) 25 MG CAPSULE    Take 1 capsule  by mouth 3 times daily.    TAMSULOSIN (FLOMAX) 0.4 MG CAP    Take 2 capsules by mouth daily after a meal.    THIAMINE (VITAMIN B1) 100 MG TABLET    Take 100 mg by mouth daily.    TRAZODONE (DESYREL) 100 MG TABLET    Take 1 tablet by mouth nightly.    ZENPEP [id number] UNITS PER CAPSULE    TAKE 1 CAPSULE BY MOUTH THREE TIMES DAILY        Anticoagulation (examples - coumadin, heparin, lovenox, xarelto, pradaxa): No  Platelet Modifying (examples - Plavix, aspirin, nsaids, Aggrenox) : Yes, Patient takes Plavix daily and will need to get clearance from prescribing provider to hold for 5 days prior to procedure.    Concerns for sedation. On Chronic long acting narcotics, Methadone, Suboxone, antianxiety like xanax, klonazepam: Yes, Trazodone, Ativan, Depakote, Percocet, Fentanyl patch  Review of other medications indicate - diuretic   Patient takes Ferrous Sulfate (Iron supplement), will need to HOLD for 7 days prior to OAC.    BMI: Estimated body mass index is 18.86 kg/m² as calculated from the following:    Height as of 7/9/18: 5' 6\" (1.676 m).    Weight as of 7/9/18: 53 kg.:more than 45 No  Is the patient over 300 lbs Weight:   Wt Readings from Last 1 Encounters:   07/09/18 53 kg   :  No  On Oxygen:  No    Past Medical History:  Past Medical History:   Diagnosis Date   • Depression    • Ischemic bowel disease (CMS/HCC)    • Lumbago     chonic back pain   • Nephrolithiasis    • Psychosis 10/06/2017       Past Surgical History:  Past Surgical History:   Procedure Laterality Date   • Colostomy      July 2017   • Forearm/wrist surgery unlisted      broken rt arm metal plate   • Laminectomy,lumbar      L5 S1        Other Concerns: Polysubstance abuse, kidney stone (No Osmo prep), Colostomy 7/2017    Prior Labs: If abnormal creatinine/electrolytes, then will need Nulytely prep  Creatinine   Date Value Ref Range Status   07/09/2018 2.38 (H) 0.67 - 1.17 mg/dL Final     BUN   Date Value Ref Range Status   07/09/2018 50 (H) 6 -  20 mg/dL Final     Sodium   Date Value Ref Range Status   07/09/2018 132 (L) 135 - 145 mmol/L Final     Potassium   Date Value Ref Range Status   07/09/2018 3.5 3.4 - 5.1 mmol/L Final     Chloride   Date Value Ref Range Status   07/09/2018 97 (L) 98 - 107 mmol/L Final     Carbon Dioxide   Date Value Ref Range Status   07/09/2018 17 (L) 21 - 32 mmol/L Final     Glucose   Date Value Ref Range Status   07/09/2018 97 65 - 99 mg/dL Final     CALCIUM   Date Value Ref Range Status   07/09/2018 10.3 (H) 8.4 - 10.2 mg/dL Final        SCHEDULING:  OK to schedule open access colonoscopy, if no then will need office visit: Yes  Physician Scheduled with: Patient Preference  Diagnosis code from O/A order:  Colon Cancer Screening Z12.11  Location: Patient Preference  Sedation: MAC  Prep: Physician Preference (NO Suprep - patient has an abnormal Creatinine)     **Patient takes Plavix daily and will need to get clearance from prescribing provider to hold for 5 days prior to procedure.    **Patient takes Ferrous Sulfate (Iron supplement), will need to HOLD for 7 days prior to OAC.

## 2020-01-04 NOTE — CONSULTS
CONSULTED: [id number]    PCP:  DEMETRIUS Dunbar     Admitting Doctor / Primary Team: Dr. Diana Atkinson of General Surgery  Internal Medicine Hopsitalist Team Consulted for: Management of DM and HTN and AC        SUBJECTIVE:    Chief Complaint   Patient presents with    Abdominal Pain     HPI and ROS:  Mr. Hilario Higginbotham is a pleasant 53year old AA gentleman from home. He states that his wife brought him to the hospital. He states that it was for stomach pain. He states that he just hurts bad. He states that this has happened before, and he states that he was sent to [facility]. He states that he went in and did something inside of him. He states that he had wanted to go to [place]. His wife then stated that he was sick for days, about a week.    He also endorses: nausea, chest pressure (resolved), cramping, cough, sneezes  He also denies: head aches, confusion, vomiting, chest pain, blurry vision, diarrhea, dysuria, hematuria, hematochezia, other bleeding    (Following subjective sections from chart review as extreme patient volume, allergies reviewed with him)    Past Medical History:   Diagnosis Date    Anxiety     Atrial fibrillation (HCC)     Chronic pain     COPD (chronic obstructive pulmonary disease) ([facility]     Depression     Diabetes mellitus ([facility] [address]     GERD (gastroesophageal reflux disease)     Gout     Hernia     Hyperlipidemia     Hypertension     Hypothyroid     Myocardial infarct, old     [place] - Cardiac Cath - no blockage    Neuropathy     Osteoarthritis     Psychiatric illness      Past Psychiatric History:  As per above    Past Surgical History:   Procedure Laterality Date    ABDOMINOPLASTY      ANKLE SURGERY  1990    fracture repair   [address] - No blockage    ESOPHAGUS SURGERY      GASTRIC BAND      HERNIA REPAIR      STOMACH SURGERY  02/2018    gastric sleeve     Social History     Tobacco History     Smoking Status  Never Smoker    Smokeless Tobacco Use  Never Used          Alcohol History     Alcohol Use Status  No          Drug Use     Drug Use Status  No Comment  History of Cocaine and Marijuana usage 2010          Sexual Activity     Sexually Active  Not Asked              Family History   Problem Relation Age of Onset    Diabetes Other     Heart Disease Other      Allergies   Allergen Reactions    Milk-Related Compounds Nausea And Vomiting     diarrhea     Current Facility-Administered Medications   Medication Dose Route Frequency Provider Last Rate Last Dose    lactated ringers infusion   Intravenous Continuous Tara Javier  mL/hr at 01/03/20 2258      sodium chloride flush 0.9 % injection 10 mL  10 mL Intravenous 2 times per day Tara Javier MD        sodium chloride flush 0.9 % injection 10 mL  10 mL Intravenous PRN Tara Javier MD        morphine injection 2 mg  2 mg Intravenous Q2H PRN Tara Javier MD        Or   Case Travis morphine injection 4 mg  4 mg Intravenous Q2H PRN Tara Javier MD   4 mg at 01/03/20 2234    ondansetron (ZOFRAN) injection 4 mg  4 mg Intravenous Q6H PRN Tara Javier MD        [START ON 1/4/2020] ceFAZolin (ANCEF) 2 g in 0.9% sodium chloride 50 mL IVPB  2 g Intravenous Tyrone Crawford  mL/hr at 01/03/20 2349 2 g at 01/03/20 2349    [START ON 1/4/2020] influenza quadrivalent split vaccine (FLUZONE;FLUARIX;FLULAVAL;AFLURIA) injection 0.5 mL  0.5 mL Intramuscular Once Tara Javier MD        insulin glargine (LANTUS) injection vial 15 Units  15 Units Subcutaneous Nightly Minus MD Maritza        insulin lispro (HUMALOG) injection vial 0-6 Units  0-6 Units Subcutaneous Q4H Minus MD Maritza        glucose (GLUTOSE) 40 % oral gel 15 g  15 g Oral PRN Minus MD Maritza        dextrose 50 % IV solution  12.5 g Intravenous PRN Minus MD Maritza        glucagon (rDNA) injection 1 mg  1 mg Intramuscular PRN Minus MD Maritza        dextrose 5 % solution  100 mL/hr Intravenous PRN

## 2020-01-04 NOTE — H&P
Chief complaint: Abdominal pain    History of Present Illness: The patient is a 80-year-old black male who had an hour or so ago developed excruciating mid abdominal pain. He has never had anything similar to this before. He has a history of a Radhika-en-Y gastric bypass as well as a gastric sleeve resection. It certainly appears that he has a strangulated internal hernia and needs urgently as urgently as possible. The patient denies chills or fever. There is no chest pain, palpations, or SOB. There is no weakness, dizziness, or stroke-like symptoms. CT abdomen pelvis- 1/3/2020    FINDINGS:  Angiogram:  The visualized aorta is normal in caliber and demonstrates no evidence  of dissection, stenosis or vessel cut off. No significant  atherosclerosis is identified. Bilateral common iliac, external iliac, internal iliac, common  femoral, and visualized superficial and deep femoral arteries  demonstrate no aneurysm, dissection, stenosis or vessel cut off. No  significant atherosclerosis is identified. The celiac artery and its major branches are normal in appearance. The proximal portion of the superior mesenteric artery is normal in  appearance but abruptly terminates approximately 6 cm distal to its  takeoff. This appears to be due to twisting of bowel and a possible  internal hernia. As a result, there is diffuse bowel wall thickening  throughout the small bowel and colon. The inferior mesenteric artery  territory is normal in appearance. The inferior mesenteric artery is  normal in appearance. Bilateral renal arteries are normal in appearance. Other findings:  Changes of previous gastric bypass are noted. There is severe bowel  wall thickening throughout the jejunum and ileum. Tenting of the colon  is seen in the transverse colon. This is best appreciated on the  coronal images. There is no evidence of bowel obstruction at this  time. A small volume of ascites is noted.  The urinary bladder is normal.  Small cysts are present in the kidneys. The spleen is normal. There is  increased flow within the left hepatic lobe compared to the right. This could be due to early portal venous perfusion. No acute bony abnormality is seen. Significant abdominal wall edema is present. Impression:     1. Abrupt cut off of the superior mesenteric artery approximately 6 cm  distal to its origin. This is felt to be due to twisting of the bowel  from an internal hernia. As a result, ischemic bowel is seen  throughout the jejunum and ileum. A small portion of the colon is also  at risk. No pneumatosis is visualized. Emergent surgical intervention  is recommended. 2. Mild ascites and anasarca.      Flo Enrique is a 51 y.o. male with the following history as recorded in [facility]:  Patient Active Problem List    Diagnosis Date Noted    Diabetes mellitus ([facility] 02/13/2019    Low back pain 02/13/2019    Morbid obesity ([facility] 02/13/2019    Bipolar disorder with severe depression ([facility]     Dyslipidemia 06/04/2018    Paroxysmal A-fib ([facility] 05/02/2018    Obstructive sleep apnea 05/02/2018    Abnormal EKG 05/02/2018    Chronic obstructive pulmonary disease ([address] 01/09/2018    Anxiety 07/26/2017    Hiatal hernia     Chest pain, atypical 06/03/2016    Gastroesophageal reflux disease 06/03/2016    Morbid obesity with BMI of 45.0-49.9, adult ([facility] 06/03/2016    Type 2 diabetes mellitus without complication ([facility] 45/02/6103    Acquired hypothyroidism 06/03/2016    Hypertensive disorder 06/03/2016    Lactic acidosis     Chest pain      Current Facility-Administered Medications   Medication Dose Route Frequency Provider Last Rate Last Dose    0.9 % sodium chloride bolus  1,000 mL Intravenous Once Ruthy Pride MD 2,000 mL/hr at 01/03/20 1845 1,000 mL at 01/03/20 1845    vancomycin (VANCOCIN) 1 g in dextrose 5% 250 mL IVPB  1,000 mg Intravenous Once Ruthy Pride MD   1,000 mg at 01/03/20 1820    piperacillin-tazobactam HYDROcodone-acetaminophen (NORCO)  MG per tablet Take 1 tablet by mouth every 8 hours as needed for Pain       Allergies: Milk-related compounds  Past Medical History:   Diagnosis Date    Anxiety     Atrial fibrillation (HCC)     Chronic pain     COPD (chronic obstructive pulmonary disease) ([address]     Depression     Diabetes mellitus (HCC)     GERD (gastroesophageal reflux disease)     Gout     Hernia     Hyperlipidemia     Hypertension     Hypothyroid     Myocardial infarct, old     [place] - Cardiac Cath - no blockage    Neuropathy     Osteoarthritis     Psychiatric illness      Past Surgical History:   Procedure Laterality Date    ABDOMINOPLASTY      ANKLE SURGERY  1990    fracture repair   [address] - No blockage    ESOPHAGUS SURGERY      GASTRIC BAND      HERNIA REPAIR      STOMACH SURGERY  02/2018    gastric sleeve     Family History   Problem Relation Age of Onset    Diabetes Other     Heart Disease Other      Social History     Tobacco Use    Smoking status: Never Smoker    Smokeless tobacco: Never Used   Substance Use Topics    Alcohol use: No     Alcohol/week: 0.0 standard drinks       ROS:  14 point review of systems is negative except for the above. PHYSICAL EXAM:    The patient is a 51 y.o. male  in impressive acute distress. He is alert oriented and cooperative. Mood and affect are appropriate. Skin is warm and dry without rashes. /82   Pulse 72   Temp 97.1 °F (36.2 °C)   Resp 19   Ht 5' 7\" (1.702 m)   Wt 205 lb (93 kg)   SpO2 94%   BMI 32.11 kg/m²       HEENT: Normocephalic and atraumatic. EOMs intact. Pupils equal and round and reactive to light and accommodation. External ears and nose are normal.  Sclera nonicteric. Conjunctiva normal  Oropharynx without masses or lesions. Neck: Neck is supple without masses or thyromegaly    Chest: Lungs are clear to auscultation.  Respiratory effort normal    Cardiac: Regular rate and rhythm without rubs, murmurs, or gallops    Abdomen: The abdomen is exquisitely tender and quiet. He has a recent lower abdominal scar from an abdominoplasty for removal of excess skin since his 200 pound weight loss. Extremities: The extremities are normal. There are no signs of clubbing, cyanosis, or edema. Protime 31.3High   12.0 - 14.6 sec Final 01/03/2020  6:05 PM [address] [place] [address] Lab   INR 3. 12High   0.88 - 1.18 Final 01/03/2020  6:05 PM [address] [place] [address] Lab   INR  < or = 1.3  Normal      WBC 12. 9High   4.8 - 10.8 K/uL Final 01/03/2020  6:05 PM [address] [place] [address] Lab   RBC 4. 21Low   4.70 - 6.10 M/uL Final 01/03/2020  6:05 PM [address] [place] [address] Lab   Hemoglobin 11.6Low   14.0 - 18.0 g/dL Final 01/03/2020  6:05 PM [address] [place] [address] Lab   Hematocrit 39.5Low   42.0 - 52.0 % Final 01/03/2020  6:05 PM [facility] Lab   MCV 93.8  80.0 - 94.0 fL Final 01/03/2020  6:05 PM [facility] Lab   MCH 27.6  27.0 - 31.0 pg Final 01/03/2020  6:05 PM [facility] Lab   MCHC 29.4Low   33.0 - 37.0 g/dL Final 01/03/2020  6:05 PM [facility] Lab   RDW 14.2  11.5 - 14.5 % Final 01/03/2020  6:05 PM [facility] Lab   Platelets [id number]           Sodium 137  136 - 145 mmol/L Final 01/03/2020  6:05 PM [facility] Lab   Potassium 3.5  3.5 - 5.0 mmol/L Final 01/03/2020  6:05 PM [facility] Lab   Chloride 105  98 - 111 mmol/L Final 01/03/2020  6:05 PM [facility] Lab   CO2 16Low   22 - 29 mmol/L Final 01/03/2020  6:05 PM [facility] Lab   Anion Gap 16  7 - 19 mmol/L Final 01/03/2020  6:05 PM [facility] Lab   Glucose 291High   74 - 109 mg/dL Final 01/03/2020  6:05 PM [facility] Lab   BUN 12  6 - 20 mg/dL Final 01/03/2020  6:05 PM [facility] Lab   CREATININE 1.1  0.5 - 1.2 mg/dL Final 01/03/2020  6:05 PM [address] [facility] Lab   GFR Non-African American >60  >60 Final 01/03/2020  6:05 PM [address] [place] [address] Lab   This calculation may be inaccurate for patients under the age of 25 years. For ages 25 and older, a GFR >60 mL/min/1.73m2 (not corrected for weight) is   valid for stable renal function. Calcium 7.7Low   8.6 - 10.0 mg/dL Final 01/03/2020  6:05 PM [address] [facility] Lab   Total Protein 6.9  6.6 - 8.7 g/dL Final 01/03/2020  6:05 PM [address] [facility] Lab   Alb 3.4Low   3.5 - 5.2 g/dL Final 01/03/2020  6:05 PM [address] [facility] Lab   Total Bilirubin <0.2  0.2 - 1.2 mg/dL Final 01/03/2020  6:05 PM [address] [facility] Lab   Alkaline Phosphatase 88  40 - 130 U/L Final 01/03/2020  6:05 PM [facility] Lab   ALT 17  5 - 41 U/L Final 01/03/2020  6:05 PM [facility] Lab   AST 25                IMPRESSION: Strangulated internal hernia with SMA occlusion                           Chronic anticoagulation    PLAN: Emergency laparotomy and and untwisting the small bowel. He may require vascular surgical assistance as well. This a high risk situation that poses an immediate risk to life or bodily function and may or may not require surgery. Even with surgery it may or may not actually fix the underlying condition. This is a highly complex scenario and may result in an adverse outcome despite our best efforts and intentions. All conditions are not curable in spite of the correct interventions and best possible therapies.

## 2020-01-04 NOTE — BRIEF OP NOTE
Brief Postoperative Note      DATE OF PROCEDURE: 1/3/2020     SURGEON: Jairo Cordon MD    PREOPERATIVE DIAGNOSIS: Incarcerated small bowel obstruction with strangulation    POSTOPERATIVE DIAGNOSIS: Same     OPERATION: Procedure(s):  LAPAROTOMY EXPLORATORY, reduction internal hernia  Closure of residual defect    ANESTHESIA: General    ESTIMATED BLOOD LOSS: Minimal    COMPLICATIONS: None. SPECIMENS: * No specimens in log *    DRAINS: PERRY    The patient tolerated the procedure well.     Electronically signed by Jairo Cordon MD  on 1/3/2020 at 10:00 PM

## 2020-01-04 NOTE — PLAN OF CARE
Problem: Pain:  Goal: Pain level will decrease  Description  Pain level will decrease  Outcome: Ongoing  Goal: Control of acute pain  Description  Control of acute pain  Outcome: Ongoing  Goal: Control of chronic pain  Description  Control of chronic pain  Outcome: Ongoing     Problem: Falls - Risk of:  Goal: Will remain free from falls  Description  Will remain free from falls  Outcome: Ongoing  Goal: Absence of physical injury  Description  Absence of physical injury  Outcome: Ongoing     Problem: Risk for Impaired Skin Integrity  Goal: Tissue integrity - skin and mucous membranes  Description  Structural intactness and normal physiological function of skin and  mucous membranes.   Outcome: Ongoing     Problem: Discharge Planning:  Goal: Participates in care planning  Description  Participates in care planning  Outcome: Ongoing  Goal: Discharged to appropriate level of care  Description  Discharged to appropriate level of care  Outcome: Ongoing     Problem: Pain:  Goal: Recognizes and communicates pain  Description  Recognizes and communicates pain  Outcome: Ongoing  Goal: Control of acute pain  Description  Control of acute pain  Outcome: Ongoing  Goal: Control of chronic pain  Description  Control of chronic pain  Outcome: Ongoing     Problem: Nutrition  Goal: Optimal nutrition therapy  Description  Nutrition Problem: Inadequate oral intake  Intervention: Food and/or Nutrient Delivery: Continue NPO  Nutritional Goals: meet nutritional needs through po intake or alternate source of nutrition     1/4/2020 1421 by Sunny Gaucher, RN  Outcome: Ongoing  1/4/2020 0745 by Jermaine Flood, MS, RD, LD  Outcome: Ongoing

## 2020-01-04 NOTE — ANESTHESIA PRE PROCEDURE
Department of Anesthesiology  Preprocedure Note       Name:  Nikolay Delarosa   Age:  51 y.o.  :  1970                                          MRN:  [id number]         Date:  1/3/2020      Surgeon: Haydee Abad):  Jose Rodrigez MD    Procedure: LAPAROTOMY EXPLORATORY (N/A )    Medications prior to admission:   Prior to Admission medications    Medication Sig Start Date End Date Taking? Authorizing Provider   fenofibrate (TRICOR) 145 MG tablet TAKE ONE TABLET BY MOUTH DAILY 1/3/20   Benay Fabry, APRN   guaiFENesin ([address]) 600 MG extended release tablet TAKE 2 TABLETS BY MOUTH 2 TIMES DAILY 19   Benay Fabry, APRN   B-D ULTRAFINE III SHORT PEN 31G X 8 MM MISC USE WITH INSULIN 19   Benay Fabry, APRN   Liraglutide (VICTOZA) 18 MG/3ML SOPN SC injection Inject 1.8 mg into the skin daily 19   Benay Fabry, APRN   allopurinol (ZYLOPRIM) 300 MG tablet TAKE ONE TABLET BY MOUTH ONCE DAILY 19   Benay Fabry, APRN   pregabalin (LYRICA) 75 MG capsule Take 75 mg by mouth 2 times daily.     Historical Provider, MD   sulfamethoxazole-trimethoprim (BACTRIM DS;SEPTRA DS) 800-160 MG per tablet Take 1 tablet by mouth 2 times daily Indications: started monday    Historical Provider, MD   sildenafil (VIAGRA) 50 MG tablet Take 1 tablet by mouth as needed for Erectile Dysfunction 10/16/19   Benay Fabry, APRN   metFORMIN (GLUCOPHAGE) 1000 MG tablet TAKE ONE TABLET BY MOUTH TWO TIMES DAILY (WITH MEALS) 19   Benay Fabry, APRN   cyclobenzaprine (FLEXERIL) 10 MG tablet TAKE ONE TABLET BY MOUTH THREE TIMES A DAY EFFECTIVE DATE 19   Historical Provider, MD   warfarin (COUMADIN) 5 MG tablet TAKE 1 TABLET BY MOUTH EVERY EVENING 19   DEMETRIUS Koo   JARDIANCE 25 MG tablet TAKE ONE TABLET BY MOUTH ONCE DAILY 19   Archie Woody MD   escitalopram (LEXAPRO) 20 MG tablet Take 1 tablet by mouth daily 19   DEMETRIUS Zamudio - CNP   omeprazole (PRILOSEC) 40 MG delayed release capsule omeprazole 40 mg capsule,delayed release   Take 1 capsule twice a day by oral route for 30 days. Historical Provider, MD   levothyroxine (SYNTHROID) 200 MCG tablet TAKE ONE TABLET BY MOUTH ONCE DAILY 1/24/19   Albert Myles MD   hydrALAZINE (APRESOLINE) 50 MG tablet Take 1 tablet by mouth 2 times daily 6/14/18   DEMETRIUS Chowdary   HYDROcodone-acetaminophen (NORCO)  MG per tablet Take 1 tablet by mouth every 8 hours as needed for Pain    Historical Provider, MD       Current medications:    Current Facility-Administered Medications   Medication Dose Route Frequency Provider Last Rate Last Dose    0.9 % sodium chloride bolus  1,000 mL Intravenous Once Dahiana Walker MD 2,000 mL/hr at 01/03/20 1845 1,000 mL at 01/03/20 1845    vancomycin (VANCOCIN) 1 g in dextrose 5% 250 mL IVPB  1,000 mg Intravenous Once Dahiana Walker MD   1,000 mg at 01/03/20 1820    0.9 % sodium chloride bolus  1,000 mL Intravenous Once Dahiana Walker MD        HYDROmorphone (DILAUDID) 1 MG/ML injection              Current Outpatient Medications   Medication Sig Dispense Refill    fenofibrate (TRICOR) 145 MG tablet TAKE ONE TABLET BY MOUTH DAILY 30 tablet 3    guaiFENesin (MUCINEX) 600 MG extended release tablet TAKE 2 TABLETS BY MOUTH 2 TIMES DAILY 28 tablet 0    B-D ULTRAFINE III SHORT PEN 31G X 8 MM MISC USE WITH INSULIN 100 each 1    Liraglutide (VICTOZA) 18 MG/3ML SOPN SC injection Inject 1.8 mg into the skin daily 9 mL 3    allopurinol (ZYLOPRIM) 300 MG tablet TAKE ONE TABLET BY MOUTH ONCE DAILY 30 tablet 5    pregabalin (LYRICA) 75 MG capsule Take 75 mg by mouth 2 times daily.       sulfamethoxazole-trimethoprim (BACTRIM DS;SEPTRA DS) 800-160 MG per tablet Take 1 tablet by mouth 2 times daily Indications: started monday      sildenafil (VIAGRA) 50 MG tablet Take 1 tablet by mouth as needed for Erectile Dysfunction 30 tablet 3    metFORMIN (GLUCOPHAGE) 1000 MG tablet TAKE ONE TABLET BY MOUTH TWO TIMES DAILY Hyperlipidemia     Hypertension     Hypothyroid     Myocardial infarct, old     [place] - Cardiac Cath - no blockage    Neuropathy     Osteoarthritis     Psychiatric illness        Past Surgical History:        Procedure Laterality Date    ABDOMINOPLASTY      ANKLE SURGERY  1990    fracture repair   [address] - No blockage    ESOPHAGUS SURGERY      GASTRIC BAND      HERNIA REPAIR      STOMACH SURGERY  02/2018    gastric sleeve       Social History:    Social History     Tobacco Use    Smoking status: Never Smoker    Smokeless tobacco: Never Used   Substance Use Topics    Alcohol use: No     Alcohol/week: 0.0 standard drinks                                Counseling given: Not Answered      Vital Signs (Current):   Vitals:    01/03/20 1716 01/03/20 1717 01/03/20 1902   BP: 111/82  (!) 141/106   Pulse: 72  78   Resp: 19 18   Temp:  97.1 °F (36.2 °C)    SpO2: 94%     Weight: 205 lb (93 kg)     Height: 5' 7\" (1.702 m)                                                BP Readings from Last 3 Encounters:   01/03/20 (!) 141/106   12/12/19 122/80   12/09/19 122/72       NPO Status:                                                                                 BMI:   Wt Readings from Last 3 Encounters:   01/03/20 205 lb (93 kg)   12/12/19 205 lb (93 kg)   12/09/19 199 lb (90.3 kg)     Body mass index is 32.11 kg/m².     CBC:   Lab Results   Component Value Date    WBC 12.9 01/03/2020    RBC 4.21 01/03/2020    HGB 11.6 01/03/2020    HCT 39.5 01/03/2020    HCT 42.2 02/09/2011    MCV 93.8 01/03/2020    RDW 14.2 01/03/2020     01/03/2020     02/09/2011       CMP:   Lab Results   Component Value Date     01/03/2020     02/09/2011    K 3.5 01/03/2020    K 3.8 02/09/2011     01/03/2020    CL 97 02/09/2011    CO2 16 01/03/2020    BUN 12 01/03/2020    CREATININE 1.1 01/03/2020    CREATININE 1.2 02/09/2011    LABGLOM >60 01/03/2020    GLUCOSE 291

## 2020-01-04 NOTE — OP NOTE
[facility] [address] [facility]                                OPERATIVE REPORT    PATIENT NAME: Namrata Alexander                       :        1970  MED REC NO:   [id number]                              ROOM[facility]       [facility]  ACCOUNT NO:   [de-identified]                           ADMIT DATE: 2020  PROVIDER:     Martha Worthington MD    DATE OF PROCEDURE:  2020    PREOPERATIVE DIAGNOSIS:  Strangulated small bowel obstruction due to  internal hernia. POSTOPERATIVE DIAGNOSIS: Strangulated small bowel obstruction due to  internal hernia. OPERATION PERFORMED:  1. Exploratory laparotomy. 2.  Reduction of internal hernia/volvulus and closure of peritoneal  defects. SURGEON:  Martha Worthington MD    ANESTHESIA:  General.    INDICATIONS:  The patient is a 70-year-old gentleman who acutely  developed severe excruciating abdominal pain about 2 hours before  presentation to the emergency room. CT scan showed obstruction of the  SMA, likely related to an internal hernia. Of note, he had had previous  gastric sleeve resection and also Radhika-en-Y gastric bypass, which is  prone to create spaces for internal hernias. We discussed the urgency  of surgery with him and his wife. They understood and were agreeable to  proceed with this. In addition, he was chronically anticoagulated on  Coumadin and his INR was approximately 3. He is on that for atrial  fibrillation. OPERATIVE PROCEDURE:  He was taken expeditiously to the operating room  where he underwent adequate general anesthesia, and was prepped and  draped in a sterile fashion. A midline incision was made carried down  into the abdomen. There was copious chylous, milky fluid approximately  2 liters. The small bowel was blue and decidedly unhappy.   We began  dissecting the small bowel and identified the point of twist and most of  the bowel improved fairly quickly, but it was very difficult to get the  entire bowel untwisted. This required, sort of, a tedious untying of  the knot as it were and this was very challenging. Eventually, we were  able to get the Radhika-en-Y through the defect and everything settled out  beautifully. Once this was accomplished, we closed that defect with 3-0  Vicryls and also the other defect on the other side with 3-0 Vicryls,  which will hopefully be helpful. On this, the small bowel pinked up. It remarkably looked quite good. We irrigated copiously with warm  saline, things looked good, and we then closed with interrupted  figure-of-eight 0 Vicryls. We did administer 0.25% Marcaine mixed with  Exparel for postoperative incisional pain and placed a PERRY in the subcu,  closed the subcu with 3-0 Vicryl, and then the skin with staples. Sterile dressings were applied. Estimated blood loss, minimal.   Complications, none. He tolerated the procedure quite well.         Nancy Edgar MD    D: 01/03/2020 23:27:47      T: 01/04/2020 0:33:01     [id number]  Job#: [id number]     Doc#: [id number]    CC:

## 2020-01-04 NOTE — PROGRESS NOTES
% Ideal Body 115.7%  · BMI Classification: BMI 25.0 - 29.9 Overweight    Nutrition Interventions:   Continue NPO  Continued Inpatient Monitoring    Nutrition Evaluation:   · Evaluation: Goals set   · Goals: meet nutritional needs through po intake or alternate source of nutrition    · Monitoring: Nutrition Progression, Diet Tolerance, Skin Integrity, Weight, Pertinent Labs      Electronically signed by Cha Santos MS, RD, LD on 1/4/20 at 7:43 AM    Contact Number: [phone]

## 2020-01-04 NOTE — PROGRESS NOTES
Length of Stay:   LOS: 1 day      Chief complaint:  Chief Complaint   Patient presents with    Abdominal Pain         Subjective:  S/p Ex lap overnight, tolerated procedure well    Physical Examination:  /86   Pulse 75   Temp 99.1 °F (37.3 °C) (Temporal)   Resp 11   Ht 6' (1.829 m)   Wt 206 lb 1.6 oz (93.5 kg)   SpO2 98%   BMI 27.95 kg/m²   Constitutional - Appropriately groomed, good nutritional status  Psychiatric - AOX3, baseline mood  Eyes - EOMI, conjunctivae and lids intact  ENMT - lips, teeth, gums intact; hearing intact  Respiratory - CTAB, no accessory muscle use  Cardiovascular - Clear S1, S2 no gross m/r/g; pedal pulses intact  Abd - binder on for surgical wounds  MSK - UE and LE joints intact, no gross clubbing  Skin - No rashes or wounds; no gross capillary refill defects  Neurologic - CN 2-12 grossly intact, sensation intact    Medications:  sodium chloride flush, 10 mL, Intravenous, 2 times per day    insulin glargine, 15 Units, Subcutaneous, Nightly    insulin lispro, 0-6 Units, Subcutaneous, Q4H      Problem list:  Principal Problem:    Small bowel volvulus (HCC)  Active Problems:    Volvulus of small intestine (HCC)    Strangulation of small intestine (HCC)  Resolved Problems:    * No resolved hospital problems.  *        A/P:  []Volvulus, internal hernia  -S/p Ex lap by general surgery  Tolerated procedure well  Continue post-op management per general surgery  -Continue supportive care - pain control    []DM2  Continue SSI    []HTN  -Titrate medications as needed    []Afib  -Hold coumadin till ok with general surgery    []Hospital Issues  DVT prop - SCDs  Code - FULL  Disposition - Continue ICU       Sanna Soliz M.D.,  1/4/2020

## 2020-01-05 LAB
ALBUMIN SERPL-MCNC: 3.2 G/DL (ref 3.5–5.2)
ALP BLD-CCNC: 71 U/L (ref 40–130)
ALT SERPL-CCNC: 14 U/L (ref 5–41)
ANION GAP SERPL CALCULATED.3IONS-SCNC: 14 MMOL/L (ref 7–19)
AST SERPL-CCNC: 21 U/L (ref 5–40)
BASOPHILS ABSOLUTE: 0 K/UL (ref 0–0.2)
BASOPHILS RELATIVE PERCENT: 0.1 % (ref 0–1)
BILIRUB SERPL-MCNC: 0.3 MG/DL (ref 0.2–1.2)
BUN BLDV-MCNC: 10 MG/DL (ref 6–20)
CALCIUM SERPL-MCNC: 8.1 MG/DL (ref 8.6–10)
CHLORIDE BLD-SCNC: 100 MMOL/L (ref 98–111)
CO2: 26 MMOL/L (ref 22–29)
CREAT SERPL-MCNC: 0.6 MG/DL (ref 0.5–1.2)
EKG P AXIS: 47 DEGREES
EKG P-R INTERVAL: 110 MS
EKG Q-T INTERVAL: 446 MS
EKG QRS DURATION: 88 MS
EKG QTC CALCULATION (BAZETT): 441 MS
EKG T AXIS: 37 DEGREES
EOSINOPHILS ABSOLUTE: 0 K/UL (ref 0–0.6)
EOSINOPHILS RELATIVE PERCENT: 0.2 % (ref 0–5)
GFR NON-AFRICAN AMERICAN: >60
GLUCOSE BLD-MCNC: 119 MG/DL (ref 70–99)
GLUCOSE BLD-MCNC: 123 MG/DL (ref 74–109)
GLUCOSE BLD-MCNC: 126 MG/DL (ref 70–99)
GLUCOSE BLD-MCNC: 139 MG/DL (ref 70–99)
GLUCOSE BLD-MCNC: 144 MG/DL (ref 70–99)
HBA1C MFR BLD: 5 % (ref 4–6)
HCT VFR BLD CALC: 31 % (ref 42–52)
HEMOGLOBIN: 9.4 G/DL (ref 14–18)
IMMATURE GRANULOCYTES #: 0 K/UL
INR BLD: 2.38 (ref 0.88–1.18)
LYMPHOCYTES ABSOLUTE: 1.5 K/UL (ref 1.1–4.5)
LYMPHOCYTES RELATIVE PERCENT: 17.2 % (ref 20–40)
MAGNESIUM: 1.7 MG/DL (ref 1.6–2.6)
MCH RBC QN AUTO: 27.4 PG (ref 27–31)
MCHC RBC AUTO-ENTMCNC: 30.3 G/DL (ref 33–37)
MCV RBC AUTO: 90.4 FL (ref 80–94)
MONOCYTES ABSOLUTE: 0.8 K/UL (ref 0–0.9)
MONOCYTES RELATIVE PERCENT: 8.5 % (ref 0–10)
MRSA CULTURE ONLY: NORMAL
NEUTROPHILS ABSOLUTE: 6.6 K/UL (ref 1.5–7.5)
NEUTROPHILS RELATIVE PERCENT: 73.8 % (ref 50–65)
PDW BLD-RTO: 14.3 % (ref 11.5–14.5)
PERFORMED ON: ABNORMAL
PLATELET # BLD: 271 K/UL (ref 130–400)
PMV BLD AUTO: 9.5 FL (ref 9.4–12.4)
POTASSIUM SERPL-SCNC: 4.1 MMOL/L (ref 3.5–5)
PROTHROMBIN TIME: 25.2 SEC (ref 12–14.6)
RBC # BLD: 3.43 M/UL (ref 4.7–6.1)
SODIUM BLD-SCNC: 140 MMOL/L (ref 136–145)
TOTAL PROTEIN: 5.5 G/DL (ref 6.6–8.7)
WBC # BLD: 8.9 K/UL (ref 4.8–10.8)

## 2020-01-05 PROCEDURE — 83735 ASSAY OF MAGNESIUM: CPT

## 2020-01-05 PROCEDURE — 83036 HEMOGLOBIN GLYCOSYLATED A1C: CPT

## 2020-01-05 PROCEDURE — 6370000000 HC RX 637 (ALT 250 FOR IP): Performed by: SURGERY

## 2020-01-05 PROCEDURE — 99024 POSTOP FOLLOW-UP VISIT: CPT | Performed by: SURGERY

## 2020-01-05 PROCEDURE — 6360000002 HC RX W HCPCS: Performed by: INTERNAL MEDICINE

## 2020-01-05 PROCEDURE — 6360000002 HC RX W HCPCS: Performed by: HOSPITALIST

## 2020-01-05 PROCEDURE — 2580000003 HC RX 258: Performed by: SURGERY

## 2020-01-05 PROCEDURE — 85025 COMPLETE CBC W/AUTO DIFF WBC: CPT

## 2020-01-05 PROCEDURE — 2580000003 HC RX 258: Performed by: INTERNAL MEDICINE

## 2020-01-05 PROCEDURE — 1210000000 HC MED SURG R&B

## 2020-01-05 PROCEDURE — 6360000002 HC RX W HCPCS: Performed by: SURGERY

## 2020-01-05 PROCEDURE — 80053 COMPREHEN METABOLIC PANEL: CPT

## 2020-01-05 PROCEDURE — 36592 COLLECT BLOOD FROM PICC: CPT

## 2020-01-05 PROCEDURE — 82948 REAGENT STRIP/BLOOD GLUCOSE: CPT

## 2020-01-05 PROCEDURE — 85610 PROTHROMBIN TIME: CPT

## 2020-01-05 RX ORDER — MAGNESIUM SULFATE 1 G/100ML
1 INJECTION INTRAVENOUS ONCE
Status: COMPLETED | OUTPATIENT
Start: 2020-01-05 | End: 2020-01-05

## 2020-01-05 RX ORDER — HYDROCODONE BITARTRATE AND ACETAMINOPHEN 10; 325 MG/1; MG/1
1 TABLET ORAL EVERY 4 HOURS PRN
Status: DISCONTINUED | OUTPATIENT
Start: 2020-01-05 | End: 2020-01-08 | Stop reason: HOSPADM

## 2020-01-05 RX ADMIN — DEXTROSE AND SODIUM CHLORIDE: 5; 450 INJECTION, SOLUTION INTRAVENOUS at 00:44

## 2020-01-05 RX ADMIN — HYDROMORPHONE HYDROCHLORIDE 1 MG: 1 INJECTION, SOLUTION INTRAMUSCULAR; INTRAVENOUS; SUBCUTANEOUS at 00:30

## 2020-01-05 RX ADMIN — HYDROMORPHONE HYDROCHLORIDE 1 MG: 1 INJECTION, SOLUTION INTRAMUSCULAR; INTRAVENOUS; SUBCUTANEOUS at 06:33

## 2020-01-05 RX ADMIN — LORAZEPAM 1 MG: 2 INJECTION INTRAMUSCULAR; INTRAVENOUS at 15:38

## 2020-01-05 RX ADMIN — DEXTROSE AND SODIUM CHLORIDE: 5; 450 INJECTION, SOLUTION INTRAVENOUS at 15:38

## 2020-01-05 RX ADMIN — MAGNESIUM SULFATE HEPTAHYDRATE 1 G: 1 INJECTION, SOLUTION INTRAVENOUS at 11:11

## 2020-01-05 RX ADMIN — HYDROMORPHONE HYDROCHLORIDE 1 MG: 1 INJECTION, SOLUTION INTRAMUSCULAR; INTRAVENOUS; SUBCUTANEOUS at 16:22

## 2020-01-05 RX ADMIN — LORAZEPAM 1 MG: 2 INJECTION INTRAMUSCULAR; INTRAVENOUS at 01:32

## 2020-01-05 RX ADMIN — HYDROCODONE BITARTRATE AND ACETAMINOPHEN 1 TABLET: 10; 325 TABLET ORAL at 17:20

## 2020-01-05 RX ADMIN — HYDROMORPHONE HYDROCHLORIDE 1 MG: 1 INJECTION, SOLUTION INTRAMUSCULAR; INTRAVENOUS; SUBCUTANEOUS at 20:14

## 2020-01-05 RX ADMIN — Medication 10 ML: at 08:00

## 2020-01-05 RX ADMIN — Medication 10 ML: at 20:11

## 2020-01-05 RX ADMIN — MORPHINE SULFATE 4 MG: 4 INJECTION, SOLUTION INTRAMUSCULAR; INTRAVENOUS at 05:25

## 2020-01-05 RX ADMIN — HYDROMORPHONE HYDROCHLORIDE 1 MG: 1 INJECTION, SOLUTION INTRAMUSCULAR; INTRAVENOUS; SUBCUTANEOUS at 03:32

## 2020-01-05 RX ADMIN — HYDRALAZINE HYDROCHLORIDE 10 MG: 20 INJECTION INTRAMUSCULAR; INTRAVENOUS at 03:34

## 2020-01-05 RX ADMIN — SODIUM CHLORIDE, PRESERVATIVE FREE 10 ML: 5 INJECTION INTRAVENOUS at 00:04

## 2020-01-05 RX ADMIN — HYDROMORPHONE HYDROCHLORIDE 1 MG: 1 INJECTION, SOLUTION INTRAMUSCULAR; INTRAVENOUS; SUBCUTANEOUS at 09:55

## 2020-01-05 RX ADMIN — HYDROMORPHONE HYDROCHLORIDE 1 MG: 1 INJECTION, SOLUTION INTRAMUSCULAR; INTRAVENOUS; SUBCUTANEOUS at 13:21

## 2020-01-05 RX ADMIN — HYDROCODONE BITARTRATE AND ACETAMINOPHEN 1 TABLET: 10; 325 TABLET ORAL at 23:59

## 2020-01-05 RX ADMIN — HYDRALAZINE HYDROCHLORIDE 10 MG: 20 INJECTION INTRAMUSCULAR; INTRAVENOUS at 15:38

## 2020-01-05 ASSESSMENT — PAIN DESCRIPTION - PAIN TYPE
TYPE: SURGICAL PAIN
TYPE: SURGICAL PAIN
TYPE: ACUTE PAIN
TYPE: ACUTE PAIN

## 2020-01-05 ASSESSMENT — PAIN DESCRIPTION - ONSET
ONSET: GRADUAL
ONSET: GRADUAL

## 2020-01-05 ASSESSMENT — PAIN DESCRIPTION - LOCATION
LOCATION: ABDOMEN

## 2020-01-05 ASSESSMENT — PAIN DESCRIPTION - ORIENTATION
ORIENTATION: MID
ORIENTATION: MID

## 2020-01-05 ASSESSMENT — PAIN SCALES - GENERAL
PAINLEVEL_OUTOF10: 9
PAINLEVEL_OUTOF10: 9
PAINLEVEL_OUTOF10: 8
PAINLEVEL_OUTOF10: 7
PAINLEVEL_OUTOF10: 9
PAINLEVEL_OUTOF10: 7
PAINLEVEL_OUTOF10: 9
PAINLEVEL_OUTOF10: 10
PAINLEVEL_OUTOF10: 8
PAINLEVEL_OUTOF10: 7
PAINLEVEL_OUTOF10: 6
PAINLEVEL_OUTOF10: 5
PAINLEVEL_OUTOF10: 5
PAINLEVEL_OUTOF10: 10
PAINLEVEL_OUTOF10: 6
PAINLEVEL_OUTOF10: 9
PAINLEVEL_OUTOF10: 7
PAINLEVEL_OUTOF10: 10
PAINLEVEL_OUTOF10: 6

## 2020-01-05 ASSESSMENT — PAIN DESCRIPTION - PROGRESSION
CLINICAL_PROGRESSION: GRADUALLY WORSENING
CLINICAL_PROGRESSION: GRADUALLY WORSENING

## 2020-01-05 ASSESSMENT — PAIN DESCRIPTION - DESCRIPTORS
DESCRIPTORS: CRAMPING;DISCOMFORT;JABBING
DESCRIPTORS: CRAMPING;DISCOMFORT;SORE

## 2020-01-05 ASSESSMENT — PAIN - FUNCTIONAL ASSESSMENT: PAIN_FUNCTIONAL_ASSESSMENT: PREVENTS OR INTERFERES SOME ACTIVE ACTIVITIES AND ADLS

## 2020-01-05 ASSESSMENT — PAIN DESCRIPTION - FREQUENCY
FREQUENCY: INTERMITTENT
FREQUENCY: INTERMITTENT

## 2020-01-05 NOTE — PROGRESS NOTES
01/05/20 0130 (!) 150/94 -- -- 74 10 98 % --   01/05/20 0100 (!) 157/96 -- -- 83 20 99 % --   01/05/20 0030 (!) 140/95 -- -- 75 15 97 % --   01/05/20 0000 (!) 152/92 97.5 °F (36.4 °C) Temporal 86 17 99 % --   01/04/20 2330 (!) 137/92 -- -- 82 20 98 % --   01/04/20 2300 (!) 139/92 -- -- 77 11 98 % --   01/04/20 2145 -- -- -- 84 13 96 % --   01/04/20 2130 -- -- -- 87 18 98 % --   01/04/20 2125 (!) 165/109 -- -- 76 13 99 % --   01/04/20 2030 (!) 160/102 -- -- 80 10 98 % --   01/04/20 2000 (!) 161/104 -- -- 82 27 99 % --   01/04/20 1930 (!) 156/97 97.1 °F (36.2 °C) Temporal 77 13 97 % --   01/04/20 1900 (!) 155/93 -- -- 79 13 98 % --   01/04/20 1831 (!) 140/95 -- -- 85 17 98 % --   01/04/20 1800 (!) 151/109 -- -- 80 16 98 % --   01/04/20 1700 (!) 154/105 -- -- 77 17 97 % --   01/04/20 1600 -- 99.1 °F (37.3 °C) Temporal -- -- -- --   01/04/20 1530 (!) 157/99 -- -- 80 13 99 % --   01/04/20 1500 (!) 160/98 -- -- 77 15 99 % --   01/04/20 1400 (!) 152/95 -- -- 74 12 98 % --         Intake/Output Summary (Last 24 hours) at 1/5/2020 1305  Last data filed at 1/5/2020 1200  Gross per 24 hour   Intake 1899.17 ml   Output 1488 ml   Net 411.17 ml       In: 1749.2 [P.O.:120; I.V.:1629.2]  Out: 9804 [Urine:1340; Drains:28]    I/O last 3 completed shifts: In: 2075 [P.O.:120; I.V.:1755; IV Piggyback:200]  Out: [place]; Drains:28]     Date 01/05/20 0000 - 01/05/20 2359   Shift 7989-6338 1234-6661 9603-2359 24 Hour Total   INTAKE   P.O.(mL/kg/hr)  0  0   I. V.(mL/kg) 656. 3(7) 391.7(4.2)  1047. 9(11.1)   Shift Total(mL/kg) 656. 3(7) 391.7(4.2)  1047. 9(11.1)   OUTPUT   Urine(mL/kg/hr) 975(1.3)   975   Drains(mL/kg) 5(0.1)   5(0.1)   Shift Total(mL/kg) 980(10.4)   980(10.4)   Weight (kg) 94.3 94.3 94.3 94.3       Wt Readings from Last 3 Encounters:   01/05/20 207 lb 14.4 oz (94.3 kg)   12/12/19 205 lb (93 kg)   12/09/19 199 lb (90.3 kg)        Body mass index is 28.2 kg/m². Diet: Diet NPO Effective Now Exceptions are:  Ice Chips    LABS:     CBC:   Recent Labs     01/04/20  0515 01/04/20  1235 01/05/20  0135   WBC 14.6* 14.6* 8.9   RBC 3.59* 3.51* 3.43*   HGB 10.2* 9.8* 9.4*   HCT 32.7* 31.8* 31.0*   MCV 91.1 90.6 90.4   MCH 28.4 27.9 27.4   MCHC 31.2* 30.8* 30.3*   RDW 14.1 14.3 14.3    328 271   MPV 9.4 9.9 9.5      Last 3 CMP:   Recent Labs     01/03/20  1805 01/04/20  0515 01/05/20  0135    140 140   K 3.5 4.1 4.1    106 100   CO2 16* 24 26   BUN 12 11 10   CREATININE 1.1 0.7 0.6   GLUCOSE 291* 148* 123*   CALCIUM 7.7* 7.8* 8.1*   PROT 6.9 6.0* 5.5*   LABALBU 3.4* 3.2* 3.2*   BILITOT <0.2 <0.2 0.3   ALKPHOS 88 74 71   AST 25 22 21   ALT 17 17 14          PHYSICAL EXAM:     CONSTITUTIONAL: Alert, appropriate, no acute distress  SKIN: warm, dry with no rashes or lesions  EYES: Non icteric  CHEST/LUNGS: CTA bilaterally  CARDIOVASCULAR: RRR    ABDOMEN: soft, ND, appropriately TTP, +BS  Incisions: Clean, dry, and intact with no erythema or induration  NEUROLOGIC: CN II-XI grossly intact, no motor or sensory deficits   EXTREMITIES: warm, well perfused, no edema     ASSESSMENT:       1. HD # 2  Patient Active Problem List   Diagnosis    Chest pain, atypical    Gastroesophageal reflux disease    Morbid obesity with BMI of 45.0-49.9, adult (HCC)    Type 2 diabetes mellitus without complication (HCC)    Acquired hypothyroidism    Hypertensive disorder    Lactic acidosis    Chest pain    Hiatal hernia    Anxiety    Chronic obstructive pulmonary disease (HCC)    Paroxysmal A-fib (HCC)    Obstructive sleep apnea    Abnormal EKG    Dyslipidemia    Bipolar disorder with severe depression (HCC)    Diabetes mellitus (HCC)    Low back pain    Morbid obesity (HCC)    Small bowel volvulus (HCC)    Volvulus of small intestine ([address]    Strangulation of small intestine ([address]   2. PLAN:     1. Clear liquid diet  2. DC PERRY drain  3. Okay to transfer to floor  4. Change to oral pain medicines  5.      Chasidy Tony

## 2020-01-05 NOTE — PROGRESS NOTES
Dr. Kayla Live at bedside. Indicated pt can have ice chips. No NGT needed at this time. Walk pt q shift. May be transferred to floor if stable over night. Pt indicates all questions was answered but indicated he will not ambulate tonight.

## 2020-01-05 NOTE — PLAN OF CARE
Problem: Pain:  Description  Pain management should include both nonpharmacologic and pharmacologic interventions. Goal: Pain level will decrease  Description  Pain level will decrease  Outcome: Ongoing  Goal: Control of acute pain  Description  Control of acute pain  Outcome: Ongoing  Goal: Control of chronic pain  Description  Control of chronic pain  Outcome: Ongoing     Problem: Falls - Risk of:  Goal: Will remain free from falls  Description  Will remain free from falls  Outcome: Ongoing  Goal: Absence of physical injury  Description  Absence of physical injury  Outcome: Ongoing     Problem: Risk for Impaired Skin Integrity  Goal: Tissue integrity - skin and mucous membranes  Description  Structural intactness and normal physiological function of skin and  mucous membranes. Outcome: Ongoing     Problem: Discharge Planning:  Goal: Participates in care planning  Description  Participates in care planning  Outcome: Ongoing  Goal: Discharged to appropriate level of care  Description  Discharged to appropriate level of care  Outcome: Ongoing     Problem: Pain:  Description  Pain management should include both nonpharmacologic and pharmacologic interventions.   Goal: Recognizes and communicates pain  Description  Recognizes and communicates pain  Outcome: Ongoing  Goal: Control of acute pain  Description  Control of acute pain  Outcome: Ongoing  Goal: Control of chronic pain  Description  Control of chronic pain  Outcome: Ongoing     Problem: Nutrition  Goal: Optimal nutrition therapy  Description  Nutrition Problem: Inadequate oral intake  Intervention: Food and/or Nutrient Delivery: Continue NPO  Nutritional Goals: meet nutritional needs through po intake or alternate source of nutrition     Outcome: Ongoing

## 2020-01-05 NOTE — PROGRESS NOTES
After multiple attempts from me and family member (Niece) pt agrees to ambulated. Walked from one ICU door to the other ICU door twice. Pain medication was given 30 minutes before walking. No significant increase in HR or drop in oxygen saturation during or after ambulation. BP has been in the 160s, PRN medication given. Back in bed.

## 2020-01-05 NOTE — PROGRESS NOTES
--   20 0438 -- 98.6 °F (37 °C) Temporal -- -- -- -- --   20 0400 (!) 160/86 -- -- 80 13 -- -- --   20 0300 134/87 -- -- 81 11 -- -- --   20 0200 137/88 -- -- 79 10 -- -- --   20 0100 (!) 142/88 -- -- 83 17 -- -- --   20 0030 (!) 146/86 -- -- 83 12 -- -- --   20 0000 (!) 136/90 -- -- 76 14 -- -- --   20 2300 (!) 149/100 -- -- 79 15 -- -- 206 lb 1.6 oz (93.5 kg)   20 2234 (!) 147/97 96.6 °F (35.9 °C) Temporal 75 12 -- 6' (1.829 m) --   200 (!) 149/102 -- -- 69 19 94 % -- --   20 2215 (!) 159/104 -- -- 70 19 94 % -- --   20 2210 (!) 159/109 -- -- 70 18 95 % -- --   20 (!) 153/98 97.6 °F (36.4 °C) Temporal 73 17 95 % -- --   200 (!) 151/98 -- -- 71 18 93 % -- --   20 (!) 159/101 -- -- 72 22 94 % -- --   200 (!) 165/100 -- -- 70 24 94 % -- --   205 (!) 153/93 -- -- 75 19 92 % -- --   20 (!) 169/107 -- -- 74 19 95 % -- --   20 -- -- -- 79 16 94 % -- --   20 (!) 168/106 -- -- 79 18 94 % -- --   20 (!) 151/101 -- -- 82 19 94 % -- --   20 (!) 154/106 -- -- 83 17 94 % -- --   20 -- -- -- 84 16 94 % -- --   20 (!) 137/94 -- -- 81 17 94 % -- --   20 138/87 -- -- 74 14 96 % -- --   20 (!) 156/92 -- -- 78 11 98 % -- --   20 (!) 156/92 97 °F (36.1 °C) Temporal 74 20 98 % -- --   20 (!) 141/106 -- -- 78 18 -- -- --         Intake/Output Summary (Last 24 hours) at 2020 1826  Last data filed at 2020 1400  Gross per 24 hour   Intake 4909.94 ml   Output 1770 ml   Net 3139.94 ml       In: 2109.9 [I.V.:1909.9]  Out: 1495 [Urine:1455; Drains:40]    I/O last 3 completed shifts: In: 4909.9 [I.V.:4209.9;  Blood:500; IV Piggyback:200]  Out: 8449 [JGQY; Drains:40; Blood:75]     Date 20 0000 - 20 235   Shift 1442-4900 6579-4301 5735-5418 24 Hour Total   INTAKE

## 2020-01-06 LAB
ALBUMIN SERPL-MCNC: 2.9 G/DL (ref 3.5–5.2)
ALP BLD-CCNC: 64 U/L (ref 40–130)
ALT SERPL-CCNC: 14 U/L (ref 5–41)
ANION GAP SERPL CALCULATED.3IONS-SCNC: 11 MMOL/L (ref 7–19)
AST SERPL-CCNC: 23 U/L (ref 5–40)
BASOPHILS ABSOLUTE: 0 K/UL (ref 0–0.2)
BASOPHILS RELATIVE PERCENT: 0.6 % (ref 0–1)
BILIRUB SERPL-MCNC: <0.2 MG/DL (ref 0.2–1.2)
BUN BLDV-MCNC: 8 MG/DL (ref 6–20)
CALCIUM SERPL-MCNC: 8.2 MG/DL (ref 8.6–10)
CHLORIDE BLD-SCNC: 99 MMOL/L (ref 98–111)
CO2: 26 MMOL/L (ref 22–29)
CREAT SERPL-MCNC: 0.6 MG/DL (ref 0.5–1.2)
EOSINOPHILS ABSOLUTE: 0.1 K/UL (ref 0–0.6)
EOSINOPHILS RELATIVE PERCENT: 1.3 % (ref 0–5)
GFR NON-AFRICAN AMERICAN: >60
GLUCOSE BLD-MCNC: 104 MG/DL (ref 70–99)
GLUCOSE BLD-MCNC: 112 MG/DL (ref 70–99)
GLUCOSE BLD-MCNC: 116 MG/DL (ref 74–109)
GLUCOSE BLD-MCNC: 117 MG/DL (ref 70–99)
GLUCOSE BLD-MCNC: 128 MG/DL (ref 70–99)
GLUCOSE BLD-MCNC: 148 MG/DL (ref 70–99)
GLUCOSE BLD-MCNC: 92 MG/DL (ref 70–99)
HCT VFR BLD CALC: 31.3 % (ref 42–52)
HEMOGLOBIN: 9.8 G/DL (ref 14–18)
IMMATURE GRANULOCYTES #: 0 K/UL
INR BLD: 1.43 (ref 0.88–1.18)
LYMPHOCYTES ABSOLUTE: 1.7 K/UL (ref 1.1–4.5)
LYMPHOCYTES RELATIVE PERCENT: 31.9 % (ref 20–40)
MAGNESIUM: 1.5 MG/DL (ref 1.6–2.6)
MCH RBC QN AUTO: 28.2 PG (ref 27–31)
MCHC RBC AUTO-ENTMCNC: 31.3 G/DL (ref 33–37)
MCV RBC AUTO: 89.9 FL (ref 80–94)
MONOCYTES ABSOLUTE: 0.6 K/UL (ref 0–0.9)
MONOCYTES RELATIVE PERCENT: 10.5 % (ref 0–10)
NEUTROPHILS ABSOLUTE: 2.9 K/UL (ref 1.5–7.5)
NEUTROPHILS RELATIVE PERCENT: 55.3 % (ref 50–65)
PDW BLD-RTO: 14.4 % (ref 11.5–14.5)
PERFORMED ON: ABNORMAL
PERFORMED ON: NORMAL
PLATELET # BLD: 268 K/UL (ref 130–400)
PMV BLD AUTO: 9.9 FL (ref 9.4–12.4)
POTASSIUM SERPL-SCNC: 3.8 MMOL/L (ref 3.5–5)
PROTHROMBIN TIME: 16.8 SEC (ref 12–14.6)
RBC # BLD: 3.48 M/UL (ref 4.7–6.1)
REASON FOR REJECTION: NORMAL
REJECTED TEST: NORMAL
SODIUM BLD-SCNC: 136 MMOL/L (ref 136–145)
TOTAL PROTEIN: 5.9 G/DL (ref 6.6–8.7)
WBC # BLD: 5.2 K/UL (ref 4.8–10.8)

## 2020-01-06 PROCEDURE — 85610 PROTHROMBIN TIME: CPT

## 2020-01-06 PROCEDURE — 6360000002 HC RX W HCPCS: Performed by: INTERNAL MEDICINE

## 2020-01-06 PROCEDURE — 1210000000 HC MED SURG R&B

## 2020-01-06 PROCEDURE — 99024 POSTOP FOLLOW-UP VISIT: CPT | Performed by: SURGERY

## 2020-01-06 PROCEDURE — 2580000003 HC RX 258: Performed by: SURGERY

## 2020-01-06 PROCEDURE — 80053 COMPREHEN METABOLIC PANEL: CPT

## 2020-01-06 PROCEDURE — 85025 COMPLETE CBC W/AUTO DIFF WBC: CPT

## 2020-01-06 PROCEDURE — 82948 REAGENT STRIP/BLOOD GLUCOSE: CPT

## 2020-01-06 PROCEDURE — 83735 ASSAY OF MAGNESIUM: CPT

## 2020-01-06 PROCEDURE — 6370000000 HC RX 637 (ALT 250 FOR IP): Performed by: SURGERY

## 2020-01-06 RX ORDER — MAGNESIUM SULFATE IN WATER 40 MG/ML
2 INJECTION, SOLUTION INTRAVENOUS ONCE
Status: COMPLETED | OUTPATIENT
Start: 2020-01-06 | End: 2020-01-06

## 2020-01-06 RX ADMIN — Medication 10 ML: at 08:06

## 2020-01-06 RX ADMIN — HYDROCODONE BITARTRATE AND ACETAMINOPHEN 1 TABLET: 10; 325 TABLET ORAL at 06:40

## 2020-01-06 RX ADMIN — Medication 10 ML: at 21:07

## 2020-01-06 RX ADMIN — HYDROMORPHONE HYDROCHLORIDE 1 MG: 1 INJECTION, SOLUTION INTRAMUSCULAR; INTRAVENOUS; SUBCUTANEOUS at 16:20

## 2020-01-06 RX ADMIN — HYDROCODONE BITARTRATE AND ACETAMINOPHEN 1 TABLET: 10; 325 TABLET ORAL at 11:23

## 2020-01-06 RX ADMIN — HYDROMORPHONE HYDROCHLORIDE 1 MG: 1 INJECTION, SOLUTION INTRAMUSCULAR; INTRAVENOUS; SUBCUTANEOUS at 08:12

## 2020-01-06 RX ADMIN — HYDROMORPHONE HYDROCHLORIDE 1 MG: 1 INJECTION, SOLUTION INTRAMUSCULAR; INTRAVENOUS; SUBCUTANEOUS at 21:07

## 2020-01-06 RX ADMIN — MAGNESIUM SULFATE HEPTAHYDRATE 2 G: 40 INJECTION, SOLUTION INTRAVENOUS at 11:43

## 2020-01-06 RX ADMIN — HYDROMORPHONE HYDROCHLORIDE 1 MG: 1 INJECTION, SOLUTION INTRAMUSCULAR; INTRAVENOUS; SUBCUTANEOUS at 01:19

## 2020-01-06 ASSESSMENT — PAIN SCALES - GENERAL
PAINLEVEL_OUTOF10: 9
PAINLEVEL_OUTOF10: 6
PAINLEVEL_OUTOF10: 9
PAINLEVEL_OUTOF10: 8
PAINLEVEL_OUTOF10: 6
PAINLEVEL_OUTOF10: 9
PAINLEVEL_OUTOF10: 5
PAINLEVEL_OUTOF10: 5
PAINLEVEL_OUTOF10: 9
PAINLEVEL_OUTOF10: 8
PAINLEVEL_OUTOF10: 8

## 2020-01-06 ASSESSMENT — PAIN DESCRIPTION - ONSET: ONSET: ON-GOING

## 2020-01-06 ASSESSMENT — PAIN DESCRIPTION - PROGRESSION: CLINICAL_PROGRESSION: NOT CHANGED

## 2020-01-06 ASSESSMENT — PAIN - FUNCTIONAL ASSESSMENT: PAIN_FUNCTIONAL_ASSESSMENT: PREVENTS OR INTERFERES SOME ACTIVE ACTIVITIES AND ADLS

## 2020-01-06 ASSESSMENT — PAIN DESCRIPTION - FREQUENCY: FREQUENCY: CONTINUOUS

## 2020-01-06 ASSESSMENT — PAIN DESCRIPTION - PAIN TYPE: TYPE: SURGICAL PAIN

## 2020-01-06 ASSESSMENT — PAIN DESCRIPTION - LOCATION: LOCATION: ABDOMEN

## 2020-01-06 NOTE — PROGRESS NOTES
Caty Mchugh M.D., FACS  Daily Progress Note    Pt Name: Susette Fleischer  Medical Record Number: [id number]  Date of Birth 1970   Today's Date: 1/6/2020    Chief Complaint:  Chief Complaint   Patient presents with    Abdominal Pain         SUBJECTIVE:     Kavin is doing well. Pain is well controlled. Lots of gas.     Advance diet    IID IV    Possibly home tomorrow    MEDS:     Scheduled Meds:   magnesium sulfate  2 g Intravenous Once    insulin lispro  0-6 Units Subcutaneous TID WC    insulin lispro  0-3 Units Subcutaneous Nightly    sodium chloride flush  10 mL Intravenous 2 times per day     Continuous Infusions:   dextrose       PRN Meds:HYDROcodone-acetaminophen, 1 tablet, Q4H PRN  naloxone, 0.4 mg, PRN  HYDROmorphone, 1 mg, Q3H PRN  LORazepam, 1 mg, Q4H PRN  sodium chloride flush, 10 mL, PRN  morphine, 2 mg, Q2H PRN    Or  morphine, 4 mg, Q2H PRN  ondansetron, 4 mg, Q6H PRN  glucose, 15 g, PRN  dextrose, 12.5 g, PRN  glucagon (rDNA), 1 mg, PRN  dextrose, 100 mL/hr, PRN  hydrALAZINE, 10 mg, Q4H PRN        OBJECTIVE:     Patient Vitals for the past 24 hrs:   BP Temp Temp src Pulse Resp SpO2   01/06/20 1130 (!) 139/92 98.1 °F (36.7 °C) Temporal 63 17 98 %   01/06/20 0623 (!) 138/91 97.5 °F (36.4 °C) Temporal 65 17 97 %   01/06/20 0436 (!) 142/97 98.8 °F (37.1 °C) Temporal 68 18 98 %   01/05/20 2238 133/88 97.8 °F (36.6 °C) Temporal 66 16 97 %   01/05/20 1810 (!) 140/94 98.7 °F (37.1 °C) Temporal 82 16 98 %   01/05/20 1700 (!) 144/76 -- -- 86 14 --   01/05/20 1600 (!) 151/96 99.1 °F (37.3 °C) Temporal 69 13 97 %   01/05/20 1500 (!) 146/82 -- -- 68 21 96 %   01/05/20 1400 (!) 147/94 -- -- 67 20 97 %   01/05/20 1300 (!) 158/91 -- -- 71 22 96 %         Intake/Output Summary (Last 24 hours) at 1/6/2020 1250  Last data filed at 1/6/2020 0603  Gross per 24 hour   Intake 752.76 ml   Output 470 ml   Net 282.76 ml       In: 652.8 [I.V.:652.8]  Out: 470 [Urine:450; Drains:20]    I/O last 3 completed shifts: In: 1144.4 [I.V.:1144.4]  Out: 249 [Urine:900; Drains:20]     Date 01/06/20 0000 - 01/06/20 2359   Shift 8180-3036 5739-4294 7947-2506 24 Hour Total   INTAKE   I.V.(mL/kg) 486. 3(5.2)   486. 3(5.2)   Shift Total(mL/kg) 486. 3(5.2)   486. 3(5.2)   OUTPUT   Shift Total(mL/kg)       Weight (kg) 94.3 94.3 94.3 94.3       Wt Readings from Last 3 Encounters:   01/05/20 207 lb 14.4 oz (94.3 kg)   12/12/19 205 lb (93 kg)   12/09/19 199 lb (90.3 kg)        Body mass index is 28.2 kg/m².      Diet: DIET FULL LIQUID;    LABS:     CBC:   Recent Labs     01/04/20  1235 01/05/20  0135 01/06/20  0000   WBC 14.6* 8.9 5.2   RBC 3.51* 3.43* 3.48*   HGB 9.8* 9.4* 9.8*   HCT 31.8* 31.0* 31.3*   MCV 90.6 90.4 89.9   MCH 27.9 27.4 28.2   MCHC 30.8* 30.3* 31.3*   RDW 14.3 14.3 14.4    271 268   MPV 9.9 9.5 9.9      Last 3 CMP:   Recent Labs     01/04/20  0515 01/05/20  0135 01/06/20  0000    140 136   K 4.1 4.1 3.8    100 99   CO2 24 26 26   BUN 11 10 8   CREATININE 0.7 0.6 0.6   GLUCOSE 148* 123* 116*   CALCIUM 7.8* 8.1* 8.2*   PROT 6.0* 5.5* 5.9*   LABALBU 3.2* 3.2* 2.9*   BILITOT <0.2 0.3 <0.2   ALKPHOS 74 71 64   AST 22 21 23   ALT 17 14 14          PHYSICAL EXAM:     CONSTITUTIONAL: Alert, appropriate, no acute distress  SKIN: warm, dry with no rashes or lesions  EYES: Non icteric  CHEST/LUNGS: CTA bilaterally  CARDIOVASCULAR: RRR    ABDOMEN: soft, ND, appropriately TTP, +BS  Incisions: Clean, dry, and intact with no erythema or induration  NEUROLOGIC: CN II-XI grossly intact, no motor or sensory deficits   EXTREMITIES: warm, well perfused, no edema     ASSESSMENT:       1. HD # 3  Patient Active Problem List   Diagnosis    Chest pain, atypical    Gastroesophageal reflux disease    Morbid obesity with BMI of 45.0-49.9, adult (HCC)    Type 2 diabetes mellitus without complication (HCC)    Acquired hypothyroidism    Hypertensive disorder    Lactic acidosis    Chest pain    Hiatal hernia    Anxiety    Chronic obstructive pulmonary disease (HCC)    Paroxysmal A-fib (HCC)    Obstructive sleep apnea    Abnormal EKG    Dyslipidemia    Bipolar disorder with severe depression ([address]    Diabetes mellitus ([address]    Low back pain    Morbid obesity ([address]    Small bowel volvulus ([address]    Volvulus of small intestine ([address]    Strangulation of small intestine ([address]   2. PLAN:     1. Advance diet  2. Resume home meds  3. Jairo Cordon M.D.  FACS  Electronically signed 1/6/2020 at 12:50 PM

## 2020-01-06 NOTE — PROGRESS NOTES
Length of Stay:   LOS: 3 days      Chief complaint:  Chief Complaint   Patient presents with    Abdominal Pain         Subjective:  Transferred to floor, doing well, improving pain control    Physical Examination:  BP (!) 138/91   Pulse 65   Temp 97.5 °F (36.4 °C) (Temporal)   Resp 17   Ht 6' (1.829 m)   Wt 207 lb 14.4 oz (94.3 kg)   SpO2 97%   BMI 28.20 kg/m²   Constitutional - Appropriately groomed, good nutritional status  Psychiatric - AOX3, baseline mood  Eyes - EOMI, conjunctivae and lids intact  ENMT - lips, teeth, gums intact; hearing intact  Respiratory - CTAB, no accessory muscle use  Cardiovascular - Clear S1, S2 no gross m/r/g; pedal pulses intact  Abd - binder on for surgical wounds  MSK - UE and LE joints intact, no gross clubbing  Skin - No rashes or wounds; no gross capillary refill defects  Neurologic - CN 2-12 grossly intact, sensation intact    Medications:    magnesium sulfate, 2 g, Intravenous, Once    insulin lispro, 0-6 Units, Subcutaneous, TID WC    insulin lispro, 0-3 Units, Subcutaneous, Nightly    sodium chloride flush, 10 mL, Intravenous, 2 times per day      Problem list:  Principal Problem:    Small bowel volvulus (HCC)  Active Problems:    Volvulus of small intestine (HCC)    Strangulation of small intestine (HCC)  Resolved Problems:    * No resolved hospital problems.  *        A/P:  []Volvulus, internal hernia  -S/p Ex lap by general surgery  Tolerated procedure well  Continue post-op management per general surgery  -Continue supportive care - improving pain control    []DM2  Continue SSI    []HTN  -Titrate medications as needed    []Afib chronic  -Hold coumadin till ok with general surgery    []Hospital Issues  DVT prop - SCDs  Code - FULL  Disposition - Continue floor      Elham Morfin M.D.,  1/6/2020

## 2020-01-07 VITALS
HEIGHT: 72 IN | RESPIRATION RATE: 18 BRPM | TEMPERATURE: 97.8 F | OXYGEN SATURATION: 99 % | WEIGHT: 207.9 LBS | BODY MASS INDEX: 28.16 KG/M2 | DIASTOLIC BLOOD PRESSURE: 89 MMHG | SYSTOLIC BLOOD PRESSURE: 135 MMHG | HEART RATE: 64 BPM

## 2020-01-07 LAB
ALBUMIN SERPL-MCNC: 2.9 G/DL (ref 3.5–5.2)
ALP BLD-CCNC: 59 U/L (ref 40–130)
ALT SERPL-CCNC: 13 U/L (ref 5–41)
ANION GAP SERPL CALCULATED.3IONS-SCNC: 10 MMOL/L (ref 7–19)
AST SERPL-CCNC: 21 U/L (ref 5–40)
BASOPHILS ABSOLUTE: 0 K/UL (ref 0–0.2)
BASOPHILS RELATIVE PERCENT: 0.4 % (ref 0–1)
BILIRUB SERPL-MCNC: <0.2 MG/DL (ref 0.2–1.2)
BLOOD BANK DISPENSE STATUS: NORMAL
BLOOD BANK PRODUCT CODE: NORMAL
BPU ID: NORMAL
BUN BLDV-MCNC: 6 MG/DL (ref 6–20)
CALCIUM SERPL-MCNC: 8.7 MG/DL (ref 8.6–10)
CHLORIDE BLD-SCNC: 101 MMOL/L (ref 98–111)
CO2: 27 MMOL/L (ref 22–29)
CREAT SERPL-MCNC: 0.7 MG/DL (ref 0.5–1.2)
DESCRIPTION BLOOD BANK: NORMAL
EOSINOPHILS ABSOLUTE: 0.1 K/UL (ref 0–0.6)
EOSINOPHILS RELATIVE PERCENT: 2 % (ref 0–5)
GFR NON-AFRICAN AMERICAN: >60
GLUCOSE BLD-MCNC: 107 MG/DL (ref 74–109)
GLUCOSE BLD-MCNC: 125 MG/DL (ref 70–99)
GLUCOSE BLD-MCNC: 84 MG/DL (ref 70–99)
GLUCOSE BLD-MCNC: 95 MG/DL (ref 70–99)
GLUCOSE BLD-MCNC: 97 MG/DL (ref 70–99)
HCT VFR BLD CALC: 30.1 % (ref 42–52)
HEMOGLOBIN: 9.4 G/DL (ref 14–18)
IMMATURE GRANULOCYTES #: 0 K/UL
INR BLD: 1.22 (ref 0.88–1.18)
LYMPHOCYTES ABSOLUTE: 1.5 K/UL (ref 1.1–4.5)
LYMPHOCYTES RELATIVE PERCENT: 32.5 % (ref 20–40)
MAGNESIUM: 1.5 MG/DL (ref 1.6–2.6)
MCH RBC QN AUTO: 28.1 PG (ref 27–31)
MCHC RBC AUTO-ENTMCNC: 31.2 G/DL (ref 33–37)
MCV RBC AUTO: 89.9 FL (ref 80–94)
MONOCYTES ABSOLUTE: 0.4 K/UL (ref 0–0.9)
MONOCYTES RELATIVE PERCENT: 8.5 % (ref 0–10)
NEUTROPHILS ABSOLUTE: 2.5 K/UL (ref 1.5–7.5)
NEUTROPHILS RELATIVE PERCENT: 56.2 % (ref 50–65)
PDW BLD-RTO: 14 % (ref 11.5–14.5)
PERFORMED ON: ABNORMAL
PERFORMED ON: NORMAL
PLATELET # BLD: 239 K/UL (ref 130–400)
PMV BLD AUTO: 10 FL (ref 9.4–12.4)
POTASSIUM SERPL-SCNC: 3.8 MMOL/L (ref 3.5–5)
PROTHROMBIN TIME: 14.8 SEC (ref 12–14.6)
RBC # BLD: 3.35 M/UL (ref 4.7–6.1)
SODIUM BLD-SCNC: 138 MMOL/L (ref 136–145)
TOTAL PROTEIN: 6 G/DL (ref 6.6–8.7)
WBC # BLD: 4.5 K/UL (ref 4.8–10.8)

## 2020-01-07 PROCEDURE — 2580000003 HC RX 258: Performed by: SURGERY

## 2020-01-07 PROCEDURE — 99024 POSTOP FOLLOW-UP VISIT: CPT | Performed by: PHYSICIAN ASSISTANT

## 2020-01-07 PROCEDURE — 83735 ASSAY OF MAGNESIUM: CPT

## 2020-01-07 PROCEDURE — 6370000000 HC RX 637 (ALT 250 FOR IP): Performed by: SURGERY

## 2020-01-07 PROCEDURE — 6360000002 HC RX W HCPCS: Performed by: INTERNAL MEDICINE

## 2020-01-07 PROCEDURE — 85610 PROTHROMBIN TIME: CPT

## 2020-01-07 PROCEDURE — 80053 COMPREHEN METABOLIC PANEL: CPT

## 2020-01-07 PROCEDURE — 82948 REAGENT STRIP/BLOOD GLUCOSE: CPT

## 2020-01-07 PROCEDURE — 85025 COMPLETE CBC W/AUTO DIFF WBC: CPT

## 2020-01-07 PROCEDURE — 99024 POSTOP FOLLOW-UP VISIT: CPT | Performed by: SURGERY

## 2020-01-07 RX ORDER — OXYCODONE HYDROCHLORIDE AND ACETAMINOPHEN 5; 325 MG/1; MG/1
1 TABLET ORAL EVERY 6 HOURS PRN
Qty: 15 TABLET | Refills: 0 | Status: SHIPPED | OUTPATIENT
Start: 2020-01-07 | End: 2020-04-01

## 2020-01-07 RX ADMIN — HYDROMORPHONE HYDROCHLORIDE 1 MG: 1 INJECTION, SOLUTION INTRAMUSCULAR; INTRAVENOUS; SUBCUTANEOUS at 00:43

## 2020-01-07 RX ADMIN — Medication 10 ML: at 07:34

## 2020-01-07 RX ADMIN — HYDROMORPHONE HYDROCHLORIDE 1 MG: 1 INJECTION, SOLUTION INTRAMUSCULAR; INTRAVENOUS; SUBCUTANEOUS at 14:01

## 2020-01-07 RX ADMIN — HYDROCODONE BITARTRATE AND ACETAMINOPHEN 1 TABLET: 10; 325 TABLET ORAL at 12:29

## 2020-01-07 RX ADMIN — HYDROCODONE BITARTRATE AND ACETAMINOPHEN 1 TABLET: 10; 325 TABLET ORAL at 07:32

## 2020-01-07 RX ADMIN — HYDROMORPHONE HYDROCHLORIDE 1 MG: 1 INJECTION, SOLUTION INTRAMUSCULAR; INTRAVENOUS; SUBCUTANEOUS at 09:10

## 2020-01-07 ASSESSMENT — PAIN SCALES - GENERAL
PAINLEVEL_OUTOF10: 2
PAINLEVEL_OUTOF10: 9
PAINLEVEL_OUTOF10: 6
PAINLEVEL_OUTOF10: 3
PAINLEVEL_OUTOF10: 10
PAINLEVEL_OUTOF10: 9
PAINLEVEL_OUTOF10: 6
PAINLEVEL_OUTOF10: 3
PAINLEVEL_OUTOF10: 4

## 2020-01-07 NOTE — PROGRESS NOTES
Jose Taveras M.D., FACS  Daily Progress Note    Pt Name: Monica Bush  Medical Record Number: [id number]  Date of Birth 1970   Today's Date: 1/7/2020    Chief Complaint:  Chief Complaint   Patient presents with    Abdominal Pain         SUBJECTIVE:     Kavin is doing well. Pain is well controlled. He is tolerating diet and wanting to go home. MEDS:     Scheduled Meds:   insulin lispro  0-6 Units Subcutaneous TID WC    insulin lispro  0-3 Units Subcutaneous Nightly    sodium chloride flush  10 mL Intravenous 2 times per day     Continuous Infusions:   dextrose       PRN Meds:HYDROcodone-acetaminophen, 1 tablet, Q4H PRN  naloxone, 0.4 mg, PRN  HYDROmorphone, 1 mg, Q3H PRN  LORazepam, 1 mg, Q4H PRN  sodium chloride flush, 10 mL, PRN  morphine, 2 mg, Q2H PRN    Or  morphine, 4 mg, Q2H PRN  ondansetron, 4 mg, Q6H PRN  glucose, 15 g, PRN  dextrose, 12.5 g, PRN  glucagon (rDNA), 1 mg, PRN  dextrose, 100 mL/hr, PRN  hydrALAZINE, 10 mg, Q4H PRN        OBJECTIVE:     Patient Vitals for the past 24 hrs:   BP Temp Temp src Pulse Resp SpO2   01/07/20 1100 (!) 147/95 98.3 °F (36.8 °C) Temporal 69 18 99 %   01/07/20 0704 (!) 152/92 97.7 °F (36.5 °C) Temporal 65 18 100 %   01/07/20 0233 (!) 141/90 97.7 °F (36.5 °C) Temporal 65 18 99 %   01/06/20 2242 137/86 97.4 °F (36.3 °C) Temporal 65 18 99 %   01/06/20 1816 (!) 136/92 97.4 °F (36.3 °C) Temporal 69 18 98 %   01/06/20 1422 (!) 139/90 98.3 °F (36.8 °C) Temporal 70 16 95 %         Intake/Output Summary (Last 24 hours) at 1/7/2020 1141  Last data filed at 1/7/2020 0800  Gross per 24 hour   Intake 842.55 ml   Output 300 ml   Net 542.55 ml       In: 842.6 [P.O.:520; I.V.:322.6]  Out: 300 [Urine:300]    I/O last 3 completed shifts: In: 842.6 [P.O.:520;  I.V.:322.6]  Out: -      Date 01/07/20 0000 - 01/07/20 2359   Shift 0000-0759 1975-2759 1600-2359 24 Hour Total   INTAKE   P.O.(mL/kg/hr)  120  120   Shift Total(mL/kg)  120(1.3)  120(1.3)   OUTPUT

## 2020-01-09 ENCOUNTER — TELEPHONE (OUTPATIENT)
Dept: INTERNAL MEDICINE | Age: 50
End: 2020-01-09

## 2020-01-09 NOTE — TELEPHONE ENCOUNTER
[facility] Transitions Initial Follow Up Call    Outreach made within 2 business days of discharge: Yes    Patient: Mynor Rooney Patient : 1970   MRN: [id number]  Reason for Admission: patient was admitted on January 3, 2020 due to Strangulated small bowel obstruction due to internal hernia.       PREOPERATIVE DIAGNOSIS:  Strangulated small bowel obstruction due to  internal hernia.     POSTOPERATIVE DIAGNOSIS: Strangulated small bowel obstruction due to  internal hernia.     OPERATION PERFORMED:  1. Exploratory laparotomy. 2.  Reduction of internal hernia/volvulus and closure of peritoneal  defects.     Discharge Date: 20       Spoke with: wife with patient answering in background. Discharge department/facility: [facility], [address] Interactive Patient Contact:  Was patient able to fill all prescriptions: Yes  Was patient instructed to bring all medications to the follow-up visit: Yes  Is patient taking all medications as directed in the discharge summary? Yes  Does patient understand their discharge instructions: Yes  Does patient have questions or concerns that need addressed prior to 7-14 day follow up office visit: no  Per patient's wife, patient still with a lot of pain. Surgery site looks good, no redness or drainage. Patient did mention that he had bumps along surgery site. Bowels are working but a little slow and bladder working OK. Appetite is poor. Not sleeping very well. Patient to bring all medications to HFU for review and will contact office with any concerns.     Scheduled appointment with PCP within 7-14 days    Follow Up  Future Appointments   Date Time Provider Keaton Torres   1/10/2020 10:05 AM Zelia Mortimer, APRN - NP [facility]   2020  2:00 PM Nohelia Venegas PA-C [facility] Gen Surg [facility]   2020  8:45 AM Darwin Farias MD [facility] Cardio [facility]       Polo Terrazas LPN

## 2020-01-10 ENCOUNTER — HOSPITAL ENCOUNTER (EMERGENCY)
Age: 50
Discharge: HOME | End: 2020-01-10
Attending: EMERGENCY MEDICINE
Payer: MEDICAID

## 2020-01-10 ENCOUNTER — OFFICE VISIT (OUTPATIENT)
Dept: PRIMARY CARE CLINIC | Age: 50
End: 2020-01-10
Payer: MEDICAID

## 2020-01-10 ENCOUNTER — APPOINTMENT (OUTPATIENT)
Dept: CT IMAGING | Age: 50
End: 2020-01-10
Payer: MEDICAID

## 2020-01-10 VITALS
BODY MASS INDEX: 28.44 KG/M2 | OXYGEN SATURATION: 96 % | SYSTOLIC BLOOD PRESSURE: 120 MMHG | HEART RATE: 68 BPM | WEIGHT: 210 LBS | HEIGHT: 72 IN | DIASTOLIC BLOOD PRESSURE: 82 MMHG | RESPIRATION RATE: 20 BRPM

## 2020-01-10 VITALS
WEIGHT: 205 LBS | DIASTOLIC BLOOD PRESSURE: 84 MMHG | RESPIRATION RATE: 18 BRPM | TEMPERATURE: 98.4 F | HEIGHT: 72 IN | BODY MASS INDEX: 27.77 KG/M2 | SYSTOLIC BLOOD PRESSURE: 146 MMHG | HEART RATE: 82 BPM | OXYGEN SATURATION: 98 %

## 2020-01-10 LAB
ALBUMIN SERPL-MCNC: 3.8 G/DL (ref 3.5–5.2)
ALP BLD-CCNC: 61 U/L (ref 40–130)
ALT SERPL-CCNC: 13 U/L (ref 5–41)
ANION GAP SERPL CALCULATED.3IONS-SCNC: 12 MMOL/L (ref 7–19)
AST SERPL-CCNC: 22 U/L (ref 5–40)
BASOPHILS ABSOLUTE: 0 K/UL (ref 0–0.2)
BASOPHILS RELATIVE PERCENT: 0.2 % (ref 0–1)
BILIRUB SERPL-MCNC: <0.2 MG/DL (ref 0.2–1.2)
BILIRUBIN URINE: NEGATIVE
BLOOD, URINE: NEGATIVE
BUN BLDV-MCNC: 12 MG/DL (ref 6–20)
CALCIUM SERPL-MCNC: 8.7 MG/DL (ref 8.6–10)
CHLORIDE BLD-SCNC: 103 MMOL/L (ref 98–111)
CLARITY: CLEAR
CO2: 24 MMOL/L (ref 22–29)
COLOR: NORMAL
CREAT SERPL-MCNC: 0.8 MG/DL (ref 0.5–1.2)
EOSINOPHILS ABSOLUTE: 0.1 K/UL (ref 0–0.6)
EOSINOPHILS RELATIVE PERCENT: 1.7 % (ref 0–5)
GFR NON-AFRICAN AMERICAN: >60
GLUCOSE BLD-MCNC: 96 MG/DL (ref 74–109)
GLUCOSE URINE: NEGATIVE MG/DL
HCT VFR BLD CALC: 34 % (ref 42–52)
HEMOGLOBIN: 10.3 G/DL (ref 14–18)
IMMATURE GRANULOCYTES #: 0.1 K/UL
INR BLD: 1.12 (ref 0.88–1.18)
KETONES, URINE: NEGATIVE MG/DL
LEUKOCYTE ESTERASE, URINE: NEGATIVE
LIPASE: 31 U/L (ref 13–60)
LYMPHOCYTES ABSOLUTE: 1.3 K/UL (ref 1.1–4.5)
LYMPHOCYTES RELATIVE PERCENT: 20.8 % (ref 20–40)
MCH RBC QN AUTO: 27.9 PG (ref 27–31)
MCHC RBC AUTO-ENTMCNC: 30.3 G/DL (ref 33–37)
MCV RBC AUTO: 92.1 FL (ref 80–94)
MONOCYTES ABSOLUTE: 0.4 K/UL (ref 0–0.9)
MONOCYTES RELATIVE PERCENT: 5.6 % (ref 0–10)
NEUTROPHILS ABSOLUTE: 4.6 K/UL (ref 1.5–7.5)
NEUTROPHILS RELATIVE PERCENT: 70.9 % (ref 50–65)
NITRITE, URINE: NEGATIVE
PDW BLD-RTO: 14.6 % (ref 11.5–14.5)
PH UA: 5.5 (ref 5–8)
PLATELET # BLD: 400 K/UL (ref 130–400)
PMV BLD AUTO: 10 FL (ref 9.4–12.4)
POTASSIUM REFLEX MAGNESIUM: 4.3 MMOL/L (ref 3.5–5)
PROTEIN UA: NEGATIVE MG/DL
PROTHROMBIN TIME: 13.8 SEC (ref 12–14.6)
RBC # BLD: 3.69 M/UL (ref 4.7–6.1)
SODIUM BLD-SCNC: 139 MMOL/L (ref 136–145)
SPECIFIC GRAVITY UA: 1.04 (ref 1–1.03)
TOTAL PROTEIN: 7.3 G/DL (ref 6.6–8.7)
URINE REFLEX TO CULTURE: NORMAL
UROBILINOGEN, URINE: 0.2 E.U./DL
WBC # BLD: 6.4 K/UL (ref 4.8–10.8)

## 2020-01-10 PROCEDURE — 83690 ASSAY OF LIPASE: CPT

## 2020-01-10 PROCEDURE — 96374 THER/PROPH/DIAG INJ IV PUSH: CPT

## 2020-01-10 PROCEDURE — 96375 TX/PRO/DX INJ NEW DRUG ADDON: CPT

## 2020-01-10 PROCEDURE — 99215 OFFICE O/P EST HI 40 MIN: CPT | Performed by: NURSE PRACTITIONER

## 2020-01-10 PROCEDURE — 6360000004 HC RX CONTRAST MEDICATION: Performed by: EMERGENCY MEDICINE

## 2020-01-10 PROCEDURE — 1111F DSCHRG MED/CURRENT MED MERGE: CPT | Performed by: NURSE PRACTITIONER

## 2020-01-10 PROCEDURE — 85610 PROTHROMBIN TIME: CPT

## 2020-01-10 PROCEDURE — 74177 CT ABD & PELVIS W/CONTRAST: CPT

## 2020-01-10 PROCEDURE — 2580000003 HC RX 258: Performed by: EMERGENCY MEDICINE

## 2020-01-10 PROCEDURE — 80053 COMPREHEN METABOLIC PANEL: CPT

## 2020-01-10 PROCEDURE — 36415 COLL VENOUS BLD VENIPUNCTURE: CPT

## 2020-01-10 PROCEDURE — 85025 COMPLETE CBC W/AUTO DIFF WBC: CPT

## 2020-01-10 PROCEDURE — 6360000002 HC RX W HCPCS: Performed by: EMERGENCY MEDICINE

## 2020-01-10 PROCEDURE — 96376 TX/PRO/DX INJ SAME DRUG ADON: CPT

## 2020-01-10 PROCEDURE — 99284 EMERGENCY DEPT VISIT MOD MDM: CPT

## 2020-01-10 PROCEDURE — 81003 URINALYSIS AUTO W/O SCOPE: CPT

## 2020-01-10 RX ORDER — HYDROCODONE BITARTRATE AND ACETAMINOPHEN 10; 325 MG/1; MG/1
1 TABLET ORAL EVERY 6 HOURS PRN
Qty: 12 TABLET | Refills: 0 | Status: SHIPPED | OUTPATIENT
Start: 2020-01-10 | End: 2020-01-13

## 2020-01-10 RX ORDER — 0.9 % SODIUM CHLORIDE 0.9 %
1000 INTRAVENOUS SOLUTION INTRAVENOUS ONCE
Status: COMPLETED | OUTPATIENT
Start: 2020-01-10 | End: 2020-01-10

## 2020-01-10 RX ORDER — ONDANSETRON 2 MG/ML
4 INJECTION INTRAMUSCULAR; INTRAVENOUS ONCE
Status: COMPLETED | OUTPATIENT
Start: 2020-01-10 | End: 2020-01-10

## 2020-01-10 RX ADMIN — SODIUM CHLORIDE 1000 ML: 9 INJECTION, SOLUTION INTRAVENOUS at 12:15

## 2020-01-10 RX ADMIN — IOPAMIDOL 90 ML: 755 INJECTION, SOLUTION INTRAVENOUS at 13:13

## 2020-01-10 RX ADMIN — HYDROMORPHONE HYDROCHLORIDE 1 MG: 1 INJECTION, SOLUTION INTRAMUSCULAR; INTRAVENOUS; SUBCUTANEOUS at 12:06

## 2020-01-10 RX ADMIN — ONDANSETRON 4 MG: 2 INJECTION INTRAMUSCULAR; INTRAVENOUS at 12:05

## 2020-01-10 RX ADMIN — HYDROMORPHONE HYDROCHLORIDE 0.5 MG: 1 INJECTION, SOLUTION INTRAMUSCULAR; INTRAVENOUS; SUBCUTANEOUS at 14:30

## 2020-01-10 ASSESSMENT — ENCOUNTER SYMPTOMS
CONSTIPATION: 1
RESPIRATORY NEGATIVE: 1
DIARRHEA: 0
ALLERGIC/IMMUNOLOGIC NEGATIVE: 1
ABDOMINAL PAIN: 1
CONSTIPATION: 1
ABDOMINAL PAIN: 1
NAUSEA: 1
NAUSEA: 1
DIARRHEA: 0
VOMITING: 0
HEMATOCHEZIA: 0
EYES NEGATIVE: 1
ABDOMINAL DISTENTION: 1
VOMITING: 0

## 2020-01-10 ASSESSMENT — PAIN SCALES - GENERAL
PAINLEVEL_OUTOF10: 9
PAINLEVEL_OUTOF10: 2

## 2020-01-10 ASSESSMENT — PAIN DESCRIPTION - LOCATION: LOCATION: ABDOMEN

## 2020-01-10 ASSESSMENT — PAIN DESCRIPTION - ORIENTATION: ORIENTATION: MID;LOWER;RIGHT;LEFT

## 2020-01-10 ASSESSMENT — PAIN DESCRIPTION - PAIN TYPE: TYPE: ACUTE PAIN

## 2020-01-10 NOTE — ED NOTES
[facility]  01/10/20 [phone]

## 2020-01-10 NOTE — PROGRESS NOTES
(VICTOZA) 18 MG/3ML SOPN SC injection Inject 1.8 mg into the skin daily 9 mL 3    allopurinol (ZYLOPRIM) 300 MG tablet TAKE ONE TABLET BY MOUTH ONCE DAILY 30 tablet 5    pregabalin (LYRICA) 75 MG capsule Take 75 mg by mouth 2 times daily.  sildenafil (VIAGRA) 50 MG tablet Take 1 tablet by mouth as needed for Erectile Dysfunction 30 tablet 3    metFORMIN (GLUCOPHAGE) 1000 MG tablet TAKE ONE TABLET BY MOUTH TWO TIMES DAILY (WITH MEALS) 60 tablet 5    warfarin (COUMADIN) 5 MG tablet TAKE 1 TABLET BY MOUTH EVERY EVENING 30 tablet 5    escitalopram (LEXAPRO) 20 MG tablet Take 1 tablet by mouth daily 30 tablet 2    omeprazole (PRILOSEC) 40 MG delayed release capsule omeprazole 40 mg capsule,delayed release   Take 1 capsule twice a day by oral route for 30 days.  levothyroxine (SYNTHROID) 200 MCG tablet TAKE ONE TABLET BY MOUTH ONCE DAILY 30 tablet 11    hydrALAZINE (APRESOLINE) 50 MG tablet Take 1 tablet by mouth 2 times daily 90 tablet 11    HYDROcodone-acetaminophen (NORCO)  MG per tablet Take 1 tablet by mouth every 8 hours as needed for Pain          Medications patient taking as of now reconciled against medications ordered at time of hospital discharge: Yes    Chief Complaint   Patient presents with    Follow-Up from Hospital     emergancy surgery due to intestine issue       HPI    Inpatient course: Discharge summary reviewed- see chart. Interval history/Current status: reports decreased BMs; patient reports he had a small BM last night, but not like usual; patient reports an extreme amount of pain; prior to the procedure, patient was seeing pain management who prescribed his norco 10/ 325 every 8 hours; patient was discharged with percocet 5/325 mg every 6 hours; patient reports the percocet nor the norco is helping his pain;  Patient reports his pain 8/10 when he left the hospital, it has increased to 10/10; patient reports he has noticed hard knots around the top of his incision that pulses. Heart sounds: Normal heart sounds. No murmur. Pulmonary:      Effort: Pulmonary effort is normal. No respiratory distress. Breath sounds: Normal breath sounds. No wheezing. Abdominal:      General: Bowel sounds are increased. There is distension. Tenderness: There is generalized tenderness (around abdominal wound ). There is guarding. Skin:     General: Skin is warm and dry. Findings: Erythema (slight; around the incision) present. Neurological:      Mental Status: He is alert and oriented to person, place, and time. Psychiatric:         Attention and Perception: Attention and perception normal.         Mood and Affect: Mood is anxious. Affect is tearful. Speech: Speech normal.         Behavior: Behavior normal. Behavior is cooperative. Thought Content: Thought content normal.         Judgment: Judgment normal.             Assessment/Plan:  1. Hospital discharge follow-up  - AL DISCHARGE MEDS RECONCILED W/ CURRENT OUTPATIENT MED LIST    2.  S/P abdominal surgery, follow-up exam  - patient sent to ER for further evaluation; patient in severe pain at this time;      Contacted General Surgery, chart was looked at by [facility] and it was recommended patient be sent to the ER for evaluation    Medical Decision Making: high complexity

## 2020-01-10 NOTE — ED PROVIDER NOTES
[facility]  eMERGENCY dEPARTMENT eNCOUnter      Pt Name: Avinash Abarca  MRN: [id number]  [place] 1970  Date of evaluation: 1/10/2020  Provider: Aldo Stewart MD    CHIEF COMPLAINT       Chief Complaint   Patient presents with    Post-op Problem     strangulated bowel surgery on saturday by Dr Jessica Manzano   (Location/Symptom, Timing/Onset,Context/Setting, Quality, Duration, Modifying Factors, Severity)  Note limiting factors. Avinash Abarca is a 51 y.o. male who presents to the emergency department      The history is provided by the patient. Abdominal Pain   Pain location: midline, surrounding incision. Pain quality: sharp    Pain quality comment:  \"pulling\"  Pain radiates to:  Does not radiate  Pain severity:  Severe  Onset quality:  Gradual  Duration:  3 days  Timing:  Constant  Progression:  Worsening  Chronicity:  New  Context comment:  Oneweek ago had lap for strangulated small bowel  Relieved by:  Nothing  Worsened by:  Palpation  Ineffective treatments: home pain meds. Associated symptoms: constipation and nausea    Associated symptoms: no anorexia, no chest pain, no diarrhea, no dysuria, no fever, no hematochezia and no vomiting    Risk factors: recent hospitalization        NursingNotes were reviewed. REVIEW OF SYSTEMS    (2-9 systems for level 4, 10 or more for level 5)     Review of Systems   Constitutional: Negative for fever. Cardiovascular: Negative for chest pain. Gastrointestinal: Positive for abdominal pain, constipation and nausea. Negative for anorexia, diarrhea, hematochezia and vomiting. Genitourinary: Negative for dysuria. All other systems reviewed and are negative. Except as noted above the remainder of the review of systems was reviewed and negative.        PAST MEDICAL HISTORY     Past Medical History:   Diagnosis Date    Anxiety     Atrial fibrillation (HCC)     Chronic pain     [facility]     Depression     Diabetes mellitus ([facility]     GERD (gastroesophageal reflux disease)     Gout     Hernia     Hyperlipidemia     Hypertension     Hypothyroid     Myocardial infarct, old     [place] - Cardiac Cath - no blockage    Neuropathy     Osteoarthritis     Psychiatric illness          SURGICALHISTORY       Past Surgical History:   Procedure Laterality Date    ABDOMINOPLASTY      ABDOMINOPLASTY      Nov 2019    ANKLE SURGERY  1990    fracture repair    CARDIAC CATHETERIZATION      [place] - No blockage    ESOPHAGUS SURGERY      GASTRIC BAND      HERNIA REPAIR      LAPAROTOMY EXPLORATORY N/A 1/3/2020    LAPAROTOMY EXPLORATORY performed by Ata Lane MD at [address]  02/2018    gastric sleeve         CURRENT MEDICATIONS       Discharge Medication List as of 1/10/2020  2:52 PM      CONTINUE these medications which have NOT CHANGED    Details   oxyCODONE-acetaminophen (PERCOCET) 5-325 MG per tablet Take 1 tablet by mouth every 6 hours as needed for Pain., Disp-15 tablet, R-0Print      diazepam (VALIUM) 10 MG tablet Take 10 mg by mouth daily as needed for Anxiety. Historical Med      B-D ULTRAFINE III SHORT PEN 31G X 8 MM MISC Disp-100 each, R-1, Normal      allopurinol (ZYLOPRIM) 300 MG tablet TAKE ONE TABLET BY MOUTH ONCE DAILY, Disp-30 tablet, R-5Normal      pregabalin (LYRICA) 75 MG capsule Take 75 mg by mouth 2 times daily. Historical Med      metFORMIN (GLUCOPHAGE) 1000 MG tablet TAKE ONE TABLET BY MOUTH TWO TIMES DAILY (WITH MEALS), Disp-60 tablet, R-5Normal      warfarin (COUMADIN) 5 MG tablet TAKE 1 TABLET BY MOUTH EVERY EVENING, Disp-30 tablet, R-5Normal      escitalopram (LEXAPRO) 20 MG tablet Take 1 tablet by mouth daily, Disp-30 tablet, R-2Normal      omeprazole (PRILOSEC) 40 MG delayed release capsule omeprazole 40 mg capsule,delayed release   Take 1 capsule twice a day by oral route for 30 days. Historical Med      levothyroxine (SYNTHROID) 200 MCG tablet TAKE ONE TABLET BY MOUTH ONCE DAILY, Disp-30 tablet, R-11Normal      hydrALAZINE (APRESOLINE) 50 MG tablet Take 1 tablet by mouth 2 times daily, Disp-90 tablet, R-11NO PRINT      zolpidem (AMBIEN) 10 MG tablet Take by mouth nightly as needed for Sleep. Historical Med      Liraglutide (VICTOZA) 18 MG/3ML SOPN SC injection Inject 1.8 mg into the skin daily, Disp-9 mL, R-3Normal      sildenafil (VIAGRA) 50 MG tablet Take 1 tablet by mouth as needed for Erectile Dysfunction, Disp-30 tablet, R-3Normal      HYDROcodone-acetaminophen (NORCO)  MG per tablet Take 1 tablet by mouth every 8 hours as needed for Pain             ALLERGIES     Food    FAMILY HISTORY       Family History   Problem Relation Age of Onset    Diabetes Other     Heart Disease Other           SOCIAL HISTORY       Social History     Socioeconomic History    Marital status:      Spouse name: None    Number of children: 2    Years of education: None    Highest education level: None   Occupational History    None   Social Needs    Financial resource strain: None    Food insecurity:     Worry: None     Inability: None    Transportation needs:     Medical: None     Non-medical: None   Tobacco Use    Smoking status: Never Smoker    Smokeless tobacco: Never Used   Substance and Sexual Activity    Alcohol use: No     Alcohol/week: 0.0 standard drinks    Drug use: No     Comment: History of Cocaine and Marijuana usage 2010    Sexual activity: None   Lifestyle    Physical activity:     Days per week: None     Minutes per session: None    Stress: None   Relationships    Social connections:     Talks on phone: None     Gets together: None     Attends Hinduism service: None     Active member of club or organization: None     Attends meetings of clubs or organizations: None     Relationship status: None    Intimate partner violence:     Fear of current or ex partner: None     Emotionally abused: None     Physically is recommended. Signed by Dr Durga Muller on 1/10/2020 2:04 PM            ED BEDSIDE ULTRASOUND:   Performed by ED Physician - none    LABS:  Labs Reviewed   CBC WITH AUTO DIFFERENTIAL - Abnormal; Notable for the following components:       Result Value    RBC 3.69 (*)     Hemoglobin 10.3 (*)     Hematocrit 34.0 (*)     MCHC 30.3 (*)     RDW 14.6 (*)     Neutrophils % 70.9 (*)     All other components within normal limits   COMPREHENSIVE METABOLIC PANEL W/ REFLEX TO MG FOR LOW K   LIPASE   URINE RT REFLEX TO CULTURE   PROTIME-INR       All other labs were within normal range or not returned as of this dictation. EMERGENCY DEPARTMENT COURSE and DIFFERENTIAL DIAGNOSIS/MDM:   Vitals:    Vitals:    01/10/20 1145 01/10/20 1146 01/10/20 1300 01/10/20 1400   BP:  (!) 155/87 (!) 143/88 (!) 146/84   Pulse:  100 88 82   Resp:  18 18 18   Temp:  98.4 °F (36.9 °C)     TempSrc:  Oral     SpO2:  98%     Weight: 205 lb (93 kg)      Height: 6' (1.829 m)              MDM  Number of Diagnoses or Management Options  Diagnosis management comments: Discussed with Dr. Brooklynn Chino - reviews CT. Speaks with pt. Some constipation noted on CT. CRITICAL CARE TIME   Total Critical Care time was 0 minutes, excluding separately reportable procedures. There was a high probability of clinically significant/lifethreatening deterioration in the patient's condition which required my urgent intervention. CONSULTS:  None    PROCEDURES:  Unless otherwise noted below, none     Procedures    FINAL IMPRESSION      1. Postoperative pain    2. Constipation, unspecified constipation type          DISPOSITION/PLAN   DISPOSITION        PATIENT REFERRED TO:  Dr. Brooklynn Chino as directed. DISCHARGE MEDICATIONS:  Discharge Medication List as of 1/10/2020  2:52 PM      START taking these medications    Details   magnesium citrate solution Take 592 mls by mouth for 1 doses. , Disp-592 mL, R-0Print                (Please note that portions of this note were completed with a voice recognition program.  Efforts were made to edit the dictations but occasionally words are mis-transcribed.)    Rosio Zhu MD (electronically signed)  Attending Emergency Physician          Rosio Zhu MD  01/10/20 2910

## 2020-01-13 ENCOUNTER — OFFICE VISIT (OUTPATIENT)
Dept: SURGERY | Age: 50
End: 2020-01-13

## 2020-01-13 VITALS
BODY MASS INDEX: 28.25 KG/M2 | HEIGHT: 72 IN | TEMPERATURE: 98.6 F | DIASTOLIC BLOOD PRESSURE: 80 MMHG | SYSTOLIC BLOOD PRESSURE: 132 MMHG | WEIGHT: 208.6 LBS

## 2020-01-13 PROCEDURE — 99024 POSTOP FOLLOW-UP VISIT: CPT | Performed by: PHYSICIAN ASSISTANT

## 2020-01-13 RX ORDER — SULFAMETHOXAZOLE AND TRIMETHOPRIM 800; 160 MG/1; MG/1
1 TABLET ORAL 2 TIMES DAILY
Qty: 14 TABLET | Refills: 0 | Status: SHIPPED | OUTPATIENT
Start: 2020-01-13 | End: 2020-01-20

## 2020-01-13 NOTE — DISCHARGE SUMMARY
Physician Discharge Summary         Patient ID:  Susana Perez  [id number]  51 y.o. Admit date: 1/3/2020    Discharge date and time: 1/7/2020  6:32 PM     Admitting Physician: Varun Mathews MD     Admission Diagnoses: Volvulus of small intestine ([address] [K56.2]  Volvulus of small intestine ([address] [K56.2]    Discharge Diagnoses:   Strangulated small bowel obstruction due to  internal hernia. Patient Active Problem List   Diagnosis    Chest pain, atypical    Gastroesophageal reflux disease    Morbid obesity with BMI of 45.0-49.9, adult ([address]    Type 2 diabetes mellitus without complication ([address]    Acquired hypothyroidism    Hypertensive disorder    Lactic acidosis    Chest pain    Hiatal hernia    Anxiety    Chronic obstructive pulmonary disease (HCC)    Paroxysmal A-fib (HCC)    Obstructive sleep apnea    Abnormal EKG    Dyslipidemia    Bipolar disorder with severe depression ([address]    Diabetes mellitus ([address]    Low back pain    Morbid obesity ([address]    Small bowel volvulus ([address]    Volvulus of small intestine ([address]    Strangulation of small intestine ([address]       Procedure:  1. Exploratory laparotomy. 2.  Reduction of internal hernia/volvulus and closure of peritoneal defects. Discharged Condition: good    Hospital Course:   Patient presented with acute abdominal pain was seen by Dr. Kajal Vallejo for evaluation from general surgical services. Due to diagnostic studies and exam was felt the patient needed emergent laparotomy. The surgery revealed an internal hernia which was reduced. He was monitored in the ICU and the hospitalist managed his medical issues. He showed progressive improvement and was transferred from the unit to the floor. After showing further progressive improvement, he was discharged to his home    Consults:   Hospitalist       Disposition: home    Patient Instructions:    Activity: Laparotomy instruction sheet, no lifting  Diet: Hospital   Wound Care: Laparotomy instruction sheet    Follow-up:  Julio Nieto [address] [id number]  Schedule an appointment as soon as possible for a visit in 1 week  call and schedule a 1 week appointment, Hospital Follow up with Verneta Felty APRN Debborah Manna, PA-C  [address]  [phone]    Go on 1/13/2020  For suture removal 2:00 pm       Signed:  Electronically signed by Irma Flores PA-C on 1/13/20 at 4:55 AM

## 2020-01-17 NOTE — ADT AUTH CERT
LOC:Acute Adult-General Surgical (1/5/2020) by Gloria Hall RN         Review Status Review Entered   In Primary 1/6/2020 10:18       Criteria Review   REVIEW SUMMARY     Patient: Tere Hanley  Review Number: [id number]  Review Status: In Primary     Condition Specific: Yes        OUTCOMES  Outcome Type: Primary           REVIEW DETAILS     Service Date: 01/05/2020  Product: [facility] Adult  Subset: General Surgical      (Symptom or finding within 24h)         (Excludes PO medications unless noted)          [X] Select Day, One:              [X] Post-op Day 2, One:                  [X] ACUTE, One:                      [X] Partial responder, not clinically stable for discharge and requires continued stay, One:                          [X] Routine post-op stay, Both:                              [X] Surgical stay, One:                                  [X] Moderate stay within the last 5d                              [X] Expected post-op course, One:                                  [X] General surgery, All:                                      [X] Deep vein thrombosis (DVT) prophylaxis or patient ambulatory                                      [X] Hydration or nutrition, One:                                          [X] Advancing diet as tolerated or nutritional route established                                      [X] Mobility advancing as tolerated                                      [X] Pain assessment, One:                                          [X] Pain controlled     Version: InterQual® 2019.1  [employer] Gone and InterQual®  © 2019 [employer] and/or one of its [place]. All Rights Reserved. CPT only © 2018 American Medical Association. All Rights Reserved. Additional Notes   CARE DAY 3    1/5/2020      General Surgery - Rah Gar M.D.  [facility]   Daily Progress Note       Pt Name: Flo Enrique   Medical Record Number: [id number]   Date of Birth 1970    Today's Date: 1/5/2020     Chief Complaint:   Chief Complaint   Patient presents with   · Abdominal Pain               SUBJECTIVE:       Kavin is doing well. Pain is well controlled.  He is feeling much better and passing gas.  We will let him go to the floor and start clear liquids.  Change to oral pain medications       In: 1749.2 [P.O.:120; I.V.:1629.2]   Out: 2902 [Urine:1340; Drains:28]       I/O last 3 completed shifts: In: 2075 [P.O.:120; I.V.:1755; IV Piggyback:200]   Out: 1923 [Urine:1895; Drains:28]       WBC 8.9   HGB 9.4   HCT 31.0      PHYSICAL EXAM:       CONSTITUTIONAL: Alert, appropriate, no acute distress   SKIN: warm, dry with no rashes or lesions   EYES: Non icteric   CHEST/LUNGS: CTA bilaterally   CARDIOVASCULAR: RRR     ABDOMEN: soft, ND, appropriately TTP, +BS   Incisions: Clean, dry, and intact with no erythema or induration   NEUROLOGIC: CN II-XI grossly intact, no motor or sensory deficits    EXTREMITIES: warm, well perfused, no edema    PLAN:       1. Clear liquid diet   2. DC PERRY drain   3. Okay to transfer to floor   4. Change to oral pain medicines   5.         Sonia Gomez M.D.  FACS   Electronically signed 1/5/2020 at 1:05 PM       Johanna Motta MD Physician Progress Notes New Internal Medicine 01/05/20 1058    Subjective:   Doing well in ICU overnight, pain well controlled       Physical Examination:   /84   Pulse 69   Temp 98.3 °F (36.8 °C) (Temporal)   Resp 10   Ht 6' (1.829 m)   Wt 207 lb 14.4 oz (94.3 kg)   SpO2 98%   BMI 28.20 kg/m²    Constitutional - Appropriately groomed, good nutritional status   Psychiatric - AOX3, baseline mood   Eyes - EOMI, conjunctivae and lids intact   ENMT - lips, teeth, gums intact; hearing intact   Respiratory - CTAB, no accessory muscle use   Cardiovascular - Clear S1, S2 no gross m/r/g; pedal pulses intact   Abd - binder on for surgical wounds   MSK - UE and LE joints intact, no gross clubbing   Skin - No rashes or wounds; no gross capillary refill defects   Neurologic - CN 2-12 grossly intact, sensation intact   A/P:   []Volvulus, internal hernia   -S/p Ex lap by general surgery   Tolerated procedure well   Continue post-op management per general surgery   -Continue supportive care - pain control       []DM2   Continue SSI       []HTN   -Titrate medications as needed       []Afib   -Hold coumadin till ok with general surgery       []Hospital Issues   DVT prop - SCDs   Code - FULL   Disposition - Karis Zendejas to transfer to floor           Justin Rm M.D.,   1/5/2020       [facility] Adult-General Surgical (1/4/2020) by Seth Moore RN         Review Status Review Entered   In Primary 1/6/2020 10:14       Criteria Review   REVIEW SUMMARY     Patient: Alejandro Mota  Review Number: [id number]  Review Status:  In Primary     Condition Specific: Yes        OUTCOMES  Outcome Type: Primary           REVIEW DETAILS     Service Date: 01/04/2020  Product: Star Miles Adult  Subset: General Surgical      (Symptom or finding within 24h)         (Excludes PO medications unless noted)          [X] Select Day, One:              [X] Post-op Day 1, One:                  [X] ACUTE, One:                      [X] Moderate or long stay surgery and expected post-op course and, All:                      ~--Admin, IQ Admin Admin on 01- 11:06 AM--~                      LAPAROTOMY                                                                                            [X] Deep vein thrombosis (DVT) prophylaxis or patient ambulatory                          [X] Hydration or nutrition, One:                              [X] NPO and IV fluid and, One:                                  [X] >= 100 mL/h and weight >= 60 kg                          [X] Mobility advancing as tolerated                          [X] Pain assessment, One:                              [X] Pain controlled     Version: InterQual® 2019.1  Noris Mcdaniels and InterQual®  © 2019 [employer] and/or one of its subsidiaries. All Rights Reserved. CPT only © 2018 American Medical Association. All Rights Reserved. Additional Notes   CARE DAY 2       1/4/2020      Isha Lew M.D. FACS   Daily Progress Note       Pt Name: Nichole Cueto   Medical Record Number: [id number]   Date of Birth 1970    Today's Date: 1/4/2020       Chief Complaint:   Chief Complaint   Patient presents with   · Abdominal Pain   Diet: Diet NPO Effective Now       PHYSICAL EXAM:       CONSTITUTIONAL: Alert, appropriate, no acute distress   SKIN: warm, dry with no rashes or lesions   EYES: Non icteric   CHEST/LUNGS: CTA bilaterally   CARDIOVASCULAR: RRR     ABDOMEN: soft, ND, appropriately TTP, +BS   Incisions: Clean, dry, and intact with no erythema or induration   NEUROLOGIC: CN II-XI grossly intact, no motor or sensory deficits    EXTREMITIES: warm, well perfused, no edema        PLAN:       1. Continue to increase activity   2. likely transfer to floor tomorrow   3.         Diomedes Tavares M.D. FACS   Electronically signed 1/4/2020 at 6:26 PM      CONSULTED: [id number]       PCP:   DEMETRIUS Chowdary        Admitting Doctor / Primary Team: Dr. Ge Dalton of General Surgery   Internal Medicine Hopsitalist Team Consulted for: Management of DM and HTN and [facility]               SUBJECTIVE:       Chief Complaint   Patient presents with   · Abdominal Pain       HPI and ROS:   Mr. Nichole Cueto is a pleasant 53year old AA gentleman from home. He states that his wife brought him to the hospital. He states that it was for stomach pain. He states that he just hurts bad. He states that this has happened before, and he states that he was sent to [facility]. He states that he went in and did something inside of him. He states that he had wanted to go to [place]. His wife then stated that he was sick for days, about a week.     He also endorses: nausea, chest pressure (resolved), cramping, cough, sneezes   He also denies: head aches, confusion, for now)            Alicia Mccauley MD Physician Progress Notes New Internal Medicine 01/04/20 1039    Subjective:   S/p Ex lap overnight, tolerated procedure well       Physical Examination:   /86   Pulse 75   Temp 99.1 °F (37.3 °C) (Temporal)   Resp 11   Ht 6' (1.829 m)   Wt 206 lb 1.6 oz (93.5 kg)   SpO2 98%   BMI 27.95 kg/m²    Constitutional - Appropriately groomed, good nutritional status   Psychiatric - AOX3, baseline mood   Eyes - EOMI, conjunctivae and lids intact   ENMT - lips, teeth, gums intact; hearing intact   Respiratory - CTAB, no accessory muscle use   Cardiovascular - Clear S1, S2 no gross m/r/g; pedal pulses intact   Abd - binder on for surgical wounds   MSK - UE and LE joints intact, no gross clubbing   Skin - No rashes or wounds; no gross capillary refill defects   Neurologic - CN 2-12 grossly intact, sensation intact   /P:   []Volvulus, internal hernia   -S/p Ex lap by general surgery   Tolerated procedure well   Continue post-op management per general surgery   -Continue supportive care - pain control       []DM2   Continue SSI       []HTN   -Titrate medications as needed       []Afib   -Hold coumadin till ok with general surgery       []Hospital Issues   DVT prop - SCDs   Code - FULL   Disposition - Continue ICU            Alicia Mccauley M.D.,   1/4/2020              SUBJECTIVE:       Kavin is doing well. Pain is well controlled. [facility] feels much better and looks much better. [facility] is having some flatus and would like some ice chips       In: 2109.9 [I.V.:1909.9]   Out: 1495 [OWRZV:1998; Drains:40]       I/O last 3 completed shifts: In: 4909.9 [I.V.:4209.9;  Blood:500; IV Piggyback:200]   Out: 2089 [Urine:1655; Drains:40; Blood:75]       Diet: Diet NPO Effective Now                  1/3       1/4 515       1/4 1235   WBC 12.9* 14.6* 14.6*   RBC 4.21* 3.59* 3.51*   HGB 11.6* 10.2* 9.8*   HCT 39.5* 32.7* 31.8*   MCV 93.8 91.1 90.6   MCH 27.6 28.4 27.9   MCHC 29.4* 31.2* 30.8*   RDW 14.2 14.1 14.3   * 304 328   MPV 9.7 9.4 9.9         PHYSICAL EXAM:       CONSTITUTIONAL: Alert, appropriate, no acute distress   SKIN: warm, dry with no rashes or lesions   EYES: Non icteric   CHEST/LUNGS: CTA bilaterally   CARDIOVASCULAR: RRR     ABDOMEN: soft, ND, appropriately TTP, +BS   Incisions: Clean, dry, and intact with no erythema or induration   NEUROLOGIC: CN II-XI grossly intact, no motor or sensory deficits    EXTREMITIES: warm, well perfused, no edema    PLAN:       1. Continue to increase activity   2. likely transfer to floor tomorrow   3.         Celia Reid M.D.  FACS   Electronically signed 1/4/2020 at 6:26 PM

## 2020-01-29 ENCOUNTER — OFFICE VISIT (OUTPATIENT)
Dept: SURGERY | Age: 50
End: 2020-01-29

## 2020-01-29 VITALS
BODY MASS INDEX: 30.45 KG/M2 | WEIGHT: 194 LBS | DIASTOLIC BLOOD PRESSURE: 76 MMHG | HEIGHT: 67 IN | SYSTOLIC BLOOD PRESSURE: 110 MMHG

## 2020-01-29 PROCEDURE — 99024 POSTOP FOLLOW-UP VISIT: CPT | Performed by: PHYSICIAN ASSISTANT

## 2020-01-29 NOTE — PROGRESS NOTES
Subjective:  Patient presents today in follow-up from exploratory laparotomy. No new complaints. Objective:  Beto Mann is a 51 y.o. male with the following history as recorded in [facility]:  Patient Active Problem List    Diagnosis Date Noted    Occlusion of superior mesenteric artery ([facility]     Small bowel volvulus ([address] 01/03/2020    Volvulus of small intestine ([address] 01/03/2020    Strangulation of small intestine (HCC)     Diabetes mellitus ([address] 02/13/2019    Low back pain 02/13/2019    Morbid obesity ([address] 02/13/2019    Bipolar disorder with severe depression ([facility]     Dyslipidemia 06/04/2018    Paroxysmal A-fib ([facility] 05/02/2018    Obstructive sleep apnea 05/02/2018    Abnormal EKG 05/02/2018    Chronic obstructive pulmonary disease ([address] 01/09/2018    Anxiety 07/26/2017    Hiatal hernia     Chest pain, atypical 06/03/2016    Gastroesophageal reflux disease 06/03/2016    Morbid obesity with BMI of 45.0-49.9, adult ([facility] 06/03/2016    Type 2 diabetes mellitus without complication ([facility] 12/29/0709    Acquired hypothyroidism 06/03/2016    Hypertensive disorder 06/03/2016    Lactic acidosis     Chest pain      Current Outpatient Medications   Medication Sig Dispense Refill    guaiFENesin (MUCINEX) 600 MG extended release tablet TAKE 2 TABLETS BY MOUTH 2 TIMES DAILY 30 tablet 1    magnesium citrate solution Take 592 mls by mouth for 1 doses. 592 mL 0    oxyCODONE-acetaminophen (PERCOCET) 5-325 MG per tablet Take 1 tablet by mouth every 6 hours as needed for Pain. 15 tablet 0    zolpidem (AMBIEN) 10 MG tablet Take by mouth nightly as needed for Sleep.  diazepam (VALIUM) 10 MG tablet Take 10 mg by mouth daily as needed for Anxiety.       B-D ULTRAFINE III SHORT PEN 31G X 8 MM MISC USE WITH INSULIN 100 each 1    Liraglutide (VICTOZA) 18 MG/3ML SOPN SC injection Inject 1.8 mg into the skin daily 9 mL 3    allopurinol (ZYLOPRIM) 300 MG tablet TAKE ONE TABLET BY MOUTH ONCE DAILY 30 tablet 5    pregabalin (LYRICA) 75 MG capsule Take 75 mg by mouth 2 times daily.  sildenafil (VIAGRA) 50 MG tablet Take 1 tablet by mouth as needed for Erectile Dysfunction 30 tablet 3    metFORMIN (GLUCOPHAGE) 1000 MG tablet TAKE ONE TABLET BY MOUTH TWO TIMES DAILY (WITH MEALS) 60 tablet 5    warfarin (COUMADIN) 5 MG tablet TAKE 1 TABLET BY MOUTH EVERY EVENING 30 tablet 5    escitalopram (LEXAPRO) 20 MG tablet Take 1 tablet by mouth daily 30 tablet 2    omeprazole (PRILOSEC) 40 MG delayed release capsule omeprazole 40 mg capsule,delayed release   Take 1 capsule twice a day by oral route for 30 days.  levothyroxine (SYNTHROID) 200 MCG tablet TAKE ONE TABLET BY MOUTH ONCE DAILY 30 tablet 11    hydrALAZINE (APRESOLINE) 50 MG tablet Take 1 tablet by mouth 2 times daily 90 tablet 11     No current facility-administered medications for this visit.       Allergies: Food  Past Medical History:   Diagnosis Date    Anxiety     Atrial fibrillation (HCC)     Chronic pain     COPD (ch[facility] obstructive pulmonary disease) ([facility]     Depression     Diabetes mellitus (HCC)     GERD (gastroesophageal reflux disease)     Gout     Hernia     Hyperlipidemia     Hypertension     Hypothyroid     Myocardial infarct, old     [place] - Cardiac Cath - no blockage    Neuropathy     Osteoarthritis     Psychiatric illness      Past Surgical History:   Procedure Laterality Date    ABDOMINOPLASTY      ABDOMINOPLASTY      Nov 2019    ANKLE SURGERY  1990    fracture repair   [address] - No blockage    ESOPHAGUS SURGERY      GASTRIC BAND      HERNIA REPAIR      LAPAROTOMY EXPLORATORY N/A 1/3/2020    LAPAROTOMY EXPLORATORY performed by Manisha Allen MD at [address]  02/2018    gastric sleeve     Family History   Problem Relation Age of Onset    Diabetes Other     Heart Disease Other      Social History     Tobacco Use    Smoking status: Never Smoker    Smokeless tobacco: Never Used   Substance Use Topics    Alcohol use: No     Alcohol/week: 0.0 standard drinks     Exam  Blood pressure 110/76, height 5' 7\" (1.702 m), weight 194 lb (88 kg). Examination his incision is healing well. There is no evidence of a hernia. There is no evidence of infection. Impression  Doing well status post exploratory laparotomy with reduction of internal hernia    Plan  We will plan to see the patient back on a as needed basis.

## 2020-02-02 ENCOUNTER — HOSPITAL ENCOUNTER (EMERGENCY)
Facility: HOSPITAL | Age: 50
Discharge: HOME | End: 2020-02-02
Admitting: FAMILY MEDICINE

## 2020-02-02 VITALS
DIASTOLIC BLOOD PRESSURE: 90 MMHG | HEIGHT: 72 IN | RESPIRATION RATE: 16 BRPM | HEART RATE: 100 BPM | BODY MASS INDEX: 26.68 KG/M2 | TEMPERATURE: 98.7 F | WEIGHT: 197 LBS | SYSTOLIC BLOOD PRESSURE: 136 MMHG | OXYGEN SATURATION: 100 %

## 2020-02-02 DIAGNOSIS — Z48.02 VISIT FOR SUTURE REMOVAL: Primary | ICD-10-CM

## 2020-02-02 PROCEDURE — 99283 EMERGENCY DEPT VISIT LOW MDM: CPT

## 2020-02-02 PROCEDURE — 25010000003 LIDOCAINE 1 % SOLUTION: Performed by: NURSE PRACTITIONER

## 2020-02-02 RX ORDER — LIDOCAINE HYDROCHLORIDE 10 MG/ML
10 INJECTION, SOLUTION INFILTRATION; PERINEURAL ONCE
Status: COMPLETED | OUTPATIENT
Start: 2020-02-02 | End: 2020-02-02

## 2020-02-02 RX ADMIN — LIDOCAINE HYDROCHLORIDE 10 ML: 10 INJECTION, SOLUTION INFILTRATION; PERINEURAL at 01:39

## 2020-02-02 NOTE — ED NOTES
Staple removed by JOB Sharma.  Lidocaine injected prior to removal... Bandaid with triple antibiotic after removal     Lorena Waite, RN  02/02/20 0159       Lorena Waite, RN  02/02/20 0204

## 2020-02-10 ENCOUNTER — TELEPHONE (OUTPATIENT)
Dept: SURGERY | Age: 50
End: 2020-02-10

## 2020-02-10 NOTE — TELEPHONE ENCOUNTER
Kavin called this morning wanting to know when he will be able to return to the gym. Please call patient back to discuss. Thank you.

## 2020-02-16 ENCOUNTER — APPOINTMENT (OUTPATIENT)
Dept: GENERAL RADIOLOGY | Facility: HOSPITAL | Age: 50
End: 2020-02-16

## 2020-02-16 ENCOUNTER — HOSPITAL ENCOUNTER (EMERGENCY)
Facility: HOSPITAL | Age: 50
Discharge: ANOTHER HOSPITAL | End: 2020-02-17
Admitting: EMERGENCY MEDICINE

## 2020-02-16 DIAGNOSIS — K56.2 VOLVULUS (HCC): ICD-10-CM

## 2020-02-16 DIAGNOSIS — K56.609 SBO (SMALL BOWEL OBSTRUCTION) (HCC): Primary | ICD-10-CM

## 2020-02-16 DIAGNOSIS — K45.8 INTERNAL HERNIA: ICD-10-CM

## 2020-02-16 PROCEDURE — 80050 GENERAL HEALTH PANEL: CPT | Performed by: EMERGENCY MEDICINE

## 2020-02-16 PROCEDURE — 85610 PROTHROMBIN TIME: CPT | Performed by: EMERGENCY MEDICINE

## 2020-02-16 PROCEDURE — 71045 X-RAY EXAM CHEST 1 VIEW: CPT

## 2020-02-16 PROCEDURE — 85730 THROMBOPLASTIN TIME PARTIAL: CPT | Performed by: EMERGENCY MEDICINE

## 2020-02-16 PROCEDURE — 99284 EMERGENCY DEPT VISIT MOD MDM: CPT

## 2020-02-16 PROCEDURE — 83605 ASSAY OF LACTIC ACID: CPT | Performed by: EMERGENCY MEDICINE

## 2020-02-16 PROCEDURE — 93005 ELECTROCARDIOGRAM TRACING: CPT | Performed by: EMERGENCY MEDICINE

## 2020-02-16 PROCEDURE — 83735 ASSAY OF MAGNESIUM: CPT | Performed by: EMERGENCY MEDICINE

## 2020-02-16 PROCEDURE — 93010 ELECTROCARDIOGRAM REPORT: CPT | Performed by: INTERNAL MEDICINE

## 2020-02-16 PROCEDURE — 83690 ASSAY OF LIPASE: CPT | Performed by: EMERGENCY MEDICINE

## 2020-02-16 RX ORDER — ONDANSETRON 2 MG/ML
4 INJECTION INTRAMUSCULAR; INTRAVENOUS ONCE
Status: COMPLETED | OUTPATIENT
Start: 2020-02-16 | End: 2020-02-17

## 2020-02-17 ENCOUNTER — APPOINTMENT (OUTPATIENT)
Dept: CT IMAGING | Facility: HOSPITAL | Age: 50
End: 2020-02-17

## 2020-02-17 VITALS
WEIGHT: 194 LBS | HEIGHT: 72 IN | BODY MASS INDEX: 26.28 KG/M2 | DIASTOLIC BLOOD PRESSURE: 95 MMHG | RESPIRATION RATE: 18 BRPM | HEART RATE: 64 BPM | SYSTOLIC BLOOD PRESSURE: 167 MMHG | TEMPERATURE: 98.8 F | OXYGEN SATURATION: 99 %

## 2020-02-17 LAB
ALBUMIN SERPL-MCNC: 3.9 G/DL (ref 3.5–5.2)
ALBUMIN/GLOB SERPL: 1.2 G/DL
ALP SERPL-CCNC: 57 U/L (ref 39–117)
ALT SERPL W P-5'-P-CCNC: 19 U/L (ref 1–41)
ANION GAP SERPL CALCULATED.3IONS-SCNC: 15 MMOL/L (ref 5–15)
APTT PPP: 26.7 SECONDS (ref 24.1–35)
AST SERPL-CCNC: 39 U/L (ref 1–40)
BASOPHILS # BLD AUTO: 0.01 10*3/MM3 (ref 0–0.2)
BASOPHILS NFR BLD AUTO: 0.2 % (ref 0–1.5)
BILIRUB SERPL-MCNC: 0.3 MG/DL (ref 0.2–1.2)
BILIRUB UR QL STRIP: NEGATIVE
BUN BLD-MCNC: 8 MG/DL (ref 6–20)
BUN/CREAT SERPL: 10.5 (ref 7–25)
CALCIUM SPEC-SCNC: 8.9 MG/DL (ref 8.6–10.5)
CHLORIDE SERPL-SCNC: 106 MMOL/L (ref 98–107)
CLARITY UR: CLEAR
CO2 SERPL-SCNC: 22 MMOL/L (ref 22–29)
COLOR UR: YELLOW
CREAT BLD-MCNC: 0.76 MG/DL (ref 0.76–1.27)
D-LACTATE SERPL-SCNC: 3.6 MMOL/L (ref 0.5–2)
DEPRECATED RDW RBC AUTO: 51.4 FL (ref 37–54)
EOSINOPHIL # BLD AUTO: 0.03 10*3/MM3 (ref 0–0.4)
EOSINOPHIL NFR BLD AUTO: 0.5 % (ref 0.3–6.2)
ERYTHROCYTE [DISTWIDTH] IN BLOOD BY AUTOMATED COUNT: 17.2 % (ref 12.3–15.4)
GFR SERPL CREATININE-BSD FRML MDRD: 133 ML/MIN/1.73
GLOBULIN UR ELPH-MCNC: 3.2 GM/DL
GLUCOSE BLD-MCNC: 124 MG/DL (ref 65–99)
GLUCOSE UR STRIP-MCNC: NEGATIVE MG/DL
HCT VFR BLD AUTO: 35.8 % (ref 37.5–51)
HGB BLD-MCNC: 11.7 G/DL (ref 13–17.7)
HGB UR QL STRIP.AUTO: NEGATIVE
HOLD SPECIMEN: NORMAL
IMM GRANULOCYTES # BLD AUTO: 0 10*3/MM3 (ref 0–0.05)
IMM GRANULOCYTES NFR BLD AUTO: 0 % (ref 0–0.5)
INR PPP: 0.94 (ref 0.91–1.09)
KETONES UR QL STRIP: ABNORMAL
LEUKOCYTE ESTERASE UR QL STRIP.AUTO: NEGATIVE
LIPASE SERPL-CCNC: 28 U/L (ref 13–60)
LYMPHOCYTES # BLD AUTO: 2.04 10*3/MM3 (ref 0.7–3.1)
LYMPHOCYTES NFR BLD AUTO: 36 % (ref 19.6–45.3)
MAGNESIUM SERPL-MCNC: 1.3 MG/DL (ref 1.6–2.6)
MCH RBC QN AUTO: 26.7 PG (ref 26.6–33)
MCHC RBC AUTO-ENTMCNC: 32.7 G/DL (ref 31.5–35.7)
MCV RBC AUTO: 81.7 FL (ref 79–97)
MONOCYTES # BLD AUTO: 0.43 10*3/MM3 (ref 0.1–0.9)
MONOCYTES NFR BLD AUTO: 7.6 % (ref 5–12)
NEUTROPHILS # BLD AUTO: 3.16 10*3/MM3 (ref 1.7–7)
NEUTROPHILS NFR BLD AUTO: 55.7 % (ref 42.7–76)
NITRITE UR QL STRIP: NEGATIVE
NRBC BLD AUTO-RTO: 0 /100 WBC (ref 0–0.2)
PH UR STRIP.AUTO: 6 [PH] (ref 5–8)
PLATELET # BLD AUTO: 347 10*3/MM3 (ref 140–450)
PMV BLD AUTO: 10.2 FL (ref 6–12)
POTASSIUM BLD-SCNC: 4.5 MMOL/L (ref 3.5–5.2)
PROT SERPL-MCNC: 7.1 G/DL (ref 6–8.5)
PROT UR QL STRIP: NEGATIVE
PROTHROMBIN TIME: 12.8 SECONDS (ref 11.9–14.6)
RBC # BLD AUTO: 4.38 10*6/MM3 (ref 4.14–5.8)
SODIUM BLD-SCNC: 143 MMOL/L (ref 136–145)
SP GR UR STRIP: >1.03 (ref 1–1.03)
TSH SERPL DL<=0.05 MIU/L-ACNC: 4.28 UIU/ML (ref 0.27–4.2)
UROBILINOGEN UR QL STRIP: ABNORMAL
WBC NRBC COR # BLD: 5.67 10*3/MM3 (ref 3.4–10.8)

## 2020-02-17 PROCEDURE — 74177 CT ABD & PELVIS W/CONTRAST: CPT

## 2020-02-17 PROCEDURE — 96365 THER/PROPH/DIAG IV INF INIT: CPT

## 2020-02-17 PROCEDURE — 25010000002 PIPERACILLIN SOD-TAZOBACTAM PER 1 G: Performed by: EMERGENCY MEDICINE

## 2020-02-17 PROCEDURE — 25010000002 IOPAMIDOL 61 % SOLUTION: Performed by: EMERGENCY MEDICINE

## 2020-02-17 PROCEDURE — 81003 URINALYSIS AUTO W/O SCOPE: CPT | Performed by: EMERGENCY MEDICINE

## 2020-02-17 PROCEDURE — 25010000003 HYDROMORPHONE 1 MG/ML SOLUTION: Performed by: EMERGENCY MEDICINE

## 2020-02-17 PROCEDURE — 96376 TX/PRO/DX INJ SAME DRUG ADON: CPT

## 2020-02-17 PROCEDURE — 87040 BLOOD CULTURE FOR BACTERIA: CPT | Performed by: EMERGENCY MEDICINE

## 2020-02-17 PROCEDURE — 25010000002 VANCOMYCIN 1 G RECONSTITUTED SOLUTION: Performed by: EMERGENCY MEDICINE

## 2020-02-17 PROCEDURE — 25010000002 PROMETHAZINE PER 50 MG: Performed by: PHYSICIAN ASSISTANT

## 2020-02-17 PROCEDURE — 96367 TX/PROPH/DG ADDL SEQ IV INF: CPT

## 2020-02-17 PROCEDURE — 96375 TX/PRO/DX INJ NEW DRUG ADDON: CPT

## 2020-02-17 PROCEDURE — 36415 COLL VENOUS BLD VENIPUNCTURE: CPT

## 2020-02-17 PROCEDURE — 25010000002 ONDANSETRON PER 1 MG: Performed by: EMERGENCY MEDICINE

## 2020-02-17 PROCEDURE — 25010000002 FENTANYL CITRATE (PF) 100 MCG/2ML SOLUTION: Performed by: EMERGENCY MEDICINE

## 2020-02-17 RX ORDER — FENTANYL CITRATE 50 UG/ML
75 INJECTION, SOLUTION INTRAMUSCULAR; INTRAVENOUS ONCE
Status: COMPLETED | OUTPATIENT
Start: 2020-02-17 | End: 2020-02-17

## 2020-02-17 RX ORDER — PROMETHAZINE HYDROCHLORIDE 25 MG/ML
12.5 INJECTION, SOLUTION INTRAMUSCULAR; INTRAVENOUS ONCE
Status: COMPLETED | OUTPATIENT
Start: 2020-02-17 | End: 2020-02-17

## 2020-02-17 RX ADMIN — SODIUM CHLORIDE 1000 ML: 9 INJECTION, SOLUTION INTRAVENOUS at 00:24

## 2020-02-17 RX ADMIN — PROMETHAZINE HYDROCHLORIDE 12.5 MG: 25 INJECTION INTRAMUSCULAR; INTRAVENOUS at 01:26

## 2020-02-17 RX ADMIN — HYDROMORPHONE HYDROCHLORIDE 1 MG: 1 INJECTION, SOLUTION INTRAMUSCULAR; INTRAVENOUS; SUBCUTANEOUS at 00:36

## 2020-02-17 RX ADMIN — ONDANSETRON HYDROCHLORIDE 4 MG: 2 SOLUTION INTRAMUSCULAR; INTRAVENOUS at 00:00

## 2020-02-17 RX ADMIN — HYDROMORPHONE HYDROCHLORIDE 1 MG: 1 INJECTION, SOLUTION INTRAMUSCULAR; INTRAVENOUS; SUBCUTANEOUS at 00:00

## 2020-02-17 RX ADMIN — VANCOMYCIN HYDROCHLORIDE 1000 MG: 1 INJECTION, POWDER, LYOPHILIZED, FOR SOLUTION INTRAVENOUS at 01:28

## 2020-02-17 RX ADMIN — FENTANYL CITRATE 75 MCG: 50 INJECTION, SOLUTION INTRAMUSCULAR; INTRAVENOUS at 02:45

## 2020-02-17 RX ADMIN — PIPERACILLIN SODIUM AND TAZOBACTAM SODIUM 4.5 G: 4; .5 INJECTION, POWDER, LYOPHILIZED, FOR SOLUTION INTRAVENOUS at 00:24

## 2020-02-17 RX ADMIN — IOPAMIDOL 100 ML: 612 INJECTION, SOLUTION INTRAVENOUS at 00:49

## 2020-02-17 NOTE — ED NOTES
DR. GALLAGHER AT [facility] HAS ACCEPTED THE PT. HE TOLD JIMBO ABRAHAM TO ADMIT THE PT TO THE FLOOR AND THAT WHEN THE PT ARRIVES THERE, STAFF IS TO CALL HIM AND THE SURGERY TEAM IN. NAHUN, [facility] SUPERVISOR NOTIFIED OF TRANSFER. SHE REPORTS THAT THE PT WILL GO TO THE 5TH FLOOR AND TO CALL REPORT [phone].      Carola Stevens RN  02/17/20 0245

## 2020-02-17 NOTE — ED PROVIDER NOTES
Subjective   History of Present Illness  49-year-old male presents with a chief complaint of right upper quadrant abdominal pain.  This began just 2 hours prior to arrival and was severe and sharp.  He said he had the exact same pain 1 month ago and was seen at [facility] for an emergent exploratory laparotomy for an internal hernia.  The patient reports he has had previous surgery in his abdomen through bariatric surgeon in [place] and Dr. Cox.  He had a previous Rommel-en-Y and gastric sleeve.  He has been afebrile.  He reports excessive nausea and vomiting.  No diarrhea.  Review of Systems   All other systems reviewed and are negative.      Past Medical History:   Diagnosis Date   • Arthritis    • Diabetes mellitus (CMS/HCC)    • Disease of thyroid gland    • GERD (gastroesophageal reflux disease)    • Hernia    • Hyperlipidemia    • Hypertension        No Known Allergies    Past Surgical History:   Procedure Laterality Date   • GASTRIC SLEEVE LAPAROSCOPIC     • NISSEN FUNDOPLICATION         Family History   Problem Relation Age of Onset   • No Known Problems Mother    • Diabetes Father    • Hyperlipidemia Father    • Hypertension Father    • No Known Problems Sister    • Cancer Brother        Social History     Socioeconomic History   • Marital status:      Spouse name: Not on file   • Number of children: Not on file   • Years of education: Not on file   • Highest education level: Not on file   Tobacco Use   • Smoking status: Never Smoker   Substance and Sexual Activity   • Alcohol use: No   • Drug use: No   • Sexual activity: Yes     Partners: Female           Objective   Physical Exam   Constitutional: He is oriented to person, place, and time. He appears ill. He appears distressed.   HENT:   Head: Normocephalic and atraumatic.   Eyes: Pupils are equal, round, and reactive to light. EOM are normal.   Cardiovascular: Normal rate and regular rhythm.   Pulmonary/Chest: Effort normal and breath sounds normal.  "  Abdominal: Normal appearance. There is tenderness in the right upper quadrant.   Neurological: He is alert and oriented to person, place, and time.   Skin: Skin is warm. Capillary refill takes less than 2 seconds.   Psychiatric: He has a normal mood and affect. His behavior is normal.   Nursing note and vitals reviewed.      Procedures           ED Course  ED Course as of Feb 17 0246   Mon Feb 17, 2020   0126 Will give right of refusal to his surgeon first if they do not call back in an expedited amount of time will talk to mary ellen.     []   0152 [facility] has refused stating that \"you have surgeons there.\"     []      ED Course User Index  [] Chepe Robertson MD                                           MDM  Number of Diagnoses or Management Options  Diagnosis management comments: Spoke with Dr. Pierson at [facility] given his recent surgery with Dr. Miller 1 month ago felt it was appropriate to follow-up with Dr. Miller.  Dr. Pierson is on-call and reports that she is not Dr. Miller and feels that because we have surgeons at this facility that we should be able to take care of this problem here.  This ended our conversation.  The transfer center did call back and for me that this was a denial by the general surgeon at [facility].      The transfer center at [facility] had called me back and report that they had actually spoken with Dr. Miller on the phone and he says he is unable to take this patient either given that he has had a previous Rommel-en-Y and gastric sleeve performed February 2018 by Dr. Cox in [place].  He reports he only had taken this patient previously due to the emergent nature and the patient was critically ill.  He says at this time he does not feel comfortable accepting this patient.     I have called Dr. Malcolm our general surgeon on call.  He reports he is unable to take care of this patient due to his complicated gastric procedures in the past performed by bariatric surgeon, he is encouraged me " to call our bariatric surgeon on-call.    I have called our bariatric surgeon on-call, Dr. Kwong.  He has encouraged me to call Dr. Cox in [place] and if Dr. Cox is unable to accept this patient that he will accept the patient here.    Spoke with Dr. Cox, he is accepting of patient and requests immediate transfer        Amount and/or Complexity of Data Reviewed  Clinical lab tests: ordered and reviewed  Tests in the radiology section of CPT®: ordered and reviewed  Tests in the medicine section of CPT®: ordered and reviewed    Risk of Complications, Morbidity, and/or Mortality  Presenting problems: high  Diagnostic procedures: high  Management options: high    Critical Care  Total time providing critical care: 30-74 minutes    Patient Progress  Patient progress: (critical)      Final diagnoses:   SBO (small bowel obstruction) (CMS/HCC)   Internal hernia   Volvulus (CMS/HCC)            Stuart Oropeza PA-C  02/17/20 0246

## 2020-02-22 LAB
BACTERIA SPEC AEROBE CULT: NORMAL
BACTERIA SPEC AEROBE CULT: NORMAL

## 2020-03-10 RX ORDER — SILDENAFIL 50 MG/1
50 TABLET, FILM COATED ORAL PRN
Qty: 30 TABLET | Refills: 3 | Status: SHIPPED | OUTPATIENT
Start: 2020-03-10 | End: 2020-09-29 | Stop reason: SDUPTHER

## 2020-03-10 RX ORDER — DIAZEPAM 10 MG/1
TABLET ORAL
Qty: 45 TABLET | Refills: 0 | OUTPATIENT
Start: 2020-03-10 | End: 2020-04-09

## 2020-03-10 NOTE — TELEPHONE ENCOUNTER
Kavin [employer] called to request a refill on his medication. Last office visit : 1/10/2020   Next office visit : Visit date not found     Last UDS:   Amphetamine Screen, Urine   Date Value Ref Range Status   12/12/2019 -  Final     Barbiturate Screen, Urine   Date Value Ref Range Status   12/12/2019 -  Final     Benzodiazepine Screen, Urine   Date Value Ref Range Status   12/12/2019 positive  Final     Buprenorphine Urine   Date Value Ref Range Status   12/12/2019 positive  Final     Cocaine Metabolite Screen, Urine   Date Value Ref Range Status   12/12/2019 positive  Final     Gabapentin Screen, Urine   Date Value Ref Range Status   12/12/2019 -  Final     MDMA, Urine   Date Value Ref Range Status   12/12/2019 -  Final     Methamphetamine, Urine   Date Value Ref Range Status   12/12/2019 -  Final     Opiate Scrn, Ur   Date Value Ref Range Status   12/12/2019 positive  Final     Oxycodone Screen, Ur   Date Value Ref Range Status   12/12/2019 positive  Final     PCP Screen, Urine   Date Value Ref Range Status   12/12/2019 -  Final     Propoxyphene Screen, Urine   Date Value Ref Range Status   12/12/2019 -  Final     THC Screen, Urine   Date Value Ref Range Status   12/12/2019 -  Final     Tricyclic Antidepressants, Urine   Date Value Ref Range Status   12/12/2019 -  Final       Last Michelle Roldan: 12-12  Medication Contract: 2-2017   Last Fill: 12-19    Requested Prescriptions     Pending Prescriptions Disp Refills    sildenafil (VIAGRA) 50 MG tablet [Pharmacy Med Name: SILDENAFIL CITRATE 50 MG TA 50 TAB] 30 tablet 3     Sig: TAKE 1 TABLET BY MOUTH AS NEEDED FOR ERECTILE DYSFUNCTION    diazePAM (VALIUM) 10 MG tablet [Pharmacy Med Name: DIAZEPAM 10 MG TABLET 10 TAB] 45 tablet 0     Sig: PLEASE WEAN OFF OF VALIUM.  CAN TAKE ONCE DAILY OR 1/2 TABLET DAILY                     Refuse---- Message from DEMETRIUS Blakely sent at 12/19/2019  1:22 PM CST -----  Please let patient know that confirmation returned positive for cocaine. We can not provide controlled meds any longer. I will send in 2 weeks for him to wean off of the benzo.   Maida Almanza, LPN

## 2020-03-11 RX ORDER — GUAIFENESIN 600 MG/1
TABLET, EXTENDED RELEASE ORAL
Qty: 30 TABLET | Refills: 1 | Status: SHIPPED | OUTPATIENT
Start: 2020-03-11 | End: 2020-03-31

## 2020-03-19 RX ORDER — LEVOTHYROXINE SODIUM 0.2 MG/1
TABLET ORAL
Qty: 30 TABLET | Refills: 11 | Status: SHIPPED | OUTPATIENT
Start: 2020-03-19 | End: 2022-03-24

## 2020-04-01 ENCOUNTER — VIRTUAL VISIT (OUTPATIENT)
Dept: PRIMARY CARE CLINIC | Age: 50
End: 2020-04-01
Payer: MEDICAID

## 2020-04-01 PROCEDURE — G8428 CUR MEDS NOT DOCUMENT: HCPCS | Performed by: NURSE PRACTITIONER

## 2020-04-01 PROCEDURE — 99214 OFFICE O/P EST MOD 30 MIN: CPT | Performed by: NURSE PRACTITIONER

## 2020-04-01 RX ORDER — TEMAZEPAM 15 MG/1
15 CAPSULE ORAL NIGHTLY PRN
Qty: 30 CAPSULE | Refills: 0 | Status: SHIPPED | OUTPATIENT
Start: 2020-04-01 | End: 2020-05-08

## 2020-04-01 RX ORDER — HYDROCODONE BITARTRATE AND ACETAMINOPHEN 10; 325 MG/1; MG/1
1 TABLET ORAL 3 TIMES DAILY
COMMUNITY

## 2020-04-01 ASSESSMENT — ENCOUNTER SYMPTOMS
RESPIRATORY NEGATIVE: 1
GASTROINTESTINAL NEGATIVE: 1
EYES NEGATIVE: 1

## 2020-04-01 NOTE — PROGRESS NOTES
Pt has been notified of appt via [employer] message.
Yes DEMETRIUS Block   Liraglutide (VICTOZA) 18 MG/3ML SOPN SC injection Inject 1.8 mg into the skin daily Yes DEMETRIUS Block   allopurinol (ZYLOPRIM) 300 MG tablet TAKE ONE TABLET BY MOUTH ONCE DAILY Yes DEMETRIUS Block   pregabalin (LYRICA) 75 MG capsule Take 75 mg by mouth 2 times daily. Yes Historical Provider, MD   warfarin (COUMADIN) 5 MG tablet TAKE 1 TABLET BY MOUTH EVERY EVENING Yes DEMETRIUS Aburto   escitalopram (LEXAPRO) 20 MG tablet Take 1 tablet by mouth daily Yes DEMETRIUS Ahn - CNP   omeprazole (PRILOSEC) 40 MG delayed release capsule omeprazole 40 mg capsule,delayed release   Take 1 capsule twice a day by oral route for 30 days. Yes Historical Provider, MD   hydrALAZINE (APRESOLINE) 50 MG tablet Take 1 tablet by mouth 2 times daily Yes DEMETRIUS Block   magnesium citrate solution Take 592 mls by mouth for 1 doses. Gavino Rosa MD   B-D ULTRAFINE III SHORT PEN 31G X 8 MM MISC USE WITH INSULIN  DEMETRIUS Block       Social History     Tobacco Use    Smoking status: Never Smoker    Smokeless tobacco: Never Used   Substance Use Topics    Alcohol use: No     Alcohol/week: 0.0 standard drinks    Drug use: No     Comment: History of Cocaine and Marijuana usage 2010        Allergies   Allergen Reactions    Food      Pt states he is allergic to milk, but not milk products.    ,   Past Medical History:   Diagnosis Date    Anxiety     Atrial fibrillation (HCC)     Chronic pain     COPD (chronic obstructive pulmonary disease) (HCC)     Depression     Diabetes mellitus (HCC)     GERD (gastroesophageal reflux disease)     Gout     Hernia     Hyperlipidemia     Hypertension     Hypothyroid     Myocardial infarct, old     [place] - Cardiac Cath - no blockage    Neuropathy     Osteoarthritis     Psychiatric illness    ,   Past Surgical History:   Procedure Laterality Date    ABDOMINOPLASTY      ABDOMINOPLASTY      Nov 2019    ANKLE SURGERY  1990    fracture

## 2020-05-08 RX ORDER — LIRAGLUTIDE 6 MG/ML
INJECTION SUBCUTANEOUS
Qty: 9 ML | Refills: 3 | Status: SHIPPED | OUTPATIENT
Start: 2020-05-08 | End: 2020-09-01

## 2020-05-08 RX ORDER — WARFARIN SODIUM 5 MG/1
5 TABLET ORAL EVERY EVENING
Qty: 30 TABLET | Refills: 5 | Status: ON HOLD | OUTPATIENT
Start: 2020-05-08 | End: 2021-02-11

## 2020-05-08 RX ORDER — TEMAZEPAM 15 MG/1
CAPSULE ORAL
Qty: 30 CAPSULE | Refills: 0 | OUTPATIENT
Start: 2020-05-08 | End: 2020-06-07

## 2020-05-13 ENCOUNTER — VIRTUAL VISIT (OUTPATIENT)
Dept: PRIMARY CARE CLINIC | Age: 50
End: 2020-05-13
Payer: MEDICAID

## 2020-05-13 PROCEDURE — G8428 CUR MEDS NOT DOCUMENT: HCPCS | Performed by: NURSE PRACTITIONER

## 2020-05-13 PROCEDURE — 99214 OFFICE O/P EST MOD 30 MIN: CPT | Performed by: NURSE PRACTITIONER

## 2020-05-13 ASSESSMENT — ENCOUNTER SYMPTOMS
EYES NEGATIVE: 1
RESPIRATORY NEGATIVE: 1
GASTROINTESTINAL NEGATIVE: 1
BACK PAIN: 1

## 2020-05-13 NOTE — PROGRESS NOTES
1/3/2020    LAPAROTOMY EXPLORATORY performed by Ceci Silverio MD at [address]  02/2018    gastric sleeve   ,   Family History   Problem Relation Age of Onset    Diabetes Other     Heart Disease Other        PHYSICAL EXAMINATION:  [ INSTRUCTIONS:  \"[x]\" Indicates a positive item  \"[]\" Indicates a negative item  -- DELETE ALL ITEMS NOT EXAMINED]  Vital Signs: (As obtained by patient/caregiver at home)        Constitutional: [x] Appears well-developed and well-nourished [x] No apparent distress      [] Abnormal   Mental status  [x] Alert and awake  [x] Oriented to person/place/time [x]Able to follow commands        Eyes:  EOM    [x]  Normal  [] Abnormal-  Sclera  [x]  Normal  [] Abnormal -         Discharge []  None visible  [] Abnormal -    HENT:   [x] Normocephalic, atraumatic. [] Abnormal   [x] Mouth/Throat: Mucous membranes are moist.     External Ears [x] Normal  [] Abnormal-    Neck: [x] No visualized mass     Pulmonary/Chest: [x] Respiratory effort normal.  [x] No visualized signs of difficulty breathing or respiratory distress        [] Abnormal      Musculoskeletal:   [x] Normal gait with no signs of ataxia         [x] Normal range of motion of neck        [] Abnormal       Neurological:        [x] No Facial Asymmetry (Cranial nerve 7 motor function) (limited exam to video visit)          [] No gaze palsy        [] Abnormal         Skin:        [x] No significant exanthematous lesions or discoloration noted on facial skin         [] Abnormal            Psychiatric:       [x] Normal Affect [] Abnormal        [] No Hallucinations    Other pertinent observable physical exam findings:-    Due to this being a TeleHealth encounter, evaluation of the following organ systems is limited: Vitals/Constitutional/EENT/Resp/CV/GI//MS/Neuro/Skin/Heme-Lymph-Imm. ASSESSMENT/PLAN:  1. Primary insomnia      2.  Type 2 diabetes mellitus without complication, with long-term current use of insulin

## 2020-06-08 ENCOUNTER — TELEPHONE (OUTPATIENT)
Dept: CARDIOLOGY | Age: 50
End: 2020-06-08

## 2020-06-08 RX ORDER — TEMAZEPAM 15 MG/1
CAPSULE ORAL
Qty: 30 CAPSULE | Refills: 0 | Status: SHIPPED | OUTPATIENT
Start: 2020-06-08 | End: 2020-12-08

## 2020-06-08 NOTE — TELEPHONE ENCOUNTER
Kavin Lock called to request a refill on his medication. Last office visit : 1/10/2020   Next office visit : 8/12/2020     Last UDS: 12/12/2019   Amphetamine Screen, Urine   Date Value Ref Range Status   12/12/2019 -  Final     Barbiturate Screen, Urine   Date Value Ref Range Status   12/12/2019 -  Final     Benzodiazepine Screen, Urine   Date Value Ref Range Status   12/12/2019 positive  Final     Buprenorphine Urine   Date Value Ref Range Status   12/12/2019 positive  Final     Cocaine Metabolite Screen, Urine   Date Value Ref Range Status   12/12/2019 positive  Final     Gabapentin Screen, Urine   Date Value Ref Range Status   12/12/2019 -  Final     MDMA, Urine   Date Value Ref Range Status   12/12/2019 -  Final     Methamphetamine, Urine   Date Value Ref Range Status   12/12/2019 -  Final     Opiate Scrn, Ur   Date Value Ref Range Status   12/12/2019 positive  Final     Oxycodone Screen, Ur   Date Value Ref Range Status   12/12/2019 positive  Final     PCP Screen, Urine   Date Value Ref Range Status   12/12/2019 -  Final     Propoxyphene Screen, Urine   Date Value Ref Range Status   12/12/2019 -  Final     THC Screen, Urine   Date Value Ref Range Status   12/12/2019 -  Final     Tricyclic Antidepressants, Urine   Date Value Ref Range Status   12/12/2019 -  Final       Last Shaila Galvez: 06/08/2020  Medication Contract: not on file   Last Fill: 05/08/2020    Requested Prescriptions     Pending Prescriptions Disp Refills    temazepam (RESTORIL) 15 MG capsule [Pharmacy Med Name: TEMAZEPAM 15 MG CAPSULE 15 CAP] 30 capsule 0     Sig: TAKE 1 CAPSULE BY MOUTH NIGHTLY AS NEEDED FOR SLEEP         Please approve or refuse this medication.    Abran Valdez

## 2020-06-28 RX ORDER — HYDRALAZINE HYDROCHLORIDE 50 MG/1
TABLET, FILM COATED ORAL
Qty: 90 TABLET | Refills: 11 | Status: SHIPPED | OUTPATIENT
Start: 2020-06-28

## 2020-07-07 RX ORDER — ALLOPURINOL 300 MG/1
TABLET ORAL
Qty: 30 TABLET | Refills: 5 | Status: SHIPPED | OUTPATIENT
Start: 2020-07-07 | End: 2020-12-08

## 2020-08-12 ENCOUNTER — OFFICE VISIT (OUTPATIENT)
Dept: PRIMARY CARE CLINIC | Age: 50
End: 2020-08-12
Payer: MEDICAID

## 2020-08-12 VITALS
TEMPERATURE: 98.6 F | BODY MASS INDEX: 25.6 KG/M2 | OXYGEN SATURATION: 98 % | SYSTOLIC BLOOD PRESSURE: 132 MMHG | RESPIRATION RATE: 18 BRPM | WEIGHT: 189 LBS | HEART RATE: 111 BPM | DIASTOLIC BLOOD PRESSURE: 86 MMHG | HEIGHT: 72 IN

## 2020-08-12 LAB
AMPHETAMINE SCREEN, URINE: ABNORMAL
BARBITURATE SCREEN, URINE: ABNORMAL
BENZODIAZEPINE SCREEN, URINE: ABNORMAL
BUPRENORPHINE URINE: ABNORMAL
COCAINE METABOLITE SCREEN URINE: ABNORMAL
GABAPENTIN SCREEN, URINE: ABNORMAL
HBA1C MFR BLD: 5.6 %
MDMA URINE: ABNORMAL
METHADONE SCREEN, URINE: ABNORMAL
METHAMPHETAMINE, URINE: ABNORMAL
OPIATE SCREEN URINE: ABNORMAL
OXYCODONE SCREEN URINE: ABNORMAL
PHENCYCLIDINE SCREEN URINE: ABNORMAL
PROPOXYPHENE SCREEN, URINE: ABNORMAL
THC SCREEN, URINE: ABNORMAL
TRICYCLIC ANTIDEPRESSANTS, UR: ABNORMAL

## 2020-08-12 PROCEDURE — 99214 OFFICE O/P EST MOD 30 MIN: CPT | Performed by: NURSE PRACTITIONER

## 2020-08-12 PROCEDURE — 83036 HEMOGLOBIN GLYCOSYLATED A1C: CPT | Performed by: NURSE PRACTITIONER

## 2020-08-12 PROCEDURE — 1036F TOBACCO NON-USER: CPT | Performed by: NURSE PRACTITIONER

## 2020-08-12 PROCEDURE — 80305 DRUG TEST PRSMV DIR OPT OBS: CPT | Performed by: NURSE PRACTITIONER

## 2020-08-12 PROCEDURE — G8417 CALC BMI ABV UP PARAM F/U: HCPCS | Performed by: NURSE PRACTITIONER

## 2020-08-12 PROCEDURE — G8427 DOCREV CUR MEDS BY ELIG CLIN: HCPCS | Performed by: NURSE PRACTITIONER

## 2020-08-12 PROCEDURE — 2022F DILAT RTA XM EVC RTNOPTHY: CPT | Performed by: NURSE PRACTITIONER

## 2020-08-12 PROCEDURE — 3044F HG A1C LEVEL LT 7.0%: CPT | Performed by: NURSE PRACTITIONER

## 2020-08-12 ASSESSMENT — PATIENT HEALTH QUESTIONNAIRE - PHQ9
1. LITTLE INTEREST OR PLEASURE IN DOING THINGS: 0
SUM OF ALL RESPONSES TO PHQ QUESTIONS 1-9: 0
SUM OF ALL RESPONSES TO PHQ9 QUESTIONS 1 & 2: 0
2. FEELING DOWN, DEPRESSED OR HOPELESS: 0
SUM OF ALL RESPONSES TO PHQ QUESTIONS 1-9: 0

## 2020-08-12 ASSESSMENT — ENCOUNTER SYMPTOMS
EYES NEGATIVE: 1
GASTROINTESTINAL NEGATIVE: 1
RESPIRATORY NEGATIVE: 1

## 2020-08-12 NOTE — PROGRESS NOTES
[facility] PRIMARY CARE  [id number] [address]  [address] [address]  Dept: [phone]  Dept Fax: [phone]: [phone]    Fady Lipscomb is a 53 y.o. male who presents today for his medical conditions/complaints as noted below. Fady Lipscomb is c/o of Diabetes (Pt reports sugars are running good - no concerns ); Insomnia (Pt states medication seems to be working okay he took promethazine with it last night and it seemed to lay on him a little heavier so he will be sure not to do that again. ); and Leg Swelling (MITCHEL at night per pt. )      Chief Complaint   Patient presents with    Diabetes     Pt reports sugars are running good - no concerns     Insomnia     Pt states medication seems to be working okay he took promethazine with it last night and it seemed to lay on him a little heavier so he will be sure not to do that again.  Leg Swelling     MITCHEL at night per pt. HPI:     HPI  Patient here for follow up on chronic conditions including DM, insomnia, and HTN. He has been taking his meds as prescribed. He has been taking the Restoril and it is helping. He has been monitoring his blood sugars at home and they are doing well. He has noticed some intermittent swelling at night, but the swelling has returned to normal by the next morning.      Past Medical History:   Diagnosis Date    Anxiety     Atrial fibrillation (HCC)     Chronic pain     COPD (chronic obstructive pulmonary disease) (HCC)     Depression     Diabetes mellitus (HCC)     GERD (gastroesophageal reflux disease)     Gout     Hernia     Hyperlipidemia     Hypertension     Hypothyroid     Myocardial infarct, old     [place] - Cardiac Cath - no blockage    Neuropathy     Osteoarthritis     Psychiatric illness         Past Surgical History:   Procedure Laterality Date    ABDOMINOPLASTY      ABDOMINOPLASTY      Nov 2019    ANKLE SURGERY  1990    fracture repair    Food      Pt states he is allergic to milk, but not milk products. Family History   Problem Relation Age of Onset    Diabetes Other     Heart Disease Other                Subjective:      Review of Systems   Constitutional: Negative. HENT: Negative. Eyes: Negative. Respiratory: Negative. Cardiovascular: Positive for leg swelling (intermittent). Gastrointestinal: Negative. Endocrine: Negative. Genitourinary: Negative. Musculoskeletal: Negative. Skin: Negative. Neurological: Negative. Hematological: Negative. Psychiatric/Behavioral: Negative. Objective:     Physical Exam  Vitals signs and nursing note reviewed. Constitutional:       Appearance: Normal appearance. HENT:      Head: Normocephalic and atraumatic. Right Ear: Hearing, tympanic membrane, ear canal and external ear normal.      Left Ear: Hearing, tympanic membrane, ear canal and external ear normal.      Nose: Nose normal.      Mouth/Throat:      Lips: Pink. Mouth: Mucous membranes are moist.      Pharynx: Oropharynx is clear. Eyes:      General: Lids are normal.      Extraocular Movements: Extraocular movements intact. Conjunctiva/sclera: Conjunctivae normal.      Pupils: Pupils are equal, round, and reactive to light. Neck:      Musculoskeletal: Full passive range of motion without pain, normal range of motion and neck supple. Thyroid: No thyromegaly. Cardiovascular:      Rate and Rhythm: Normal rate and regular rhythm. Pulses: Normal pulses. Dorsalis pedis pulses are 2+ on the right side and 2+ on the left side. Posterior tibial pulses are 2+ on the right side and 2+ on the left side. Heart sounds: Normal heart sounds. Pulmonary:      Effort: Pulmonary effort is normal.      Breath sounds: Normal breath sounds and air entry. Abdominal:      General: Bowel sounds are normal.      Palpations: Abdomen is soft.    Musculoskeletal:      Thoracic back: He exhibits normal range of motion and no tenderness. Lumbar back: He exhibits normal range of motion and no tenderness. Lymphadenopathy:      Cervical: No cervical adenopathy. Skin:     General: Skin is warm and dry. Capillary Refill: Capillary refill takes 2 to 3 seconds. Neurological:      General: No focal deficit present. Mental Status: He is alert and oriented to person, place, and time. Mental status is at baseline. Coordination: Coordination is intact. Psychiatric:         Mood and Affect: Mood normal.         Speech: Speech normal.         Behavior: Behavior normal.         Thought Content: Thought content normal.         Cognition and Memory: Cognition and memory normal.         Judgment: Judgment normal.     Monofilament Exam Reveals:  Pulses: normal  Edema:normal  Skin Lesions:bilateral callus  Right Foot:    Left Foot:  Normal sensation at 0   Normal sensation at 0   Diminished sensation at 1,2,3,4,5,6,7,8,9,10   Diminished sensation at 1,2,3,4,5,6,7,8,9,10   No sensation at 0    No sensation at 0           /86   Pulse 111   Temp 98.6 °F (37 °C) (Temporal)   Resp 18   Ht 6' (1.829 m)   Wt 189 lb (85.7 kg)   SpO2 98%   BMI 25.63 kg/m²     Assessment:      Diagnosis Orders   1. Essential hypertension     2. Primary insomnia  POCT Rapid Drug Screen   3.  Type 2 diabetes mellitus without complication, with long-term current use of insulin ([facility])  POCT glycosylated hemoglobin (Hb A1C)     DIABETES FOOT EXAM       Results for orders placed or performed in visit on 08/12/20   POCT glycosylated hemoglobin (Hb A1C)   Result Value Ref Range    Hemoglobin A1C 5.6 %   POCT Rapid Drug Screen   Result Value Ref Range    Amphetamine Screen, Urine -     Barbiturate Screen, Urine -     Benzodiazepine Screen, Urine -     Buprenorphine Urine -     Cocaine Metabolite Screen, Urine -     Gabapentin Screen, Urine -     MDMA, Urine -     Methamphetamine, Urine -     Methadone Screen, Urine -     Opiate Scrn, Ur +     Oxycodone Screen, Ur +     PCP Screen, Urine -     Propoxyphene Screen, Urine -     THC Screen, Urine -     Tricyclic Antidepressants, Urine -        Plan:     Sending urine off for confirmation due to abnormal UDS results. Will continue current medication regimen. Return in about 4 months (around 12/12/2020) for Office Visit, Refill with UDS. Orders Placed This Encounter   Procedures    POCT glycosylated hemoglobin (Hb A1C)    POCT Rapid Drug Screen    [facility] DIABETES FOOT EXAM       No orders of the defined types were placed in this encounter. Patient offered educational handouts and has had all questions answered. Patient voices understanding and agrees to plans along with risks and benefits of plan. Patient is instructed to continue prior meds, diet, and exercise plans as instructed. Patient agrees to follow up as instructed and sooner if needed. Patient agrees to go to ER if condition becomes emergent. EMR Dragon/transcription disclaimer: Some of this encounter note is an electronic transcription/translation of spoken language to printed text. The electronic translation of spoken language may permit erroneous, or at times, nonsensical words or phrases to be inadvertently transcribed.  Although I have reviewed the note for such errors, some may still exist.    Electronically signed by DEMETRIUS Menezes on 8/12/2020 at 9:37 AM

## 2020-08-13 ENCOUNTER — TRANSCRIBE ORDERS (OUTPATIENT)
Dept: ADMINISTRATIVE | Facility: HOSPITAL | Age: 50
End: 2020-08-13

## 2020-08-13 DIAGNOSIS — Z01.818 PREOP TESTING: Primary | ICD-10-CM

## 2020-08-15 ENCOUNTER — LAB (OUTPATIENT)
Dept: LAB | Facility: HOSPITAL | Age: 50
End: 2020-08-15

## 2020-08-15 PROCEDURE — U0003 INFECTIOUS AGENT DETECTION BY NUCLEIC ACID (DNA OR RNA); SEVERE ACUTE RESPIRATORY SYNDROME CORONAVIRUS 2 (SARS-COV-2) (CORONAVIRUS DISEASE [COVID-19]), AMPLIFIED PROBE TECHNIQUE, MAKING USE OF HIGH THROUGHPUT TECHNOLOGIES AS DESCRIBED BY CMS-2020-01-R: HCPCS | Performed by: PAIN MEDICINE

## 2020-08-15 PROCEDURE — C9803 HOPD COVID-19 SPEC COLLECT: HCPCS | Performed by: PAIN MEDICINE

## 2020-08-16 LAB
COVID LABCORP PRIORITY: NORMAL
SARS-COV-2 RNA RESP QL NAA+PROBE: NOT DETECTED

## 2020-09-01 RX ORDER — LIRAGLUTIDE 6 MG/ML
INJECTION SUBCUTANEOUS
Qty: 9 ML | Refills: 3 | Status: SHIPPED | OUTPATIENT
Start: 2020-09-01 | End: 2021-01-29

## 2020-09-22 ENCOUNTER — HOSPITAL ENCOUNTER (EMERGENCY)
Facility: HOSPITAL | Age: 50
Discharge: HOME | End: 2020-09-22
Attending: EMERGENCY MEDICINE | Admitting: EMERGENCY MEDICINE

## 2020-09-22 VITALS
OXYGEN SATURATION: 99 % | RESPIRATION RATE: 16 BRPM | DIASTOLIC BLOOD PRESSURE: 82 MMHG | HEART RATE: 99 BPM | HEIGHT: 72 IN | BODY MASS INDEX: 27.9 KG/M2 | WEIGHT: 206 LBS | SYSTOLIC BLOOD PRESSURE: 132 MMHG | TEMPERATURE: 98.1 F

## 2020-09-22 DIAGNOSIS — S61.011A LACERATION OF RIGHT THUMB WITHOUT FOREIGN BODY WITHOUT DAMAGE TO NAIL, INITIAL ENCOUNTER: Primary | ICD-10-CM

## 2020-09-22 PROCEDURE — 25010000003 LIDOCAINE 1 % SOLUTION: Performed by: EMERGENCY MEDICINE

## 2020-09-22 PROCEDURE — 90471 IMMUNIZATION ADMIN: CPT | Performed by: EMERGENCY MEDICINE

## 2020-09-22 PROCEDURE — 25010000002 TDAP 5-2.5-18.5 LF-MCG/0.5 SUSPENSION: Performed by: EMERGENCY MEDICINE

## 2020-09-22 PROCEDURE — 99283 EMERGENCY DEPT VISIT LOW MDM: CPT

## 2020-09-22 PROCEDURE — 90715 TDAP VACCINE 7 YRS/> IM: CPT | Performed by: EMERGENCY MEDICINE

## 2020-09-22 RX ORDER — LIDOCAINE HYDROCHLORIDE 10 MG/ML
10 INJECTION, SOLUTION INFILTRATION; PERINEURAL ONCE
Status: COMPLETED | OUTPATIENT
Start: 2020-09-22 | End: 2020-09-22

## 2020-09-22 RX ADMIN — TETANUS TOXOID, REDUCED DIPHTHERIA TOXOID AND ACELLULAR PERTUSSIS VACCINE, ADSORBED 0.5 ML: 5; 2.5; 8; 8; 2.5 SUSPENSION INTRAMUSCULAR at 02:55

## 2020-09-22 RX ADMIN — LIDOCAINE HYDROCHLORIDE 10 ML: 10 INJECTION, SOLUTION INFILTRATION; PERINEURAL at 02:41

## 2020-09-22 NOTE — ED PROVIDER NOTES
Subjective   51 y/o right handed male arrives for evaluation of right thumb laceration that occurred just PTA when he was cleaning his scissors. He is unsure of his tetanus status. He is on coumadin and presented secondary to the wound that kept bleeding. He arrives in [facility]        Family, social and past history reviewed as below, prior documentation of H and Ps and other documentation are reviewed:    Past Medical History:  No date: A-fib (CMS/[facility])  No date: Arthritis  No date: Diabetes mellitus (CMS/HCC)  No date: Disease of thyroid gland  No date: GERD (gastroesophageal reflux disease)  No date: Hernia  No date: Hyperlipidemia  No date: Hypertension    Past Surgical History:  No date: GASTRIC SLEEVE LAPAROSCOPIC  No date: NISSEN FUNDOPLICATION    Social History    Socioeconomic History      Marital status:       Spouse name: Not on file      Number of children: Not on file      Years of education: Not on file      Highest education level: Not on file    Tobacco Use      Smoking status: Never Smoker    Substance and Sexual Activity      Alcohol use: No      Drug use: No      Sexual activity: Yes        Partners: Female      Family history: reviewed and noncontributory           Review of Systems   All other systems reviewed and are negative.      Past Medical History:   Diagnosis Date   • A-fib (CMS/HCC)    • Arthritis    • Diabetes mellitus (CMS/HCC)    • Disease of thyroid gland    • GERD (gastroesophageal reflux disease)    • Hernia    • Hyperlipidemia    • Hypertension        No Known Allergies    Past Surgical History:   Procedure Laterality Date   • GASTRIC SLEEVE LAPAROSCOPIC     • NISSEN FUNDOPLICATION         Family History   Problem Relation Age of Onset   • No Known Problems Mother    • Diabetes Father    • Hyperlipidemia Father    • Hypertension Father    • No Known Problems Sister    • Cancer Brother        Social History     Socioeconomic History   • Marital status:      Spouse name: Not  on file   • Number of children: Not on file   • Years of education: Not on file   • Highest education level: Not on file   Tobacco Use   • Smoking status: Never Smoker   Substance and Sexual Activity   • Alcohol use: No   • Drug use: No   • Sexual activity: Yes     Partners: Female           Objective   Physical Exam  Vitals signs and nursing note reviewed.   Constitutional:       Appearance: Normal appearance.   HENT:      Head: Normocephalic.      Mouth/Throat:      Mouth: Mucous membranes are moist.   Eyes:      Extraocular Movements: Extraocular movements intact.   Musculoskeletal: Normal range of motion.      Comments: At the pad of the right thumb there is a 1 cm flap laceration that does continue to bleed. He has full ROM, no tendon dysfunction, sensation is intact, radial and ulnar pulses are intact ,no signs of infection   Skin:     General: Skin is warm.      Capillary Refill: Capillary refill takes less than 2 seconds.   Neurological:      General: No focal deficit present.      Mental Status: He is alert.   Psychiatric:         Mood and Affect: Mood normal.         Thought Content: Thought content normal.         Laceration Repair    Date/Time: 9/22/2020 2:55 AM  Performed by: Chepe Robertson MD  Authorized by: Chepe Robertson MD     Consent:     Consent obtained:  Verbal    Consent given by:  Patient    Risks discussed:  Infection, need for additional repair, nerve damage, poor wound healing, poor cosmetic result, pain, retained foreign body, tendon damage and vascular damage    Alternatives discussed:  No treatment  Anesthesia (see MAR for exact dosages):     Anesthesia method:  Local infiltration    Local anesthetic:  Lidocaine 1% w/o epi  Laceration details:     Location:  Finger    Finger location:  R thumb    Length (cm):  1  Repair type:     Repair type:  Simple  Pre-procedure details:     Preparation:  Patient was prepped and draped in usual sterile fashion  Exploration:      Hemostasis achieved with:  Direct pressure    Wound exploration: wound explored through full range of motion and entire depth of wound probed and visualized      Wound extent: no areolar tissue violation noted, no fascia violation noted, no foreign bodies/material noted, no muscle damage noted, no nerve damage noted, no tendon damage noted, no underlying fracture noted and no vascular damage noted      Contaminated: no    Treatment:     Area cleansed with:  Saline    Amount of cleaning:  Standard    Irrigation solution:  Sterile saline  Skin repair:     Repair method:  Sutures    Suture size:  4-0    Suture material:  Nylon    Suture technique:  Simple interrupted    Number of sutures:  2  Approximation:     Approximation:  Close  Post-procedure details:     Dressing:  Antibiotic ointment    Patient tolerance of procedure:  Tolerated well, no immediate complications  Comments:      Of note we did try glue but the wound continued to bleed thus we switched to sutures.                ED Course         No further bleeding after sutures. Aware of reasons for return and need for follow up. All questions answered.                                   MDM    Final diagnoses:   Laceration of right thumb without foreign body without damage to nail, initial encounter            Chepe Robertson MD  09/22/20 0250

## 2020-09-30 RX ORDER — PEN NEEDLE, DIABETIC 31 GX5/16"
1 NEEDLE, DISPOSABLE MISCELLANEOUS PRN
Qty: 100 EACH | Refills: 1 | Status: SHIPPED | OUTPATIENT
Start: 2020-09-30

## 2020-09-30 RX ORDER — SILDENAFIL 50 MG/1
50 TABLET, FILM COATED ORAL PRN
Qty: 30 TABLET | Refills: 3 | Status: SHIPPED | OUTPATIENT
Start: 2020-09-30 | End: 2022-03-24

## 2020-09-30 NOTE — TELEPHONE ENCOUNTER
Kavin [place]burg called to request a refill on his medication. Last office visit : 8/12/2020   Next office visit : 12/17/2020     Last UDS:   Amphetamine Screen, Urine   Date Value Ref Range Status   08/12/2020 -  Final     Barbiturate Screen, Urine   Date Value Ref Range Status   08/12/2020 -  Final     Benzodiazepine Screen, Urine   Date Value Ref Range Status   08/12/2020 -  Final     Buprenorphine Urine   Date Value Ref Range Status   08/12/2020 -  Final     Cocaine Metabolite Screen, Urine   Date Value Ref Range Status   08/12/2020 -  Final     Gabapentin Screen, Urine   Date Value Ref Range Status   08/12/2020 -  Final     MDMA, Urine   Date Value Ref Range Status   08/12/2020 -  Final     Methamphetamine, Urine   Date Value Ref Range Status   08/12/2020 -  Final     Opiate Scrn, Ur   Date Value Ref Range Status   08/12/2020 +  Final     Comment:     Norco Appropriate     Oxycodone Screen, Ur   Date Value Ref Range Status   08/12/2020 +  Final     PCP Screen, Urine   Date Value Ref Range Status   08/12/2020 -  Final     Propoxyphene Screen, Urine   Date Value Ref Range Status   08/12/2020 -  Final     THC Screen, Urine   Date Value Ref Range Status   08/12/2020 -  Final     Tricyclic Antidepressants, Urine   Date Value Ref Range Status   08/12/2020 -  Final       Last Jenna Darion: 6/8/2020  Medication Contract: 8/14/2020   Last Fill: 6/10/2020    Requested Prescriptions     Pending Prescriptions Disp Refills    Insulin Pen Needle (B-D ULTRAFINE III SHORT PEN) 31G X 8 MM MISC 100 each 1    sildenafil (VIAGRA) 50 MG tablet 30 tablet 3     Sig: Take 1 tablet by mouth as needed for Erectile Dysfunction         Please approve or refuse this medication.    Cristina Henao

## 2020-10-28 ENCOUNTER — OFFICE VISIT (OUTPATIENT)
Dept: PRIMARY CARE CLINIC | Age: 50
End: 2020-10-28
Payer: MEDICAID

## 2020-10-28 PROCEDURE — 90471 IMMUNIZATION ADMIN: CPT | Performed by: NURSE PRACTITIONER

## 2020-10-28 PROCEDURE — 90686 IIV4 VACC NO PRSV 0.5 ML IM: CPT | Performed by: NURSE PRACTITIONER

## 2020-10-28 NOTE — PROGRESS NOTES
Per orders of Aurora Judd  A vaccine of   Javier Goss was given in the LEFT DELTOID . Pt tolerated well and was released from the office with no concerns.

## 2020-11-18 ENCOUNTER — TRANSCRIBE ORDERS (OUTPATIENT)
Dept: ADMINISTRATIVE | Facility: HOSPITAL | Age: 50
End: 2020-11-18

## 2020-11-18 DIAGNOSIS — Z01.818 PREOP TESTING: Primary | ICD-10-CM

## 2020-11-20 ENCOUNTER — LAB (OUTPATIENT)
Dept: LAB | Facility: HOSPITAL | Age: 50
End: 2020-11-20

## 2020-11-20 PROCEDURE — C9803 HOPD COVID-19 SPEC COLLECT: HCPCS | Performed by: PAIN MEDICINE

## 2020-11-20 PROCEDURE — U0003 INFECTIOUS AGENT DETECTION BY NUCLEIC ACID (DNA OR RNA); SEVERE ACUTE RESPIRATORY SYNDROME CORONAVIRUS 2 (SARS-COV-2) (CORONAVIRUS DISEASE [COVID-19]), AMPLIFIED PROBE TECHNIQUE, MAKING USE OF HIGH THROUGHPUT TECHNOLOGIES AS DESCRIBED BY CMS-2020-01-R: HCPCS | Performed by: PAIN MEDICINE

## 2020-11-21 LAB
COVID LABCORP PRIORITY: NORMAL
SARS-COV-2 RNA RESP QL NAA+PROBE: NOT DETECTED

## 2020-12-08 RX ORDER — WARFARIN SODIUM 5 MG/1
5 TABLET ORAL EVERY EVENING
Qty: 30 TABLET | Refills: 5 | OUTPATIENT
Start: 2020-12-08

## 2020-12-08 RX ORDER — ALLOPURINOL 300 MG/1
TABLET ORAL
Qty: 30 TABLET | Refills: 5 | Status: SHIPPED | OUTPATIENT
Start: 2020-12-08 | End: 2021-10-18

## 2020-12-08 RX ORDER — TEMAZEPAM 15 MG/1
CAPSULE ORAL
Qty: 30 CAPSULE | Refills: 0 | Status: SHIPPED | OUTPATIENT
Start: 2020-12-08 | End: 2021-01-07

## 2020-12-08 NOTE — TELEPHONE ENCOUNTER
Kavin Penn called to request a refill on his medication. Last office visit : 10/28/2020   Next office visit : 12/17/2020     Last UDS:   Amphetamine Screen, Urine   Date Value Ref Range Status   08/12/2020 -  Final     Barbiturate Screen, Urine   Date Value Ref Range Status   08/12/2020 -  Final     Benzodiazepine Screen, Urine   Date Value Ref Range Status   08/12/2020 -  Final     Buprenorphine Urine   Date Value Ref Range Status   08/12/2020 -  Final     Cocaine Metabolite Screen, Urine   Date Value Ref Range Status   08/12/2020 -  Final     Gabapentin Screen, Urine   Date Value Ref Range Status   08/12/2020 -  Final     MDMA, Urine   Date Value Ref Range Status   08/12/2020 -  Final     Methamphetamine, Urine   Date Value Ref Range Status   08/12/2020 -  Final     Opiate Scrn, Ur   Date Value Ref Range Status   08/12/2020 +  Final     Comment:     Norco Appropriate     Oxycodone Screen, Ur   Date Value Ref Range Status   08/12/2020 +  Final     PCP Screen, Urine   Date Value Ref Range Status   08/12/2020 -  Final     Propoxyphene Screen, Urine   Date Value Ref Range Status   08/12/2020 -  Final     THC Screen, Urine   Date Value Ref Range Status   08/12/2020 -  Final     Tricyclic Antidepressants, Urine   Date Value Ref Range Status   08/12/2020 -  Final       Last Zoila Zaier: 6/4/2020  Medication Contract: 8/14/2020   Last Fill: 6/8/2020    Requested Prescriptions     Pending Prescriptions Disp Refills    temazepam (RESTORIL) 15 MG capsule [Pharmacy Med Name: TEMAZEPAM 15 MG CAPSULE 15 Capsule] 30 capsule 0     Sig: TAKE ONE CAPSULE BY MOUTH NIGHTLY AS NEEDED FOR SLEEP    allopurinol (ZYLOPRIM) 300 MG tablet [Pharmacy Med Name: ALLOPURINOL 300 MG TABLET 300 Tablet] 30 tablet 5     Sig: TAKE ONE TABLET BY MOUTH ONCE DAILY         Please approve or refuse this medication.    Roanna Patch

## 2020-12-16 ENCOUNTER — NURSE TRIAGE (OUTPATIENT)
Dept: CALL CENTER | Facility: HOSPITAL | Age: 50
End: 2020-12-16

## 2020-12-16 ENCOUNTER — HOSPITAL ENCOUNTER (INPATIENT)
Age: 50
LOS: 2 days | Discharge: LEFT BEFORE DISCHARGE | DRG: 641 | End: 2020-12-18
Attending: EMERGENCY MEDICINE | Admitting: HOSPITALIST
Payer: MEDICAID

## 2020-12-16 ENCOUNTER — APPOINTMENT (OUTPATIENT)
Dept: CT IMAGING | Age: 50
DRG: 641 | End: 2020-12-16
Payer: MEDICAID

## 2020-12-16 ENCOUNTER — TELEPHONE (OUTPATIENT)
Dept: CARDIOLOGY | Age: 50
End: 2020-12-16

## 2020-12-16 ENCOUNTER — APPOINTMENT (OUTPATIENT)
Dept: GENERAL RADIOLOGY | Age: 50
DRG: 641 | End: 2020-12-16
Payer: MEDICAID

## 2020-12-16 LAB
ADENOVIRUS BY PCR: NOT DETECTED
ALBUMIN SERPL-MCNC: 4.6 G/DL (ref 3.5–5.2)
ALP BLD-CCNC: 64 U/L (ref 40–130)
ALT SERPL-CCNC: 27 U/L (ref 5–41)
ANION GAP SERPL CALCULATED.3IONS-SCNC: 23 MMOL/L (ref 7–19)
ANION GAP SERPL CALCULATED.3IONS-SCNC: 23 MMOL/L (ref 7–19)
AST SERPL-CCNC: 44 U/L (ref 5–40)
BASOPHILS ABSOLUTE: 0 K/UL (ref 0–0.2)
BASOPHILS RELATIVE PERCENT: 0.3 % (ref 0–1)
BILIRUB SERPL-MCNC: <0.2 MG/DL (ref 0.2–1.2)
BILIRUBIN URINE: NEGATIVE
BLOOD, URINE: NEGATIVE
BORDETELLA PARAPERTUSSIS BY PCR: NOT DETECTED
BORDETELLA PERTUSSIS BY PCR: NOT DETECTED
BUN BLDV-MCNC: 15 MG/DL (ref 6–20)
BUN BLDV-MCNC: 15 MG/DL (ref 6–20)
C-REACTIVE PROTEIN: 0.16 MG/DL (ref 0–0.5)
CALCIUM SERPL-MCNC: 8.9 MG/DL (ref 8.6–10)
CALCIUM SERPL-MCNC: 9.4 MG/DL (ref 8.6–10)
CHLAMYDOPHILIA PNEUMONIAE BY PCR: NOT DETECTED
CHLORIDE BLD-SCNC: 101 MMOL/L (ref 98–111)
CHLORIDE BLD-SCNC: 101 MMOL/L (ref 98–111)
CLARITY: CLEAR
CO2: 10 MMOL/L (ref 22–29)
CO2: 12 MMOL/L (ref 22–29)
COLOR: YELLOW
CORONAVIRUS 229E BY PCR: NOT DETECTED
CORONAVIRUS HKU1 BY PCR: NOT DETECTED
CORONAVIRUS NL63 BY PCR: NOT DETECTED
CORONAVIRUS OC43 BY PCR: NOT DETECTED
CREAT SERPL-MCNC: 0.9 MG/DL (ref 0.5–1.2)
CREAT SERPL-MCNC: 1 MG/DL (ref 0.5–1.2)
D DIMER: 0.31 UG/ML FEU (ref 0–0.48)
EKG P AXIS: 76 DEGREES
EKG P-R INTERVAL: 120 MS
EKG Q-T INTERVAL: 314 MS
EKG QRS DURATION: 76 MS
EKG QTC CALCULATION (BAZETT): 415 MS
EKG T AXIS: -54 DEGREES
EOSINOPHILS ABSOLUTE: 0 K/UL (ref 0–0.6)
EOSINOPHILS RELATIVE PERCENT: 0.3 % (ref 0–5)
GFR AFRICAN AMERICAN: >59
GFR AFRICAN AMERICAN: >59
GFR NON-AFRICAN AMERICAN: >60
GFR NON-AFRICAN AMERICAN: >60
GLUCOSE BLD-MCNC: 164 MG/DL (ref 70–99)
GLUCOSE BLD-MCNC: 52 MG/DL (ref 74–109)
GLUCOSE BLD-MCNC: 87 MG/DL (ref 70–99)
GLUCOSE BLD-MCNC: 88 MG/DL (ref 74–109)
GLUCOSE BLD-MCNC: 95 MG/DL (ref 70–99)
GLUCOSE BLD-MCNC: 98 MG/DL (ref 70–99)
GLUCOSE URINE: NEGATIVE MG/DL
HBA1C MFR BLD: 6.1 % (ref 4–6)
HCT VFR BLD CALC: 36 % (ref 42–52)
HEMOGLOBIN: 10.7 G/DL (ref 14–18)
HUMAN METAPNEUMOVIRUS BY PCR: NOT DETECTED
HUMAN RHINOVIRUS/ENTEROVIRUS BY PCR: NOT DETECTED
IMMATURE GRANULOCYTES #: 0 K/UL
INFLUENZA A BY PCR: NOT DETECTED
INFLUENZA B BY PCR: NOT DETECTED
INR BLD: 1.19 (ref 0.88–1.18)
KETONES, URINE: 15 MG/DL
L. PNEUMOPHILA SEROGP 1 UR AG: NORMAL
LACTIC ACID: 1.3 MMOL/L (ref 0.5–1.9)
LACTIC ACID: 7.6 MMOL/L (ref 0.5–1.9)
LEUKOCYTE ESTERASE, URINE: NEGATIVE
LV EF: 58 %
LVEF MODALITY: NORMAL
LYMPHOCYTES ABSOLUTE: 1.4 K/UL (ref 1.1–4.5)
LYMPHOCYTES RELATIVE PERCENT: 19.1 % (ref 20–40)
MAGNESIUM: 1.4 MG/DL (ref 1.6–2.6)
MCH RBC QN AUTO: 26.4 PG (ref 27–31)
MCHC RBC AUTO-ENTMCNC: 29.7 G/DL (ref 33–37)
MCV RBC AUTO: 88.7 FL (ref 80–94)
MONOCYTES ABSOLUTE: 0.6 K/UL (ref 0–0.9)
MONOCYTES RELATIVE PERCENT: 7.7 % (ref 0–10)
MYCOPLASMA PNEUMONIAE BY PCR: NOT DETECTED
NEUTROPHILS ABSOLUTE: 5.3 K/UL (ref 1.5–7.5)
NEUTROPHILS RELATIVE PERCENT: 72.2 % (ref 50–65)
NITRITE, URINE: NEGATIVE
PARAINFLUENZA VIRUS 1 BY PCR: NOT DETECTED
PARAINFLUENZA VIRUS 2 BY PCR: NOT DETECTED
PARAINFLUENZA VIRUS 3 BY PCR: NOT DETECTED
PARAINFLUENZA VIRUS 4 BY PCR: NOT DETECTED
PDW BLD-RTO: 16.4 % (ref 11.5–14.5)
PERFORMED ON: ABNORMAL
PERFORMED ON: NORMAL
PH UA: 5.5 (ref 5–8)
PLATELET # BLD: 349 K/UL (ref 130–400)
PMV BLD AUTO: 10 FL (ref 9.4–12.4)
POTASSIUM SERPL-SCNC: 4.4 MMOL/L (ref 3.5–5)
POTASSIUM SERPL-SCNC: 4.4 MMOL/L (ref 3.5–5)
PRO-BNP: 117 PG/ML (ref 0–450)
PROTEIN UA: NEGATIVE MG/DL
PROTHROMBIN TIME: 15.1 SEC (ref 12–14.6)
RBC # BLD: 4.06 M/UL (ref 4.7–6.1)
RESPIRATORY SYNCYTIAL VIRUS BY PCR: NOT DETECTED
SARS-COV-2, PCR: NOT DETECTED
SEDIMENTATION RATE, ERYTHROCYTE: 6 MM/HR (ref 0–10)
SODIUM BLD-SCNC: 134 MMOL/L (ref 136–145)
SODIUM BLD-SCNC: 136 MMOL/L (ref 136–145)
SPECIFIC GRAVITY UA: 1.02 (ref 1–1.03)
T4 FREE: 2 NG/DL (ref 0.93–1.7)
TOTAL PROTEIN: 7.5 G/DL (ref 6.6–8.7)
TROPONIN: <0.01 NG/ML (ref 0–0.03)
TSH SERPL DL<=0.05 MIU/L-ACNC: 0.17 UIU/ML (ref 0.27–4.2)
UROBILINOGEN, URINE: 0.2 E.U./DL
WBC # BLD: 7.4 K/UL (ref 4.8–10.8)

## 2020-12-16 PROCEDURE — 85610 PROTHROMBIN TIME: CPT

## 2020-12-16 PROCEDURE — 2580000003 HC RX 258: Performed by: EMERGENCY MEDICINE

## 2020-12-16 PROCEDURE — 85025 COMPLETE CBC W/AUTO DIFF WBC: CPT

## 2020-12-16 PROCEDURE — 93306 TTE W/DOPPLER COMPLETE: CPT

## 2020-12-16 PROCEDURE — 84439 ASSAY OF FREE THYROXINE: CPT

## 2020-12-16 PROCEDURE — 93010 ELECTROCARDIOGRAM REPORT: CPT | Performed by: INTERNAL MEDICINE

## 2020-12-16 PROCEDURE — 1210000000 HC MED SURG R&B

## 2020-12-16 PROCEDURE — 71275 CT ANGIOGRAPHY CHEST: CPT

## 2020-12-16 PROCEDURE — 6360000002 HC RX W HCPCS

## 2020-12-16 PROCEDURE — 6360000002 HC RX W HCPCS: Performed by: HOSPITALIST

## 2020-12-16 PROCEDURE — 2580000003 HC RX 258: Performed by: HOSPITALIST

## 2020-12-16 PROCEDURE — 83880 ASSAY OF NATRIURETIC PEPTIDE: CPT

## 2020-12-16 PROCEDURE — 85379 FIBRIN DEGRADATION QUANT: CPT

## 2020-12-16 PROCEDURE — 0202U NFCT DS 22 TRGT SARS-COV-2: CPT

## 2020-12-16 PROCEDURE — 84443 ASSAY THYROID STIM HORMONE: CPT

## 2020-12-16 PROCEDURE — 99255 IP/OBS CONSLTJ NEW/EST HI 80: CPT | Performed by: INTERNAL MEDICINE

## 2020-12-16 PROCEDURE — 99285 EMERGENCY DEPT VISIT HI MDM: CPT

## 2020-12-16 PROCEDURE — 87086 URINE CULTURE/COLONY COUNT: CPT

## 2020-12-16 PROCEDURE — 82947 ASSAY GLUCOSE BLOOD QUANT: CPT

## 2020-12-16 PROCEDURE — 71045 X-RAY EXAM CHEST 1 VIEW: CPT

## 2020-12-16 PROCEDURE — 6370000000 HC RX 637 (ALT 250 FOR IP): Performed by: HOSPITALIST

## 2020-12-16 PROCEDURE — 36415 COLL VENOUS BLD VENIPUNCTURE: CPT

## 2020-12-16 PROCEDURE — 86140 C-REACTIVE PROTEIN: CPT

## 2020-12-16 PROCEDURE — 81003 URINALYSIS AUTO W/O SCOPE: CPT

## 2020-12-16 PROCEDURE — 6360000004 HC RX CONTRAST MEDICATION: Performed by: EMERGENCY MEDICINE

## 2020-12-16 PROCEDURE — 99999 PR OFFICE/OUTPT VISIT,PROCEDURE ONLY: CPT | Performed by: EMERGENCY MEDICINE

## 2020-12-16 PROCEDURE — 83735 ASSAY OF MAGNESIUM: CPT

## 2020-12-16 PROCEDURE — 87040 BLOOD CULTURE FOR BACTERIA: CPT

## 2020-12-16 PROCEDURE — 80053 COMPREHEN METABOLIC PANEL: CPT

## 2020-12-16 PROCEDURE — 85652 RBC SED RATE AUTOMATED: CPT

## 2020-12-16 PROCEDURE — 84484 ASSAY OF TROPONIN QUANT: CPT

## 2020-12-16 PROCEDURE — 87449 NOS EACH ORGANISM AG IA: CPT

## 2020-12-16 PROCEDURE — 83605 ASSAY OF LACTIC ACID: CPT

## 2020-12-16 PROCEDURE — 83036 HEMOGLOBIN GLYCOSYLATED A1C: CPT

## 2020-12-16 PROCEDURE — 93005 ELECTROCARDIOGRAM TRACING: CPT | Performed by: EMERGENCY MEDICINE

## 2020-12-16 RX ORDER — SODIUM CHLORIDE 9 MG/ML
INJECTION, SOLUTION INTRAVENOUS CONTINUOUS
Status: DISCONTINUED | OUTPATIENT
Start: 2020-12-16 | End: 2020-12-18 | Stop reason: HOSPADM

## 2020-12-16 RX ORDER — LEVOTHYROXINE SODIUM 0.1 MG/1
200 TABLET ORAL DAILY
Status: DISCONTINUED | OUTPATIENT
Start: 2020-12-17 | End: 2020-12-16

## 2020-12-16 RX ORDER — GUAIFENESIN 600 MG/1
600 TABLET, EXTENDED RELEASE ORAL 2 TIMES DAILY
Status: DISCONTINUED | OUTPATIENT
Start: 2020-12-16 | End: 2020-12-18 | Stop reason: HOSPADM

## 2020-12-16 RX ORDER — ALLOPURINOL 300 MG/1
300 TABLET ORAL DAILY
Status: DISCONTINUED | OUTPATIENT
Start: 2020-12-17 | End: 2020-12-18 | Stop reason: HOSPADM

## 2020-12-16 RX ORDER — SODIUM CHLORIDE, SODIUM LACTATE, POTASSIUM CHLORIDE, AND CALCIUM CHLORIDE .6; .31; .03; .02 G/100ML; G/100ML; G/100ML; G/100ML
1000 INJECTION, SOLUTION INTRAVENOUS ONCE
Status: COMPLETED | OUTPATIENT
Start: 2020-12-16 | End: 2020-12-16

## 2020-12-16 RX ORDER — MAGNESIUM SULFATE IN WATER 40 MG/ML
2 INJECTION, SOLUTION INTRAVENOUS ONCE
Status: COMPLETED | OUTPATIENT
Start: 2020-12-16 | End: 2020-12-16

## 2020-12-16 RX ORDER — PROMETHAZINE HYDROCHLORIDE 25 MG/1
12.5 TABLET ORAL EVERY 6 HOURS PRN
Status: DISCONTINUED | OUTPATIENT
Start: 2020-12-16 | End: 2020-12-18 | Stop reason: HOSPADM

## 2020-12-16 RX ORDER — NITROGLYCERIN 0.4 MG/1
0.4 TABLET SUBLINGUAL EVERY 5 MIN PRN
Status: DISCONTINUED | OUTPATIENT
Start: 2020-12-16 | End: 2020-12-18 | Stop reason: HOSPADM

## 2020-12-16 RX ORDER — WARFARIN SODIUM 5 MG/1
5 TABLET ORAL EVERY EVENING
Status: DISCONTINUED | OUTPATIENT
Start: 2020-12-16 | End: 2020-12-16

## 2020-12-16 RX ORDER — HYDROCODONE BITARTRATE AND ACETAMINOPHEN 10; 325 MG/1; MG/1
1 TABLET ORAL EVERY 6 HOURS PRN
Status: DISCONTINUED | OUTPATIENT
Start: 2020-12-16 | End: 2020-12-18 | Stop reason: HOSPADM

## 2020-12-16 RX ORDER — FAMOTIDINE 20 MG/1
20 TABLET, FILM COATED ORAL 2 TIMES DAILY
Status: DISCONTINUED | OUTPATIENT
Start: 2020-12-16 | End: 2020-12-18 | Stop reason: HOSPADM

## 2020-12-16 RX ORDER — TEMAZEPAM 7.5 MG/1
7.5 CAPSULE ORAL NIGHTLY PRN
Status: DISCONTINUED | OUTPATIENT
Start: 2020-12-16 | End: 2020-12-16 | Stop reason: CLARIF

## 2020-12-16 RX ORDER — NITROGLYCERIN 0.4 MG/1
TABLET SUBLINGUAL
Status: DISPENSED
Start: 2020-12-16 | End: 2020-12-16

## 2020-12-16 RX ORDER — LORAZEPAM 0.5 MG/1
0.5 TABLET ORAL EVERY 4 HOURS PRN
Status: DISCONTINUED | OUTPATIENT
Start: 2020-12-16 | End: 2020-12-18 | Stop reason: HOSPADM

## 2020-12-16 RX ORDER — ONDANSETRON 2 MG/ML
4 INJECTION INTRAMUSCULAR; INTRAVENOUS EVERY 6 HOURS PRN
Status: DISCONTINUED | OUTPATIENT
Start: 2020-12-16 | End: 2020-12-18 | Stop reason: HOSPADM

## 2020-12-16 RX ORDER — ACETAMINOPHEN 325 MG/1
650 TABLET ORAL EVERY 6 HOURS PRN
Status: DISCONTINUED | OUTPATIENT
Start: 2020-12-16 | End: 2020-12-18 | Stop reason: HOSPADM

## 2020-12-16 RX ORDER — PREGABALIN 75 MG/1
75 CAPSULE ORAL 2 TIMES DAILY
Status: DISCONTINUED | OUTPATIENT
Start: 2020-12-16 | End: 2020-12-18 | Stop reason: HOSPADM

## 2020-12-16 RX ORDER — WARFARIN SODIUM 5 MG/1
10 TABLET ORAL EVERY EVENING
Status: DISCONTINUED | OUTPATIENT
Start: 2020-12-16 | End: 2020-12-18 | Stop reason: HOSPADM

## 2020-12-16 RX ORDER — POLYETHYLENE GLYCOL 3350 17 G/17G
17 POWDER, FOR SOLUTION ORAL DAILY PRN
Status: DISCONTINUED | OUTPATIENT
Start: 2020-12-16 | End: 2020-12-18 | Stop reason: HOSPADM

## 2020-12-16 RX ORDER — SODIUM CHLORIDE 0.9 % (FLUSH) 0.9 %
10 SYRINGE (ML) INJECTION PRN
Status: DISCONTINUED | OUTPATIENT
Start: 2020-12-16 | End: 2020-12-18 | Stop reason: HOSPADM

## 2020-12-16 RX ORDER — MORPHINE SULFATE 4 MG/ML
INJECTION, SOLUTION INTRAMUSCULAR; INTRAVENOUS
Status: COMPLETED
Start: 2020-12-16 | End: 2020-12-16

## 2020-12-16 RX ORDER — HYDRALAZINE HYDROCHLORIDE 25 MG/1
25 TABLET, FILM COATED ORAL EVERY 8 HOURS SCHEDULED
Status: DISCONTINUED | OUTPATIENT
Start: 2020-12-16 | End: 2020-12-18 | Stop reason: HOSPADM

## 2020-12-16 RX ORDER — SODIUM CHLORIDE 0.9 % (FLUSH) 0.9 %
10 SYRINGE (ML) INJECTION EVERY 12 HOURS SCHEDULED
Status: DISCONTINUED | OUTPATIENT
Start: 2020-12-16 | End: 2020-12-18 | Stop reason: HOSPADM

## 2020-12-16 RX ORDER — MORPHINE SULFATE 4 MG/ML
4 INJECTION, SOLUTION INTRAMUSCULAR; INTRAVENOUS
Status: DISCONTINUED | OUTPATIENT
Start: 2020-12-16 | End: 2020-12-18 | Stop reason: HOSPADM

## 2020-12-16 RX ORDER — MORPHINE SULFATE 4 MG/ML
2 INJECTION, SOLUTION INTRAMUSCULAR; INTRAVENOUS
Status: DISCONTINUED | OUTPATIENT
Start: 2020-12-16 | End: 2020-12-16

## 2020-12-16 RX ORDER — ACETAMINOPHEN 650 MG/1
650 SUPPOSITORY RECTAL EVERY 6 HOURS PRN
Status: DISCONTINUED | OUTPATIENT
Start: 2020-12-16 | End: 2020-12-18 | Stop reason: HOSPADM

## 2020-12-16 RX ADMIN — MORPHINE SULFATE 2 MG: 4 INJECTION, SOLUTION INTRAMUSCULAR; INTRAVENOUS at 10:15

## 2020-12-16 RX ADMIN — PREGABALIN 75 MG: 75 CAPSULE ORAL at 10:31

## 2020-12-16 RX ADMIN — HYDROCODONE BITARTRATE AND ACETAMINOPHEN 1 TABLET: 10; 325 TABLET ORAL at 10:31

## 2020-12-16 RX ADMIN — WARFARIN SODIUM 10 MG: 5 TABLET ORAL at 16:55

## 2020-12-16 RX ADMIN — MORPHINE SULFATE 4 MG: 4 INJECTION, SOLUTION INTRAMUSCULAR; INTRAVENOUS at 12:30

## 2020-12-16 RX ADMIN — HYDROCODONE BITARTRATE AND ACETAMINOPHEN 1 TABLET: 10; 325 TABLET ORAL at 17:47

## 2020-12-16 RX ADMIN — PREGABALIN 75 MG: 75 CAPSULE ORAL at 21:22

## 2020-12-16 RX ADMIN — HYDRALAZINE HYDROCHLORIDE 25 MG: 25 TABLET, FILM COATED ORAL at 16:55

## 2020-12-16 RX ADMIN — FAMOTIDINE 20 MG: 20 TABLET, FILM COATED ORAL at 21:22

## 2020-12-16 RX ADMIN — INSULIN LISPRO 1 UNITS: 100 INJECTION, SOLUTION INTRAVENOUS; SUBCUTANEOUS at 21:25

## 2020-12-16 RX ADMIN — HYDRALAZINE HYDROCHLORIDE 25 MG: 25 TABLET, FILM COATED ORAL at 21:22

## 2020-12-16 RX ADMIN — GUAIFENESIN 600 MG: 600 TABLET, EXTENDED RELEASE ORAL at 10:32

## 2020-12-16 RX ADMIN — MORPHINE SULFATE 4 MG: 4 INJECTION, SOLUTION INTRAMUSCULAR; INTRAVENOUS at 21:22

## 2020-12-16 RX ADMIN — IOPAMIDOL 90 ML: 755 INJECTION, SOLUTION INTRAVENOUS at 05:58

## 2020-12-16 RX ADMIN — SODIUM CHLORIDE: 9 INJECTION, SOLUTION INTRAVENOUS at 07:35

## 2020-12-16 RX ADMIN — SODIUM CHLORIDE, POTASSIUM CHLORIDE, SODIUM LACTATE AND CALCIUM CHLORIDE 1000 ML: 600; 310; 30; 20 INJECTION, SOLUTION INTRAVENOUS at 03:28

## 2020-12-16 RX ADMIN — GUAIFENESIN 600 MG: 600 TABLET, EXTENDED RELEASE ORAL at 21:22

## 2020-12-16 RX ADMIN — Medication 10 ML: at 21:22

## 2020-12-16 RX ADMIN — MAGNESIUM SULFATE IN WATER 2 G: 40 INJECTION, SOLUTION INTRAVENOUS at 11:50

## 2020-12-16 RX ADMIN — NITROGLYCERIN 0.4 MG: 0.4 TABLET SUBLINGUAL at 10:16

## 2020-12-16 ASSESSMENT — PAIN SCALES - GENERAL
PAINLEVEL_OUTOF10: 4
PAINLEVEL_OUTOF10: 8
PAINLEVEL_OUTOF10: 6
PAINLEVEL_OUTOF10: 2
PAINLEVEL_OUTOF10: 8
PAINLEVEL_OUTOF10: 4
PAINLEVEL_OUTOF10: 8
PAINLEVEL_OUTOF10: 2
PAINLEVEL_OUTOF10: 6

## 2020-12-16 ASSESSMENT — PAIN DESCRIPTION - DIRECTION
RADIATING_TOWARDS: NECK

## 2020-12-16 ASSESSMENT — ENCOUNTER SYMPTOMS
ABDOMINAL PAIN: 0
ABDOMINAL DISTENTION: 0
WHEEZING: 0
COUGH: 1
RESPIRATORY NEGATIVE: 1
SHORTNESS OF BREATH: 1
EYES NEGATIVE: 1
VOMITING: 0
GASTROINTESTINAL NEGATIVE: 1
NAUSEA: 0
DIARRHEA: 0
SHORTNESS OF BREATH: 0

## 2020-12-16 ASSESSMENT — PAIN DESCRIPTION - LOCATION
LOCATION: CHEST

## 2020-12-16 ASSESSMENT — PAIN DESCRIPTION - DESCRIPTORS
DESCRIPTORS: BURNING;PRESSURE

## 2020-12-16 ASSESSMENT — PAIN DESCRIPTION - ORIENTATION
ORIENTATION: LEFT

## 2020-12-16 ASSESSMENT — PAIN DESCRIPTION - PAIN TYPE
TYPE: ACUTE PAIN

## 2020-12-16 ASSESSMENT — PAIN DESCRIPTION - FREQUENCY
FREQUENCY: CONTINUOUS

## 2020-12-16 ASSESSMENT — PAIN DESCRIPTION - ONSET
ONSET: ON-GOING

## 2020-12-16 ASSESSMENT — PAIN DESCRIPTION - PROGRESSION
CLINICAL_PROGRESSION: GRADUALLY IMPROVING

## 2020-12-16 NOTE — TELEPHONE ENCOUNTER
"Wife called and states that she tested positive for COVID-19 and that her  is on oxygen and is very short of breath.  Advised per care advice.  States that they want to wait to see if it gets better.  Encouraged to call EMS.  Wife asked that I call.  [id number] Called EMS.  0115 Called wife and let her know EMS is on their way.    Reason for Disposition  • Severe difficulty breathing (e.g., struggling for each breath, speaks in single words)    Additional Information  • Negative: [1] Breathing stopped AND [2] hasn't returned  • Negative: Choking on something    Answer Assessment - Initial Assessment Questions  1. RESPIRATORY STATUS: \"Describe your breathing?\" (e.g., wheezing, shortness of breath, unable to speak, severe coughing)       Severe shortness of breath  2. ONSET: \"When did this breathing problem begin?\"       Today  3. PATTERN \"Does the difficult breathing come and go, or has it been constant since it started?\"       Constant  4. SEVERITY: \"How bad is your breathing?\" (e.g., mild, moderate, severe)     - MILD: No SOB at rest, mild SOB with walking, speaks normally in sentences, can lay down, no retractions, pulse < 100.     - MODERATE: SOB at rest, SOB with minimal exertion and prefers to sit, cannot lie down flat, speaks in phrases, mild retractions, audible wheezing, pulse 100-120.     - SEVERE: Very SOB at rest, speaks in single words, struggling to breathe, sitting hunched forward, retractions, pulse > 120       Severe  5. RECURRENT SYMPTOM: \"Have you had difficulty breathing before?\" If so, ask: \"When was the last time?\" and \"What happened that time?\"       Unknown  6. CARDIAC HISTORY: \"Do you have any history of heart disease?\" (e.g., heart attack, angina, bypass surgery, angioplasty)       Unknown  7. LUNG HISTORY: \"Do you have any history of lung disease?\"  (e.g., pulmonary embolus, asthma, emphysema)      Wears oxygen according to wife  8. CAUSE: \"What do you think is causing the breathing " "problem?\"       Unknown  9. OTHER SYMPTOMS: \"Do you have any other symptoms? (e.g., dizziness, runny nose, cough, chest pain, fever)      Unknown  10. PREGNANCY: \"Is there any chance you are pregnant?\" \"When was your last menstrual period?\"        NA  11. TRAVEL: \"Have you traveled out of the country in the last month?\" (e.g., travel history, exposures)        Unknown    Protocols used: BREATHING DIFFICULTY-ADULT-AH      "
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
## 2020-12-16 NOTE — ED NOTES
Pt's glucose 52 on lab work, pt given two cups of orange juice to drink.      Evan De Leon RN  12/16/20 3373

## 2020-12-16 NOTE — ED NOTES
Report to [address] [place] [address], RN  12/16/20 [id number], [facility]  12/16/20 9174

## 2020-12-16 NOTE — H&P
History and Physical    Patient Name:  Dina Walker    :  1970    Chief Complaint:   Chest pain     History of Present Illness:   Dina Walker presents to [facility] with chest pain/pressure since last night, intermittent, increasing in intensity and frequency, currently 8 out 10, radiating to his neck, with associated palpitations and dyspnea. No calf pain, no peripheral swelling, no orthopnea. No cough, wheezing, fever or chills, no N/V/abd pain/D/C/dysuria. Pt has PMH of DM2, HTN, HL, hypothyroidism, PAF, chronic anticoagulation with warfarin. Covid 19 neg. On admission tachycardic with . Labs showed lactic acidosis of 7.6, mag 1.4, elevated t4 free, suppressed TSH, Hg 10.7, INR 1.19,  Trop neg. EKG Sinus tachycardia with sinus arrhythmia   Diffuse ST-T abnormality may be due to myocardial ischemia. Pt was given IV morphine and SL nitro, his CP improved to 4 out 10. Cardiology consulted. Past Medical History:   has a past medical history of Anxiety, Atrial fibrillation ([address] Chronic pain, COPD (chronic obstructive pulmonary disease) ([facility] [address] Depression, Diabetes mellitus ([facility] [address] GERD (gastroesophageal reflux disease), Gout, Hernia, Hyperlipidemia, Hypertension, Hypothyroid, Myocardial infarct, old, Neuropathy, Osteoarthritis, and Psychiatric illness. Surgical History:   has a past surgical history that includes Cardiac catheterization; Ankle surgery (); hernia repair; Stomach surgery (2018); Esophagus surgery; Gastric Band; Abdominoplasty; Abdominoplasty; and LAPAROTOMY EXPLORATORY (N/A, 1/3/2020). Social History:   reports that he is a non-smoker but has been exposed to tobacco smoke. He has never used smokeless tobacco. He reports that he does not drink alcohol or use drugs. Family History:  family history includes Diabetes in an other family member; Heart Disease in an other family member.      Medications:  Prior to Admission medications Medication Sig Start Date End Date Taking? Authorizing Provider   temazepam (RESTORIL) 15 MG capsule TAKE ONE CAPSULE BY MOUTH NIGHTLY AS NEEDED FOR SLEEP 12/8/20 1/7/21 Yes DEMETRIUS Shlutz   allopurinol (ZYLOPRIM) 300 MG tablet TAKE ONE TABLET BY MOUTH ONCE DAILY 12/8/20  Yes DEMETRIUS Shultz   metFORMIN (GLUCOPHAGE) 1000 MG tablet TAKE ONE TABLET BY MOUTH TWO TIMES DAILY (WITH MEALS) 9/25/20  Yes DEMETRIUS Shultz   VICTOZA 18 MG/3ML SOPN SC injection INJECT 1.8 MG INTO THE SKIN DAILY 9/1/20  Yes DEMETRIUS Shultz   hydrALAZINE (APRESOLINE) 50 MG tablet TAKE 1 TABLET BY MOUTH 3 TIMES DAILY 6/28/20  Yes DEMETRIUS Shultz   warfarin (COUMADIN) 5 MG tablet TAKE 1 TABLET BY MOUTH EVERY EVENING 5/8/20  Yes DEMETRIUS Millan   HYDROcodone-acetaminophen (NORCO)  MG per tablet Take 1 tablet by mouth 3 times daily. Yes Historical Provider, MD   MUCINEX 600 MG extended release tablet TAKE 2 TABLETS BY MOUTH 2 TIMES DAILY 3/31/20  Yes DEMETRIUS Shultz   levothyroxine (SYNTHROID) 200 MCG tablet TAKE ONE TABLET BY MOUTH ONCE DAILY 3/19/20  Yes DEMETRIUS Shultz   pregabalin (LYRICA) 75 MG capsule Take 75 mg by mouth 2 times daily. Yes Historical Provider, MD   omeprazole (PRILOSEC) 40 MG delayed release capsule omeprazole 40 mg capsule,delayed release   Take 1 capsule twice a day by oral route for 30 days. Yes Historical Provider, MD   Insulin Pen Needle (B-D ULTRAFINE III SHORT PEN) 31G X 8 MM MISC Inject 1 each into the skin as needed (FOR DM) 9/30/20   DEMETRIUS Shultz   sildenafil (VIAGRA) 50 MG tablet Take 1 tablet by mouth as needed for Erectile Dysfunction 9/30/20   DEMETRIUS Shultz   magnesium citrate solution Take 592 mls by mouth for 1 doses. 1/10/20   Glenny Hubbard MD       Allergies:  Food     Review of Systems:   · Constitutional: there has been no unanticipated weight loss. There's been change in energy level, sleep pattern, or activity level. Neck: Neck supple, and symmetric. No adenopathy. Trachea is midline. Carotids brisk in upstroke without bruits, No abnormal JVP noted at 45°. Lungs: Lungs clear to auscultation bilaterally. No retractions or use of accessory muscles. No vocal fremitus. No ronchi, crackle or rale. Heart:  S1 > S2. Regular rate and rhythm. No gallop, murmur, rub, palpable thrill or heave noted. Abdomen: Abdomen soft, non-tender. BS normal. No masses, organomegaly. No hernia noted. Extremities: Extremities normal. No deformities, edema, or skin discoloration. No cyanosis or clubbing noted to the nails. Peripheral pulses 4/4. Musculoskeletal: Spine ROM normal. Muscular strength intact. Neuro: Cranial nerves intact. Motor: Strength 5/5 in all extremities. No focal weakness. Sensory: grossly normal to touch. Pertinent Labs:  CBC:   Recent Labs     12/16/20 0253   WBC 7.4   RBC 4.06*   HGB 10.7*   HCT 36.0*   MCV 88.7   MCH 26.4*   MCHC 29.7*   RDW 16.4*      MPV 10.0     BMP:   Recent Labs     12/16/20 0253 12/16/20  0408    134*   K 4.4 4.4    101   CO2 12* 10*   BUN 15 15   CREATININE 1.0 0.9   GLUCOSE 52* 88   CALCIUM 9.4 8.9     ABGs: No results for input(s): PO2, PCO2, PH, HCO3, BE, O2SAT in the last 72 hours.   INR:   Recent Labs     12/16/20 0253   INR 1.19*   PROTIME 15.1*     BNP:  No results found for: BNP  TSH:   Lab Results   Component Value Date    TSH 1.180 02/22/2019     Cardiac Injury Profile:   Lab Results   Component Value Date    TROPONINI <0.01 01/03/2020     Lipid Profile: No components found for: CHLPL  Lab Results   Component Value Date    TRIG 91 02/22/2019    TRIG 231 (H) 04/24/2018    TRIG 750 (H) 06/03/2016     Lab Results   Component Value Date    HDL 63 12/12/2019    HDL 51 (L) 02/22/2019    HDL 36 (L) 04/24/2018     Lab Results   Component Value Date    LDLCALC 49 12/12/2019    LDLCALC 73 02/22/2019    LDLCALC 74 04/24/2018     No results found for: LABVLDL Hemoglobin A1C:   Lab Results   Component Value Date    LABA1C 5.6 08/12/2020     No results found for: EAG      Assessment/Plan:  · Chest pain - cardiology consult- nitro and morphine prn, monitor trop   · PAF with chronic anticoagulation and sub therapeutic INR- increase warfarin tonight, inr daily   · Lactic acidosis- -hydration- rule out infection- stop metformin  · DM2- ISS  · GERD- pepcid  · Hypomagnesemia - replace - daily mag   · HL - lipid panel am  Hypothyroidism- overcorrected- hold synthroid   Patient Active Problem List:  Hypertensive disorder  Hiatal hernia   Anxiety  Chronic obstructive pulmonary disease (HCC)  Obstructive sleep apnea  Bipolar disorder with severe depression ([facility] [address]  Low back pain  Occlusion of superior mesenteric artery ([facility] [address]               I have reviewed my findings and recommendations in detail with Kavin Lock.     James Espinal MD       Admission level 2
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
## 2020-12-16 NOTE — ED NOTES
Patient placed in a gown Patient placed on cardiac monitor, continuous pulse oximeter, and NIBP monitor.  Monitor alarms on.       Rose Argueta RN  12/16/20 8930

## 2020-12-16 NOTE — ED NOTES
Dr Víctor Booth notified by Trae Storey of lactic     Fuad Bush, [facility]  12/16/20 [address], RN  12/16/20 [address], RN  12/16/20 4620

## 2020-12-16 NOTE — ED NOTES
Pt to [facility] [address], [address] [place] [address]  12/16/20 5226

## 2020-12-16 NOTE — ED NOTES
Bed: 03  Expected date: 12/16/20  Expected time:   Means of arrival: [facility]  Comments:  MARCIA Durbin RN  12/16/20 2722

## 2020-12-17 ENCOUNTER — APPOINTMENT (OUTPATIENT)
Dept: NUCLEAR MEDICINE | Age: 50
DRG: 641 | End: 2020-12-17
Payer: MEDICAID

## 2020-12-17 LAB
CHOLESTEROL, TOTAL: 130 MG/DL (ref 160–199)
GLUCOSE BLD-MCNC: 102 MG/DL (ref 70–99)
GLUCOSE BLD-MCNC: 104 MG/DL (ref 70–99)
GLUCOSE BLD-MCNC: 143 MG/DL (ref 70–99)
GLUCOSE BLD-MCNC: 95 MG/DL (ref 70–99)
HDLC SERPL-MCNC: 61 MG/DL (ref 55–121)
INR BLD: 2.15 (ref 0.88–1.18)
LDL CHOLESTEROL CALCULATED: 53 MG/DL
MAGNESIUM: 1.5 MG/DL (ref 1.6–2.6)
PERFORMED ON: ABNORMAL
PERFORMED ON: NORMAL
PROTHROMBIN TIME: 24.4 SEC (ref 12–14.6)
TRIGL SERPL-MCNC: 81 MG/DL (ref 0–149)
TROPONIN: <0.01 NG/ML (ref 0–0.03)
TROPONIN: <0.01 NG/ML (ref 0–0.03)

## 2020-12-17 PROCEDURE — 2580000003 HC RX 258: Performed by: HOSPITALIST

## 2020-12-17 PROCEDURE — 3430000000 HC RX DIAGNOSTIC RADIOPHARMACEUTICAL: Performed by: INTERNAL MEDICINE

## 2020-12-17 PROCEDURE — A9500 TC99M SESTAMIBI: HCPCS | Performed by: INTERNAL MEDICINE

## 2020-12-17 PROCEDURE — 83735 ASSAY OF MAGNESIUM: CPT

## 2020-12-17 PROCEDURE — 80061 LIPID PANEL: CPT

## 2020-12-17 PROCEDURE — 82947 ASSAY GLUCOSE BLOOD QUANT: CPT

## 2020-12-17 PROCEDURE — 93017 CV STRESS TEST TRACING ONLY: CPT

## 2020-12-17 PROCEDURE — 6360000002 HC RX W HCPCS: Performed by: INTERNAL MEDICINE

## 2020-12-17 PROCEDURE — 36415 COLL VENOUS BLD VENIPUNCTURE: CPT

## 2020-12-17 PROCEDURE — 6360000002 HC RX W HCPCS: Performed by: HOSPITALIST

## 2020-12-17 PROCEDURE — 84484 ASSAY OF TROPONIN QUANT: CPT

## 2020-12-17 PROCEDURE — 1210000000 HC MED SURG R&B

## 2020-12-17 PROCEDURE — 85610 PROTHROMBIN TIME: CPT

## 2020-12-17 PROCEDURE — 6370000000 HC RX 637 (ALT 250 FOR IP): Performed by: HOSPITALIST

## 2020-12-17 RX ADMIN — REGADENOSON 0.4 MG: 0.08 INJECTION, SOLUTION INTRAVENOUS at 08:50

## 2020-12-17 RX ADMIN — HYDROCODONE BITARTRATE AND ACETAMINOPHEN 1 TABLET: 10; 325 TABLET ORAL at 23:13

## 2020-12-17 RX ADMIN — Medication 10 ML: at 21:33

## 2020-12-17 RX ADMIN — MORPHINE SULFATE 4 MG: 4 INJECTION, SOLUTION INTRAMUSCULAR; INTRAVENOUS at 22:21

## 2020-12-17 RX ADMIN — HYDROCODONE BITARTRATE AND ACETAMINOPHEN 1 TABLET: 10; 325 TABLET ORAL at 17:02

## 2020-12-17 RX ADMIN — GUAIFENESIN 600 MG: 600 TABLET, EXTENDED RELEASE ORAL at 21:08

## 2020-12-17 RX ADMIN — Medication 10 ML: at 09:36

## 2020-12-17 RX ADMIN — FAMOTIDINE 20 MG: 20 TABLET, FILM COATED ORAL at 21:08

## 2020-12-17 RX ADMIN — ALLOPURINOL 300 MG: 300 TABLET ORAL at 09:35

## 2020-12-17 RX ADMIN — MORPHINE SULFATE 4 MG: 4 INJECTION, SOLUTION INTRAMUSCULAR; INTRAVENOUS at 09:36

## 2020-12-17 RX ADMIN — FAMOTIDINE 20 MG: 20 TABLET, FILM COATED ORAL at 09:35

## 2020-12-17 RX ADMIN — HYDRALAZINE HYDROCHLORIDE 25 MG: 25 TABLET, FILM COATED ORAL at 21:08

## 2020-12-17 RX ADMIN — HYDROCODONE BITARTRATE AND ACETAMINOPHEN 1 TABLET: 10; 325 TABLET ORAL at 10:29

## 2020-12-17 RX ADMIN — PREGABALIN 75 MG: 75 CAPSULE ORAL at 09:35

## 2020-12-17 RX ADMIN — TETRAKIS(2-METHOXYISOBUTYLISOCYANIDE)COPPER(I) TETRAFLUOROBORATE 30 MILLICURIE: 1 INJECTION, POWDER, LYOPHILIZED, FOR SOLUTION INTRAVENOUS at 09:25

## 2020-12-17 RX ADMIN — HYDROCODONE BITARTRATE AND ACETAMINOPHEN 1 TABLET: 10; 325 TABLET ORAL at 02:40

## 2020-12-17 RX ADMIN — GUAIFENESIN 600 MG: 600 TABLET, EXTENDED RELEASE ORAL at 09:35

## 2020-12-17 RX ADMIN — TETRAKIS(2-METHOXYISOBUTYLISOCYANIDE)COPPER(I) TETRAFLUOROBORATE 10 MILLICURIE: 1 INJECTION, POWDER, LYOPHILIZED, FOR SOLUTION INTRAVENOUS at 09:25

## 2020-12-17 RX ADMIN — MORPHINE SULFATE 4 MG: 4 INJECTION, SOLUTION INTRAMUSCULAR; INTRAVENOUS at 19:45

## 2020-12-17 RX ADMIN — WARFARIN SODIUM 10 MG: 5 TABLET ORAL at 17:01

## 2020-12-17 RX ADMIN — PREGABALIN 75 MG: 75 CAPSULE ORAL at 21:08

## 2020-12-17 RX ADMIN — HYDRALAZINE HYDROCHLORIDE 25 MG: 25 TABLET, FILM COATED ORAL at 14:35

## 2020-12-17 ASSESSMENT — PAIN SCALES - GENERAL
PAINLEVEL_OUTOF10: 8
PAINLEVEL_OUTOF10: 6
PAINLEVEL_OUTOF10: 8
PAINLEVEL_OUTOF10: 6
PAINLEVEL_OUTOF10: 8
PAINLEVEL_OUTOF10: 5
PAINLEVEL_OUTOF10: 0
PAINLEVEL_OUTOF10: 6
PAINLEVEL_OUTOF10: 8
PAINLEVEL_OUTOF10: 1
PAINLEVEL_OUTOF10: 6
PAINLEVEL_OUTOF10: 8
PAINLEVEL_OUTOF10: 5
PAINLEVEL_OUTOF10: 8
PAINLEVEL_OUTOF10: 8

## 2020-12-17 ASSESSMENT — PAIN DESCRIPTION - PAIN TYPE: TYPE: ACUTE PAIN;CHRONIC PAIN

## 2020-12-17 NOTE — PROGRESS NOTES
Patient is refusing blood draw for troponin. Told phlebotomist that he is being stuck too much.      Electronically signed by Kendra Cohen RN on 12/17/2020 at 2:24 PM

## 2020-12-17 NOTE — PROGRESS NOTES
Hospitalist Progress Note  12/17/2020 9:34 AM  Subjective:   Admit Date: 12/16/2020  PCP: DEMETRIUS Eaton    Chief Complaint: Chest pain     Subjective: Just returned from stress test.  Having continued chest pressure, about 8 out of 10. Says is continued overnight but was relieved with nitro and IV morphine. He is having a cough with some sputum production. Also associated with shortness of breath. His chest pain is worsened with a deep breath and palpation of left anterior chest.     [facility]: 6year-old who presented on 12/16 with chest pain. On admission found to have lactic acidosis of 7.6, Covid negative. Cardiology was consulted. ROS: Six point review of systems is negative except as specifically addressed above.     Diet NPO, After Midnight    Intake/Output Summary (Last 24 hours) at 12/17/2020 0934  Last data filed at 12/16/2020 1805  Gross per 24 hour   Intake 240 ml   Output 520 ml   Net -280 ml     Medications:   sodium chloride 50 mL/hr at 12/16/20 2123     Current Facility-Administered Medications   Medication Dose Route Frequency Provider Last Rate Last Admin    0.9 % sodium chloride infusion   Intravenous Continuous Favian Davis MD 50 mL/hr at 12/16/20 2123 Rate Change at 12/16/20 2123    insulin lispro (HUMALOG) injection vial 0-6 Units  0-6 Units Subcutaneous TID WC Favian Davis MD        insulin lispro (HUMALOG) injection vial 0-3 Units  0-3 Units Subcutaneous Nightly Favian Davis MD   1 Units at 12/16/20 2125    sodium chloride flush 0.9 % injection 10 mL  10 mL Intravenous 2 times per day Favian Davis MD   10 mL at 12/16/20 2122    sodium chloride flush 0.9 % injection 10 mL  10 mL Intravenous PRN Favian Davis MD        promethazine (PHENERGAN) tablet 12.5 mg  12.5 mg Oral Q6H PRN Favian Davis MD        Or  ondansetron (ZOFRAN) injection 4 mg  4 mg Intravenous Q6H PRN Mario Akhtar MD        polyethylene glycol [facility]) packet 17 g  17 g Oral Daily PRN Mario Akhtar MD        famotidine (PEPCID) tablet 20 mg  20 mg Oral BID Mario Akhtar MD   20 mg at 12/16/20 2122    acetaminophen (TYLENOL) tablet 650 mg  650 mg Oral Q6H PRN Mario Akhtar MD        Or    acetaminophen (TYLENOL) suppository 650 mg  650 mg Rectal Q6H PRN Mario Akhtar MD        nitroGLYCERIN (NITROSTAT) SL tablet 0.4 mg  0.4 mg Sublingual Q5 Min PRN Mario Akhtar MD   0.4 mg at 12/16/20 1016    allopurinol (ZYLOPRIM) tablet 300 mg  300 mg Oral Daily Mario Akhtar MD        HYDROcodone-acetaminophen [facility])  MG per tablet 1 tablet  1 tablet Oral Q6H PRN Mario Akhtar MD   1 tablet at 12/17/20 0240    guaiFENesin [facility]) extended release tablet 600 mg  600 mg Oral BID Mario Akhtar MD   600 mg at 12/16/20 2122    pregabalin (LYRICA) capsule 75 mg  75 mg Oral BID Mario Akhtar MD   75 mg at 12/16/20 2122    warfarin (COUMADIN) daily dosing (placeholder)   Other RX Placeholder Mario Akhtar MD        morphine injection 4 mg  4 mg Intravenous Q2H PRN Mario Akhtar MD   4 mg at 12/16/20 2122    warfarin (COUMADIN) tablet 10 mg  10 mg Oral QPM Mario Akhtar MD   10 mg at 12/16/20 1655    hydrALAZINE (APRESOLINE) tablet 25 mg  25 mg Oral 3 times per day Mario Akhtar MD   25 mg at 12/16/20 2122    LORazepam (ATIVAN) tablet 0.5 mg  0.5 mg Oral Q4H PRN Mario Akhtar MD            Labs:     Recent Labs     12/16/20 0253   WBC 7.4   RBC 4.06*   HGB 10.7*   HCT 36.0*   MCV 88.7   MCH 26.4*   MCHC 29.7*        Recent Labs     12/16/20 0253 12/16/20  0408    134*   K 4.4 4.4   ANIONGAP 23* 23*    101   CO2 12* 10*   BUN 15 15   CREATININE 1.0 0.9   GLUCOSE 52* 88 CALCIUM 9.4 8.9     Recent Labs     12/16/20  0952 12/17/20  0248   MG 1.4* 1.5*     Recent Labs     12/16/20  0253   AST 44*   ALT 27   BILITOT <0.2   ALKPHOS 64     ABGs:No results for input(s): PH, PO2, PCO2, HCO3, BE, O2SAT in the last 72 hours. Troponin T:   Recent Labs     12/16/20  1419 12/16/20  2017 12/17/20  0248   TROPONINI <0.01 <0.01 <0.01     INR:   Recent Labs     12/16/20  0253 12/17/20  0248   INR 1.19* 2.15*     Lactic Acid:   Recent Labs     12/16/20  0951   LACTA 1.3       Objective:   Vitals: /78   Pulse 72   Temp 97.3 °F (36.3 °C)   Resp 18   Ht 5' 11.5\" (1.816 m)   Wt 197 lb (89.4 kg)   SpO2 98%   BMI 27.09 kg/m²   24HR INTAKE/OUTPUT:      Intake/Output Summary (Last 24 hours) at 12/17/2020 7400  Last data filed at 12/16/2020 1805  Gross per 24 hour   Intake 240 ml   Output 520 ml   Net -280 ml     General appearance: walking around the room with mild tachypnea   Head: NC/AT   Eyes: normal sclera  Mouth: mask  Lungs: no increased work of breathing, CTAB  Heart: RRR  Abdomen: soft, NT, ND  Extremities: no LE edema  Chest pain reproducible with palpation of left anterior chest   Neurologic: no focal neurologic deficits, normal sensation, alert and oriented, affect and mood appropriate  Skin: no rashes    Assessment and Plan: Active Problems:    Lactic acidosis    Chest pain  Resolved Problems:    * No resolved hospital problems. *    Atypical chest pain  Troponins negative. CTA w/o PE, infection. - cardiology Consulted  - awaiting stress test results   - PRN morphine, nitro  - telemetry    Anion gap metabolic acidosis, Lactic acidosis- resolved with IV fluids    Paroxsymal atrial fibrillation- continue home meds, Warfarin    Type 2 diabetes  Hold Metformin and Victoza  - Continue low-dose sliding scale insulin and adjust as needed    Chronic problems, continue home meds where appropriate.      Advance Directive: Full Code    DVT prophylaxis: warfarin Discharge planning: TBD pending stress test, cardiology recs     Signed:   Paco Cunha MD 12/17/2020 9:34 AM  Hospitalist

## 2020-12-18 ENCOUNTER — TELEPHONE (OUTPATIENT)
Dept: CARDIOLOGY | Age: 50
End: 2020-12-18

## 2020-12-18 VITALS
RESPIRATION RATE: 16 BRPM | BODY MASS INDEX: 28 KG/M2 | DIASTOLIC BLOOD PRESSURE: 86 MMHG | WEIGHT: 206.7 LBS | SYSTOLIC BLOOD PRESSURE: 130 MMHG | HEIGHT: 72 IN | OXYGEN SATURATION: 98 % | TEMPERATURE: 97.6 F | HEART RATE: 76 BPM

## 2020-12-18 LAB
ANION GAP SERPL CALCULATED.3IONS-SCNC: 11 MMOL/L (ref 7–19)
BASOPHILS ABSOLUTE: 0 K/UL (ref 0–0.2)
BASOPHILS RELATIVE PERCENT: 0.5 % (ref 0–1)
BUN BLDV-MCNC: 10 MG/DL (ref 6–20)
CALCIUM SERPL-MCNC: 8.4 MG/DL (ref 8.6–10)
CHLORIDE BLD-SCNC: 106 MMOL/L (ref 98–111)
CO2: 25 MMOL/L (ref 22–29)
CREAT SERPL-MCNC: 0.8 MG/DL (ref 0.5–1.2)
EOSINOPHILS ABSOLUTE: 0.1 K/UL (ref 0–0.6)
EOSINOPHILS RELATIVE PERCENT: 2.5 % (ref 0–5)
GFR AFRICAN AMERICAN: >59
GFR NON-AFRICAN AMERICAN: >60
GLUCOSE BLD-MCNC: 100 MG/DL (ref 70–99)
GLUCOSE BLD-MCNC: 128 MG/DL (ref 74–109)
HCT VFR BLD CALC: 32.8 % (ref 42–52)
HEMOGLOBIN: 9.8 G/DL (ref 14–18)
IMMATURE GRANULOCYTES #: 0 K/UL
INR BLD: 2.48 (ref 0.88–1.18)
LYMPHOCYTES ABSOLUTE: 2.1 K/UL (ref 1.1–4.5)
LYMPHOCYTES RELATIVE PERCENT: 51.2 % (ref 20–40)
MAGNESIUM: 1.4 MG/DL (ref 1.6–2.6)
MCH RBC QN AUTO: 25.9 PG (ref 27–31)
MCHC RBC AUTO-ENTMCNC: 29.9 G/DL (ref 33–37)
MCV RBC AUTO: 86.8 FL (ref 80–94)
MONOCYTES ABSOLUTE: 0.5 K/UL (ref 0–0.9)
MONOCYTES RELATIVE PERCENT: 11.4 % (ref 0–10)
NEUTROPHILS ABSOLUTE: 1.4 K/UL (ref 1.5–7.5)
NEUTROPHILS RELATIVE PERCENT: 34.4 % (ref 50–65)
PDW BLD-RTO: 16.2 % (ref 11.5–14.5)
PERFORMED ON: ABNORMAL
PLATELET # BLD: 287 K/UL (ref 130–400)
PMV BLD AUTO: 10 FL (ref 9.4–12.4)
POTASSIUM REFLEX MAGNESIUM: 4.3 MMOL/L (ref 3.5–5)
PROTHROMBIN TIME: 27.4 SEC (ref 12–14.6)
RBC # BLD: 3.78 M/UL (ref 4.7–6.1)
SODIUM BLD-SCNC: 142 MMOL/L (ref 136–145)
URINE CULTURE, ROUTINE: NORMAL
WBC # BLD: 4 K/UL (ref 4.8–10.8)

## 2020-12-18 PROCEDURE — 6370000000 HC RX 637 (ALT 250 FOR IP): Performed by: HOSPITALIST

## 2020-12-18 PROCEDURE — 6360000002 HC RX W HCPCS: Performed by: HOSPITALIST

## 2020-12-18 PROCEDURE — 85610 PROTHROMBIN TIME: CPT

## 2020-12-18 PROCEDURE — 36415 COLL VENOUS BLD VENIPUNCTURE: CPT

## 2020-12-18 PROCEDURE — 80048 BASIC METABOLIC PNL TOTAL CA: CPT

## 2020-12-18 PROCEDURE — 83735 ASSAY OF MAGNESIUM: CPT

## 2020-12-18 PROCEDURE — 82947 ASSAY GLUCOSE BLOOD QUANT: CPT

## 2020-12-18 PROCEDURE — 85025 COMPLETE CBC W/AUTO DIFF WBC: CPT

## 2020-12-18 RX ADMIN — HYDRALAZINE HYDROCHLORIDE 25 MG: 25 TABLET, FILM COATED ORAL at 06:00

## 2020-12-18 RX ADMIN — MORPHINE SULFATE 4 MG: 4 INJECTION, SOLUTION INTRAMUSCULAR; INTRAVENOUS at 02:39

## 2020-12-18 RX ADMIN — HYDROCODONE BITARTRATE AND ACETAMINOPHEN 1 TABLET: 10; 325 TABLET ORAL at 05:20

## 2020-12-18 RX ADMIN — MORPHINE SULFATE 4 MG: 4 INJECTION, SOLUTION INTRAMUSCULAR; INTRAVENOUS at 04:51

## 2020-12-18 RX ADMIN — MORPHINE SULFATE 4 MG: 4 INJECTION, SOLUTION INTRAMUSCULAR; INTRAVENOUS at 00:24

## 2020-12-18 ASSESSMENT — PAIN SCALES - GENERAL
PAINLEVEL_OUTOF10: 8
PAINLEVEL_OUTOF10: 6
PAINLEVEL_OUTOF10: 9
PAINLEVEL_OUTOF10: 8
PAINLEVEL_OUTOF10: 6
PAINLEVEL_OUTOF10: 8
PAINLEVEL_OUTOF10: 6

## 2020-12-18 NOTE — DISCHARGE SUMMARY
Discharge Summary    NAME: Janeen Blanton  :  1970  MRN:  [id number]    Admit date:  2020  Discharge date:  2020    Admitting Physician:  Dylan King MD    Advance Directive: Full Code    Consults: IP CONSULT TO CARDIOLOGY    Primary Care Physician:  DEMETRIUS Ospina    Discharge Diagnoses: Active Problems:    Lactic acidosis    Chest pain  Resolved Problems:    * No resolved hospital problems. *      HPI:   Janeen Blanton presents to [facility] with chest pain/pressure since last night, intermittent, increasing in intensity and frequency, currently 8 out 10, radiating to his neck, with associated palpitations and dyspnea. No calf pain, no peripheral swelling, no orthopnea. No cough, wheezing, fever or chills, no N/V/abd pain/D/C/dysuria. Pt has PMH of DM2, HTN, HL, hypothyroidism, PAF, chronic anticoagulation with warfarin. Covid 19 neg. On admission tachycardic with . Labs showed lactic acidosis of 7.6, mag 1.4, elevated t4 free, suppressed TSH, Hg 10.7, INR 1.19,  Trop neg. EKG Sinus tachycardia with sinus arrhythmia   Diffuse ST-T abnormality may be due to myocardial ischemia. Pt was given IV morphine and SL nitro, his CP improved to 4 out 10. Cardiology consulted. Hospital Course:  64-year-old who presented on  with chest pain. On admission found to have [address] and lactic acidosis of 7.6, Covid negative. Cardiology was consulted. Anion gap and lactic acidosis suggested improved with IV fluids. Underwent nuclear medicine stress test, which resulted this morning -Normal ejection fraction 53% abnormal perfusion study with inferior   and apical defects ischemia not excluded. On the morning of  he signed out AMA prior to evaluation by myself or cardiology.  See RN note from 7:41.  =    Significant Diagnostic Studies:   Xr Chest Portable    Result Date: 2020 Exam:   XR CHEST PORTABLE  Date:  12/16/2020 History:  Male, age  53 years; chest x-ray dated January 3, 2020. COMPARISON:  None. Findings : The heart and mediastinum are normal in size. Lungs are without focal infiltrate, mass or effusions. The bones show no acute pathology. Impression: No acute cardiopulmonary disease.  Signed by Dr Maxine Lamb on 12/16/2020 7:36 AM    Cta Pulmonary W Contrast    Result Date: 12/16/2020 Examination. CTA PULMONARY W CONTRAST 12/16/2020 4:21 AM History: Shortness of breath. DLP: 711 mGycm. The CT angiography of the chest is performed after intravenous contrast enhancement. The images are acquired in axial plane with subsequent 2-D reconstruction coronal and sagittal planes and 3-D maximum intensity projection reconstruction. The comparison is made with the previous study dated 1/3/2020. There is suboptimal opacification of the pulmonary arteries and the branches. No obvious filling defect of the visualized pulmonary arterial bed which is limited to the central pulmonary arteries and the proximal segmental branches. The distal pulmonary arterial bed is not visualized are evaluated. Mild atheromatous changes of thoracic aorta are seen. No aneurysmal dilatation. A moderate atheromatous changes of coronary arteries are seen. There is no evidence of mediastinal or hilar mass or lymphadenopathy. There is moderate dilatation of the esophagus with small fluid level. There is moderate thickening of the wall of the distal esophagus adjacent and proximal to a heart of hernia. The lungs are poorly distended. There is an area of discoid atelectasis in the right lower lobe posteriorly and medially. This is similar to the previous study. No areas of focal consolidation or infiltrate. There is a left laryngeal diverticula which is incompletely included in the study. The thyroid gland is not optimally visualized or evaluated. There is no axillary lymphadenopathy. The visualized liver and spleen appear unremarkable. There is evidence of previous gastric surgery. The gallbladder, the pancreas and kidneys are incompletely visualized and not evaluated in this study. The images reviewed in bone window show chronic degenerative changes of the thoracic spine. No focal bony lesion. A limited diagnostic study due to poor bolus timing and concentration. No obvious embolism of the visualized pulmonary arterial bed. The findings as above. Above study was initially reviewed and reported by stat rads. I do not find any discrepancies. Signed by Dr Zachery Wood on 12/16/2020 7:57 AM    Nm Myocardial Spect Rest Exercise Or Rx    Result Date: 12/18/2020  Lexiscan Nuclear Stress Test Report Procedure date: 12/17/2020 Indications: shortness of breath Procedure: Stress was performed with injection of 0.4 mg Lexiscan. Vital signs and EKG were monitored. Technetium-99 sestamibi was injected in divided doses, 10.4 mCi and 30.9 mCi respectively for rest and stress imaging. The patient was discharged in stable condition. Results: Patient had symptoms of dyspnea during infusion that resolved in recovery. Baseline EKG showed normal sinus rhythm with nonspecific ST/T changes. During stress there were  EKG changes or rhythm changes. Baseline and peak blood pressures were 144/96, and 144/96 respectively. Baseline and peak heart rates were 68 and  102 respectively. Review of rest and stress images obtained utilizing a gated SPECT acquisition protocol along with review of the polar plot revealed: 1. Ejection fraction 53% 2. Wall motion study unremarkable 3. Myocardial perfusion imaging demonstrated defect inferior and apical region with some suggestion of reversibility.  Summary impressions: Normal ejection fraction 53% abnormal perfusion study with inferior and apical defects ischemia not excluded correlate clinically and/or angiographically if appropriate Signed by Dr Kyle Mullen on 12/18/2020 8:10 AM    Echo 2d Wo Color Doppler Complete    Result Date: 12/16/2020 function. Pericardial Effusion  No pericardial effusion. Miscellaneous  Dilation of the IVC with decreased respiratory variation suggesting  increased right atrial pressure/volume overload. M-Mode Measurements (cm)   LVIDd: 3.69 cm                       LVIDs: 2.73 cm  IVSd: 1.26 cm  LVPWd: 1.36 cm                       AO Root Dimension: 2.4 cm  % Ejection Fraction: 55 %            LA: 4.2 cm                                       LVOT: 2.2 cm  Doppler Measurements:   AV Peak Gradient: 12.67 mmHg          MV Peak E-Wave: 97.7 cm/s                                        MV Peak A-Wave: 91.7 cm/s                                        MV E/A Ratio: 1.07 %                                        MV Peak Gradient: 3.82 mmHg        Pertinent Labs:   CBC:   Recent Labs     12/16/20  0253 12/18/20  0357   WBC 7.4 4.0*   HGB 10.7* 9.8*    287     BMP:    Recent Labs     12/16/20  0253 12/16/20  0408 12/18/20  0357    134* 142   K 4.4 4.4 4.3    101 106   CO2 12* 10* 25   BUN 15 15 10   CREATININE 1.0 0.9 0.8   GLUCOSE 52* 88 128*     INR:   Recent Labs     12/16/20  0253 12/17/20  0248 12/18/20  0357   INR 1.19* 2.15* 2.48*     Lipids:   Recent Labs     12/17/20  0248   CHOL 130*   HDL 61     ABGs:No results for input(s): PHART, FWS4ELV, PO2ART, STD8BYN, BEART, HGBAE, J3RGNXXQ, CARBOXHGBART, 02THERAPY in the last 72 hours. HgBA1c:    Recent Labs     12/16/20  0951   LABA1C 6.1*       Physical Exam:  Vital Signs: /86   Pulse 76   Temp 97.6 °F (36.4 °C) (Temporal)   Resp 16   Ht 5' 11.5\" (1.816 m)   Wt 206 lb 11.2 oz (93.8 kg)   SpO2 98%   BMI 28.43 kg/m²   Patient left AMA prior to my evaluation. Discharge Medications:      Patient left [address] Follow-up: Patient left AMA    Disposition: Patient left AMA    Time spent on discharge > 35 minutes. Signed:   Ceci Guerra MD

## 2020-12-18 NOTE — LETTER
[facility] [place], [facility]  [address], [place] [address], [address] [id number]  Phone: [phone]  Fax: [phone]            2020          Re:  Mr. Elyse Wiggins   :  1970  [address] [place] [address]    To Whom It May Concern:     Our office has called and tried to discuss your stress test results but was unsuccessful. This test was done while you were in the hospital and before the results were finalized, you left against medical advice. Your stress test was abnormal and it is very important you contact our office back to discuss further. When a patient has a stress test that is abnormal it means there could possibly be a blockage that could lead to a heart attack if left untreated. If you have any further questions, please do not hesitate to contact my office.       Sincerely,       Electronically signed: Antoine Garcia MD, 2020 11:48 AM

## 2020-12-18 NOTE — TELEPHONE ENCOUNTER
Patient had Raul Sauce done while inpatient. He left AMA before the stress test was discussed with him. While in the hospital patient had fever and recently tested positive for covid but lab draw that was done in the hospital showed he was negative. Patient had abnormal lexiscan that should be addressed as he was having chest pain. I called the patient to discuss this with him and he would not let me talk. He kept talking over me saying he didn't care what the stress test read that no one would talk with him about it while he was in the hospital. Explained to him that I am not the hospital side I am outpatient and calling to talk with him about the results because he left before they could be addressed with him. He still would not let me talk he kept talking over me very upset. I asked if I could please tell him about his lexiscan results as they were abnormal. He said \"no you can not talk\" and continued with what he was saying. Irina Martinez will be in touch with this office and hospital to get answers for him\" and then hung up the phone. Will send letter to patient explaining abnormal test results since he would not talk to me on the phone.

## 2020-12-18 NOTE — PROGRESS NOTES
CNA notified signee that patient stating he is leaving and needs IV out. Signee promptly went to patients room introduced self as patient's nurse for the day, patient immediately dismissive to signee stating he is leaving and has been ignored for an hour. Explained that I just came on shift he is the first patient I have come to see and offered to try to address or fix any concerns or problems. Patient stating he is leaving with or without his IV and to get his papers. Offered to contact physician to try to discuss discharge and get papers. Patient remaining agitated and verbally aggressive stating he is leaving and to take his IV out now. Leonie Randolph left room to get IV supplies and AMA paperwork, and requested charge nurse accompany signee. Upon returning to room, again offered to try to address and resolve any concerns or problems patient had. Patient refused. Patient then was refusing signee to remove IV, stating signee could wait on him since he had to wait on other people. I again explained to patient that he was the first person I immediately came to see. Patient continuing to not allow signee to remove IV. Signee left room and contacted security in concern that patient was going to leave with IV as he placed his jacket on and had his bags packed. Notified Dr. Santa Christopher that patient was leaving AMA via voalte. Patient allowed charge nurse, Luc López to remove IV, security came and waited in hallway and patient left AMA.     Electronically signed by Charlette Luque RN on 12/18/2020 at 7:51 AM

## 2020-12-18 NOTE — TELEPHONE ENCOUNTER
Note that patient was verbally abusive while in the hospital as well and security had to be contacted.

## 2020-12-21 LAB
BLOOD CULTURE, ROUTINE: NORMAL
CULTURE, BLOOD 2: NORMAL

## 2021-01-04 LAB
LV EF: 53 %
LVEF MODALITY: NORMAL

## 2021-01-06 ENCOUNTER — TRANSCRIBE ORDERS (OUTPATIENT)
Dept: LAB | Facility: HOSPITAL | Age: 51
End: 2021-01-06

## 2021-01-06 DIAGNOSIS — Z01.818 PREOP TESTING: Primary | ICD-10-CM

## 2021-01-29 ENCOUNTER — TELEPHONE (OUTPATIENT)
Dept: CARDIOLOGY CLINIC | Age: 51
End: 2021-01-29

## 2021-01-29 ENCOUNTER — OFFICE VISIT (OUTPATIENT)
Dept: CARDIOLOGY CLINIC | Age: 51
End: 2021-01-29
Payer: MEDICAID

## 2021-01-29 VITALS
OXYGEN SATURATION: 98 % | DIASTOLIC BLOOD PRESSURE: 80 MMHG | HEIGHT: 72 IN | HEART RATE: 89 BPM | WEIGHT: 209 LBS | SYSTOLIC BLOOD PRESSURE: 132 MMHG | BODY MASS INDEX: 28.31 KG/M2

## 2021-01-29 DIAGNOSIS — Z79.4 TYPE 2 DIABETES MELLITUS WITHOUT COMPLICATION, WITH LONG-TERM CURRENT USE OF INSULIN (HCC): Chronic | ICD-10-CM

## 2021-01-29 DIAGNOSIS — I10 ESSENTIAL HYPERTENSION: ICD-10-CM

## 2021-01-29 DIAGNOSIS — R07.9 CHEST PAIN, UNSPECIFIED TYPE: ICD-10-CM

## 2021-01-29 DIAGNOSIS — E78.5 DYSLIPIDEMIA: ICD-10-CM

## 2021-01-29 DIAGNOSIS — I48.0 PAROXYSMAL A-FIB (HCC): Primary | ICD-10-CM

## 2021-01-29 DIAGNOSIS — Z82.49 FAMILY HISTORY OF EARLY CAD: ICD-10-CM

## 2021-01-29 DIAGNOSIS — E11.9 TYPE 2 DIABETES MELLITUS WITHOUT COMPLICATION, WITH LONG-TERM CURRENT USE OF INSULIN (HCC): Chronic | ICD-10-CM

## 2021-01-29 PROCEDURE — 1036F TOBACCO NON-USER: CPT | Performed by: NURSE PRACTITIONER

## 2021-01-29 PROCEDURE — G8417 CALC BMI ABV UP PARAM F/U: HCPCS | Performed by: NURSE PRACTITIONER

## 2021-01-29 PROCEDURE — G8427 DOCREV CUR MEDS BY ELIG CLIN: HCPCS | Performed by: NURSE PRACTITIONER

## 2021-01-29 PROCEDURE — 99214 OFFICE O/P EST MOD 30 MIN: CPT | Performed by: NURSE PRACTITIONER

## 2021-01-29 PROCEDURE — G8482 FLU IMMUNIZE ORDER/ADMIN: HCPCS | Performed by: NURSE PRACTITIONER

## 2021-01-29 PROCEDURE — 93000 ELECTROCARDIOGRAM COMPLETE: CPT | Performed by: NURSE PRACTITIONER

## 2021-01-29 RX ORDER — LIRAGLUTIDE 6 MG/ML
INJECTION SUBCUTANEOUS
Qty: 9 ML | Refills: 3 | Status: SHIPPED | OUTPATIENT
Start: 2021-01-29

## 2021-01-29 RX ORDER — ISOSORBIDE MONONITRATE 30 MG/1
30 TABLET, EXTENDED RELEASE ORAL DAILY
Qty: 30 TABLET | Refills: 3 | Status: SHIPPED | OUTPATIENT
Start: 2021-01-29 | End: 2021-10-18 | Stop reason: SDUPTHER

## 2021-01-29 RX ORDER — METOPROLOL SUCCINATE 25 MG/1
25 TABLET, EXTENDED RELEASE ORAL DAILY
Qty: 90 TABLET | Refills: 1 | Status: SHIPPED | OUTPATIENT
Start: 2021-01-29 | End: 2021-10-18 | Stop reason: SDUPTHER

## 2021-01-29 RX ORDER — NITROGLYCERIN 0.4 MG/1
0.4 TABLET SUBLINGUAL EVERY 5 MIN PRN
Qty: 25 TABLET | Refills: 3 | Status: SHIPPED | OUTPATIENT
Start: 2021-01-29 | End: 2021-10-18 | Stop reason: SDUPTHER

## 2021-01-29 ASSESSMENT — ENCOUNTER SYMPTOMS
COUGH: 0
SHORTNESS OF BREATH: 1
SORE THROAT: 0
CHEST TIGHTNESS: 1
WHEEZING: 0

## 2021-01-29 NOTE — PATIENT INSTRUCTIONS
[place] [facility] and [address] [facility] located on the first floor of [place] [address] through hospital main entrance and turn immediately to your left. Date/Time:     Pre-operative work-up:  CBC, BMP, and Chest x-ray. Allergies:  Food   Contact number:  [phone] (home)     Cardiac Catheterization Instructions   · Do not eat or drink anything after midnight (or 8 hours) prior to the procedure. Please take your morning medication with a small amount of water at your normally scheduled time, including any blood pressure pills, Plavix, and aspirin. · Do not eat or drink anything containing caffeine for at least 24 hours prior to your procedure. · Do not take any diuretics (water pills) such as Lasix, Bumex, Demadex, Furosemide, and Zaroxolyn on the day of your procedure. If your diuretic is combined with your blood pressure medication, you will take it as usual.      · Diabetic medication should be stopped two days prior: Metformin (glucophage), Invokana (canagliflozin), Farxiga (dapagliflozin), Jardiance (empagliflozin)   · Coumadin (warfarin) should be stopped two days prior to this procedure. · Xarelto (ravaroxaban), Eliquis (apixaban), or Pradaxa (dabigatran) should be stopped one day prior to procedure. · Bring a list of the names and dosages of all the medications you are taking. · You must have someone to drive you home - you are not allowed to drive for 24 hours after your procedure. If an intervention is performed, you will stay overnight at the hospital.    · Further plan will depend upon the result of the cardiac catheterization. WHAT IS A CARDIAC CATHETERIZATION? This is a procedure that providers your cardiologist with detailed information regarding how your heart functions. A small catheter (long, fine tube) is inserted into an artery (a vessel that carries blood and oxygen) that leads to your heart. While watching with x-ray equipment, small amounts of dye are injected which enables visualization of the heart arteries and the heart chambers. The pictures that your cardiologist receives from the cardiac catheterization enables him to decide on the best method of treatment for you. If for any reason you are unable to keep this appointment, please contact [facility], [phone], as soon as possible to reschedule.

## 2021-01-29 NOTE — PROGRESS NOTES
 Morbid obesity ([facility] 02/13/2019    Bipolar disorder with severe depression ([facility]     Dyslipidemia 06/04/2018    Paroxysmal A-fib ([place] [address].) 05/02/2018    Obstructive sleep apnea 05/02/2018    Abnormal EKG 05/02/2018    Chronic obstructive pulmonary disease ([facility] 01/09/2018    Anxiety 07/26/2017    Hiatal hernia     Chest pain, atypical 06/03/2016    Gastroesophageal reflux disease 06/03/2016    Morbid obesity with BMI of 45.0-49.9, adult ([facility] 06/03/2016    Type 2 diabetes mellitus without complication ([place] [address].) 23/65/3398    Acquired hypothyroidism 06/03/2016    Hypertensive disorder 06/03/2016    Lactic acidosis     Chest pain      Current Outpatient Medications   Medication Sig Dispense Refill    diclofenac sodium (VOLTAREN) 1 % GEL       metoprolol succinate (TOPROL XL) 25 MG extended release tablet Take 1 tablet by mouth daily 90 tablet 1    nitroGLYCERIN (NITROSTAT) 0.4 MG SL tablet Place 1 tablet under the tongue every 5 minutes as needed for Chest pain 25 tablet 3    isosorbide mononitrate (IMDUR) 30 MG extended release tablet Take 1 tablet by mouth daily 30 tablet 3    MUCINEX 600 MG extended release tablet TAKE 2 TABLETS BY MOUTH 2 TIMES DAILY 30 tablet 1    allopurinol (ZYLOPRIM) 300 MG tablet TAKE ONE TABLET BY MOUTH ONCE DAILY 30 tablet 5    Insulin Pen Needle (B-D ULTRAFINE III SHORT PEN) 31G X 8 MM MISC Inject 1 each into the skin as needed (FOR DM) 100 each 1    sildenafil (VIAGRA) 50 MG tablet Take 1 tablet by mouth as needed for Erectile Dysfunction 30 tablet 3    metFORMIN (GLUCOPHAGE) 1000 MG tablet TAKE ONE TABLET BY MOUTH TWO TIMES DAILY (WITH MEALS) 60 tablet 5    VICTOZA 18 MG/3ML SOPN SC injection INJECT 1.8 MG INTO THE SKIN DAILY 9 mL 3    hydrALAZINE (APRESOLINE) 50 MG tablet TAKE 1 TABLET BY MOUTH 3 TIMES DAILY 90 tablet 11    HYDROcodone-acetaminophen (NORCO)  MG per tablet Take 1 tablet by mouth 3 times daily.  levothyroxine (SYNTHROID) 200 MCG tablet TAKE ONE TABLET BY MOUTH ONCE DAILY 30 tablet 11    pregabalin (LYRICA) 75 MG capsule Take 75 mg by mouth 2 times daily.  omeprazole (PRILOSEC) 40 MG delayed release capsule omeprazole 40 mg capsule,delayed release   Take 1 capsule twice a day by oral route for 30 days.  warfarin (COUMADIN) 5 MG tablet TAKE 1 TABLET BY MOUTH EVERY EVENING (Patient not taking: Reported on 1/29/2021) 30 tablet 5    magnesium citrate solution Take 592 mls by mouth for 1 doses. (Patient not taking: Reported on 1/29/2021) 592 mL 0     No current facility-administered medications for this visit.       Allergies: Food  Past Medical History:   Diagnosis Date    Anxiety     Atrial fibrillation (HCC)     Chronic pain     COPD (chronic obstructive pulmonary disease) ([address]     Depression     Diabetes mellitus (HCC)     GERD (gastroesophageal reflux disease)     Gout     Hernia     Hyperlipidemia     Hypertension     Hypothyroid     Myocardial infarct, old     [place] - Cardiac Cath - no blockage    Neuropathy     Osteoarthritis     Psychiatric illness      Past Surgical History:   Procedure Laterality Date    ABDOMINOPLASTY      ABDOMINOPLASTY      Nov 2019    ANKLE SURGERY  1990    fracture repair   [address] - No blockage    ESOPHAGUS SURGERY      GASTRIC BAND      HERNIA REPAIR      LAPAROTOMY EXPLORATORY N/A 1/3/2020    LAPAROTOMY EXPLORATORY performed by Anika Frances MD at [address]  02/2018    gastric sleeve     Family History   Problem Relation Age of Onset    Diabetes Other     Heart Disease Other      Social History     Tobacco Use    Smoking status: Passive Smoke Exposure - Never Smoker    Smokeless tobacco: Never Used   Substance Use Topics    Alcohol use: No     Alcohol/week: 0.0 standard drinks          Review of Systems:    Review of Systems Constitutional: Positive for activity change and fatigue. Negative for chills, diaphoresis and fever. HENT: Negative for congestion and sore throat. Respiratory: Positive for chest tightness and shortness of breath. Negative for cough and wheezing. Cardiovascular: Positive for chest pain. Negative for palpitations and leg swelling. Musculoskeletal: Negative. Neurological: Positive for dizziness and light-headedness. Negative for syncope and headaches. Psychiatric/Behavioral: Negative for confusion. The patient is not nervous/anxious. Objective:    /80   Pulse 89   Ht 6' (1.829 m)   Wt 209 lb (94.8 kg)   SpO2 98%   BMI 28.35 kg/m²     GENERAL - well developed and well nourished, conversant  HEENT   PERRLA, Hearing appears normal, gentleman lids are normal.  External inspection of ears and nose appear normal.  NECK - no thyromegaly, no JVD, trachea is in the midline  CARDIOVASCULAR  PMI is in the mid line clavicular position, Normal S1 and S2 with no systolic murmur. No S3 or S4    PULMONARY  no respiratory distress. No wheezes or rales. Lungs are clear to ausculation, normal respiratory effort. ABDOMEN   soft, non tender, no rebound  MUSCULOSKELETAL   range of motion of the upper and lower extermites appears normal and equal and is without pain   EXTREMITIES - no significant edema   NEUROLOGIC  gait and station are normal  SKIN - turgor is normal, can warm and dry.   PSYCHIATRIC - normal mood and affect, alert and orientated x 3,      ASSESSMENT:    ALL THE CARDIOLOGY PROBLEMS ARE LISTED ABOVE; HOWEVER, THE FOLLOWING SPECIFIC CARDIAC PROBLEMS / CONDITIONS WERE ADDRESSED AND TREATED DURING THE OFFICE VISIT TODAY:                                                                                            MEDICAL DECISION MAKING             Cardiac Specific Problem / Diagnosis  Discussion and Data Reviewed Diagnostic Procedures Ordered Management Options Selected 1.   Chest pain  Chief complaint   Review and summation of old records:    EKG in the office showing sinus rhythm with heart rate of 89 bpm.  With flipped T waves to the cheerier and inferior leads. This is unchanged from previous EKG. Have ordered Toprol-XL, Imdur, and sublingual nitro as needed. We will schedule cardiac catheterization with Dr. Awa Kramer. Yes Continue current medications:     Yes           2. Hypertension  Stable   Blood pressure in the office today 132/80. O2 sat 98%. No Continue current medications:    Yes           3. Paroxysmal atrial fib Stable  patient has had no recurrence. Patient stopped taking Coumadin. EKG in the office showing sinus rhythm. Yes Continue current medications:       Yes           4. Family history of coronary artery disease Shows no change  schedule patient for cardiac catheterization. Yes Continue current medications:       Yes     Orders Placed This Encounter   Procedures    EKG 12 lead     Order Specific Question:   Reason for Exam?     Answer:   Irregular heart rate     Comments:   A FIB     Order Specific Question:   Reason for Exam?     Answer:   Chest pain     Orders Placed This Encounter   Medications    metoprolol succinate (TOPROL XL) 25 MG extended release tablet     Sig: Take 1 tablet by mouth daily     Dispense:  90 tablet     Refill:  1    nitroGLYCERIN (NITROSTAT) 0.4 MG SL tablet     Sig: Place 1 tablet under the tongue every 5 minutes as needed for Chest pain     Dispense:  25 tablet     Refill:  3    isosorbide mononitrate (IMDUR) 30 MG extended release tablet     Sig: Take 1 tablet by mouth daily     Dispense:  30 tablet     Refill:  3       Discussed with patient. Return in about 6 weeks (around 3/12/2021) for Dr Awa Kramer . I greatly appreciate the opportunity to meet Kavin Lock and your confidence in allowing me to participate in his cardiovascular care.     Romayne Marin, APRN - NP  1/29/2021 9:04 AM CST This dictation was generated by voice recognition computer software. Although all attempts are made to edit dictation for accuracy, there may be errors in the transcription that are not intended.

## 2021-01-29 NOTE — TELEPHONE ENCOUNTER
Patient was in the office today and still having chest pain needing a cath scheduled. Tried to call patient to talk with him again but someone answered the phone and all they would tell me was \"no\" to everything I asked and then gave me to another person who did the same and then handed the phone to someone else who did the same. I will try to call him back today.

## 2021-01-29 NOTE — TELEPHONE ENCOUNTER
This was me but I had another encounter open because I had tried to schedule him back in Dec. But patient wants Dr. Kirk Meyer now.

## 2021-02-08 ENCOUNTER — OFFICE VISIT (OUTPATIENT)
Age: 51
End: 2021-02-08

## 2021-02-08 VITALS — TEMPERATURE: 97.9 F | HEART RATE: 103 BPM | OXYGEN SATURATION: 99 %

## 2021-02-08 DIAGNOSIS — I48.0 PAROXYSMAL A-FIB (HCC): ICD-10-CM

## 2021-02-08 DIAGNOSIS — Z11.59 SCREENING FOR VIRAL DISEASE: Primary | ICD-10-CM

## 2021-02-08 DIAGNOSIS — I10 ESSENTIAL HYPERTENSION: ICD-10-CM

## 2021-02-08 LAB
ALBUMIN SERPL-MCNC: 4.2 G/DL (ref 3.5–5.2)
ALP BLD-CCNC: 61 U/L (ref 40–130)
ALT SERPL-CCNC: 15 U/L (ref 5–41)
ANION GAP SERPL CALCULATED.3IONS-SCNC: 11 MMOL/L (ref 7–19)
AST SERPL-CCNC: 29 U/L (ref 5–40)
BILIRUB SERPL-MCNC: 0.4 MG/DL (ref 0.2–1.2)
BUN BLDV-MCNC: 14 MG/DL (ref 6–20)
CALCIUM SERPL-MCNC: 8.6 MG/DL (ref 8.6–10)
CHLORIDE BLD-SCNC: 108 MMOL/L (ref 98–111)
CO2: 23 MMOL/L (ref 22–29)
CREAT SERPL-MCNC: 0.9 MG/DL (ref 0.5–1.2)
GFR AFRICAN AMERICAN: >59
GFR NON-AFRICAN AMERICAN: >60
GLUCOSE BLD-MCNC: 122 MG/DL (ref 74–109)
HCT VFR BLD CALC: 36.2 % (ref 42–52)
HEMOGLOBIN: 11.2 G/DL (ref 14–18)
MCH RBC QN AUTO: 26.5 PG (ref 27–31)
MCHC RBC AUTO-ENTMCNC: 30.9 G/DL (ref 33–37)
MCV RBC AUTO: 85.8 FL (ref 80–94)
PDW BLD-RTO: 16.9 % (ref 11.5–14.5)
PLATELET # BLD: 363 K/UL (ref 130–400)
PMV BLD AUTO: 10.2 FL (ref 9.4–12.4)
POTASSIUM SERPL-SCNC: 3.7 MMOL/L (ref 3.5–5)
RBC # BLD: 4.22 M/UL (ref 4.7–6.1)
SODIUM BLD-SCNC: 142 MMOL/L (ref 136–145)
TOTAL PROTEIN: 6.7 G/DL (ref 6.6–8.7)
WBC # BLD: 6 K/UL (ref 4.8–10.8)

## 2021-02-08 PROCEDURE — 99999 PR OFFICE/OUTPT VISIT,PROCEDURE ONLY: CPT | Performed by: NURSE PRACTITIONER

## 2021-02-09 LAB — SARS-COV-2, NAA: NOT DETECTED

## 2021-02-11 ENCOUNTER — HOSPITAL ENCOUNTER (OUTPATIENT)
Dept: CARDIAC CATH/INVASIVE PROCEDURES | Age: 51
Discharge: HOME | End: 2021-02-11
Attending: INTERNAL MEDICINE | Admitting: INTERNAL MEDICINE
Payer: MEDICAID

## 2021-02-11 VITALS
HEART RATE: 72 BPM | DIASTOLIC BLOOD PRESSURE: 72 MMHG | SYSTOLIC BLOOD PRESSURE: 108 MMHG | BODY MASS INDEX: 27.63 KG/M2 | HEIGHT: 72 IN | OXYGEN SATURATION: 99 % | WEIGHT: 204 LBS | RESPIRATION RATE: 14 BRPM | TEMPERATURE: 97.5 F

## 2021-02-11 DIAGNOSIS — Z79.4 TYPE 2 DIABETES MELLITUS WITHOUT COMPLICATION, WITH LONG-TERM CURRENT USE OF INSULIN (HCC): Chronic | ICD-10-CM

## 2021-02-11 DIAGNOSIS — E11.9 TYPE 2 DIABETES MELLITUS WITHOUT COMPLICATION, WITH LONG-TERM CURRENT USE OF INSULIN (HCC): Chronic | ICD-10-CM

## 2021-02-11 DIAGNOSIS — I48.0 PAROXYSMAL A-FIB (HCC): ICD-10-CM

## 2021-02-11 PROCEDURE — 99152 MOD SED SAME PHYS/QHP 5/>YRS: CPT

## 2021-02-11 PROCEDURE — C1769 GUIDE WIRE: HCPCS

## 2021-02-11 PROCEDURE — 6370000000 HC RX 637 (ALT 250 FOR IP): Performed by: INTERNAL MEDICINE

## 2021-02-11 PROCEDURE — 6360000002 HC RX W HCPCS

## 2021-02-11 PROCEDURE — 2709999900 HC NON-CHARGEABLE SUPPLY

## 2021-02-11 PROCEDURE — C1894 INTRO/SHEATH, NON-LASER: HCPCS

## 2021-02-11 PROCEDURE — 6360000004 HC RX CONTRAST MEDICATION: Performed by: INTERNAL MEDICINE

## 2021-02-11 PROCEDURE — 2500000003 HC RX 250 WO HCPCS

## 2021-02-11 PROCEDURE — 93458 L HRT ARTERY/VENTRICLE ANGIO: CPT

## 2021-02-11 PROCEDURE — 2580000003 HC RX 258: Performed by: INTERNAL MEDICINE

## 2021-02-11 PROCEDURE — C1887 CATHETER, GUIDING: HCPCS

## 2021-02-11 RX ORDER — ONDANSETRON 2 MG/ML
4 INJECTION INTRAMUSCULAR; INTRAVENOUS EVERY 6 HOURS PRN
Status: DISCONTINUED | OUTPATIENT
Start: 2021-02-11 | End: 2021-02-11 | Stop reason: SDUPTHER

## 2021-02-11 RX ORDER — SODIUM CHLORIDE 9 MG/ML
INJECTION, SOLUTION INTRAVENOUS CONTINUOUS
Status: DISCONTINUED | OUTPATIENT
Start: 2021-02-11 | End: 2021-02-11 | Stop reason: SDUPTHER

## 2021-02-11 RX ORDER — ATORVASTATIN CALCIUM 10 MG/1
20 TABLET, FILM COATED ORAL DAILY
Qty: 90 TABLET | Refills: 3 | Status: SHIPPED | OUTPATIENT
Start: 2021-02-11 | End: 2021-02-11 | Stop reason: SDUPTHER

## 2021-02-11 RX ORDER — ATORVASTATIN CALCIUM 20 MG/1
20 TABLET, FILM COATED ORAL DAILY
Qty: 90 TABLET | Refills: 3 | Status: SHIPPED | OUTPATIENT
Start: 2021-02-11 | End: 2021-10-18 | Stop reason: SDUPTHER

## 2021-02-11 RX ORDER — SODIUM CHLORIDE 9 MG/ML
INJECTION, SOLUTION INTRAVENOUS CONTINUOUS
Status: DISCONTINUED | OUTPATIENT
Start: 2021-02-11 | End: 2021-02-11 | Stop reason: HOSPADM

## 2021-02-11 RX ORDER — NITROGLYCERIN 0.4 MG/1
0.4 TABLET SUBLINGUAL EVERY 5 MIN PRN
Status: DISCONTINUED | OUTPATIENT
Start: 2021-02-11 | End: 2021-02-11 | Stop reason: HOSPADM

## 2021-02-11 RX ORDER — SODIUM CHLORIDE 0.9 % (FLUSH) 0.9 %
10 SYRINGE (ML) INJECTION PRN
Status: DISCONTINUED | OUTPATIENT
Start: 2021-02-11 | End: 2021-02-11 | Stop reason: HOSPADM

## 2021-02-11 RX ORDER — ONDANSETRON 2 MG/ML
4 INJECTION INTRAMUSCULAR; INTRAVENOUS EVERY 6 HOURS PRN
Status: DISCONTINUED | OUTPATIENT
Start: 2021-02-11 | End: 2021-02-11 | Stop reason: HOSPADM

## 2021-02-11 RX ORDER — ASPIRIN 325 MG
325 TABLET ORAL ONCE
Status: COMPLETED | OUTPATIENT
Start: 2021-02-11 | End: 2021-02-11

## 2021-02-11 RX ORDER — SODIUM CHLORIDE 0.9 % (FLUSH) 0.9 %
10 SYRINGE (ML) INJECTION EVERY 12 HOURS SCHEDULED
Status: DISCONTINUED | OUTPATIENT
Start: 2021-02-11 | End: 2021-02-11 | Stop reason: SDUPTHER

## 2021-02-11 RX ORDER — ACETAMINOPHEN 325 MG/1
650 TABLET ORAL EVERY 4 HOURS PRN
Status: DISCONTINUED | OUTPATIENT
Start: 2021-02-11 | End: 2021-02-11 | Stop reason: HOSPADM

## 2021-02-11 RX ORDER — WARFARIN SODIUM 5 MG/1
5 TABLET ORAL EVERY EVENING
Qty: 30 TABLET | Refills: 5 | Status: SHIPPED | OUTPATIENT
Start: 2021-02-11 | End: 2021-11-14

## 2021-02-11 RX ORDER — SODIUM CHLORIDE 0.9 % (FLUSH) 0.9 %
10 SYRINGE (ML) INJECTION EVERY 12 HOURS SCHEDULED
Status: DISCONTINUED | OUTPATIENT
Start: 2021-02-11 | End: 2021-02-11 | Stop reason: HOSPADM

## 2021-02-11 RX ORDER — SODIUM CHLORIDE 0.9 % (FLUSH) 0.9 %
10 SYRINGE (ML) INJECTION PRN
Status: DISCONTINUED | OUTPATIENT
Start: 2021-02-11 | End: 2021-02-11 | Stop reason: SDUPTHER

## 2021-02-11 RX ADMIN — IOPAMIDOL 92 ML: 612 INJECTION, SOLUTION INTRAVENOUS at 09:23

## 2021-02-11 RX ADMIN — SODIUM CHLORIDE: 9 INJECTION, SOLUTION INTRAVENOUS at 07:35

## 2021-02-11 RX ADMIN — ASPIRIN 325 MG: 325 TABLET, FILM COATED ORAL at 07:47

## 2021-02-11 ASSESSMENT — ENCOUNTER SYMPTOMS
ABDOMINAL DISTENTION: 0
SHORTNESS OF BREATH: 0
VOMITING: 0
COUGH: 0
DIARRHEA: 0
CHEST TIGHTNESS: 1
BACK PAIN: 0
WHEEZING: 0
BLOOD IN STOOL: 0
ABDOMINAL PAIN: 0

## 2021-02-11 NOTE — PROGRESS NOTES
AVS EXPLAINED & GIVEN. PT & WIFE VOICED UNDERSTANDINGS. AMBULATED WITH PT TO CAR WITH ALL BELONGINGS FOR DISCHARGE HOME PER WIFE.

## 2021-02-11 NOTE — PROGRESS NOTES
Pt stated he did not want to take lipitor or generic of. \"I'll try something else, but i've heard way too much bad stuff on the drug. I'm not going to take that pill. I'll take something else\". Informed Dr. Makenna Valdez. Dr. Makenna Valdez spoke with pt & wife. Pt now in agreement on taking Atorvastatin.
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
## 2021-02-11 NOTE — PROGRESS NOTES
OFFERED LUNCH/DRINK SEVERAL TIMES TO PT. PT REFUSED. INSTRUCTED PT TO DRINK EXTRA FLUIDS WHEN DISCHARGED TODAY. PT VOICED UNDERSTANDINGS. PT OOB WITH RN @ SIDE. PT AMBULATED TO BATHROOM TO VOID, THEN AMBULATED BACK TO BED WITHOUT DIFFICULTY.

## 2021-02-15 ENCOUNTER — APPOINTMENT (OUTPATIENT)
Dept: CT IMAGING | Age: 51
DRG: 337 | End: 2021-02-15
Payer: MEDICAID

## 2021-02-15 ENCOUNTER — ANESTHESIA (OUTPATIENT)
Dept: OPERATING ROOM | Age: 51
DRG: 337 | End: 2021-02-15
Payer: MEDICAID

## 2021-02-15 ENCOUNTER — APPOINTMENT (OUTPATIENT)
Dept: GENERAL RADIOLOGY | Age: 51
DRG: 337 | End: 2021-02-15
Payer: MEDICAID

## 2021-02-15 ENCOUNTER — HOSPITAL ENCOUNTER (INPATIENT)
Age: 51
LOS: 3 days | Discharge: HOME | DRG: 337 | End: 2021-02-18
Attending: EMERGENCY MEDICINE | Admitting: SURGERY
Payer: MEDICAID

## 2021-02-15 ENCOUNTER — ANESTHESIA EVENT (OUTPATIENT)
Dept: OPERATING ROOM | Age: 51
DRG: 337 | End: 2021-02-15
Payer: MEDICAID

## 2021-02-15 VITALS
OXYGEN SATURATION: 90 % | DIASTOLIC BLOOD PRESSURE: 54 MMHG | SYSTOLIC BLOOD PRESSURE: 92 MMHG | TEMPERATURE: 97.6 F | RESPIRATION RATE: 1 BRPM

## 2021-02-15 DIAGNOSIS — R93.5 ABNORMAL CT OF THE ABDOMEN: ICD-10-CM

## 2021-02-15 DIAGNOSIS — G89.18 POST-OP PAIN: ICD-10-CM

## 2021-02-15 DIAGNOSIS — R10.84 GENERALIZED ABDOMINAL PAIN: Primary | ICD-10-CM

## 2021-02-15 PROBLEM — R10.9 ABDOMINAL PAIN: Status: ACTIVE | Noted: 2021-02-15

## 2021-02-15 LAB
ALBUMIN SERPL-MCNC: 4.2 G/DL (ref 3.5–5.2)
ALP BLD-CCNC: 56 U/L (ref 40–130)
ALT SERPL-CCNC: 17 U/L (ref 5–41)
ANION GAP SERPL CALCULATED.3IONS-SCNC: 10 MMOL/L (ref 7–19)
AST SERPL-CCNC: 27 U/L (ref 5–40)
BASOPHILS ABSOLUTE: 0 K/UL (ref 0–0.2)
BASOPHILS RELATIVE PERCENT: 0.1 % (ref 0–1)
BILIRUB SERPL-MCNC: 0.3 MG/DL (ref 0.2–1.2)
BILIRUBIN URINE: NEGATIVE
BLOOD, URINE: NEGATIVE
BUN BLDV-MCNC: 11 MG/DL (ref 6–20)
CALCIUM SERPL-MCNC: 8.4 MG/DL (ref 8.6–10)
CHLORIDE BLD-SCNC: 109 MMOL/L (ref 98–111)
CLARITY: CLEAR
CO2: 25 MMOL/L (ref 22–29)
COLOR: YELLOW
CREAT SERPL-MCNC: 0.7 MG/DL (ref 0.5–1.2)
EOSINOPHILS ABSOLUTE: 0 K/UL (ref 0–0.6)
EOSINOPHILS RELATIVE PERCENT: 0.3 % (ref 0–5)
GFR AFRICAN AMERICAN: >59
GFR NON-AFRICAN AMERICAN: >60
GLUCOSE BLD-MCNC: 173 MG/DL (ref 70–99)
GLUCOSE BLD-MCNC: 182 MG/DL (ref 70–99)
GLUCOSE BLD-MCNC: 192 MG/DL (ref 70–99)
GLUCOSE BLD-MCNC: 204 MG/DL (ref 74–109)
GLUCOSE URINE: NEGATIVE MG/DL
HBA1C MFR BLD: 5.6 % (ref 4–6)
HCT VFR BLD CALC: 38.1 % (ref 42–52)
HEMOGLOBIN: 11.4 G/DL (ref 14–18)
IMMATURE GRANULOCYTES #: 0 K/UL
INR BLD: 1.05 (ref 0.88–1.18)
KETONES, URINE: NEGATIVE MG/DL
LACTIC ACID: 1.1 MMOL/L (ref 0.5–1.9)
LEUKOCYTE ESTERASE, URINE: NEGATIVE
LIPASE: 33 U/L (ref 13–60)
LYMPHOCYTES ABSOLUTE: 0.9 K/UL (ref 1.1–4.5)
LYMPHOCYTES RELATIVE PERCENT: 11.7 % (ref 20–40)
MCH RBC QN AUTO: 26.5 PG (ref 27–31)
MCHC RBC AUTO-ENTMCNC: 29.9 G/DL (ref 33–37)
MCV RBC AUTO: 88.4 FL (ref 80–94)
MONOCYTES ABSOLUTE: 0.5 K/UL (ref 0–0.9)
MONOCYTES RELATIVE PERCENT: 6 % (ref 0–10)
NEUTROPHILS ABSOLUTE: 6.3 K/UL (ref 1.5–7.5)
NEUTROPHILS RELATIVE PERCENT: 81.4 % (ref 50–65)
NITRITE, URINE: NEGATIVE
PDW BLD-RTO: 16.5 % (ref 11.5–14.5)
PERFORMED ON: ABNORMAL
PH UA: 5.5 (ref 5–8)
PLATELET # BLD: 321 K/UL (ref 130–400)
PMV BLD AUTO: 10.3 FL (ref 9.4–12.4)
POTASSIUM REFLEX MAGNESIUM: 3.9 MMOL/L (ref 3.5–5)
PROTEIN UA: NEGATIVE MG/DL
PROTHROMBIN TIME: 13.7 SEC (ref 12–14.6)
RBC # BLD: 4.31 M/UL (ref 4.7–6.1)
REASON FOR REJECTION: NORMAL
REJECTED TEST: NORMAL
SARS-COV-2, NAAT: NOT DETECTED
SODIUM BLD-SCNC: 144 MMOL/L (ref 136–145)
SPECIFIC GRAVITY UA: 1.04 (ref 1–1.03)
TOTAL PROTEIN: 6.8 G/DL (ref 6.6–8.7)
UROBILINOGEN, URINE: 0.2 E.U./DL
WBC # BLD: 7.7 K/UL (ref 4.8–10.8)

## 2021-02-15 PROCEDURE — 3700000000 HC ANESTHESIA ATTENDED CARE: Performed by: SURGERY

## 2021-02-15 PROCEDURE — 2580000003 HC RX 258: Performed by: SURGERY

## 2021-02-15 PROCEDURE — 96375 TX/PRO/DX INJ NEW DRUG ADDON: CPT

## 2021-02-15 PROCEDURE — 6360000002 HC RX W HCPCS: Performed by: SURGERY

## 2021-02-15 PROCEDURE — 0DN80ZZ RELEASE SMALL INTESTINE, OPEN APPROACH: ICD-10-PCS | Performed by: SURGERY

## 2021-02-15 PROCEDURE — 2580000003 HC RX 258: Performed by: ANESTHESIOLOGY

## 2021-02-15 PROCEDURE — 2709999900 HC NON-CHARGEABLE SUPPLY: Performed by: SURGERY

## 2021-02-15 PROCEDURE — 74177 CT ABD & PELVIS W/CONTRAST: CPT

## 2021-02-15 PROCEDURE — 36415 COLL VENOUS BLD VENIPUNCTURE: CPT

## 2021-02-15 PROCEDURE — 85025 COMPLETE CBC W/AUTO DIFF WBC: CPT

## 2021-02-15 PROCEDURE — C9113 INJ PANTOPRAZOLE SODIUM, VIA: HCPCS | Performed by: EMERGENCY MEDICINE

## 2021-02-15 PROCEDURE — 80053 COMPREHEN METABOLIC PANEL: CPT

## 2021-02-15 PROCEDURE — 2700000000 HC OXYGEN THERAPY PER DAY

## 2021-02-15 PROCEDURE — U0002 COVID-19 LAB TEST NON-CDC: HCPCS

## 2021-02-15 PROCEDURE — 6360000002 HC RX W HCPCS: Performed by: EMERGENCY MEDICINE

## 2021-02-15 PROCEDURE — 6360000002 HC RX W HCPCS

## 2021-02-15 PROCEDURE — 6360000004 HC RX CONTRAST MEDICATION: Performed by: EMERGENCY MEDICINE

## 2021-02-15 PROCEDURE — 6370000000 HC RX 637 (ALT 250 FOR IP)

## 2021-02-15 PROCEDURE — 83605 ASSAY OF LACTIC ACID: CPT

## 2021-02-15 PROCEDURE — 3600000005 HC SURGERY LEVEL 5 BASE: Performed by: SURGERY

## 2021-02-15 PROCEDURE — 99222 1ST HOSP IP/OBS MODERATE 55: CPT | Performed by: SURGERY

## 2021-02-15 PROCEDURE — 83036 HEMOGLOBIN GLYCOSYLATED A1C: CPT

## 2021-02-15 PROCEDURE — 6370000000 HC RX 637 (ALT 250 FOR IP): Performed by: SURGERY

## 2021-02-15 PROCEDURE — 7100000000 HC PACU RECOVERY - FIRST 15 MIN: Performed by: SURGERY

## 2021-02-15 PROCEDURE — 85610 PROTHROMBIN TIME: CPT

## 2021-02-15 PROCEDURE — 3600000015 HC SURGERY LEVEL 5 ADDTL 15MIN: Performed by: SURGERY

## 2021-02-15 PROCEDURE — 82947 ASSAY GLUCOSE BLOOD QUANT: CPT

## 2021-02-15 PROCEDURE — 7100000001 HC PACU RECOVERY - ADDTL 15 MIN: Performed by: SURGERY

## 2021-02-15 PROCEDURE — 83690 ASSAY OF LIPASE: CPT

## 2021-02-15 PROCEDURE — 96374 THER/PROPH/DIAG INJ IV PUSH: CPT

## 2021-02-15 PROCEDURE — 2500000003 HC RX 250 WO HCPCS

## 2021-02-15 PROCEDURE — 81003 URINALYSIS AUTO W/O SCOPE: CPT

## 2021-02-15 PROCEDURE — 3700000001 HC ADD 15 MINUTES (ANESTHESIA): Performed by: SURGERY

## 2021-02-15 PROCEDURE — 1210000000 HC MED SURG R&B

## 2021-02-15 PROCEDURE — 99283 EMERGENCY DEPT VISIT LOW MDM: CPT

## 2021-02-15 PROCEDURE — 49000 EXPLORATION OF ABDOMEN: CPT | Performed by: SURGERY

## 2021-02-15 RX ORDER — ATORVASTATIN CALCIUM 20 MG/1
20 TABLET, FILM COATED ORAL DAILY
Status: DISCONTINUED | OUTPATIENT
Start: 2021-02-15 | End: 2021-02-18 | Stop reason: HOSPADM

## 2021-02-15 RX ORDER — PROMETHAZINE HYDROCHLORIDE 25 MG/ML
6.25 INJECTION, SOLUTION INTRAMUSCULAR; INTRAVENOUS
Status: DISCONTINUED | OUTPATIENT
Start: 2021-02-15 | End: 2021-02-15 | Stop reason: HOSPADM

## 2021-02-15 RX ORDER — WARFARIN SODIUM 5 MG/1
5 TABLET ORAL EVERY EVENING
Status: DISCONTINUED | OUTPATIENT
Start: 2021-02-15 | End: 2021-02-18 | Stop reason: HOSPADM

## 2021-02-15 RX ORDER — MORPHINE SULFATE 4 MG/ML
2 INJECTION, SOLUTION INTRAMUSCULAR; INTRAVENOUS EVERY 5 MIN PRN
Status: DISCONTINUED | OUTPATIENT
Start: 2021-02-15 | End: 2021-02-15 | Stop reason: HOSPADM

## 2021-02-15 RX ORDER — NICOTINE POLACRILEX 4 MG
15 LOZENGE BUCCAL PRN
Status: DISCONTINUED | OUTPATIENT
Start: 2021-02-15 | End: 2021-02-18 | Stop reason: HOSPADM

## 2021-02-15 RX ORDER — SODIUM CHLORIDE 0.9 % (FLUSH) 0.9 %
10 SYRINGE (ML) INJECTION EVERY 12 HOURS SCHEDULED
Status: DISCONTINUED | OUTPATIENT
Start: 2021-02-15 | End: 2021-02-18 | Stop reason: HOSPADM

## 2021-02-15 RX ORDER — HYDROMORPHONE HYDROCHLORIDE 1 MG/ML
0.25 INJECTION, SOLUTION INTRAMUSCULAR; INTRAVENOUS; SUBCUTANEOUS EVERY 5 MIN PRN
Status: DISCONTINUED | OUTPATIENT
Start: 2021-02-15 | End: 2021-02-15 | Stop reason: HOSPADM

## 2021-02-15 RX ORDER — PREGABALIN 75 MG/1
75 CAPSULE ORAL 2 TIMES DAILY
Status: DISCONTINUED | OUTPATIENT
Start: 2021-02-15 | End: 2021-02-18 | Stop reason: HOSPADM

## 2021-02-15 RX ORDER — CEFAZOLIN SODIUM 1 G/3ML
INJECTION, POWDER, FOR SOLUTION INTRAMUSCULAR; INTRAVENOUS PRN
Status: DISCONTINUED | OUTPATIENT
Start: 2021-02-15 | End: 2021-02-15 | Stop reason: SDUPTHER

## 2021-02-15 RX ORDER — LEVOTHYROXINE SODIUM 0.1 MG/1
200 TABLET ORAL DAILY
Status: DISCONTINUED | OUTPATIENT
Start: 2021-02-15 | End: 2021-02-18 | Stop reason: HOSPADM

## 2021-02-15 RX ORDER — DEXTROSE MONOHYDRATE 50 MG/ML
100 INJECTION, SOLUTION INTRAVENOUS PRN
Status: DISCONTINUED | OUTPATIENT
Start: 2021-02-15 | End: 2021-02-18 | Stop reason: HOSPADM

## 2021-02-15 RX ORDER — PANTOPRAZOLE SODIUM 40 MG/1
40 TABLET, DELAYED RELEASE ORAL
Status: DISCONTINUED | OUTPATIENT
Start: 2021-02-16 | End: 2021-02-18 | Stop reason: HOSPADM

## 2021-02-15 RX ORDER — SODIUM CHLORIDE 9 MG/ML
INJECTION, SOLUTION INTRAVENOUS CONTINUOUS
Status: DISCONTINUED | OUTPATIENT
Start: 2021-02-15 | End: 2021-02-18

## 2021-02-15 RX ORDER — HYDROMORPHONE HYDROCHLORIDE 1 MG/ML
0.25 INJECTION, SOLUTION INTRAMUSCULAR; INTRAVENOUS; SUBCUTANEOUS
Status: DISCONTINUED | OUTPATIENT
Start: 2021-02-15 | End: 2021-02-17

## 2021-02-15 RX ORDER — HYDROMORPHONE HYDROCHLORIDE 1 MG/ML
0.5 INJECTION, SOLUTION INTRAMUSCULAR; INTRAVENOUS; SUBCUTANEOUS ONCE
Status: COMPLETED | OUTPATIENT
Start: 2021-02-15 | End: 2021-02-15

## 2021-02-15 RX ORDER — DIPHENHYDRAMINE HYDROCHLORIDE 50 MG/ML
12.5 INJECTION INTRAMUSCULAR; INTRAVENOUS
Status: DISCONTINUED | OUTPATIENT
Start: 2021-02-15 | End: 2021-02-15 | Stop reason: HOSPADM

## 2021-02-15 RX ORDER — PANTOPRAZOLE SODIUM 40 MG/10ML
40 INJECTION, POWDER, LYOPHILIZED, FOR SOLUTION INTRAVENOUS ONCE
Status: COMPLETED | OUTPATIENT
Start: 2021-02-15 | End: 2021-02-15

## 2021-02-15 RX ORDER — SUCCINYLCHOLINE CHLORIDE 20 MG/ML
INJECTION INTRAMUSCULAR; INTRAVENOUS PRN
Status: DISCONTINUED | OUTPATIENT
Start: 2021-02-15 | End: 2021-02-15 | Stop reason: SDUPTHER

## 2021-02-15 RX ORDER — DEXTROSE MONOHYDRATE 25 G/50ML
12.5 INJECTION, SOLUTION INTRAVENOUS PRN
Status: DISCONTINUED | OUTPATIENT
Start: 2021-02-15 | End: 2021-02-18 | Stop reason: HOSPADM

## 2021-02-15 RX ORDER — ONDANSETRON 2 MG/ML
4 INJECTION INTRAMUSCULAR; INTRAVENOUS EVERY 8 HOURS PRN
Status: DISCONTINUED | OUTPATIENT
Start: 2021-02-15 | End: 2021-02-18 | Stop reason: HOSPADM

## 2021-02-15 RX ORDER — DEXAMETHASONE SODIUM PHOSPHATE 10 MG/ML
INJECTION, SOLUTION INTRAMUSCULAR; INTRAVENOUS PRN
Status: DISCONTINUED | OUTPATIENT
Start: 2021-02-15 | End: 2021-02-15 | Stop reason: SDUPTHER

## 2021-02-15 RX ORDER — MEPERIDINE HYDROCHLORIDE 50 MG/ML
12.5 INJECTION INTRAMUSCULAR; INTRAVENOUS; SUBCUTANEOUS EVERY 5 MIN PRN
Status: DISCONTINUED | OUTPATIENT
Start: 2021-02-15 | End: 2021-02-15 | Stop reason: HOSPADM

## 2021-02-15 RX ORDER — HYDROMORPHONE HYDROCHLORIDE 1 MG/ML
0.5 INJECTION, SOLUTION INTRAMUSCULAR; INTRAVENOUS; SUBCUTANEOUS EVERY 5 MIN PRN
Status: DISCONTINUED | OUTPATIENT
Start: 2021-02-15 | End: 2021-02-15 | Stop reason: HOSPADM

## 2021-02-15 RX ORDER — HYDROMORPHONE HYDROCHLORIDE 1 MG/ML
0.5 INJECTION, SOLUTION INTRAMUSCULAR; INTRAVENOUS; SUBCUTANEOUS
Status: DISCONTINUED | OUTPATIENT
Start: 2021-02-15 | End: 2021-02-17

## 2021-02-15 RX ORDER — ISOSORBIDE MONONITRATE 30 MG/1
30 TABLET, EXTENDED RELEASE ORAL DAILY
Status: DISCONTINUED | OUTPATIENT
Start: 2021-02-15 | End: 2021-02-18 | Stop reason: HOSPADM

## 2021-02-15 RX ORDER — MIDAZOLAM HYDROCHLORIDE 1 MG/ML
INJECTION INTRAMUSCULAR; INTRAVENOUS PRN
Status: DISCONTINUED | OUTPATIENT
Start: 2021-02-15 | End: 2021-02-15 | Stop reason: SDUPTHER

## 2021-02-15 RX ORDER — PROPOFOL 10 MG/ML
INJECTION, EMULSION INTRAVENOUS PRN
Status: DISCONTINUED | OUTPATIENT
Start: 2021-02-15 | End: 2021-02-15 | Stop reason: SDUPTHER

## 2021-02-15 RX ORDER — HYDRALAZINE HYDROCHLORIDE 50 MG/1
50 TABLET, FILM COATED ORAL 2 TIMES DAILY
Status: DISCONTINUED | OUTPATIENT
Start: 2021-02-15 | End: 2021-02-18 | Stop reason: HOSPADM

## 2021-02-15 RX ORDER — ENALAPRILAT 2.5 MG/2ML
1.25 INJECTION INTRAVENOUS
Status: DISCONTINUED | OUTPATIENT
Start: 2021-02-15 | End: 2021-02-15 | Stop reason: HOSPADM

## 2021-02-15 RX ORDER — SODIUM CHLORIDE 0.9 % (FLUSH) 0.9 %
10 SYRINGE (ML) INJECTION PRN
Status: DISCONTINUED | OUTPATIENT
Start: 2021-02-15 | End: 2021-02-18 | Stop reason: HOSPADM

## 2021-02-15 RX ORDER — GUAIFENESIN 600 MG/1
600 TABLET, EXTENDED RELEASE ORAL DAILY
Status: DISCONTINUED | OUTPATIENT
Start: 2021-02-15 | End: 2021-02-18 | Stop reason: HOSPADM

## 2021-02-15 RX ORDER — LIDOCAINE HYDROCHLORIDE 20 MG/ML
JELLY TOPICAL ONCE
Status: COMPLETED | OUTPATIENT
Start: 2021-02-15 | End: 2021-02-15

## 2021-02-15 RX ORDER — LABETALOL HYDROCHLORIDE 5 MG/ML
5 INJECTION, SOLUTION INTRAVENOUS EVERY 10 MIN PRN
Status: DISCONTINUED | OUTPATIENT
Start: 2021-02-15 | End: 2021-02-15 | Stop reason: HOSPADM

## 2021-02-15 RX ORDER — SODIUM CHLORIDE 0.9 % (FLUSH) 0.9 %
10 SYRINGE (ML) INJECTION EVERY 12 HOURS SCHEDULED
Status: DISCONTINUED | OUTPATIENT
Start: 2021-02-15 | End: 2021-02-17 | Stop reason: SDUPTHER

## 2021-02-15 RX ORDER — NALOXONE HYDROCHLORIDE 0.4 MG/ML
0.4 INJECTION, SOLUTION INTRAMUSCULAR; INTRAVENOUS; SUBCUTANEOUS PRN
Status: DISCONTINUED | OUTPATIENT
Start: 2021-02-15 | End: 2021-02-18 | Stop reason: HOSPADM

## 2021-02-15 RX ORDER — ALLOPURINOL 300 MG/1
300 TABLET ORAL DAILY
Status: DISCONTINUED | OUTPATIENT
Start: 2021-02-15 | End: 2021-02-18 | Stop reason: HOSPADM

## 2021-02-15 RX ORDER — SILDENAFIL 50 MG/1
50 TABLET, FILM COATED ORAL PRN
Status: DISCONTINUED | OUTPATIENT
Start: 2021-02-15 | End: 2021-02-18 | Stop reason: HOSPADM

## 2021-02-15 RX ORDER — SODIUM CHLORIDE 0.9 % (FLUSH) 0.9 %
10 SYRINGE (ML) INJECTION PRN
Status: DISCONTINUED | OUTPATIENT
Start: 2021-02-15 | End: 2021-02-17 | Stop reason: SDUPTHER

## 2021-02-15 RX ORDER — FENTANYL CITRATE 50 UG/ML
INJECTION, SOLUTION INTRAMUSCULAR; INTRAVENOUS PRN
Status: DISCONTINUED | OUTPATIENT
Start: 2021-02-15 | End: 2021-02-15 | Stop reason: SDUPTHER

## 2021-02-15 RX ORDER — NITROGLYCERIN 0.4 MG/1
0.4 TABLET SUBLINGUAL EVERY 5 MIN PRN
Status: DISCONTINUED | OUTPATIENT
Start: 2021-02-15 | End: 2021-02-18 | Stop reason: HOSPADM

## 2021-02-15 RX ORDER — MORPHINE SULFATE 4 MG/ML
4 INJECTION, SOLUTION INTRAMUSCULAR; INTRAVENOUS EVERY 5 MIN PRN
Status: DISCONTINUED | OUTPATIENT
Start: 2021-02-15 | End: 2021-02-15 | Stop reason: HOSPADM

## 2021-02-15 RX ORDER — HYDROMORPHONE HCL IN WATER/PF 6 MG/30 ML
PATIENT CONTROLLED ANALGESIA SYRINGE INTRAVENOUS CONTINUOUS
Status: DISCONTINUED | OUTPATIENT
Start: 2021-02-15 | End: 2021-02-18

## 2021-02-15 RX ORDER — LIDOCAINE HYDROCHLORIDE 20 MG/ML
JELLY TOPICAL
Status: COMPLETED
Start: 2021-02-15 | End: 2021-02-15

## 2021-02-15 RX ORDER — ROCURONIUM BROMIDE 10 MG/ML
INJECTION, SOLUTION INTRAVENOUS PRN
Status: DISCONTINUED | OUTPATIENT
Start: 2021-02-15 | End: 2021-02-15 | Stop reason: SDUPTHER

## 2021-02-15 RX ORDER — ONDANSETRON 2 MG/ML
INJECTION INTRAMUSCULAR; INTRAVENOUS PRN
Status: DISCONTINUED | OUTPATIENT
Start: 2021-02-15 | End: 2021-02-15 | Stop reason: SDUPTHER

## 2021-02-15 RX ORDER — SODIUM CHLORIDE, SODIUM LACTATE, POTASSIUM CHLORIDE, CALCIUM CHLORIDE 600; 310; 30; 20 MG/100ML; MG/100ML; MG/100ML; MG/100ML
INJECTION, SOLUTION INTRAVENOUS CONTINUOUS
Status: DISCONTINUED | OUTPATIENT
Start: 2021-02-15 | End: 2021-02-15

## 2021-02-15 RX ORDER — LIDOCAINE HYDROCHLORIDE 10 MG/ML
INJECTION, SOLUTION EPIDURAL; INFILTRATION; INTRACAUDAL; PERINEURAL PRN
Status: DISCONTINUED | OUTPATIENT
Start: 2021-02-15 | End: 2021-02-15 | Stop reason: SDUPTHER

## 2021-02-15 RX ORDER — METOPROLOL SUCCINATE 25 MG/1
25 TABLET, EXTENDED RELEASE ORAL DAILY
Status: DISCONTINUED | OUTPATIENT
Start: 2021-02-15 | End: 2021-02-18 | Stop reason: HOSPADM

## 2021-02-15 RX ORDER — HYDROCODONE BITARTRATE AND ACETAMINOPHEN 10; 325 MG/1; MG/1
1 TABLET ORAL 3 TIMES DAILY
Status: DISCONTINUED | OUTPATIENT
Start: 2021-02-15 | End: 2021-02-15

## 2021-02-15 RX ORDER — ONDANSETRON 2 MG/ML
4 INJECTION INTRAMUSCULAR; INTRAVENOUS ONCE
Status: COMPLETED | OUTPATIENT
Start: 2021-02-15 | End: 2021-02-15

## 2021-02-15 RX ORDER — HYDRALAZINE HYDROCHLORIDE 20 MG/ML
5 INJECTION INTRAMUSCULAR; INTRAVENOUS EVERY 10 MIN PRN
Status: DISCONTINUED | OUTPATIENT
Start: 2021-02-15 | End: 2021-02-15 | Stop reason: HOSPADM

## 2021-02-15 RX ORDER — METOCLOPRAMIDE HYDROCHLORIDE 5 MG/ML
10 INJECTION INTRAMUSCULAR; INTRAVENOUS
Status: DISCONTINUED | OUTPATIENT
Start: 2021-02-15 | End: 2021-02-15 | Stop reason: HOSPADM

## 2021-02-15 RX ADMIN — Medication 10 MG: at 22:28

## 2021-02-15 RX ADMIN — Medication 10 MG: at 13:27

## 2021-02-15 RX ADMIN — MIDAZOLAM 2 MG: 1 INJECTION INTRAMUSCULAR; INTRAVENOUS at 10:04

## 2021-02-15 RX ADMIN — SODIUM CHLORIDE: 9 INJECTION, SOLUTION INTRAVENOUS at 06:08

## 2021-02-15 RX ADMIN — Medication 2000 MG: at 17:33

## 2021-02-15 RX ADMIN — ONDANSETRON HYDROCHLORIDE 4 MG: 2 INJECTION, SOLUTION INTRAMUSCULAR; INTRAVENOUS at 11:10

## 2021-02-15 RX ADMIN — HYDROMORPHONE HYDROCHLORIDE 0.5 MG: 1 INJECTION, SOLUTION INTRAMUSCULAR; INTRAVENOUS; SUBCUTANEOUS at 09:38

## 2021-02-15 RX ADMIN — IOPAMIDOL 90 ML: 755 INJECTION, SOLUTION INTRAVENOUS at 02:11

## 2021-02-15 RX ADMIN — METOPROLOL SUCCINATE 25 MG: 25 TABLET, EXTENDED RELEASE ORAL at 13:36

## 2021-02-15 RX ADMIN — FENTANYL CITRATE 50 MCG: 50 INJECTION, SOLUTION INTRAMUSCULAR; INTRAVENOUS at 11:23

## 2021-02-15 RX ADMIN — CEFAZOLIN SODIUM 2000 MG: 1 INJECTION, POWDER, FOR SOLUTION INTRAMUSCULAR; INTRAVENOUS at 10:29

## 2021-02-15 RX ADMIN — SUCCINYLCHOLINE CHLORIDE 100 MG: 20 INJECTION, SOLUTION INTRAMUSCULAR; INTRAVENOUS at 10:16

## 2021-02-15 RX ADMIN — ISOSORBIDE MONONITRATE 30 MG: 30 TABLET, EXTENDED RELEASE ORAL at 13:36

## 2021-02-15 RX ADMIN — HYDROMORPHONE HYDROCHLORIDE 0.5 MG: 1 INJECTION, SOLUTION INTRAMUSCULAR; INTRAVENOUS; SUBCUTANEOUS at 03:26

## 2021-02-15 RX ADMIN — ONDANSETRON HYDROCHLORIDE 4 MG: 2 SOLUTION INTRAMUSCULAR; INTRAVENOUS at 01:47

## 2021-02-15 RX ADMIN — PREGABALIN 75 MG: 75 CAPSULE ORAL at 20:20

## 2021-02-15 RX ADMIN — SODIUM CHLORIDE, SODIUM LACTATE, POTASSIUM CHLORIDE, AND CALCIUM CHLORIDE: 600; 310; 30; 20 INJECTION, SOLUTION INTRAVENOUS at 09:55

## 2021-02-15 RX ADMIN — HYDRALAZINE HYDROCHLORIDE 50 MG: 50 TABLET, FILM COATED ORAL at 20:19

## 2021-02-15 RX ADMIN — LIDOCAINE HYDROCHLORIDE 5 ML: 10 INJECTION, SOLUTION EPIDURAL; INFILTRATION; INTRACAUDAL; PERINEURAL at 10:16

## 2021-02-15 RX ADMIN — ENOXAPARIN SODIUM 40 MG: 100 INJECTION SUBCUTANEOUS at 22:36

## 2021-02-15 RX ADMIN — LIDOCAINE HYDROCHLORIDE: 20 JELLY TOPICAL at 03:45

## 2021-02-15 RX ADMIN — ROCURONIUM BROMIDE 50 MG: 10 INJECTION, SOLUTION INTRAVENOUS at 10:25

## 2021-02-15 RX ADMIN — PROPOFOL 160 MG: 10 INJECTION, EMULSION INTRAVENOUS at 10:16

## 2021-02-15 RX ADMIN — HYDROMORPHONE HYDROCHLORIDE 0.5 MG: 1 INJECTION, SOLUTION INTRAMUSCULAR; INTRAVENOUS; SUBCUTANEOUS at 06:08

## 2021-02-15 RX ADMIN — HYDROMORPHONE HYDROCHLORIDE 0.5 MG: 1 INJECTION, SOLUTION INTRAMUSCULAR; INTRAVENOUS; SUBCUTANEOUS at 01:47

## 2021-02-15 RX ADMIN — DEXAMETHASONE SODIUM PHOSPHATE 10 MG: 10 INJECTION, SOLUTION INTRAMUSCULAR; INTRAVENOUS at 10:25

## 2021-02-15 RX ADMIN — FENTANYL CITRATE 100 MCG: 50 INJECTION, SOLUTION INTRAMUSCULAR; INTRAVENOUS at 10:12

## 2021-02-15 RX ADMIN — HYDROMORPHONE HYDROCHLORIDE 0.5 MG: 1 INJECTION, SOLUTION INTRAMUSCULAR; INTRAVENOUS; SUBCUTANEOUS at 12:56

## 2021-02-15 RX ADMIN — SUGAMMADEX 300 MG: 100 INJECTION, SOLUTION INTRAVENOUS at 11:10

## 2021-02-15 RX ADMIN — PANTOPRAZOLE SODIUM 40 MG: 40 INJECTION, POWDER, FOR SOLUTION INTRAVENOUS at 06:08

## 2021-02-15 RX ADMIN — HYDRALAZINE HYDROCHLORIDE 50 MG: 50 TABLET, FILM COATED ORAL at 13:23

## 2021-02-15 RX ADMIN — SODIUM CHLORIDE: 9 INJECTION, SOLUTION INTRAVENOUS at 16:23

## 2021-02-15 RX ADMIN — FENTANYL CITRATE 100 MCG: 50 INJECTION, SOLUTION INTRAMUSCULAR; INTRAVENOUS at 11:17

## 2021-02-15 ASSESSMENT — PAIN SCALES - GENERAL
PAINLEVEL_OUTOF10: 6
PAINLEVEL_OUTOF10: 8
PAINLEVEL_OUTOF10: 10
PAINLEVEL_OUTOF10: 8
PAINLEVEL_OUTOF10: 10
PAINLEVEL_OUTOF10: 0
PAINLEVEL_OUTOF10: 10

## 2021-02-15 ASSESSMENT — ENCOUNTER SYMPTOMS
EYE PAIN: 0
WHEEZING: 0
CONSTIPATION: 0
NAUSEA: 1
COUGH: 1
ABDOMINAL PAIN: 1
HEMATOCHEZIA: 0
EYE REDNESS: 0
BACK PAIN: 0
ABDOMINAL DISTENTION: 1
DIARRHEA: 0
FLATUS: 0
SORE THROAT: 0
VOMITING: 1
SHORTNESS OF BREATH: 0
RHINORRHEA: 0
NAUSEA: 0
VOMITING: 0
BELCHING: 1

## 2021-02-15 ASSESSMENT — PAIN DESCRIPTION - DESCRIPTORS: DESCRIPTORS: CONSTANT

## 2021-02-15 ASSESSMENT — PAIN DESCRIPTION - LOCATION
LOCATION: ABDOMEN
LOCATION: ABDOMEN

## 2021-02-15 NOTE — ED NOTES
Bed: 10  Expected date:   Expected time:   Means of arrival:   Comments:  [id number] [facility]  02/15/21 9034

## 2021-02-15 NOTE — ANESTHESIA PRE PROCEDURE
Department of Anesthesiology  Preprocedure Note       Name:  Elinda Sever   Age:  53 y.o.  :  1970                                          MRN:  [id number]         Date:  2/15/2021      Surgeon: Broderick Vaughn):  Stewart Gil MD    Procedure: LAPAROTOMY EXPLORATORY (N/A )    Medications prior to admission:   Prior to Admission medications    Medication Sig Start Date End Date Taking?  Authorizing Provider   warfarin (COUMADIN) 5 MG tablet Take 1 tablet by mouth every evening 21   Kameron Bernabe MD   metFORMIN (GLUCOPHAGE) 1000 MG tablet Hold for 2 days and restart 2021  Patient taking differently: Take 1,000 mg by mouth 2 times daily (with meals)  21   Kameron Bernabe MD   atorvastatin (LIPITOR) 20 MG tablet Take 1 tablet by mouth daily 21   Kameron Bernabe MD   diclofenac sodium (VOLTAREN) 1 % GEL Apply 4 g topically daily  10/28/20   Historical Provider, MD   metoprolol succinate (TOPROL XL) 25 MG extended release tablet Take 1 tablet by mouth daily 21   DEMETRIUS Rojas NP   nitroGLYCERIN (NITROSTAT) 0.4 MG SL tablet Place 1 tablet under the tongue every 5 minutes as needed for Chest pain 21   DEMETRIUS Rojas NP   isosorbide mononitrate (IMDUR) 30 MG extended release tablet Take 1 tablet by mouth daily 21   DEMETRIUS Rojas NP   VICTOZA 18 MG/3ML SOPN SC injection INJECT 1.8 MG INTO THE SKIN DAILY  Patient taking differently: Inject 1.8 mg into the skin nightly  21   DEMETRIUS Thomas   MUCINEX 600 MG extended release tablet TAKE 2 TABLETS BY MOUTH 2 TIMES DAILY 21   DEMETRIUS Thomas   allopurinol (ZYLOPRIM) 300 MG tablet TAKE ONE TABLET BY MOUTH ONCE DAILY 20   DEMETRIUS Thomas   Insulin Pen Needle (B-D ULTRAFINE III SHORT PEN) 31G X 8 MM MISC Inject 1 each into the skin as needed (FOR DM) 20   DEMETRIUS Thomas   sildenafil (VIAGRA) 50 MG tablet Take 1 tablet by mouth as needed for Erectile Dysfunction 20   DEMETRIUS Thomas  metoclopramide (REGLAN) injection 10 mg  10 mg Intravenous Once PRN Eudelia Adams, APRN - CRNA        diphenhydrAMINE (BENADRYL) injection 12.5 mg  12.5 mg Intravenous Once PRN Eudelia Adams, APRN - CRNA        labetalol (NORMODYNE;TRANDATE) injection 5 mg  5 mg Intravenous Q10 Min PRN Eudelia Adams, APRN - CRNA        hydrALAZINE (APRESOLINE) injection 5 mg  5 mg Intravenous Q10 Min PRN Eudelia Adams, APRN - CRNA        enalaprilat (VASOTEC) injection 1.25 mg  1.25 mg Intravenous Once PRN Eudelia Adams, APRN - CRNA           Allergies:     Allergies   Allergen Reactions    Food      ALL MILK PRODUCTS       Problem List:    Patient Active Problem List   Diagnosis Code    Chest pain, atypical R07.89    Gastroesophageal reflux disease K21.9    Morbid obesity with BMI of 45.0-49.9, adult ([facility] E66.01, Z68.42    Type 2 diabetes mellitus without complication ([facility] L08.9    Acquired hypothyroidism E03.9    Hypertensive disorder I10    Lactic acidosis E87.2    Chest pain R07.9    Hiatal hernia K44.9    Anxiety F41.9    Chronic obstructive pulmonary disease (HCC) J44.9    Paroxysmal A-fib (HCC) I48.0    Obstructive sleep apnea G47.33    Abnormal EKG R94.31    Dyslipidemia E78.5    B[facility] disorder with severe depression ([facility] F31.4    Diabetes mellitus ([facility] E11.9    Low back pain M54.5    Morbid obesity (HCC) E66.01    Small bowel volvulus (HCC) K56.2    Volvulus of small intestine (HCC) K56.2    Strangulation of small intestine (HCC) K56.2    Occlusion of superior mesenteric artery (HCC) K55.069    Abdominal pain R10.9       Past Medical History:        Diagnosis Date    Anxiety     Atrial fibrillation (HCC)     Bipolar 1 disorder (HCC)     Chronic pain     COPD (chronic obstructive pulmonary disease) (HCC)     Depression     Depression     Diabetes mellitus (HCC)     GERD (gastroesophageal reflux disease)     Gout     Hernia     Hyperlipidemia     Hypertension GLUCOSE 204 02/15/2021    PROT 6.8 02/15/2021    PROT 7.4 02/09/2011    CALCIUM 8.4 02/15/2021    BILITOT 0.3 02/15/2021    ALKPHOS 56 02/15/2021    ALKPHOS 68 02/09/2011    AST 27 02/15/2021    ALT 17 02/15/2021       POC Tests:   No results for input(s): POCGLU, POCNA, POCK, POCCL, POCBUN, POCHEMO, POCHCT in the last 72 hours. Coags:   Lab Results   Component Value Date    PROTIME 13.7 02/15/2021    PROTIME 22.3 03/14/2019    PROTIME 10.32 02/08/2011    INR 1.05 02/15/2021    APTT 33.4 01/03/2020       HCG (If Applicable): No results found for: PREGTESTUR, PREGSERUM, HCG, HCGQUANT     ABGs: No results found for: PHART, PO2ART, GAN9RRQ, LHL3USF, BEART, T0TMPUYI     Type & Screen (If Applicable):  No results found for: LABABO, [address]    Anesthesia Evaluation  Patient summary reviewed and Nursing notes reviewed  Airway: Mallampati: II  TM distance: >3 FB   Neck ROM: full  Mouth opening: > = 3 FB Dental:          Pulmonary:normal exam    (+) COPD:  sleep apnea:                             Cardiovascular:  Exercise tolerance: no interval change,   (+) hypertension:, dysrhythmias: atrial fibrillation, hyperlipidemia      ECG reviewed      Echocardiogram reviewed  Stress test reviewed  Cleared by cardiology     Beta Blocker:  Not on Beta Blocker      ROS comment: Stress test 12/20:  Review of rest and stress images obtained utilizing a gated SPECT  acquisition protocol along with review of the polar plot revealed:  1. Ejection fraction 53%  2. Wall motion study unremarkable  3. Myocardial perfusion imaging demonstrated defect inferior and  apical region with some suggestion of reversibility. Summary impressions:  Normal ejection fraction 53% abnormal perfusion study with inferior  and apical defects ischemia not excluded correlate clinically and/or  angiographically if appropriate    Echo 2020:      Summary   Normal left ventricular chamber size and systolic function. Moderate concentric left ventricular hypertrophy. Left ventricular ejection fraction is visually estimated at 55-60%. The left atrium is mild to moderately dilated. Heart cath:  Conclusions      Nonobstructive CAD predominantly involving proximal RCA. Normal LV ejection fraction. Recommendations      Medical management. Neuro/Psych:   Negative Neuro/Psych ROS     (-) seizures and CVA           GI/Hepatic/Renal:   (+) hiatal hernia, GERD: well controlled,      (-) liver disease and no renal disease      ROS comment: S/P gastric bypass    CT abdomen:  Impression  The changes in the distal small bowel detailed above may  be due to the mid/distal gut dilatation/rotation of the mesenteric  pedicle and resultant ischemia the distal small bowel and. This needs  to be clinically correlated and further evaluated. Similar finding may  also due to internal herniation of the distal small bowel? Pradeep Guerra The postsurgical changes of the stomach. Moderate dilatation of the accessory pancreatic duct (opening  separately into the minor papilla), suggesting a pancreatic divisum. Large amount of stool in the colon without evidence of obstruction. The above study was initially reviewed and reported by stat [facility]. I do  not find any discrepancies. Signed by Dr Nadia Irizarry on 2/15/2021 7:20 AM  Less. Endo/Other:    (+) DiabetesType II DM, , hypothyroidism, blood dyscrasia (coumadin use. Last dose Saturday.): anticoagulation therapy, arthritis:., electrolyte abnormalities, . Abdominal:           Vascular: negative vascular ROS. - DVT and PE. Anesthesia Plan      general     ASA 3 - emergent       Induction: intravenous and rapid sequence. MIPS: Postoperative opioids intended and Prophylactic antiemetics administered. Anesthetic plan and risks discussed with patient. Use of blood products discussed with patient whom consented to blood products. Plan discussed with CRNA.                 Luisa Faust, DO   2/15/2021

## 2021-02-15 NOTE — ED NOTES
Attempted to start IV x 2; unsuccessful.  Another RN to assist.     Becky Bedoya, OGDFREY  02/15/21 0118
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
## 2021-02-15 NOTE — ED NOTES
This RN with assist of oJanne Pope RN attempted insertion of NG tube x1; unsuccessful. Pt refuses to allow us or any other RN to attempt at this time. Physician notified.      Pedro Calderón RN  02/15/21 8689

## 2021-02-15 NOTE — OP NOTE
Operative Report    PATIENT NAME: Alo Willson  MEDICAL RECORD NO. [id number]  SURGEON: Megan Martin MD   Primary Care Physician: Army Ellsworth, DEMETRIUS  Date: 2/15/2021     PROCEDURE PERFORMED: exploratory laparotomy with lysis of adhesive band  PREOPERATIVE DIAGNOSIS: bowel obstruction  POSTOPERATIVE DIAGNOSIS: Same  SURGEON:  Megan Martin MD   Assistant: Mario Smith  ANESTHESIA:  General  ESTIMATED BLOOD LOSS: minimal  SPECIMEN:  none  COMPLICATIONS:  None; patient tolerated the procedure well. FINDINGS: thick band of scar tissue with small bowel incarcerated   DISPOSITION: PACU - hemodynamically stable. CONDITION: stable      Indications: The patient is a 53year old male who presents with a complaint of abdominal pain. He was found to have a CT consistent with an obstruction with a swirl sign. He is being taken at this time for exploration. Procedure Details     After the risks and benefits of the procedure were explained, informed consent was obtained, and the patient was taken to the operating room. The patient was placed in supine position and anesthesia was begun. The abdomen was prepped and draped in normal sterile fashion. A time out was performed. The upper midline scar was opened using a scalpel, soft tissue dissected with cautery, and the abdominal cavity was entered. The small bowel was mildly dilated. It was brought up out of the abdominal cavity. The bowel was run proximally, the jejunojunostomy anastomosis was inspected, and run back to the stomach and ligament of trietz. The bowel was then run distally. An area was encountered that was tethered in the RUQ. There was found to be a thick adhesive band wrapped around a loop of bowel which was slightly dusky, not as pink and healthy as the other bowel. The band was divided with cautery, which released the bowel. The remaining small bowel was lifted up out of the abdomen. The small bowel was run back to the cecum. No evidence of any other obstruction or twist. The small bowel had pinked up nicely. The small bowel was replaced in to the abdominal cavity. The fascia was closed with two running looped number 1 PDS stitches, meeting in the center. The skin was closed with skin staples, and a sterile dressing was applied. An NGT had been placed by anesthesia during the case. As son as the patient got to the recovery room he pulled it out. The patient tolerated the procedure well, was removed from anesthesia, and transferred to the recovery area in stable condition.                   Farhan Simon MD   Electronically signed 02/15/21 1:56 PM

## 2021-02-15 NOTE — ANESTHESIA POSTPROCEDURE EVALUATION
Department of Anesthesiology  Postprocedure Note    Patient: Steven Coker  MRN: [id number]  YOB: 1970  Date of evaluation: 2/15/2021  Time:  11:25 AM     Procedure Summary     Date: 02/15/21 Room / Location: [address]    Anesthesia Start: 1011 Anesthesia Stop: 6377    Procedure: LAPAROTOMY EXPLORATORY (N/A ) Diagnosis: (internal hernia)    Surgeons: Balta Aamya MD Responsible Provider: DEMETRIUS Sainz CRNA    Anesthesia Type: general ASA Status: 3 - Emergent          Anesthesia Type: general    Ian Phase I: Ian Score: 10    Ian Phase II:      Last vitals: Reviewed and per EMR flowsheets.        Anesthesia Post Evaluation

## 2021-02-15 NOTE — BRIEF OP NOTE
Brief Postoperative Note      Patient: Juany Kent  YOB: 1970  MRN: [id number]    Date of Procedure: 2/15/2021    Pre-Op Diagnosis: internal hernia    Post-Op Diagnosis: Same       Procedure(s):  LAPAROTOMY EXPLORATORY with lysis of adhesion    Surgeon(s):  Jemima Johnson MD    Assistant:  * No surgical staff found *    Anesthesia: General    Estimated Blood Loss (mL): Minimal    Complications: None    Specimens:   * No specimens in log *    Implants:  * No implants in log *      Drains:   NG/OG/NJ/NE Tube Nasogastric 18 fr Right nostril (Active)       Urethral Catheter Double-lumen 16 fr (Active)       Findings: dilated loops of small bowel, thick band of adhesive tissue trapping incarcerated small bowel on right side of abdomen    Electronically signed by Jemima Johnson MD on 2/15/2021 at 11:12 AM

## 2021-02-15 NOTE — H&P
Zander Powers is an 53 y.o.  male who presented to the ER with a complaint of acute onset abdominal pain. He reports that the pain started Sunday. Initially it was in the lower abdomen. It gradually worsened and spread throughout the entire abdomen. The pain is associated with nausea and vomiting. He denies any constipation or diarrhea. He has passed a small amount of flatus and very small amount of stool since admission, but continues to have severe abdominal pain. Of note. The patient has a history of gastric band, gastric sleeve, gastric bypass with hiatal hernia repair. He had an internal hernia on ex lap about one year ago. CT in the ER showed internal hernia vs enteritis per ER report. Also, he had a heart cath 4 days ago, no stents placed. He has not been able to take his coumadin because he was throwing up, so his INR is 1.05. Covid negative in the ER. Past Medical History:   Diagnosis Date    Anxiety     Atrial fibrillation (HCC)     Bipolar 1 disorder (HCC)     Chronic pain     COPD (chronic obstructive pulmonary disease) (HCC)     Depression     Depression     Diabetes mellitus (HCC)     GERD (gastroesophageal reflux disease)     Gout     Hernia     Hyperlipidemia     Hypertension     Hypothyroid     Hypothyroid     Myocardial infarct, old     [place] - Cardiac Cath - no blockage    Neuropathy     Osteoarthritis     Psychiatric illness        Allergies: Allergies   Allergen Reactions    Food      ALL MILK PRODUCTS       Active Problems:    Abdominal pain  Resolved Problems:    * No resolved hospital problems. *    Blood pressure 122/77, pulse 71, temperature 97.7 °F (36.5 °C), temperature source Temporal, resp. rate 19, height 6' (1.829 m), weight 191 lb 8 oz (86.9 kg), SpO2 99 %. Review of Systems   Constitutional: Positive for appetite change and fever. Negative for activity change. HENT: Negative for congestion, rhinorrhea and sore throat. Eyes: Negative for pain, redness and visual disturbance. Respiratory: Positive for cough. Negative for shortness of breath and wheezing. Cardiovascular: Negative for chest pain, palpitations and leg swelling. Gastrointestinal: Positive for abdominal distention, abdominal pain, nausea and vomiting. Negative for constipation and diarrhea. Endocrine: Negative for polydipsia, polyphagia and polyuria. Genitourinary: Negative for dysuria, frequency and hematuria. Musculoskeletal: Negative for arthralgias, back pain and gait problem. Skin: Negative for rash and wound. Neurological: Negative for dizziness, seizures and headaches. Psychiatric/Behavioral: Negative for confusion and dysphoric mood. The patient is not nervous/anxious. Physical Exam  Vitals signs reviewed. Constitutional:       General: He is in acute distress (mild distress due to pain). Appearance: He is obese. HENT:      Head: Normocephalic and atraumatic. Nose:      Comments: Wearing face mask  Eyes:      General: No scleral icterus. Extraocular Movements: Extraocular movements intact. Pupils: Pupils are equal, round, and reactive to light. Neck:      Musculoskeletal: Normal range of motion and neck supple. No neck rigidity. Cardiovascular:      Rate and Rhythm: Normal rate and regular rhythm. Pulmonary:      Effort: Pulmonary effort is normal. No respiratory distress. Breath sounds: No wheezing. Abdominal:      General: There is distension (mild). Palpations: Abdomen is soft. Tenderness: There is abdominal tenderness (diffuse, most on right side). There is rebound. There is no guarding. Musculoskeletal: Normal range of motion. General: No swelling or deformity. Skin:     General: Skin is warm and dry. Neurological:      General: No focal deficit present. Mental Status: He is alert. Mental status is at baseline.    Psychiatric:         Mood and Affect: Mood normal. Behavior: Behavior normal.       CT:  Impression:     The changes in the distal small bowel detailed above may   be due to the mid/distal gut dilatation/rotation of the mesenteric   pedicle and resultant ischemia the distal small bowel and. This needs   to be clinically correlated and further evaluated. Similar finding may   also due to internal herniation of the distal small bowel? Vira Libman The postsurgical changes of the stomach. Moderate dilatation of the accessory pancreatic duct (opening   separately into the minor papilla), suggesting a pancreatic divisum. Large amount of stool in the colon without evidence of obstruction. The above study was initially reviewed and reported by stat rads. I do   not find any discrepancies.    Signed by Dr Reza Mosqueda on 2/15/2021 7:20 AM     CBC:   Lab Results   Component Value Date    WBC 7.7 02/15/2021    RBC 4.31 02/15/2021    HGB 11.4 02/15/2021    HCT 38.1 02/15/2021    HCT 42.2 02/09/2011    MCV 88.4 02/15/2021    MCH 26.5 02/15/2021    MCHC 29.9 02/15/2021    RDW 16.5 02/15/2021     02/15/2021     02/09/2011    MPV 10.3 02/15/2021     BMP:    Lab Results   Component Value Date     02/15/2021     02/09/2011    K 3.9 02/15/2021    K 3.8 02/09/2011     02/15/2021    CL 97 02/09/2011    CO2 25 02/15/2021    BUN 11 02/15/2021    LABALBU 4.2 02/15/2021    LABALBU 4.6 02/09/2011    CREATININE 0.7 02/15/2021    CREATININE 1.2 02/09/2011    CALCIUM 8.4 02/15/2021    GFRAA >59 02/15/2021    LABGLOM >60 02/15/2021    GLUCOSE 204 02/15/2021       Assessment:  53year old male with likely internal hernia    Plan: Discussed with the patient that, even though he has passed a small amount of stool and flatus, I feel, based on his CT that he needs to go to the OR for emergent ex lap. The risks and benefits of ex lap were discussed, as well as the possible need for bowel resection and subsequent transfer to tertiary center. The patient expressed understanding and would like to proceed to surgery.     Pallavi Pandya MD  2/15/2021

## 2021-02-15 NOTE — PROGRESS NOTES
4 Eyes Skin Assessment    Kavin Lock is being assessed upon: Admission    I agree that Julianna Moreno, along with rustam monet (either 2 RN's or 1 LPN and 1 RN) have performed a thorough Head to Toe Skin Assessment on the patient. ALL assessment sites listed below have been assessed. Areas assessed by both nurses:     [x]   Head, Face, and Ears   [x]   Shoulders, Back, and Chest  [x]   Arms, Elbows, and Hands   [x]   Coccyx, Sacrum, and Ischium  [x]   Legs, Feet, and Heels    Does the Patient have Skin Breakdown?  No    Alok Prevention initiated: No  Wound Care Orders initiated: No    WOC nurse consulted for Pressure Injury (Stage 3,4, Unstageable, DTI, NWPT, and Complex wounds) and New or Established Ostomies: No        Primary Nurse eSignature: Carlos Gasca LPN on 4/22/6017 at 7:55 AM      Co-Signer eSignature: Electronically signed by Katlin Gruber RN on 2/15/2021 at 10:56 PM

## 2021-02-16 LAB
ANION GAP SERPL CALCULATED.3IONS-SCNC: 10 MMOL/L (ref 7–19)
BUN BLDV-MCNC: 7 MG/DL (ref 6–20)
CALCIUM SERPL-MCNC: 8.6 MG/DL (ref 8.6–10)
CHLORIDE BLD-SCNC: 103 MMOL/L (ref 98–111)
CO2: 26 MMOL/L (ref 22–29)
CREAT SERPL-MCNC: 0.7 MG/DL (ref 0.5–1.2)
GFR AFRICAN AMERICAN: >59
GFR NON-AFRICAN AMERICAN: >60
GLUCOSE BLD-MCNC: 101 MG/DL (ref 70–99)
GLUCOSE BLD-MCNC: 116 MG/DL (ref 70–99)
GLUCOSE BLD-MCNC: 134 MG/DL (ref 74–109)
HCT VFR BLD CALC: 37.1 % (ref 42–52)
HEMOGLOBIN: 11.2 G/DL (ref 14–18)
MCH RBC QN AUTO: 26.1 PG (ref 27–31)
MCHC RBC AUTO-ENTMCNC: 30.2 G/DL (ref 33–37)
MCV RBC AUTO: 86.5 FL (ref 80–94)
PDW BLD-RTO: 15.9 % (ref 11.5–14.5)
PERFORMED ON: ABNORMAL
PERFORMED ON: ABNORMAL
PLATELET # BLD: 344 K/UL (ref 130–400)
PMV BLD AUTO: 10.5 FL (ref 9.4–12.4)
POTASSIUM SERPL-SCNC: 4.3 MMOL/L (ref 3.5–5)
RBC # BLD: 4.29 M/UL (ref 4.7–6.1)
SODIUM BLD-SCNC: 139 MMOL/L (ref 136–145)
WBC # BLD: 8.6 K/UL (ref 4.8–10.8)

## 2021-02-16 PROCEDURE — 99024 POSTOP FOLLOW-UP VISIT: CPT | Performed by: SURGERY

## 2021-02-16 PROCEDURE — 2700000000 HC OXYGEN THERAPY PER DAY

## 2021-02-16 PROCEDURE — 1210000000 HC MED SURG R&B

## 2021-02-16 PROCEDURE — 85027 COMPLETE CBC AUTOMATED: CPT

## 2021-02-16 PROCEDURE — 36415 COLL VENOUS BLD VENIPUNCTURE: CPT

## 2021-02-16 PROCEDURE — 6360000002 HC RX W HCPCS: Performed by: SURGERY

## 2021-02-16 PROCEDURE — 82947 ASSAY GLUCOSE BLOOD QUANT: CPT

## 2021-02-16 PROCEDURE — 6370000000 HC RX 637 (ALT 250 FOR IP): Performed by: SURGERY

## 2021-02-16 PROCEDURE — 80048 BASIC METABOLIC PNL TOTAL CA: CPT

## 2021-02-16 RX ADMIN — LEVOTHYROXINE SODIUM 200 MCG: 100 TABLET ORAL at 08:12

## 2021-02-16 RX ADMIN — ISOSORBIDE MONONITRATE 30 MG: 30 TABLET, EXTENDED RELEASE ORAL at 08:13

## 2021-02-16 RX ADMIN — HYDRALAZINE HYDROCHLORIDE 50 MG: 50 TABLET, FILM COATED ORAL at 23:24

## 2021-02-16 RX ADMIN — ALLOPURINOL 300 MG: 300 TABLET ORAL at 08:13

## 2021-02-16 RX ADMIN — PREGABALIN 75 MG: 75 CAPSULE ORAL at 23:24

## 2021-02-16 RX ADMIN — METOPROLOL SUCCINATE 25 MG: 25 TABLET, EXTENDED RELEASE ORAL at 08:12

## 2021-02-16 RX ADMIN — PANTOPRAZOLE SODIUM 40 MG: 40 TABLET, DELAYED RELEASE ORAL at 08:13

## 2021-02-16 RX ADMIN — ATORVASTATIN CALCIUM 20 MG: 20 TABLET, FILM COATED ORAL at 08:12

## 2021-02-16 RX ADMIN — Medication 2000 MG: at 02:13

## 2021-02-16 RX ADMIN — GUAIFENESIN 600 MG: 600 TABLET, EXTENDED RELEASE ORAL at 08:13

## 2021-02-16 RX ADMIN — WARFARIN SODIUM 5 MG: 5 TABLET ORAL at 18:45

## 2021-02-16 RX ADMIN — ENOXAPARIN SODIUM 40 MG: 100 INJECTION SUBCUTANEOUS at 08:13

## 2021-02-16 RX ADMIN — HYDRALAZINE HYDROCHLORIDE 50 MG: 50 TABLET, FILM COATED ORAL at 08:13

## 2021-02-16 RX ADMIN — PREGABALIN 75 MG: 75 CAPSULE ORAL at 08:12

## 2021-02-16 RX ADMIN — Medication 10 MG: at 08:10

## 2021-02-16 RX ADMIN — Medication 10 MG: at 20:33

## 2021-02-16 ASSESSMENT — PAIN SCALES - GENERAL: PAINLEVEL_OUTOF10: 9

## 2021-02-16 NOTE — PROGRESS NOTES
Valdo Souza M.D. [facility]  Daily Progress Note    Pt Name: Ifeoma Cunha  Medical Record Number: [id number]  Date of Birth 1970   Today's Date: 2/16/2021    Chief Complaint:  Chief Complaint   Patient presents with    Abdominal Pain         SUBJECTIVE:     Kavin is doing well. Pain is well controlled. Looks very good.   Some flatus    MEDS:     Scheduled Meds:   sodium chloride flush  10 mL Intravenous 2 times per day    pantoprazole  40 mg Oral QAM AC    pregabalin  75 mg Oral BID    levothyroxine  200 mcg Oral Daily    hydrALAZINE  50 mg Oral BID    allopurinol  300 mg Oral Daily    guaiFENesin  600 mg Oral Daily    metoprolol succinate  25 mg Oral Daily    isosorbide mononitrate  30 mg Oral Daily    Liraglutide  1.8 mg Subcutaneous Nightly    warfarin  5 mg Oral QPM    metFORMIN  1,000 mg Oral BID WC    atorvastatin  20 mg Oral Daily    insulin lispro  0-12 Units Subcutaneous Q4H    sodium chloride flush  10 mL Intravenous 2 times per day    enoxaparin  40 mg Subcutaneous Daily    warfarin (COUMADIN) daily dosing (placeholder)   Other RX Placeholder     Continuous Infusions:   sodium chloride 125 mL/hr at 02/15/21 0608    dextrose      sodium chloride 125 mL/hr at 02/16/21 0636    HYDROmorphone HCl       PRN Meds:    HYDROmorphone, 0.5 mg, Q3H PRN      ondansetron, 4 mg, Q8H PRN      sodium chloride flush, 10 mL, PRN      sildenafil, 50 mg, PRN      nitroGLYCERIN, 0.4 mg, Q5 Min PRN      glucose, 15 g, PRN      dextrose, 12.5 g, PRN      glucagon (rDNA), 1 mg, PRN      dextrose, 100 mL/hr, PRN      sodium chloride flush, 10 mL, PRN      naloxone, 0.4 mg, PRN      HYDROmorphone, 0.25 mg, Q3H PRN    Or      HYDROmorphone, 0.5 mg, Q3H PRN        OBJECTIVE:     Patient Vitals for the past 24 hrs:   BP Temp Temp src Pulse Resp SpO2   02/16/21 1122 133/81 97.3 °F (36.3 °C)  66 16 100 %   02/16/21 0633 (!) 145/85 97.7 °F (36.5 °C) Temporal 67 16 100 % 02/16/21 0223 (!) 155/91 97.7 °F (36.5 °C) Temporal 70 16 100 %   02/15/21 2221 (!) 142/83 97.8 °F (36.6 °C) Temporal 74 18 100 %   02/15/21 2000    74     02/15/21 1851 (!) 149/83 98.3 °F (36.8 °C) Temporal 73 18 100 %   02/15/21 1602 (!) 169/90 97.2 °F (36.2 °C) Temporal 78 17 100 %   02/15/21 1500 (!) 177/92 97.3 °F (36.3 °C) Temporal 85 16 100 %   02/15/21 1414 (!) 181/96 98.4 °F (36.9 °C) Temporal 80 16 100 %   02/15/21 1345      100 %   02/15/21 1330 (!) 191/102 98.1 °F (36.7 °C) Temporal 64 18 100 %   02/15/21 1255 (!) 191/100 97.2 °F (36.2 °C) Temporal 66 14 100 %   02/15/21 1155 (!) 171/85 97.2 °F (36.2 °C) Temporal 62 12 98 %   02/15/21 1150 (!) 178/86   64 12 98 %   02/15/21 1145 (!) 170/81   63 10 97 %         Intake/Output Summary (Last 24 hours) at 2/16/2021 1144  Last data filed at 2/16/2021 5151  Gross per 24 hour   Intake 2118 ml   Output 1500 ml   Net 618 ml       In: 2118 [I.V.:2118]  Out: 1100 [Urine:1100]    I/O last 3 completed shifts: In: 2118 [I.V.:2118]  Out: 3167 [Urine:1400; Blood:50]     Date 02/16/21 0000 - 02/16/21 2359   Shift 3719-3536 8935-6803 8376-7608 24 Hour Total   INTAKE   I.V.(mL/kg) 1375(15.8)   1375(15.8)   Shift Total(mL/kg) 1375(15.8)   1375(15.8)   OUTPUT   Urine(mL/kg/hr)  400  400   Shift Total(mL/kg)  400(4.6)  400(4.6)   Weight (kg) 86.9 86.9 86.9 86.9       Wt Readings from Last 3 Encounters:   02/15/21 191 lb 8 oz (86.9 kg)   02/11/21 204 lb (92.5 kg)   01/29/21 209 lb (94.8 kg)        Body mass index is 25.97 kg/m².      Diet: Diet NPO Effective Now Exceptions are: Sips with Meds, Ice Chips    LABS:     CBC:   Recent Labs     02/15/21  0115 02/16/21  0251   WBC 7.7 8.6   RBC 4.31* 4.29*   HGB 11.4* 11.2*   HCT 38.1* 37.1*   MCV 88.4 86.5   MCH 26.5* 26.1*   MCHC 29.9* 30.2*   RDW 16.5* 15.9*    344   MPV 10.3 10.5      Last 3 CMP:   Recent Labs     02/15/21  0128 02/16/21  0251    139   K 3.9 4.3    103   CO2 25 26   BUN 11 7 CREATININE 0.7 0.7   GLUCOSE 204* 134*   CALCIUM 8.4* 8.6   PROT 6.8  --    LABALBU 4.2  --    BILITOT 0.3  --    ALKPHOS 56  --    AST 27  --    ALT 17  --           PHYSICAL EXAM:     CONSTITUTIONAL: Alert, appropriate, no acute distress  SKIN: warm, dry with no rashes or lesions  EYES: Non icteric  CHEST/LUNGS: CTA bilaterally  CARDIOVASCULAR: RRR    ABDOMEN: soft, ND, appropriately TTP, +BS  Incisions: Clean, dry, and intact with no erythema or induration  NEUROLOGIC: CN II-XI grossly intact, no motor or sensory deficits   EXTREMITIES: warm, well perfused, no edema     ASSESSMENT:       1. HD # 1  Patient Active Problem List   Diagnosis    Chest pain, atypical    Gastroesophageal reflux disease    Morbid obesity with BMI of 45.0-49.9, adult (HCC)    Type 2 diabetes mellitus without complication (HCC)    Acquired hypothyroidism    Hypertensive disorder    Lactic acidosis    Chest pain    Hiatal hernia    Anxiety    Chronic obstructive pulmonary disease (HCC)    Paroxysmal A-fib (HCC)    Obstructive sleep apnea    Abnormal EKG    Dyslipidemia    Bipolar disorder with severe depression (HCC)    Diabetes mellitus (HCC)    Low back pain    Morbid obesity (HCC)    Small bowel volvulus (HCC)    Volvulus of small intestine (HCC)    Strangulation of small intestine (HCC)    Occlusion of superior mesenteric artery ([address]    Abdominal pain    Internal hernia   2. PLAN:     1. May have sips and chips  2. Lu Marrero M.D.  FACS  Electronically signed 2/16/2021 at 11:44 AM

## 2021-02-17 LAB
GLUCOSE BLD-MCNC: 100 MG/DL (ref 70–99)
PERFORMED ON: ABNORMAL

## 2021-02-17 PROCEDURE — 6370000000 HC RX 637 (ALT 250 FOR IP): Performed by: SURGERY

## 2021-02-17 PROCEDURE — 2580000003 HC RX 258: Performed by: SURGERY

## 2021-02-17 PROCEDURE — 1210000000 HC MED SURG R&B

## 2021-02-17 PROCEDURE — 82947 ASSAY GLUCOSE BLOOD QUANT: CPT

## 2021-02-17 PROCEDURE — 2700000000 HC OXYGEN THERAPY PER DAY

## 2021-02-17 PROCEDURE — 6360000002 HC RX W HCPCS: Performed by: SURGERY

## 2021-02-17 PROCEDURE — 99024 POSTOP FOLLOW-UP VISIT: CPT | Performed by: SURGERY

## 2021-02-17 RX ORDER — HYDROCODONE BITARTRATE AND ACETAMINOPHEN 5; 325 MG/1; MG/1
1 TABLET ORAL EVERY 6 HOURS PRN
Status: DISCONTINUED | OUTPATIENT
Start: 2021-02-17 | End: 2021-02-18

## 2021-02-17 RX ADMIN — PREGABALIN 75 MG: 75 CAPSULE ORAL at 09:10

## 2021-02-17 RX ADMIN — ATORVASTATIN CALCIUM 20 MG: 20 TABLET, FILM COATED ORAL at 09:09

## 2021-02-17 RX ADMIN — PANTOPRAZOLE SODIUM 40 MG: 40 TABLET, DELAYED RELEASE ORAL at 09:09

## 2021-02-17 RX ADMIN — PREGABALIN 75 MG: 75 CAPSULE ORAL at 21:14

## 2021-02-17 RX ADMIN — WARFARIN SODIUM 5 MG: 5 TABLET ORAL at 17:07

## 2021-02-17 RX ADMIN — SODIUM CHLORIDE: 9 INJECTION, SOLUTION INTRAVENOUS at 10:19

## 2021-02-17 RX ADMIN — HYDRALAZINE HYDROCHLORIDE 50 MG: 50 TABLET, FILM COATED ORAL at 21:14

## 2021-02-17 RX ADMIN — HYDROCODONE BITARTRATE AND ACETAMINOPHEN 1 TABLET: 5; 325 TABLET ORAL at 17:07

## 2021-02-17 RX ADMIN — LEVOTHYROXINE SODIUM 200 MCG: 100 TABLET ORAL at 09:10

## 2021-02-17 RX ADMIN — ENOXAPARIN SODIUM 40 MG: 100 INJECTION SUBCUTANEOUS at 09:09

## 2021-02-17 RX ADMIN — ALLOPURINOL 300 MG: 300 TABLET ORAL at 09:10

## 2021-02-17 RX ADMIN — HYDRALAZINE HYDROCHLORIDE 50 MG: 50 TABLET, FILM COATED ORAL at 09:09

## 2021-02-17 RX ADMIN — GUAIFENESIN 600 MG: 600 TABLET, EXTENDED RELEASE ORAL at 09:10

## 2021-02-17 RX ADMIN — Medication 10 MG: at 10:17

## 2021-02-17 RX ADMIN — METOPROLOL SUCCINATE 25 MG: 25 TABLET, EXTENDED RELEASE ORAL at 09:10

## 2021-02-17 RX ADMIN — ISOSORBIDE MONONITRATE 30 MG: 30 TABLET, EXTENDED RELEASE ORAL at 09:10

## 2021-02-17 ASSESSMENT — PAIN SCALES - GENERAL
PAINLEVEL_OUTOF10: 4
PAINLEVEL_OUTOF10: 5

## 2021-02-17 NOTE — PROGRESS NOTES
[facility] General Surgery Progress Note    Chief Complaint:  Chief Complaint   Patient presents with    Abdominal Pain       POD # 2    S: Wily Thompson is doing well. He complains that he is not getting enough pain medication and that he has not had anything to drink. He is passing flatus, no BM.     O:   BP (!) 140/79   Pulse 68   Temp 98 °F (36.7 °C)   Resp 16   Ht 6' (1.829 m)   Wt 191 lb 8 oz (86.9 kg)   SpO2 100%   BMI 25.97 kg/m²   I/O reviewed and appropriate  CONSTITUTIONAL: Alert, appropriate, no acute distress  SKIN: warm, dry with no rashes or lesions  HEENT: NCAT, Non icteric, PERR. Trachea Midline. CHEST/LUNGS: CTA bilaterally. Normal respiratory effort. CARDIOVASCULAR: RRR, No edema. ABDOMEN: soft, ND, minimally TTP, +BS. Midline dressing is clean, dry, and intact. NEUROLOGIC: CN II-XI grossly intact, no motor or sensory deficits   MUSCULOSKELETAL: No clubbing or cyanosis. PSYCHIATRIC:  Normal Mood and Affect. Alert and Oriented. LABS:   CBC:   Recent Labs     02/15/21  0115 02/16/21 0251   WBC 7.7 8.6   RBC 4.31* 4.29*   HGB 11.4* 11.2*   HCT 38.1* 37.1*    344   LYMPHOPCT 11.7*  --    MONOPCT 6.0  --    BASOPCT 0.1  --    MONOSABS 0.50  --    LYMPHSABS 0.9*  --    EOSABS 0.00  --    BASOSABS 0.00  --       BMP:   Recent Labs     02/15/21  0128 02/16/21  0251    139   K 3.9 4.3    103   CO2 25 26   ANIONGAP 10 10   GLUCOSE 204* 134*   CREATININE 0.7 0.7   LABGLOM >60 >60   CALCIUM 8.4* 8.6     LFT:   Recent Labs     02/15/21  0128   PROT 6.8   LABALBU 4.2   BILITOT 0.3   ALKPHOS 56   ALT 17   AST 27       A: POD 2 s/p exlpa and PAUL for SBO which has since resolved. P:   Will add home norco in addition to pca. Clear liquid diet. Ambulation encouraged.          Mary Mccormack DO   Electronically Signed on 2/17/2021 at 4:06 PM

## 2021-02-18 LAB
GLUCOSE BLD-MCNC: 107 MG/DL (ref 70–99)
GLUCOSE BLD-MCNC: 120 MG/DL (ref 70–99)
PERFORMED ON: ABNORMAL
PERFORMED ON: ABNORMAL

## 2021-02-18 PROCEDURE — 99024 POSTOP FOLLOW-UP VISIT: CPT | Performed by: SURGERY

## 2021-02-18 PROCEDURE — 6360000002 HC RX W HCPCS: Performed by: SURGERY

## 2021-02-18 PROCEDURE — 2580000003 HC RX 258: Performed by: SURGERY

## 2021-02-18 PROCEDURE — 6370000000 HC RX 637 (ALT 250 FOR IP): Performed by: SURGERY

## 2021-02-18 PROCEDURE — 82947 ASSAY GLUCOSE BLOOD QUANT: CPT

## 2021-02-18 PROCEDURE — 2700000000 HC OXYGEN THERAPY PER DAY

## 2021-02-18 RX ORDER — HYDROCODONE BITARTRATE AND ACETAMINOPHEN 5; 325 MG/1; MG/1
1 TABLET ORAL EVERY 6 HOURS PRN
Qty: 20 TABLET | Refills: 0 | Status: SHIPPED | OUTPATIENT
Start: 2021-02-18 | End: 2021-02-23

## 2021-02-18 RX ORDER — HYDROCODONE BITARTRATE AND ACETAMINOPHEN 5; 325 MG/1; MG/1
1 TABLET ORAL EVERY 4 HOURS PRN
Status: DISCONTINUED | OUTPATIENT
Start: 2021-02-18 | End: 2021-02-18 | Stop reason: HOSPADM

## 2021-02-18 RX ORDER — POLYETHYLENE GLYCOL 3350 17 G/17G
17 POWDER, FOR SOLUTION ORAL DAILY
Status: DISCONTINUED | OUTPATIENT
Start: 2021-02-18 | End: 2021-02-18 | Stop reason: HOSPADM

## 2021-02-18 RX ORDER — HYDROCODONE BITARTRATE AND ACETAMINOPHEN 5; 325 MG/1; MG/1
2 TABLET ORAL EVERY 4 HOURS PRN
Status: DISCONTINUED | OUTPATIENT
Start: 2021-02-18 | End: 2021-02-18 | Stop reason: HOSPADM

## 2021-02-18 RX ORDER — DOCUSATE SODIUM 100 MG/1
100 CAPSULE, LIQUID FILLED ORAL 2 TIMES DAILY
Qty: 60 CAPSULE | Refills: 0 | Status: SHIPPED | OUTPATIENT
Start: 2021-02-18 | End: 2021-03-20

## 2021-02-18 RX ADMIN — ATORVASTATIN CALCIUM 20 MG: 20 TABLET, FILM COATED ORAL at 07:28

## 2021-02-18 RX ADMIN — HYDRALAZINE HYDROCHLORIDE 50 MG: 50 TABLET, FILM COATED ORAL at 07:28

## 2021-02-18 RX ADMIN — GUAIFENESIN 600 MG: 600 TABLET, EXTENDED RELEASE ORAL at 07:28

## 2021-02-18 RX ADMIN — ALLOPURINOL 300 MG: 300 TABLET ORAL at 07:28

## 2021-02-18 RX ADMIN — METFORMIN HYDROCHLORIDE 1000 MG: 500 TABLET ORAL at 07:28

## 2021-02-18 RX ADMIN — LEVOTHYROXINE SODIUM 200 MCG: 100 TABLET ORAL at 07:28

## 2021-02-18 RX ADMIN — METOPROLOL SUCCINATE 25 MG: 25 TABLET, EXTENDED RELEASE ORAL at 07:28

## 2021-02-18 RX ADMIN — HYDROCODONE BITARTRATE AND ACETAMINOPHEN 2 TABLET: 5; 325 TABLET ORAL at 11:26

## 2021-02-18 RX ADMIN — POLYETHYLENE GLYCOL 3350 17 G: 17 POWDER, FOR SOLUTION ORAL at 09:30

## 2021-02-18 RX ADMIN — ISOSORBIDE MONONITRATE 30 MG: 30 TABLET, EXTENDED RELEASE ORAL at 07:28

## 2021-02-18 RX ADMIN — PREGABALIN 75 MG: 75 CAPSULE ORAL at 07:28

## 2021-02-18 RX ADMIN — HYDROCODONE BITARTRATE AND ACETAMINOPHEN 1 TABLET: 5; 325 TABLET ORAL at 07:31

## 2021-02-18 RX ADMIN — PANTOPRAZOLE SODIUM 40 MG: 40 TABLET, DELAYED RELEASE ORAL at 07:28

## 2021-02-18 RX ADMIN — SODIUM CHLORIDE, PRESERVATIVE FREE 10 ML: 5 INJECTION INTRAVENOUS at 07:27

## 2021-02-18 RX ADMIN — ENOXAPARIN SODIUM 40 MG: 100 INJECTION SUBCUTANEOUS at 07:27

## 2021-02-18 ASSESSMENT — PAIN SCALES - GENERAL: PAINLEVEL_OUTOF10: 7

## 2021-02-18 NOTE — DISCHARGE SUMMARY
Physician Discharge Summary     Patient ID:  John Cortes  [id number]  58 y.o.  1970    Admit date: 2/15/2021    Discharge date and time: 2/18/2021  3:13 PM     Admitting Physician: Luz Maria العراقي MD     Discharge Physician: Luz Maria العراقي    Admission Diagnoses: Abdominal pain [R10.9]    Discharge Diagnoses: internal hernia with SBO    Admission Condition: serious    Discharged Condition: good    Indication for Admission: Acute abdominal pain with SBO from internal hernia    Hospital Course: The patient was admitted and taken to the OR for ex lap. A single band of adhesions was found to be causing internal hernia and obstruction. The patient did well post operatively. His diet was able to be gradually advanced, and his pain was better controlled. He was then stable for discharge home. Consults: none    Significant Diagnostic Studies: labs and radiology    Treatments: IV hydration, analgesia and surgery    Discharge Exam:  See progress note    Disposition: home    In process/preliminary results:  Outstanding Order Results     No orders found from 1/17/2021 to 2/16/2021. Patient Instructions:   Discharge Medication List as of 2/18/2021  2:36 PM      START taking these medications    Details   HYDROcodone-acetaminophen (NORCO) 5-325 MG per tablet Take 1 tablet by mouth every 6 hours as needed for Pain for up to 5 days.  For break though post operative pain, Disp-20 tablet, R-0Normal      docusate sodium (COLACE) 100 MG capsule Take 1 capsule by mouth 2 times daily Hold for loose stools, Disp-60 capsule, R-0Normal         CONTINUE these medications which have NOT CHANGED    Details   warfarin (COUMADIN) 5 MG tablet Take 1 tablet by mouth every evening, Disp-30 tablet, R-5Normal      metFORMIN (GLUCOPHAGE) 1000 MG tablet Hold for 2 days and restart 2/13/2021, Disp-60 tablet, R-5Normal      atorvastatin (LIPITOR) 20 MG tablet Take 1 tablet by mouth daily, Disp-90 tablet, R-3Normal diclofenac sodium (VOLTAREN) 1 % GEL Apply 4 g topically daily , Topical, DAILY Starting Wed 10/28/2020, Historical Med      metoprolol succinate (TOPROL XL) 25 MG extended release tablet Take 1 tablet by mouth daily, Disp-90 tablet, R-1Normal      nitroGLYCERIN (NITROSTAT) 0.4 MG SL tablet Place 1 tablet under the tongue every 5 minutes as needed for Chest pain, Disp-25 tablet, R-3Normal      isosorbide mononitrate (IMDUR) 30 MG extended release tablet Take 1 tablet by mouth daily, Disp-30 tablet, R-3Normal      VICTOZA 18 MG/3ML SOPN SC injection INJECT 1.8 MG INTO THE SKIN DAILY, Disp-9 mL, R-3Normal      MUCINEX 600 MG extended release tablet TAKE 2 TABLETS BY MOUTH 2 TIMES DAILY, Disp-30 tablet, R-1, DAWNormal      allopurinol (ZYLOPRIM) 300 MG tablet TAKE ONE TABLET BY MOUTH ONCE DAILY, Disp-30 tablet, R-5Normal      Insulin Pen Needle (B-D ULTRAFINE III SHORT PEN) 31G X 8 MM MISC PRN Starting Wed 9/30/2020, Disp-100 each, R-1, Normal      sildenafil (VIAGRA) 50 MG tablet Take 1 tablet by mouth as needed for Erectile Dysfunction, Disp-30 tablet, R-3Normal      hydrALAZINE (APRESOLINE) 50 MG tablet TAKE 1 TABLET BY MOUTH 3 TIMES DAILY, Disp-90 tablet,R-11Normal      HYDROcodone-acetaminophen (NORCO)  MG per tablet Take 1 tablet by mouth 3 times daily. Historical Med      levothyroxine (SYNTHROID) 200 MCG tablet TAKE ONE TABLET BY MOUTH ONCE DAILY, Disp-30 tablet, R-11Normal      pregabalin (LYRICA) 75 MG capsule Take 75 mg by mouth 2 times daily. Historical Med      omeprazole (PRILOSEC) 40 MG delayed release capsule Take 40 mg by mouth 2 times daily Historical Med           Activity: activity as tolerated, no driving while on analgesics and no heavy lifting for 2 weeks  Diet: regular diet  Wound Care: keep wound clean and dry    Follow-up with Farhan Simon in 2 weeks.     Signed:  Farhan Simon    2/18/2021  3:49 PM

## 2021-02-18 NOTE — DISCHARGE INSTR - ACTIVITY
Activity as tolerated except no lifting more than 10 pounds for the first two weeks and no driving if taking pain medication.

## 2021-02-18 NOTE — DISCHARGE INSTR - DIET
Good nutrition is important when healing from an illness, injury, or surgery. Follow any nutrition recommendations given to you during your hospital stay. If you were given an oral nutrition supplement while in the hospital, continue to take this supplement at home. You can take it with meals, in-between meals, and/or before bedtime. These supplements can be purchased at most local grocery stores, pharmacies, and chain [employer]-stores. If you have any questions about your diet or nutrition, call the hospital and ask for the dietitian.     Regular diet as tolerated

## 2021-02-18 NOTE — PROGRESS NOTES
Patient being discharged home per order. Patient verbalized understanding of all discharge instructions. Patient has no complaints or concerns at this time. Patient stable and waiting on transportation for wheelchair to private auto.

## 2021-02-18 NOTE — PROGRESS NOTES
Subjective:  No acute events. Reports that he is tolerating clears, has been passing flatus and had a BM. Objective:  BP (!) 170/84   Pulse 62   Temp 98.2 °F (36.8 °C) (Temporal)   Resp 17   Ht 6' (1.829 m)   Wt 191 lb 8 oz (86.9 kg)   SpO2 100%   BMI 25.97 kg/m²   General: NAD, AAO  Chest: regular, non-labored  Abdomen: Soft, ND, ATTP, incision C/d/I    Assessment and plan:  53year old male s/p ex lap for internal hernia  1) Doing well. Bowel function improving.   2) Will increase PO pain meds  3) Continue bowel regimen  4) Hopefully home later today if he does well

## 2021-02-19 ENCOUNTER — TELEPHONE (OUTPATIENT)
Dept: PRIMARY CARE CLINIC | Age: 51
End: 2021-02-19

## 2021-02-19 NOTE — TELEPHONE ENCOUNTER
[facility] Transitions Initial Follow Up Call    Outreach made within 2 business days of discharge: Yes    Patient: Juany Kent Patient : 1970   MRN: [id number]    Reason for Admission: Abdominal Pain, internal hernia with SBO  Discharge Date: 21       Spoke with:     Discharge department/facility: [facility]    TCM Interactive Patient Contact:  Was patient able to fill all prescriptions: Yes  Was patient instructed to bring all medications to the follow-up visit: Yes  Is patient taking all medications as directed in the discharge summary? Yes  Does patient understand their discharge instructions: Yes  Does patient have questions or concerns that need addressed prior to 7-14 day follow up office visit: no    Spoke with Kavin. He states he is still in considerable pain. He was discharged with an Rx for norco 5/375 which he states is like him taking a tylenol. His appetite is fair but improving. He has had normal bowel movements. No issues with bladder. No fever or signs of infection.   Scheduled appointment with PCP within 7-14 days    Follow Up  Future Appointments   Date Time Provider Keaton Torres   2021  8:45 AM Algernon Callow, MD [facility] Cardio [address] [place] [address]

## 2021-02-24 ENCOUNTER — TELEPHONE (OUTPATIENT)
Dept: SURGERY | Age: 51
End: 2021-02-24

## 2021-02-24 ENCOUNTER — VIRTUAL VISIT (OUTPATIENT)
Dept: PRIMARY CARE CLINIC | Age: 51
End: 2021-02-24
Payer: MEDICAID

## 2021-02-24 DIAGNOSIS — L24.A9 WOUND DRAINAGE: ICD-10-CM

## 2021-02-24 DIAGNOSIS — Z09 HOSPITAL DISCHARGE FOLLOW-UP: Primary | ICD-10-CM

## 2021-02-24 DIAGNOSIS — Z98.890 S/P EXPLORATORY LAPAROTOMY: ICD-10-CM

## 2021-02-24 DIAGNOSIS — R10.9 ABDOMINAL PAIN, UNSPECIFIED ABDOMINAL LOCATION: ICD-10-CM

## 2021-02-24 PROCEDURE — 1111F DSCHRG MED/CURRENT MED MERGE: CPT | Performed by: NURSE PRACTITIONER

## 2021-02-24 PROCEDURE — 99215 OFFICE O/P EST HI 40 MIN: CPT | Performed by: NURSE PRACTITIONER

## 2021-02-24 ASSESSMENT — ENCOUNTER SYMPTOMS
RESPIRATORY NEGATIVE: 1
EYES NEGATIVE: 1

## 2021-02-24 NOTE — PROGRESS NOTES
Consent:  Richa Palencia  and/or health care decision maker is aware that that he may receive a bill for this telephone service, depending on his insurance coverage, and has provided verbal consent to proceed: Yes     Post-Discharge Transitional Care Management Services or Hospital Follow Up      Richa Palencia   YOB: 1970    Date of Office Visit:  2/24/2021  Date of Hospital Admission: 2/15/21  Date of Hospital Discharge: 2/18/21  Readmission Risk Score(high >=14%.  Medium >=10%):Readmission Risk Score: 18      Care management risk score Rising risk (score 2-5) and Complex Care (Scores >=6): 6     Non face to face  following discharge, date last encounter closed (first attempt may have been earlier): 2/19/2021  3:27 PM 2/19/2021  3:27 PM    Call initiated 2 business days of discharge: Yes     Patient Active Problem List   Diagnosis    Chest pain, atypical    Gastroesophageal reflux disease    Morbid obesity with BMI of 45.0-49.9, adult ([address]    Type 2 diabetes mellitus without complication ([address]    Acquired hypothyroidism    Hypertensive disorder    Lactic acidosis    Chest pain    Hiatal hernia    Anxiety    Chronic obstructive pulmonary disease (HCC)    Paroxysmal A-fib ([address]    Obstructive sleep apnea    Abnormal EKG    Dyslipidemia    Bipolar disorder with severe depression ([address]    Diabetes mellitus ([address]    Low back pain    Morbid obesity ([address]    Small bowel volvulus ([address]    Volvulus of small intestine ([address]    Strangulation of small intestine ([address]    Occlusion of superior mesenteric artery ([address]    Abdominal pain    Internal hernia       Allergies   Allergen Reactions    Food      ALL MILK PRODUCTS       Medications listed as [facility] the [facility] Medication Instructions SHAE:    Printed on:02/27/21 6409   Medication Information                      allopurinol (ZYLOPRIM) 300 MG tablet  TAKE ONE TABLET BY MOUTH ONCE DAILY atorvastatin (LIPITOR) 20 MG tablet  Take 1 tablet by mouth daily             diclofenac sodium (VOLTAREN) 1 % GEL  Apply 4 g topically daily              docusate sodium (COLACE) 100 MG capsule  Take 1 capsule by mouth 2 times daily Hold for loose stools             hydrALAZINE (APRESOLINE) 50 MG tablet  TAKE 1 TABLET BY MOUTH 3 TIMES DAILY             HYDROcodone-acetaminophen (NORCO)  MG per tablet  Take 1 tablet by mouth 3 times daily. Insulin Pen Needle (B-D ULTRAFINE III SHORT PEN) 31G X 8 MM MISC  Inject 1 each into the skin as needed (FOR DM)             isosorbide mononitrate (IMDUR) 30 MG extended release tablet  Take 1 tablet by mouth daily             levothyroxine (SYNTHROID) 200 MCG tablet  TAKE ONE TABLET BY MOUTH ONCE DAILY             metFORMIN (GLUCOPHAGE) 1000 MG tablet  Hold for 2 days and restart 2/13/2021             metoprolol succinate (TOPROL XL) 25 MG extended release tablet  Take 1 tablet by mouth daily             MUCINEX 600 MG extended release tablet  TAKE 2 TABLETS BY MOUTH 2 TIMES DAILY             nitroGLYCERIN (NITROSTAT) 0.4 MG SL tablet  Place 1 tablet under the tongue every 5 minutes as needed for Chest pain             omeprazole (PRILOSEC) 40 MG delayed release capsule  Take 40 mg by mouth 2 times daily              pregabalin (LYRICA) 75 MG capsule  Take 75 mg by mouth 2 times daily.              sildenafil (VIAGRA) 50 MG tablet  Take 1 tablet by mouth as needed for Erectile Dysfunction             VICTOZA 18 MG/3ML SOPN SC injection  INJECT 1.8 MG INTO THE SKIN DAILY             warfarin (COUMADIN) 5 MG tablet  Take 1 tablet by mouth every evening                   Medications marked \"taking\" at this time  No outpatient medications have been marked as taking for the 2/24/21 encounter (Virtual Visit) with DEMETRIUS Garcias NP. 3. Abdominal pain, unspecified abdominal location    4. Wound drainage    - Rosie Robledo MA contacted general surgery to discuss plan of care for patient. General surgery reports they will contact the patient to discuss if they believe he needs to come in for a sooner wound check    Medical Decision Making: moderate complexity    Services were provided through a telephone visit to substitute for in-person clinic visit. Patient and provider were located at their individual homes. Pursuant to the emergency declaration under the [address] [place] [address], [facility] waiver authority and the [employer] Act, this Virtual  Visit was conducted, with patient's consent, to reduce the patient's risk of exposure to COVID-19 and provide continuity of care for an established patient. Patient/Caregiver provided printed discharge information.

## 2021-02-24 NOTE — TELEPHONE ENCOUNTER
Pt was seen today by DEMETRIUS Nettles - has thick yellow puss from surgical site -   has an appt 03/04/21 but thinks he needs to be seen sooner    Cc:  Suzanne Henning MA

## 2021-02-24 NOTE — TELEPHONE ENCOUNTER
Called patient and he states incision has opened up, is red and and painful, do you want me to add to scheduled tomorrow, patient denies fever

## 2021-02-25 ENCOUNTER — OFFICE VISIT (OUTPATIENT)
Dept: SURGERY | Age: 51
End: 2021-02-25

## 2021-02-25 VITALS
RESPIRATION RATE: 18 BRPM | HEIGHT: 72 IN | DIASTOLIC BLOOD PRESSURE: 70 MMHG | OXYGEN SATURATION: 99 % | SYSTOLIC BLOOD PRESSURE: 122 MMHG | HEART RATE: 95 BPM | TEMPERATURE: 98 F | BODY MASS INDEX: 27.09 KG/M2 | WEIGHT: 200 LBS

## 2021-02-25 VITALS
HEIGHT: 72 IN | SYSTOLIC BLOOD PRESSURE: 138 MMHG | WEIGHT: 191.5 LBS | DIASTOLIC BLOOD PRESSURE: 82 MMHG | BODY MASS INDEX: 25.94 KG/M2 | RESPIRATION RATE: 16 BRPM | HEART RATE: 79 BPM | OXYGEN SATURATION: 99 % | TEMPERATURE: 99.2 F

## 2021-02-25 DIAGNOSIS — Z98.890 S/P EXPLORATORY LAPAROTOMY: Primary | ICD-10-CM

## 2021-02-25 PROCEDURE — 99024 POSTOP FOLLOW-UP VISIT: CPT | Performed by: SURGERY

## 2021-02-27 NOTE — PROGRESS NOTES
Subjective:  Mr. Severino Bedoya is here to follow up after ex lap for internal hernia. The patient had his video follow up from discharge with a nurse, and they recommended he come in to have his wound checked because he was having some yellowish drainage. The patient reports that he has been keeping the incision clean and dry, and has had small amounts of yellowish drainage. He states that he is feeling pretty good over all, with his usual diarrhea and decreased appetite, similar to what he has had after previous surgeries. Objective:  /70   Pulse 95   Temp 98 °F (36.7 °C)   Resp 18   Ht 6' (1.829 m)   Wt 200 lb (90.7 kg)   SpO2 99%   BMI 27.12 kg/m²   General: NAD, AAO  Chest: Regular, non-labored  Abdomen: Soft ND, mild appropriate TTP, incision clean and dry, no erythema. There is one area where a skin edge rolled so that the edges are not everted, other wise, no issues and staples in tact    Assessment and plan:  49year old males/p ex lap for internal hernia  1) Doing okay post operatively.  Discussed with the patient that that one area of his incision will likely have some serous drainage because the edges are not everted well, but no issue to be concerned with  2) Follow up again next week for regularly scheduled post op and staple removal

## 2021-03-03 ENCOUNTER — TRANSCRIBE ORDERS (OUTPATIENT)
Dept: ADMINISTRATIVE | Facility: HOSPITAL | Age: 51
End: 2021-03-03

## 2021-03-03 DIAGNOSIS — Z01.818 PREOP TESTING: Primary | ICD-10-CM

## 2021-03-04 ENCOUNTER — OFFICE VISIT (OUTPATIENT)
Dept: SURGERY | Age: 51
End: 2021-03-04

## 2021-03-04 VITALS
HEIGHT: 72 IN | WEIGHT: 197 LBS | SYSTOLIC BLOOD PRESSURE: 112 MMHG | BODY MASS INDEX: 26.68 KG/M2 | DIASTOLIC BLOOD PRESSURE: 72 MMHG | TEMPERATURE: 97.7 F

## 2021-03-04 DIAGNOSIS — R10.10 PAIN OF UPPER ABDOMEN: Primary | ICD-10-CM

## 2021-03-04 PROCEDURE — 99024 POSTOP FOLLOW-UP VISIT: CPT | Performed by: SURGERY

## 2021-03-04 PROCEDURE — 1111F DSCHRG MED/CURRENT MED MERGE: CPT | Performed by: SURGERY

## 2021-03-04 RX ORDER — AMOXICILLIN AND CLAVULANATE POTASSIUM 875; 125 MG/1; MG/1
1 TABLET, FILM COATED ORAL 2 TIMES DAILY
Qty: 14 TABLET | Refills: 0 | Status: SHIPPED | OUTPATIENT
Start: 2021-03-04 | End: 2021-03-11

## 2021-03-04 NOTE — PROGRESS NOTES
Subjective:  Mr. Steve Clark is here to follow up after ex lap for bowel obstruction. He was seen last week with concerns about drainage from his incision. He is reporting pain to the area around his incision that is not improving over the past week. He still has decreased appetite. Objective:  /72 (Site: Left Upper Arm, Position: Sitting, Cuff Size: Large Adult)   Temp 97.7 °F (36.5 °C) (Temporal)   Ht 6' (1.829 m)   Wt 197 lb (89.4 kg)   BMI 26.72 kg/m²   General: NAD, AAO  Abdomen: Soft, ND, no hernia or fluid collection palpated. Midportion of the incision where skin edges rolled after surgery an area about 2 cm with healing wound, mild erythema along the length of the wound bilaterally - appears to be along the area of the staples    Assessment and plan:  49year old male s/p ex lap for small bowel obstruction  1) Appears to be doing okay overall, but having worsening of his abdominal pain over the last few days. I explained that I feel his incision looks as I expected in the area where the skin edges rolled in with the staples. I feel the erythema along the length of the wound appears more like irritation from the staples than infection, but will order an antibiotic for possible cellulitis. The patient reports that he has had multiple abdominal surgeries and has not had pain like this before. I will go ahead and order a CT to evaluate for possible post operative infection, fluid collection, or hernia, and have him follow up in 2 weeks.

## 2021-03-04 NOTE — Clinical Note
Delmar Cruz, I thought this would be a good chart to try sending a comment with. He has had multiple multiple abdominal surgeries. I think he looks good overall, but went ahead and ordered the CT scan because he feels this pain is worse than he has had in the past at this point after surgery. With his bariatric surgery history, and recurrent internal hernias, he is really getting to a point that, if he has more recurrences, he will probably need to get transferred to a place where they do bariatric surgery. Thank you!

## 2021-03-17 ENCOUNTER — TELEPHONE (OUTPATIENT)
Dept: SURGERY | Age: 51
End: 2021-03-17

## 2021-03-17 NOTE — TELEPHONE ENCOUNTER
Called patient to give him his appt for imaging and follow up with Dr. Larry Laguna. After several attempts at reaching him by phone finally spoke with him to give him the information and patient does not wish to come to our office or have any testinside the building until he is able to get a covid vaccine. He states he has a wife and children and is trying to protect them.

## 2021-03-30 ENCOUNTER — TRANSCRIBE ORDERS (OUTPATIENT)
Dept: LAB | Facility: HOSPITAL | Age: 51
End: 2021-03-30

## 2021-03-30 DIAGNOSIS — Z01.818 PREOPERATIVE TESTING: Primary | ICD-10-CM

## 2021-04-03 ENCOUNTER — HOSPITAL ENCOUNTER (EMERGENCY)
Age: 51
Discharge: ANOTHER HOSPITAL | End: 2021-04-03
Attending: EMERGENCY MEDICINE
Payer: MEDICAID

## 2021-04-03 ENCOUNTER — APPOINTMENT (OUTPATIENT)
Dept: CT IMAGING | Age: 51
End: 2021-04-03
Payer: MEDICAID

## 2021-04-03 VITALS
DIASTOLIC BLOOD PRESSURE: 89 MMHG | BODY MASS INDEX: 26.72 KG/M2 | WEIGHT: 197 LBS | OXYGEN SATURATION: 99 % | SYSTOLIC BLOOD PRESSURE: 146 MMHG | RESPIRATION RATE: 17 BRPM | TEMPERATURE: 98.4 F | HEART RATE: 74 BPM

## 2021-04-03 DIAGNOSIS — K56.609 SMALL BOWEL OBSTRUCTION (HCC): Primary | ICD-10-CM

## 2021-04-03 LAB
ALBUMIN SERPL-MCNC: 3.6 G/DL (ref 3.5–5.2)
ALP BLD-CCNC: 65 U/L (ref 40–130)
ALT SERPL-CCNC: 17 U/L (ref 5–41)
ANION GAP SERPL CALCULATED.3IONS-SCNC: 10 MMOL/L (ref 7–19)
AST SERPL-CCNC: 24 U/L (ref 5–40)
BASOPHILS ABSOLUTE: 0 K/UL (ref 0–0.2)
BASOPHILS RELATIVE PERCENT: 0.5 % (ref 0–1)
BILIRUB SERPL-MCNC: <0.2 MG/DL (ref 0.2–1.2)
BUN BLDV-MCNC: 12 MG/DL (ref 6–20)
CALCIUM SERPL-MCNC: 8.2 MG/DL (ref 8.6–10)
CHLORIDE BLD-SCNC: 103 MMOL/L (ref 98–111)
CO2: 24 MMOL/L (ref 22–29)
CREAT SERPL-MCNC: 0.8 MG/DL (ref 0.5–1.2)
EOSINOPHILS ABSOLUTE: 0 K/UL (ref 0–0.6)
EOSINOPHILS RELATIVE PERCENT: 1 % (ref 0–5)
GFR AFRICAN AMERICAN: >59
GFR NON-AFRICAN AMERICAN: >60
GLUCOSE BLD-MCNC: 127 MG/DL (ref 74–109)
HCT VFR BLD CALC: 35.6 % (ref 42–52)
HEMOGLOBIN: 11.2 G/DL (ref 14–18)
IMMATURE GRANULOCYTES #: 0 K/UL
LACTIC ACID: 1.8 MMOL/L (ref 0.5–1.9)
LIPASE: 23 U/L (ref 13–60)
LYMPHOCYTES ABSOLUTE: 1 K/UL (ref 1.1–4.5)
LYMPHOCYTES RELATIVE PERCENT: 25.1 % (ref 20–40)
MCH RBC QN AUTO: 26.7 PG (ref 27–31)
MCHC RBC AUTO-ENTMCNC: 31.5 G/DL (ref 33–37)
MCV RBC AUTO: 85 FL (ref 80–94)
MONOCYTES ABSOLUTE: 0.4 K/UL (ref 0–0.9)
MONOCYTES RELATIVE PERCENT: 10.2 % (ref 0–10)
NEUTROPHILS ABSOLUTE: 2.5 K/UL (ref 1.5–7.5)
NEUTROPHILS RELATIVE PERCENT: 62.7 % (ref 50–65)
PDW BLD-RTO: 16 % (ref 11.5–14.5)
PLATELET # BLD: 447 K/UL (ref 130–400)
PMV BLD AUTO: 9.8 FL (ref 9.4–12.4)
POTASSIUM SERPL-SCNC: 4 MMOL/L (ref 3.5–5)
RBC # BLD: 4.19 M/UL (ref 4.7–6.1)
SARS-COV-2, NAAT: NOT DETECTED
SODIUM BLD-SCNC: 137 MMOL/L (ref 136–145)
TOTAL PROTEIN: 6.1 G/DL (ref 6.6–8.7)
WBC # BLD: 3.9 K/UL (ref 4.8–10.8)

## 2021-04-03 PROCEDURE — 6360000002 HC RX W HCPCS

## 2021-04-03 PROCEDURE — 85025 COMPLETE CBC W/AUTO DIFF WBC: CPT

## 2021-04-03 PROCEDURE — 2500000003 HC RX 250 WO HCPCS: Performed by: EMERGENCY MEDICINE

## 2021-04-03 PROCEDURE — 87635 SARS-COV-2 COVID-19 AMP PRB: CPT

## 2021-04-03 PROCEDURE — 83690 ASSAY OF LIPASE: CPT

## 2021-04-03 PROCEDURE — 96374 THER/PROPH/DIAG INJ IV PUSH: CPT

## 2021-04-03 PROCEDURE — 74177 CT ABD & PELVIS W/CONTRAST: CPT

## 2021-04-03 PROCEDURE — 99284 EMERGENCY DEPT VISIT MOD MDM: CPT

## 2021-04-03 PROCEDURE — 83605 ASSAY OF LACTIC ACID: CPT

## 2021-04-03 PROCEDURE — 36415 COLL VENOUS BLD VENIPUNCTURE: CPT

## 2021-04-03 PROCEDURE — 80053 COMPREHEN METABOLIC PANEL: CPT

## 2021-04-03 PROCEDURE — 96375 TX/PRO/DX INJ NEW DRUG ADDON: CPT

## 2021-04-03 PROCEDURE — 2580000003 HC RX 258: Performed by: EMERGENCY MEDICINE

## 2021-04-03 PROCEDURE — 6360000002 HC RX W HCPCS: Performed by: EMERGENCY MEDICINE

## 2021-04-03 PROCEDURE — 6360000004 HC RX CONTRAST MEDICATION: Performed by: EMERGENCY MEDICINE

## 2021-04-03 PROCEDURE — 96376 TX/PRO/DX INJ SAME DRUG ADON: CPT

## 2021-04-03 RX ORDER — SODIUM CHLORIDE, SODIUM LACTATE, POTASSIUM CHLORIDE, AND CALCIUM CHLORIDE .6; .31; .03; .02 G/100ML; G/100ML; G/100ML; G/100ML
1000 INJECTION, SOLUTION INTRAVENOUS ONCE
Status: COMPLETED | OUTPATIENT
Start: 2021-04-03 | End: 2021-04-03

## 2021-04-03 RX ORDER — ONDANSETRON 2 MG/ML
4 INJECTION INTRAMUSCULAR; INTRAVENOUS ONCE
Status: COMPLETED | OUTPATIENT
Start: 2021-04-03 | End: 2021-04-03

## 2021-04-03 RX ORDER — HYDROMORPHONE HYDROCHLORIDE 1 MG/ML
1 INJECTION, SOLUTION INTRAMUSCULAR; INTRAVENOUS; SUBCUTANEOUS ONCE
Status: COMPLETED | OUTPATIENT
Start: 2021-04-03 | End: 2021-04-03

## 2021-04-03 RX ADMIN — HYDROMORPHONE HYDROCHLORIDE 1 MG: 1 INJECTION, SOLUTION INTRAMUSCULAR; INTRAVENOUS; SUBCUTANEOUS at 19:30

## 2021-04-03 RX ADMIN — HYDROMORPHONE HYDROCHLORIDE 1 MG: 1 INJECTION, SOLUTION INTRAMUSCULAR; INTRAVENOUS; SUBCUTANEOUS at 14:58

## 2021-04-03 RX ADMIN — SODIUM CHLORIDE, POTASSIUM CHLORIDE, SODIUM LACTATE AND CALCIUM CHLORIDE 1000 ML: 600; 310; 30; 20 INJECTION, SOLUTION INTRAVENOUS at 14:58

## 2021-04-03 RX ADMIN — IOPAMIDOL 90 ML: 755 INJECTION, SOLUTION INTRAVENOUS at 15:32

## 2021-04-03 RX ADMIN — HYDROMORPHONE HYDROCHLORIDE 1 MG: 1 INJECTION, SOLUTION INTRAMUSCULAR; INTRAVENOUS; SUBCUTANEOUS at 16:10

## 2021-04-03 RX ADMIN — ONDANSETRON HYDROCHLORIDE 4 MG: 2 SOLUTION INTRAMUSCULAR; INTRAVENOUS at 14:58

## 2021-04-03 RX ADMIN — HYDROMORPHONE HYDROCHLORIDE 1 MG: 1 INJECTION, SOLUTION INTRAMUSCULAR; INTRAVENOUS; SUBCUTANEOUS at 19:31

## 2021-04-03 ASSESSMENT — ENCOUNTER SYMPTOMS
DIARRHEA: 0
VOMITING: 0
SHORTNESS OF BREATH: 0
ABDOMINAL PAIN: 1
ABDOMINAL DISTENTION: 1

## 2021-04-03 ASSESSMENT — PAIN SCALES - GENERAL
PAINLEVEL_OUTOF10: 10
PAINLEVEL_OUTOF10: 9

## 2021-04-03 ASSESSMENT — PAIN DESCRIPTION - LOCATION: LOCATION: ABDOMEN

## 2021-04-03 NOTE — ED NOTES
Patient placed on cardiac monitor, continuous pulse oximeter, and NIBP monitor. Monitor alarms on.        Shon Romano RN  04/03/21 8730

## 2021-04-03 NOTE — ED TRIAGE NOTES
Pt here with severe mid abd pain pt states that he was seen here about a month ago for twisted bowel and this feels similar.   Pt moaning in pain and tearful

## 2021-04-03 NOTE — ED NOTES
Bed: 17  Expected date:   Expected time:   Means of arrival:   Comments:  EMS abd yamileth Ortega, GODFREY  04/03/21 4547
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
## 2021-04-03 NOTE — ED NOTES
EMS day shift captain states transfer will not take place until next EMS shift comes on duty.        Jorge Alberto Thomas RN  04/03/21 [id number]

## 2021-04-04 RX ORDER — HYDROMORPHONE HYDROCHLORIDE 1 MG/ML
1 INJECTION, SOLUTION INTRAMUSCULAR; INTRAVENOUS; SUBCUTANEOUS ONCE
Status: COMPLETED | OUTPATIENT
Start: 2021-04-03 | End: 2021-04-03

## 2021-04-04 NOTE — ED NOTES
Spoke with Theodoro Baumgarten at [facility]. He is sending crew for transport.        Wenceslao Fajadro RN  04/03/21 1916

## 2021-04-15 ENCOUNTER — OFFICE VISIT (OUTPATIENT)
Dept: PRIMARY CARE CLINIC | Age: 51
End: 2021-04-15
Payer: MEDICAID

## 2021-04-15 VITALS
OXYGEN SATURATION: 97 % | DIASTOLIC BLOOD PRESSURE: 68 MMHG | BODY MASS INDEX: 26.19 KG/M2 | SYSTOLIC BLOOD PRESSURE: 118 MMHG | WEIGHT: 193.4 LBS | RESPIRATION RATE: 14 BRPM | TEMPERATURE: 97.3 F | HEIGHT: 72 IN | HEART RATE: 69 BPM

## 2021-04-15 DIAGNOSIS — Z90.49 S/P SMALL BOWEL RESECTION: ICD-10-CM

## 2021-04-15 DIAGNOSIS — Z09 HOSPITAL DISCHARGE FOLLOW-UP: Primary | ICD-10-CM

## 2021-04-15 PROCEDURE — 3017F COLORECTAL CA SCREEN DOC REV: CPT | Performed by: NURSE PRACTITIONER

## 2021-04-15 PROCEDURE — 99215 OFFICE O/P EST HI 40 MIN: CPT | Performed by: NURSE PRACTITIONER

## 2021-04-15 PROCEDURE — 1111F DSCHRG MED/CURRENT MED MERGE: CPT | Performed by: NURSE PRACTITIONER

## 2021-04-15 PROCEDURE — G8427 DOCREV CUR MEDS BY ELIG CLIN: HCPCS | Performed by: NURSE PRACTITIONER

## 2021-04-15 PROCEDURE — 1036F TOBACCO NON-USER: CPT | Performed by: NURSE PRACTITIONER

## 2021-04-15 PROCEDURE — G8417 CALC BMI ABV UP PARAM F/U: HCPCS | Performed by: NURSE PRACTITIONER

## 2021-04-15 ASSESSMENT — ENCOUNTER SYMPTOMS
RHINORRHEA: 0
VOICE CHANGE: 0
COUGH: 0
BACK PAIN: 0
VOMITING: 0
PHOTOPHOBIA: 0
COLOR CHANGE: 0
SHORTNESS OF BREATH: 0
NAUSEA: 1

## 2021-04-15 NOTE — PROGRESS NOTES
(LIPITOR) 20 MG tablet  Take 1 tablet by mouth daily             diclofenac sodium (VOLTAREN) 1 % GEL  Apply 4 g topically daily              hydrALAZINE (APRESOLINE) 50 MG tablet  TAKE 1 TABLET BY MOUTH 3 TIMES DAILY             HYDROcodone-acetaminophen (NORCO)  MG per tablet  Take 1 tablet by mouth 3 times daily. Insulin Pen Needle (B-D ULTRAFINE III SHORT PEN) 31G X 8 MM MISC  Inject 1 each into the skin as needed (FOR DM)             isosorbide mononitrate (IMDUR) 30 MG extended release tablet  Take 1 tablet by mouth daily             levothyroxine (SYNTHROID) 200 MCG tablet  TAKE ONE TABLET BY MOUTH ONCE DAILY             metFORMIN (GLUCOPHAGE) 1000 MG tablet  Hold for 2 days and restart 2/13/2021             metoprolol succinate (TOPROL XL) 25 MG extended release tablet  Take 1 tablet by mouth daily             MUCINEX 600 MG extended release tablet  TAKE 2 TABLETS BY MOUTH 2 TIMES DAILY             nitroGLYCERIN (NITROSTAT) 0.4 MG SL tablet  Place 1 tablet under the tongue every 5 minutes as needed for Chest pain             omeprazole (PRILOSEC) 40 MG delayed release capsule  Take 40 mg by mouth 2 times daily              pregabalin (LYRICA) 75 MG capsule  Take 75 mg by mouth 2 times daily.              sildenafil (VIAGRA) 50 MG tablet  Take 1 tablet by mouth as needed for Erectile Dysfunction             VICTOZA 18 MG/3ML SOPN SC injection  INJECT 1.8 MG INTO THE SKIN DAILY             warfarin (COUMADIN) 5 MG tablet  Take 1 tablet by mouth every evening                   Medications marked \"taking\" at this time  Outpatient Medications Marked as Taking for the 4/15/21 encounter (Office Visit) with DEMETRIUS Ocampo   Medication Sig Dispense Refill    warfarin (COUMADIN) 5 MG tablet Take 1 tablet by mouth every evening 30 tablet 5    metFORMIN (GLUCOPHAGE) 1000 MG tablet Hold for 2 days and restart 2/13/2021 (Patient taking differently: Take 1,000 mg by mouth 2 times daily (with meals) ) 60 tablet 5    atorvastatin (LIPITOR) 20 MG tablet Take 1 tablet by mouth daily 90 tablet 3    diclofenac sodium (VOLTAREN) 1 % GEL Apply 4 g topically daily       metoprolol succinate (TOPROL XL) 25 MG extended release tablet Take 1 tablet by mouth daily 90 tablet 1    nitroGLYCERIN (NITROSTAT) 0.4 MG SL tablet Place 1 tablet under the tongue every 5 minutes as needed for Chest pain 25 tablet 3    isosorbide mononitrate (IMDUR) 30 MG extended release tablet Take 1 tablet by mouth daily 30 tablet 3    VICTOZA 18 MG/3ML SOPN SC injection INJECT 1.8 MG INTO THE SKIN DAILY (Patient taking differently: Inject 1.8 mg into the skin nightly ) 9 mL 3    MUCINEX 600 MG extended release tablet TAKE 2 TABLETS BY MOUTH 2 TIMES DAILY 30 tablet 1    allopurinol (ZYLOPRIM) 300 MG tablet TAKE ONE TABLET BY MOUTH ONCE DAILY 30 tablet 5    Insulin Pen Needle (B-D ULTRAFINE III SHORT PEN) 31G X 8 MM MISC Inject 1 each into the skin as needed (FOR DM) 100 each 1    sildenafil (VIAGRA) 50 MG tablet Take 1 tablet by mouth as needed for Erectile Dysfunction 30 tablet 3    hydrALAZINE (APRESOLINE) 50 MG tablet TAKE 1 TABLET BY MOUTH 3 TIMES DAILY (Patient taking differently: Take 50 mg by mouth 2 times daily ) 90 tablet 11    HYDROcodone-acetaminophen (NORCO)  MG per tablet Take 1 tablet by mouth 3 times daily.  levothyroxine (SYNTHROID) 200 MCG tablet TAKE ONE TABLET BY MOUTH ONCE DAILY 30 tablet 11    pregabalin (LYRICA) 75 MG capsule Take 75 mg by mouth 2 times daily.       omeprazole (PRILOSEC) 40 MG delayed release capsule Take 40 mg by mouth 2 times daily           Medications patient taking as of now reconciled against medications ordered at time of hospital discharge: Yes    Chief Complaint   Patient presents with   [address] from Hospital     was in [facility] for a bowel obstruction; he states he is still having abdominal pain and discomfort; he states he will need his staples removed HPI    Inpatient course: Discharge summary reviewed- see chart. Interval history/Current status:     Patient presents today for hospital discharge follow-up. He was recently discharged from [facility] for SBO. Patient underwent small bowel resection; this was the fourth time he has had this done he states. He did well with surgery; no complications. He states he is nauseated but this improving. He has zofran that is helpful. He has had some emesis. He is having normal BMs and gradually trying to increase eating. He denies fever. WOUND: edges approximated, no purulent drainage, no redness, no swelling, no tenderness and not hot to touch    PROCEDURE: staples removed without difficulty    PLAN  Patient instructed regarding signs and symptoms of infection  Patient instructed to notify physician if signs and symptoms of infection are present  Call physician if your wound shows signs and symptoms of infection including redness, fever, pain, drainage or an abnormal color at the incision site  Keep area clean and dry and be careful when shaving. Review of Systems   Constitutional: Negative for chills and fever. HENT: Negative for ear pain, hearing loss, rhinorrhea and voice change. Eyes: Negative for photophobia and visual disturbance. Respiratory: Negative for cough and shortness of breath. Cardiovascular: Negative for chest pain and palpitations. Gastrointestinal: Positive for nausea. Negative for vomiting. Endocrine: Negative. Negative for cold intolerance and heat intolerance. Genitourinary: Negative for difficulty urinating and flank pain. Musculoskeletal: Negative for back pain and neck pain. Skin: Negative for color change and rash. Allergic/Immunologic: Negative for environmental allergies and food allergies. Neurological: Negative for dizziness, speech difficulty and headaches. Hematological: Does not bruise/bleed easily.    Psychiatric/Behavioral: Negative for sleep

## 2021-05-20 DIAGNOSIS — E11.9 TYPE 2 DIABETES MELLITUS WITHOUT COMPLICATION, WITH LONG-TERM CURRENT USE OF INSULIN (HCC): Chronic | ICD-10-CM

## 2021-05-20 DIAGNOSIS — Z79.4 TYPE 2 DIABETES MELLITUS WITHOUT COMPLICATION, WITH LONG-TERM CURRENT USE OF INSULIN (HCC): Chronic | ICD-10-CM

## 2021-07-09 NOTE — PROGRESS NOTES
[facility]  [address]  [address] [place] [id number]  Dept: [phone]  Dept Fax: [phone]: [phone]    Mitul Hugo is a 56 y.o. male who presents today for his medical conditions/complaints as noted below. Mitul Hugo is c/o of Discuss Medications (Diabetic meds)      Chief Complaint   Patient presents with    Discuss Medications     Diabetic meds       HPI:       HPI    Repair of hiatial hernia and a gastric bypass surgery in 13 days. Pt states that he was told to come in to see his PCP for medication adjustment for he is currently on a liver shrinking diet. Pt is on victoza, basaglar, jardiance, and metformin. basaglar 45 at bedtime and 10 in the morning. Past Medical History:   Diagnosis Date    Depression     Diabetes mellitus ([address]     GERD (gastroesophageal reflux disease)     Gout     Hernia     Hyperlipidemia     Hypertension     Hypothyroid     Myocardial infarct, old     [place] - Cardiac Cath - no blockage    Neuropathy ([address]     Osteoarthritis         Past Surgical History:   Procedure Laterality Date    ANKLE SURGERY  1990    fracture repair    CARDIAC CATHETERIZATION      [place] - No blockage       Social History   Substance Use Topics    Smoking status: Never Smoker    Smokeless tobacco: Never Used    Alcohol use No        Family History   Problem Relation Age of Onset    Diabetes Other     Heart Disease Other        Current Outpatient Prescriptions   Medication Sig Dispense Refill    pregabalin (LYRICA) 25 MG capsule Take 1 capsule by mouth 2 times daily for 30 days.  60 capsule 2    Liraglutide (VICTOZA) 18 MG/3ML SOPN SC injection Inject 1.8 mg into the skin daily 2 pen 3    albuterol sulfate HFA (VENTOLIN HFA) 108 (90 Base) MCG/ACT inhaler Inhale 2 puffs into the lungs every 6 hours as needed for Wheezing 1 Inhaler 3    hydrALAZINE (APRESOLINE) 50 MG tablet Take 1 tablet by mouth 3 times daily 90 tablet 1    omeprazole (PRILOSEC) 20 MG delayed release capsule Take 1 capsule by mouth Daily 30 capsule 5    metFORMIN (GLUCOPHAGE) 1000 MG tablet Take 1 tablet by mouth 2 times daily (with meals) 60 tablet 5    simvastatin (ZOCOR) 20 MG tablet Take 1 tablet by mouth nightly 30 tablet 5    fenofibrate (TRICOR) 145 MG tablet Take 1 tablet by mouth daily 30 tablet 3    insulin glargine (BASAGLAR KWIKPEN) 100 UNIT/ML injection pen Inject 10 units in the morning and 55 units at bedtime. 15 mL 3    B-D ULTRAFINE III SHORT PEN 31G X 8 MM MISC Disp 100 needles 100 each 1    levothyroxine (SYNTHROID) 200 MCG tablet Take 1 tablet by mouth daily 30 tablet 11    empagliflozin (JARDIANCE) 25 MG tablet Take 25 mg by mouth daily 30 tablet 3    vitamin D (ERGOCALCIFEROL) 06869 units CAPS capsule Take 1 capsule by mouth once a week 4 capsule 3    guaiFENesin (MUCINEX) 600 MG extended release tablet Take 1 tablet by mouth 2 times daily 14 tablet 1    sildenafil (REVATIO) 20 MG tablet Take 1 tablet by mouth as needed (prior to sexual contact) 30 tablet 3    Blood Glucose Monitoring Suppl (FREESTYLE LITE) DON USE AS DIRECTED 1 Device 0    glucose monitoring kit (FREESTYLE) monitoring kit 1 kit by Does not apply route daily 1 kit 0    FREESTYLE LITE strip   11    meloxicam (MOBIC) 15 MG tablet Take 15 mg by mouth daily   2    allopurinol (ZYLOPRIM) 300 MG tablet Take 300 mg by mouth daily      tiZANidine (ZANAFLEX) 4 MG tablet Take 4 mg by mouth every 8 hours as needed      HYDROcodone-acetaminophen (NORCO)  MG per tablet Take 1 tablet by mouth every 8 hours as needed for Pain       No current facility-administered medications for this visit. Allergies   Allergen Reactions    Milk-Related Compounds        Lab Review not applicable      Subjective:      Review of Systems   Constitutional: Negative for activity change, appetite change, fatigue, fever and unexpected weight change.    HENT: Positive for trouble swallowing. Negative for congestion, ear pain, nosebleeds, rhinorrhea, sore throat and voice change. Eyes: Negative for redness and visual disturbance. Respiratory: Negative for cough, chest tightness, shortness of breath and wheezing. Cardiovascular: Negative for chest pain, palpitations and leg swelling. Gastrointestinal: Negative for abdominal pain, blood in stool, constipation, diarrhea, nausea and vomiting. Endocrine: Negative for polydipsia, polyphagia and polyuria. Genitourinary: Negative for dysuria, frequency and urgency. Musculoskeletal: Negative for myalgias. Skin: Negative for rash and wound. Neurological: Negative for dizziness, speech difficulty, light-headedness and headaches. Psychiatric/Behavioral: Negative for agitation, confusion, self-injury and suicidal ideas. The patient is not nervous/anxious. Objective:     Physical Exam   Constitutional: He is oriented to person, place, and time. He appears well-developed and well-nourished. No distress. HENT:   Head: Normocephalic and atraumatic. Right Ear: External ear normal.   Left Ear: External ear normal.   Nose: Nose normal.   Mouth/Throat: Oropharynx is clear and moist. No oropharyngeal exudate. Eyes: Conjunctivae are normal. Pupils are equal, round, and reactive to light. Right eye exhibits no discharge. Left eye exhibits no discharge. Neck: Normal range of motion. Neck supple. Cardiovascular: Normal rate, regular rhythm, normal heart sounds and intact distal pulses. No murmur heard. Pulmonary/Chest: Effort normal and breath sounds normal. No stridor. No respiratory distress. He has no wheezes. He has no rales. He exhibits no tenderness. Abdominal: Soft. Bowel sounds are normal. He exhibits no distension. There is no tenderness. Musculoskeletal: Normal range of motion. He exhibits no edema, tenderness or deformity. Neurological: He is alert and oriented to person, place, and time. PAIN

## 2021-08-10 ENCOUNTER — TELEPHONE (OUTPATIENT)
Dept: PRIMARY CARE CLINIC | Age: 51
End: 2021-08-10

## 2021-08-10 NOTE — TELEPHONE ENCOUNTER
Patient has no showed the following appointments:    8/10/2021  5/27/2021  1/12/2021    How would you like to proceed?

## 2021-10-18 ENCOUNTER — E-VISIT (OUTPATIENT)
Dept: INTERNAL MEDICINE CLINIC | Age: 51
End: 2021-10-18

## 2021-10-18 ENCOUNTER — TELEMEDICINE (OUTPATIENT)
Dept: CARDIOLOGY CLINIC | Age: 51
End: 2021-10-18
Payer: MEDICAID

## 2021-10-18 DIAGNOSIS — E78.5 HYPERLIPIDEMIA, UNSPECIFIED HYPERLIPIDEMIA TYPE: ICD-10-CM

## 2021-10-18 DIAGNOSIS — I10 ESSENTIAL HYPERTENSION: Primary | ICD-10-CM

## 2021-10-18 PROCEDURE — G8428 CUR MEDS NOT DOCUMENT: HCPCS | Performed by: NURSE PRACTITIONER

## 2021-10-18 PROCEDURE — 3017F COLORECTAL CA SCREEN DOC REV: CPT | Performed by: NURSE PRACTITIONER

## 2021-10-18 PROCEDURE — G8417 CALC BMI ABV UP PARAM F/U: HCPCS | Performed by: NURSE PRACTITIONER

## 2021-10-18 PROCEDURE — 1036F TOBACCO NON-USER: CPT | Performed by: NURSE PRACTITIONER

## 2021-10-18 PROCEDURE — G8484 FLU IMMUNIZE NO ADMIN: HCPCS | Performed by: NURSE PRACTITIONER

## 2021-10-18 PROCEDURE — 99441 PR PHYS/QHP TELEPHONE EVALUATION 5-10 MIN: CPT | Performed by: NURSE PRACTITIONER

## 2021-10-18 RX ORDER — LEVOTHYROXINE SODIUM 0.2 MG/1
TABLET ORAL
Qty: 30 TABLET | Refills: 11 | Status: CANCELLED | OUTPATIENT
Start: 2021-10-18

## 2021-10-18 RX ORDER — METOPROLOL SUCCINATE 25 MG/1
25 TABLET, EXTENDED RELEASE ORAL DAILY
Qty: 90 TABLET | Refills: 1 | Status: SHIPPED | OUTPATIENT
Start: 2021-10-18 | End: 2022-08-08

## 2021-10-18 RX ORDER — NITROGLYCERIN 0.4 MG/1
0.4 TABLET SUBLINGUAL EVERY 5 MIN PRN
Qty: 25 TABLET | Refills: 3 | Status: SHIPPED | OUTPATIENT
Start: 2021-10-18

## 2021-10-18 RX ORDER — ISOSORBIDE MONONITRATE 30 MG/1
30 TABLET, EXTENDED RELEASE ORAL DAILY
Qty: 30 TABLET | Refills: 3 | Status: SHIPPED | OUTPATIENT
Start: 2021-10-18 | End: 2022-02-22

## 2021-10-18 RX ORDER — ATORVASTATIN CALCIUM 20 MG/1
20 TABLET, FILM COATED ORAL DAILY
Qty: 90 TABLET | Refills: 3 | Status: SHIPPED | OUTPATIENT
Start: 2021-10-18

## 2021-10-18 ASSESSMENT — LIFESTYLE VARIABLES: SMOKING_STATUS: NO, I HAVE NEVER SMOKED

## 2021-10-18 NOTE — PROGRESS NOTES
Umu Worrell is a 49 y.o. male evaluated via telephone on 10/18/2021. Consent:  He and/or health care decision maker is aware that that he may receive a bill for this telephone service, depending on his insurance coverage, and has provided verbal consent to proceed: Yes      Documentation:  I communicated with the patient and/or health care decision maker about PAF. Details of this discussion including any medical advice provided: I was physically located for this office visit in the cardiology office in [place], [place]. Patient was physically located at his residence.       Patient with a history of paroxysmal atrial fib, hypertension, MELLO, diabetes, and dyslipidemia.     He is a patient of Dr. Leandro Nj.     Patient had a hospitalization on 12/16/2020 with complaints of chest pain and pressure. During this hospitalization patient did have a Lexiscan that showed:  Summary impressions:   Normal ejection fraction 53% abnormal perfusion study with inferior   and apical defects ischemia not excluded correlate clinically and/or   angiographically if appropriate   Signed by Dr Tristan Armijo on 12/18/2020 8:10 AM      Patient signed out AMA prior to cardiology evaluating patient and giving the results of the [place]. Cardiac catheterization 2/11/2021 showed:  Conclusions      Nonobstructive CAD predominantly involving proximal RCA. Normal LV ejection fraction. Recommendations      Medical management. Signatures      ----------------------------------------------------------------   Electronically signed by Noemi Hughes MD(Performing Physician) on   02/11/2021 09:37   ----------------------------------------------------------------    Denies any complaints of chest pain, pressure or tightness. There is no shortness of breath, orthopnea or PND. Patient denies any lightheadedness, dizziness or syncope.       Review of Systems  Constitutional: Negative for fever, chills, diaphoresis, activity change, appetite change, fatigue and unexpected weight change. Eyes: Negative for photophobia, pain, redness and visual disturbance. Respiratory: Negative for apnea, cough, chest tightness, shortness of breath, wheezing and stridor. Cardiovascular: Negative for chest pain, palpitations and leg swelling. Gastrointestinal: Negative for abdominal distention. Genitourinary: Negative for dysuria, urgency and frequency. Musculoskeletal: Negative for myalgias, arthralgias and gait problem. Skin: Negative for color change, pallor, rash and wound. Neurological: Negative for dizziness, tremors, speech difficulty, weakness and numbness. Hematological: Does not bruise/bleed easily. Psychiatric/Behavioral: Negative.     Janina Gregg is a 49 y.o. male with the following history as recorded in [facility]:  Patient Active Problem List    Diagnosis Date Noted    Abdominal pain 02/15/2021    Internal hernia     Occlusion of superior mesenteric artery (HCC)     Small bowel volvulus ([facility] 01/03/2020    Volvulus of small intestine ([facility] 01/03/2020    Strangulation of small intestine (HCC)     Diabetes mellitus ([address] 02/13/2019    Low back pain 02/13/2019    Morbid obesity ([address] 02/13/2019    Bipolar disorder with severe depression ([address]     Dyslipidemia 06/04/2018    Paroxysmal A-fib ([address] 05/02/2018    Obstructive sleep apnea 05/02/2018    Abnormal EKG 05/02/2018    Chronic obstructive pulmonary disease ([address] 01/09/2018    Anxiety 07/26/2017    Hiatal hernia     Chest pain, atypical 06/03/2016    Gastroesophageal reflux disease 06/03/2016    Morbid obesity with BMI of 45.0-49.9, adult ([address] 06/03/2016    Type 2 diabetes mellitus without complication ([address] 67/60/5211    Acquired hypothyroidism 06/03/2016    Hypertensive disorder 06/03/2016    Lactic acidosis     Chest pain      Current Outpatient Medications   Medication Sig Dispense Refill    metFORMIN (GLUCOPHAGE) 1000 MG tablet TAKE ONE TABLET BY MOUTH TWO TIMES DAILY (WITH MEALS) 60 tablet 5    warfarin (COUMADIN) 5 MG tablet Take 1 tablet by mouth every evening 30 tablet 5    atorvastatin (LIPITOR) 20 MG tablet Take 1 tablet by mouth daily 90 tablet 3    diclofenac sodium (VOLTAREN) 1 % GEL Apply 4 g topically daily       metoprolol succinate (TOPROL XL) 25 MG extended release tablet Take 1 tablet by mouth daily 90 tablet 1    nitroGLYCERIN (NITROSTAT) 0.4 MG SL tablet Place 1 tablet under the tongue every 5 minutes as needed for Chest pain 25 tablet 3    isosorbide mononitrate (IMDUR) 30 MG extended release tablet Take 1 tablet by mouth daily 30 tablet 3    VICTOZA 18 MG/3ML SOPN SC injection INJECT 1.8 MG INTO THE SKIN DAILY (Patient taking differently: Inject 1.8 mg into the skin nightly ) 9 mL 3    MUCINEX 600 MG extended release tablet TAKE 2 TABLETS BY MOUTH 2 TIMES DAILY 30 tablet 1    allopurinol (ZYLOPRIM) 300 MG tablet TAKE ONE TABLET BY MOUTH ONCE DAILY 30 tablet 5    Insulin Pen Needle (B-D ULTRAFINE III SHORT PEN) 31G X 8 MM MISC Inject 1 each into the skin as needed (FOR DM) 100 each 1    sildenafil (VIAGRA) 50 MG tablet Take 1 tablet by mouth as needed for Erectile Dysfunction 30 tablet 3    hydrALAZINE (APRESOLINE) 50 MG tablet TAKE 1 TABLET BY MOUTH 3 TIMES DAILY (Patient taking differently: Take 50 mg by mouth 2 times daily ) 90 tablet 11    HYDROcodone-acetaminophen (NORCO)  MG per tablet Take 1 tablet by mouth 3 times daily.  levothyroxine (SYNTHROID) 200 MCG tablet TAKE ONE TABLET BY MOUTH ONCE DAILY 30 tablet 11    pregabalin (LYRICA) 75 MG capsule Take 75 mg by mouth 2 times daily.  omeprazole (PRILOSEC) 40 MG delayed release capsule Take 40 mg by mouth 2 times daily        No current facility-administered medications for this visit.      Allergies: Lac bovis and Food  Past Medical History:   Diagnosis Date    Anxiety     Atrial fibrillation ([address]     Bipolar 1 disorder (HCC)     Chronic pain     COPD (chronic obstructive pulmonary disease) (HCC)     Depression     Depression     Diabetes mellitus (HCC)     GERD (gastroesophageal reflux disease)     Gout     Hernia     Hyperlipidemia     Hypertension     Hypothyroid     Hypothyroid     Myocardial infarct, old     [place] - Cardiac Cath - no blockage    Neuropathy     Osteoarthritis     Psychiatric illness      Past Surgical History:   Procedure Laterality Date    ABDOMINOPLASTY      ABDOMINOPLASTY      Nov 2019    ANKLE SURGERY  1990    fracture repair   [address] - No blockage    ESOPHAGUS SURGERY      GASTRIC BAND      HERNIA REPAIR      LAPAROTOMY N/A 2/15/2021    LAPAROTOMY EXPLORATORY performed by Jaskaran Staples MD at [address] N/A 1/3/2020    LAPAROTOMY EXPLORATORY performed by Saima Rolon MD at [address]  02/2018    gastric sleeve     Family History   Problem Relation Age of Onset    Diabetes Other     Heart Disease Other      Social History     Tobacco Use    Smoking status: Never Smoker    Smokeless tobacco: Never Used   Substance Use Topics    Alcohol use: No     Alcohol/week: 0.0 standard drinks        Assessment/ Plan:  1. Hypertension -patient is on Toprol-XL 25 mg daily. Continue present medical management. .  2.  Hyperlipidemia -patient is on Lipitor 20 mg daily. Continue present medical management. Disposition: No follow-ups on file. I affirm this is a Patient Initiated Episode with an Established Patient who has not had a related appointment within my department in the past 7 days or scheduled within the next 24 hours.     Patient identification was verified at the start of the visit: Yes    Total Time: minutes: 5-10 minutes    Note: not billable if this call serves to triage the patient into an appointment for the relevant concern      DEMETRIUS Rand - SAEED

## 2021-10-19 ENCOUNTER — TELEPHONE (OUTPATIENT)
Dept: CARDIOLOGY CLINIC | Age: 51
End: 2021-10-19

## 2021-11-14 ENCOUNTER — HOSPITAL ENCOUNTER (INPATIENT)
Age: 51
LOS: 1 days | Discharge: LEFT BEFORE DISCHARGE | DRG: 894 | End: 2021-11-15
Attending: EMERGENCY MEDICINE | Admitting: INTERNAL MEDICINE
Payer: MEDICAID

## 2021-11-14 ENCOUNTER — APPOINTMENT (OUTPATIENT)
Dept: GENERAL RADIOLOGY | Age: 51
DRG: 894 | End: 2021-11-14
Payer: MEDICAID

## 2021-11-14 DIAGNOSIS — F10.920 ACUTE ALCOHOLIC INTOXICATION WITHOUT COMPLICATION (HCC): Primary | ICD-10-CM

## 2021-11-14 DIAGNOSIS — R07.9 CHEST PAIN, UNSPECIFIED TYPE: ICD-10-CM

## 2021-11-14 DIAGNOSIS — E87.20 LACTIC ACIDOSIS: ICD-10-CM

## 2021-11-14 PROBLEM — F10.929 ALCOHOL INTOXICATION (HCC): Status: ACTIVE | Noted: 2021-11-14

## 2021-11-14 LAB
ALBUMIN SERPL-MCNC: 4 G/DL (ref 3.5–5.2)
ALP BLD-CCNC: 79 U/L (ref 40–130)
ALT SERPL-CCNC: 20 U/L (ref 5–41)
ANION GAP SERPL CALCULATED.3IONS-SCNC: 18 MMOL/L (ref 7–19)
APTT: 28.2 SEC (ref 26–36.2)
AST SERPL-CCNC: 41 U/L (ref 5–40)
BASOPHILS ABSOLUTE: 0 K/UL (ref 0–0.2)
BASOPHILS RELATIVE PERCENT: 0.5 % (ref 0–1)
BILIRUB SERPL-MCNC: <0.2 MG/DL (ref 0.2–1.2)
BUN BLDV-MCNC: 12 MG/DL (ref 6–20)
CALCIUM SERPL-MCNC: 8.4 MG/DL (ref 8.6–10)
CHLORIDE BLD-SCNC: 114 MMOL/L (ref 98–111)
CO2: 14 MMOL/L (ref 22–29)
CREAT SERPL-MCNC: 0.9 MG/DL (ref 0.5–1.2)
D DIMER: 0.39 UG/ML FEU (ref 0–0.48)
EOSINOPHILS ABSOLUTE: 0.1 K/UL (ref 0–0.6)
EOSINOPHILS RELATIVE PERCENT: 0.9 % (ref 0–5)
ETHANOL: 131 MG/DL (ref 0–0.08)
GFR AFRICAN AMERICAN: >59
GFR NON-AFRICAN AMERICAN: >60
GLUCOSE BLD-MCNC: 102 MG/DL (ref 70–99)
GLUCOSE BLD-MCNC: 121 MG/DL (ref 70–99)
GLUCOSE BLD-MCNC: 124 MG/DL (ref 74–109)
GLUCOSE BLD-MCNC: 145 MG/DL (ref 70–99)
GLUCOSE BLD-MCNC: 244 MG/DL (ref 70–99)
HCT VFR BLD CALC: 34.5 % (ref 42–52)
HEMOGLOBIN: 10.2 G/DL (ref 14–18)
IMMATURE GRANULOCYTES #: 0 K/UL
INR BLD: 0.97 (ref 0.88–1.18)
LACTIC ACID: 1.1 MMOL/L (ref 0.5–1.9)
LACTIC ACID: 3.9 MMOL/L (ref 0.5–1.9)
LYMPHOCYTES ABSOLUTE: 1.8 K/UL (ref 1.1–4.5)
LYMPHOCYTES RELATIVE PERCENT: 27.6 % (ref 20–40)
MCH RBC QN AUTO: 25.7 PG (ref 27–31)
MCHC RBC AUTO-ENTMCNC: 29.6 G/DL (ref 33–37)
MCV RBC AUTO: 86.9 FL (ref 80–94)
MONOCYTES ABSOLUTE: 0.7 K/UL (ref 0–0.9)
MONOCYTES RELATIVE PERCENT: 10.6 % (ref 0–10)
NEUTROPHILS ABSOLUTE: 3.9 K/UL (ref 1.5–7.5)
NEUTROPHILS RELATIVE PERCENT: 60.1 % (ref 50–65)
PDW BLD-RTO: 17.8 % (ref 11.5–14.5)
PERFORMED ON: ABNORMAL
PLATELET # BLD: 338 K/UL (ref 130–400)
PMV BLD AUTO: 9.3 FL (ref 9.4–12.4)
POTASSIUM SERPL-SCNC: 3.3 MMOL/L (ref 3.5–5)
PRO-BNP: 38 PG/ML (ref 0–900)
PROTHROMBIN TIME: 13.1 SEC (ref 12–14.6)
RBC # BLD: 3.97 M/UL (ref 4.7–6.1)
SARS-COV-2, NAAT: NOT DETECTED
SODIUM BLD-SCNC: 146 MMOL/L (ref 136–145)
TOTAL PROTEIN: 6.4 G/DL (ref 6.6–8.7)
TROPONIN: <0.01 NG/ML (ref 0–0.03)
WBC # BLD: 6.4 K/UL (ref 4.8–10.8)

## 2021-11-14 PROCEDURE — 85025 COMPLETE CBC W/AUTO DIFF WBC: CPT

## 2021-11-14 PROCEDURE — 2580000003 HC RX 258: Performed by: INTERNAL MEDICINE

## 2021-11-14 PROCEDURE — 85379 FIBRIN DEGRADATION QUANT: CPT

## 2021-11-14 PROCEDURE — 2500000003 HC RX 250 WO HCPCS: Performed by: INTERNAL MEDICINE

## 2021-11-14 PROCEDURE — 87635 SARS-COV-2 COVID-19 AMP PRB: CPT

## 2021-11-14 PROCEDURE — 6360000002 HC RX W HCPCS: Performed by: EMERGENCY MEDICINE

## 2021-11-14 PROCEDURE — 71045 X-RAY EXAM CHEST 1 VIEW: CPT

## 2021-11-14 PROCEDURE — HZ2ZZZZ DETOXIFICATION SERVICES FOR SUBSTANCE ABUSE TREATMENT: ICD-10-PCS | Performed by: INTERNAL MEDICINE

## 2021-11-14 PROCEDURE — 6370000000 HC RX 637 (ALT 250 FOR IP): Performed by: INTERNAL MEDICINE

## 2021-11-14 PROCEDURE — 84484 ASSAY OF TROPONIN QUANT: CPT

## 2021-11-14 PROCEDURE — 96374 THER/PROPH/DIAG INJ IV PUSH: CPT

## 2021-11-14 PROCEDURE — 99284 EMERGENCY DEPT VISIT MOD MDM: CPT

## 2021-11-14 PROCEDURE — 82077 ASSAY SPEC XCP UR&BREATH IA: CPT

## 2021-11-14 PROCEDURE — 36415 COLL VENOUS BLD VENIPUNCTURE: CPT

## 2021-11-14 PROCEDURE — 85610 PROTHROMBIN TIME: CPT

## 2021-11-14 PROCEDURE — 2580000003 HC RX 258: Performed by: EMERGENCY MEDICINE

## 2021-11-14 PROCEDURE — 87040 BLOOD CULTURE FOR BACTERIA: CPT

## 2021-11-14 PROCEDURE — 85730 THROMBOPLASTIN TIME PARTIAL: CPT

## 2021-11-14 PROCEDURE — 80053 COMPREHEN METABOLIC PANEL: CPT

## 2021-11-14 PROCEDURE — 83880 ASSAY OF NATRIURETIC PEPTIDE: CPT

## 2021-11-14 PROCEDURE — 2140000000 HC CCU INTERMEDIATE R&B

## 2021-11-14 PROCEDURE — 93005 ELECTROCARDIOGRAM TRACING: CPT | Performed by: EMERGENCY MEDICINE

## 2021-11-14 PROCEDURE — 83605 ASSAY OF LACTIC ACID: CPT

## 2021-11-14 PROCEDURE — 99253 IP/OBS CNSLTJ NEW/EST LOW 45: CPT | Performed by: INTERNAL MEDICINE

## 2021-11-14 PROCEDURE — 96375 TX/PRO/DX INJ NEW DRUG ADDON: CPT

## 2021-11-14 PROCEDURE — 6360000002 HC RX W HCPCS: Performed by: INTERNAL MEDICINE

## 2021-11-14 PROCEDURE — 82947 ASSAY GLUCOSE BLOOD QUANT: CPT

## 2021-11-14 RX ORDER — SODIUM CHLORIDE 9 MG/ML
25 INJECTION, SOLUTION INTRAVENOUS PRN
Status: DISCONTINUED | OUTPATIENT
Start: 2021-11-14 | End: 2021-11-15 | Stop reason: HOSPADM

## 2021-11-14 RX ORDER — OXYCODONE HYDROCHLORIDE 5 MG/1
5 TABLET ORAL EVERY 6 HOURS PRN
Status: DISCONTINUED | OUTPATIENT
Start: 2021-11-14 | End: 2021-11-15

## 2021-11-14 RX ORDER — 0.9 % SODIUM CHLORIDE 0.9 %
1000 INTRAVENOUS SOLUTION INTRAVENOUS ONCE
Status: COMPLETED | OUTPATIENT
Start: 2021-11-14 | End: 2021-11-14

## 2021-11-14 RX ORDER — LORAZEPAM 2 MG/ML
1 INJECTION INTRAMUSCULAR
Status: DISCONTINUED | OUTPATIENT
Start: 2021-11-14 | End: 2021-11-15

## 2021-11-14 RX ORDER — LORAZEPAM 2 MG/ML
4 INJECTION INTRAMUSCULAR
Status: DISCONTINUED | OUTPATIENT
Start: 2021-11-14 | End: 2021-11-15

## 2021-11-14 RX ORDER — HYDRALAZINE HYDROCHLORIDE 25 MG/1
50 TABLET, FILM COATED ORAL 2 TIMES DAILY
Status: DISCONTINUED | OUTPATIENT
Start: 2021-11-14 | End: 2021-11-15 | Stop reason: HOSPADM

## 2021-11-14 RX ORDER — PREGABALIN 75 MG/1
75 CAPSULE ORAL 2 TIMES DAILY
Status: DISCONTINUED | OUTPATIENT
Start: 2021-11-14 | End: 2021-11-15 | Stop reason: HOSPADM

## 2021-11-14 RX ORDER — MAGNESIUM SULFATE 1 G/100ML
1000 INJECTION INTRAVENOUS ONCE
Status: COMPLETED | OUTPATIENT
Start: 2021-11-14 | End: 2021-11-14

## 2021-11-14 RX ORDER — LORAZEPAM 1 MG/1
2 TABLET ORAL
Status: DISCONTINUED | OUTPATIENT
Start: 2021-11-14 | End: 2021-11-15

## 2021-11-14 RX ORDER — HYDROCODONE BITARTRATE AND ACETAMINOPHEN 10; 325 MG/1; MG/1
1 TABLET ORAL 3 TIMES DAILY
Status: DISCONTINUED | OUTPATIENT
Start: 2021-11-14 | End: 2021-11-14

## 2021-11-14 RX ORDER — ONDANSETRON 2 MG/ML
4 INJECTION INTRAMUSCULAR; INTRAVENOUS ONCE
Status: COMPLETED | OUTPATIENT
Start: 2021-11-14 | End: 2021-11-14

## 2021-11-14 RX ORDER — LORAZEPAM 2 MG/ML
2 INJECTION INTRAMUSCULAR
Status: DISCONTINUED | OUTPATIENT
Start: 2021-11-14 | End: 2021-11-15

## 2021-11-14 RX ORDER — LORAZEPAM 1 MG/1
3 TABLET ORAL
Status: DISCONTINUED | OUTPATIENT
Start: 2021-11-14 | End: 2021-11-15

## 2021-11-14 RX ORDER — POTASSIUM CHLORIDE 20 MEQ/1
40 TABLET, EXTENDED RELEASE ORAL
Status: DISCONTINUED | OUTPATIENT
Start: 2021-11-14 | End: 2021-11-14

## 2021-11-14 RX ORDER — ATORVASTATIN CALCIUM 20 MG/1
20 TABLET, FILM COATED ORAL DAILY
Status: DISCONTINUED | OUTPATIENT
Start: 2021-11-14 | End: 2021-11-15 | Stop reason: HOSPADM

## 2021-11-14 RX ORDER — MORPHINE SULFATE 4 MG/ML
4 INJECTION, SOLUTION INTRAMUSCULAR; INTRAVENOUS ONCE
Status: COMPLETED | OUTPATIENT
Start: 2021-11-14 | End: 2021-11-14

## 2021-11-14 RX ORDER — LORAZEPAM 1 MG/1
4 TABLET ORAL
Status: DISCONTINUED | OUTPATIENT
Start: 2021-11-14 | End: 2021-11-15

## 2021-11-14 RX ORDER — METOPROLOL SUCCINATE 50 MG/1
50 TABLET, EXTENDED RELEASE ORAL DAILY
Status: DISCONTINUED | OUTPATIENT
Start: 2021-11-14 | End: 2021-11-15 | Stop reason: HOSPADM

## 2021-11-14 RX ORDER — LORAZEPAM 2 MG/ML
3 INJECTION INTRAMUSCULAR
Status: DISCONTINUED | OUTPATIENT
Start: 2021-11-14 | End: 2021-11-15

## 2021-11-14 RX ORDER — NITROGLYCERIN 0.4 MG/1
0.4 TABLET SUBLINGUAL EVERY 5 MIN PRN
Status: DISCONTINUED | OUTPATIENT
Start: 2021-11-14 | End: 2021-11-15 | Stop reason: HOSPADM

## 2021-11-14 RX ORDER — LORAZEPAM 1 MG/1
1 TABLET ORAL
Status: DISCONTINUED | OUTPATIENT
Start: 2021-11-14 | End: 2021-11-15

## 2021-11-14 RX ORDER — LEVOTHYROXINE SODIUM 0.1 MG/1
200 TABLET ORAL DAILY
Status: DISCONTINUED | OUTPATIENT
Start: 2021-11-14 | End: 2021-11-15 | Stop reason: HOSPADM

## 2021-11-14 RX ORDER — ISOSORBIDE MONONITRATE 30 MG/1
30 TABLET, EXTENDED RELEASE ORAL DAILY
Status: DISCONTINUED | OUTPATIENT
Start: 2021-11-14 | End: 2021-11-15 | Stop reason: HOSPADM

## 2021-11-14 RX ORDER — SODIUM CHLORIDE 0.9 % (FLUSH) 0.9 %
5-40 SYRINGE (ML) INJECTION EVERY 12 HOURS SCHEDULED
Status: DISCONTINUED | OUTPATIENT
Start: 2021-11-14 | End: 2021-11-15 | Stop reason: HOSPADM

## 2021-11-14 RX ORDER — METOPROLOL SUCCINATE 25 MG/1
25 TABLET, EXTENDED RELEASE ORAL DAILY
Status: DISCONTINUED | OUTPATIENT
Start: 2021-11-14 | End: 2021-11-14

## 2021-11-14 RX ORDER — SODIUM CHLORIDE 0.9 % (FLUSH) 0.9 %
5-40 SYRINGE (ML) INJECTION PRN
Status: DISCONTINUED | OUTPATIENT
Start: 2021-11-14 | End: 2021-11-15 | Stop reason: HOSPADM

## 2021-11-14 RX ADMIN — POTASSIUM CHLORIDE 40 MEQ: 1500 TABLET, EXTENDED RELEASE ORAL at 09:16

## 2021-11-14 RX ADMIN — METOPROLOL SUCCINATE 50 MG: 50 TABLET, EXTENDED RELEASE ORAL at 09:17

## 2021-11-14 RX ADMIN — SODIUM CHLORIDE 1000 ML: 9 INJECTION, SOLUTION INTRAVENOUS at 04:00

## 2021-11-14 RX ADMIN — HYDRALAZINE HYDROCHLORIDE 50 MG: 25 TABLET, FILM COATED ORAL at 22:00

## 2021-11-14 RX ADMIN — HYDRALAZINE HYDROCHLORIDE 50 MG: 25 TABLET, FILM COATED ORAL at 09:17

## 2021-11-14 RX ADMIN — FAMOTIDINE 20 MG: 10 INJECTION, SOLUTION INTRAVENOUS at 09:14

## 2021-11-14 RX ADMIN — SODIUM CHLORIDE, PRESERVATIVE FREE 10 ML: 5 INJECTION INTRAVENOUS at 23:19

## 2021-11-14 RX ADMIN — ENOXAPARIN SODIUM 40 MG: 40 INJECTION SUBCUTANEOUS at 09:13

## 2021-11-14 RX ADMIN — ISOSORBIDE MONONITRATE 30 MG: 30 TABLET, EXTENDED RELEASE ORAL at 09:17

## 2021-11-14 RX ADMIN — MAGNESIUM SULFATE HEPTAHYDRATE 1000 MG: 1 INJECTION, SOLUTION INTRAVENOUS at 06:16

## 2021-11-14 RX ADMIN — PREGABALIN 75 MG: 75 CAPSULE ORAL at 22:00

## 2021-11-14 RX ADMIN — MORPHINE SULFATE 4 MG: 4 INJECTION INTRAVENOUS at 01:23

## 2021-11-14 RX ADMIN — OXYCODONE 5 MG: 5 TABLET ORAL at 16:03

## 2021-11-14 RX ADMIN — HYDROCODONE BITARTRATE AND ACETAMINOPHEN 1 TABLET: 10; 325 TABLET ORAL at 09:17

## 2021-11-14 RX ADMIN — PREGABALIN 75 MG: 75 CAPSULE ORAL at 09:17

## 2021-11-14 RX ADMIN — ONDANSETRON 4 MG: 2 INJECTION INTRAMUSCULAR; INTRAVENOUS at 01:22

## 2021-11-14 RX ADMIN — OXYCODONE 5 MG: 5 TABLET ORAL at 23:18

## 2021-11-14 RX ADMIN — ATORVASTATIN CALCIUM 20 MG: 20 TABLET, FILM COATED ORAL at 09:17

## 2021-11-14 RX ADMIN — FAMOTIDINE 20 MG: 10 INJECTION, SOLUTION INTRAVENOUS at 22:00

## 2021-11-14 ASSESSMENT — PAIN SCALES - GENERAL
PAINLEVEL_OUTOF10: 6
PAINLEVEL_OUTOF10: 5
PAINLEVEL_OUTOF10: 0
PAINLEVEL_OUTOF10: 10
PAINLEVEL_OUTOF10: 9
PAINLEVEL_OUTOF10: 6
PAINLEVEL_OUTOF10: 6

## 2021-11-14 ASSESSMENT — ENCOUNTER SYMPTOMS
NAUSEA: 0
SORE THROAT: 0
GASTROINTESTINAL NEGATIVE: 1
RESPIRATORY NEGATIVE: 1
VOMITING: 0
DIARRHEA: 0
ABDOMINAL PAIN: 0
BACK PAIN: 0
ALLERGIC/IMMUNOLOGIC NEGATIVE: 1
EYES NEGATIVE: 1
CHEST TIGHTNESS: 1
RHINORRHEA: 0
SHORTNESS OF BREATH: 1

## 2021-11-14 ASSESSMENT — PAIN DESCRIPTION - PAIN TYPE
TYPE: CHRONIC PAIN
TYPE: CHRONIC PAIN
TYPE: ACUTE PAIN

## 2021-11-14 ASSESSMENT — PAIN DESCRIPTION - LOCATION
LOCATION: GENERALIZED
LOCATION: GENERALIZED
LOCATION: CHEST

## 2021-11-14 ASSESSMENT — HEART SCORE: ECG: 1

## 2021-11-14 NOTE — H&P
HISTORY AND PHYSICAL             Date: 11/14/2021        Patient Name: Nessa Braxton     YOB: 1970      Age:  49 y.o. Chief Complaint     Chief Complaint   Patient presents with    Chest Pain      Had chest pain tonight with intercourse and intoxicated with ethanol level of 131    History Obtained From   Patient     History of Present Illness   Patient comes in with chest pain and typical chest pain sx with exertion. (during intercourse). Additionally, with ethanol on board at reasonably high levels. He was admitted and evaluated in the recent past for chest pain  12/20 had stress test done and this was moderate risk, however, patient left hospital ama . He did come back for cardiac cath 2/21 and there were no findings noted of obstructive CAD and he was started on medical therapy for probable and high risk for cad based on multiple other medical issues     Comes in today, after having exertional chest pain and typical sx and with then electrolyte abnormalities, elevated blood sugars, low potassium levels and elevated ethanol levels beyond reasonable non intoxicated levels. He has findings of mild metabolic acidosis and dehydration and this could be related to dehydration from excess alcohol intake and / or type ii dm for which he takes metformin as well. Troponin and initial cardiac work up in er is negative for pathology and will admit to pcu for evaluation and stabilization     Replace electrolytes     Follow up on sobriety and check ethanol levels.          Past Medical History     Past Medical History:   Diagnosis Date    Anxiety     Atrial fibrillation (HCC)     Bipolar 1 disorder (HCC)     Chronic pain     COPD (chronic obstructive pulmonary disease) (HCC)     Depression     Depression     Diabetes mellitus (HCC)     GERD (gastroesophageal reflux disease)     Gout     Hernia     Hyperlipidemia     Hypertension     Hypothyroid     Hypothyroid     Myocardial infarct, old     [place] - Cardiac Cath - no blockage    Neuropathy     Osteoarthritis     Psychiatric illness         Past Surgical History     Past Surgical History:   Procedure Laterality Date    ABDOMINOPLASTY      ABDOMINOPLASTY      Nov 2019    ANKLE SURGERY  1990    fracture repair   [address] - No blockage    ESOPHAGUS SURGERY      GASTRIC BAND      HERNIA REPAIR      LAPAROTOMY N/A 2/15/2021    LAPAROTOMY EXPLORATORY performed by Pavithra Bennett MD at [address] 1/3/2020    LAPAROTOMY EXPLORATORY performed by Verena Wilkerson MD at [address]  02/2018    gastric sleeve        Medications Prior to Admission     Prior to Admission medications    Medication Sig Start Date End Date Taking?  Authorizing Provider   atorvastatin (LIPITOR) 20 MG tablet Take 1 tablet by mouth daily 10/18/21   DEMETRIUS Villegas NP   metoprolol succinate (TOPROL XL) 25 MG extended release tablet Take 1 tablet by mouth daily 10/18/21   DEMETRIUS Villegas NP   nitroGLYCERIN (NITROSTAT) 0.4 MG SL tablet Place 1 tablet under the tongue every 5 minutes as needed for Chest pain 10/18/21   DEMETRIUS Villegas NP   isosorbide mononitrate (IMDUR) 30 MG extended release tablet Take 1 tablet by mouth daily 10/18/21   DEMETRIUS Villegas NP   metFORMIN (GLUCOPHAGE) 1000 MG tablet TAKE ONE TABLET BY MOUTH TWO TIMES DAILY (WITH MEALS) 5/20/21   DEMETRIUS Wagner   diclofenac sodium (VOLTAREN) 1 % GEL Apply 4 g topically daily   Patient not taking: Reported on 10/18/2021 10/28/20   Historical Provider, MD [address] MG/3ML SOPN SC injection INJECT 1.8 MG INTO THE SKIN DAILY  Patient taking differently: Inject 1.8 mg into the skin nightly  1/29/21   DEMETRIUS Wagner   Insulin Pen Needle (B-D ULTRAFINE III SHORT PEN) 31G X 8 MM MISC Inject 1 each into the skin as needed (FOR DM)  Patient not taking: Reported on 10/18/2021 9/30/20   Lupe Wilder DEMETRIUS   sildenafil (VIAGRA) 50 MG tablet Take 1 tablet by mouth as needed for Erectile Dysfunction  Patient not taking: Reported on 10/18/2021 9/30/20   DEMETRIUS Law   hydrALAZINE (APRESOLINE) 50 MG tablet TAKE 1 TABLET BY MOUTH 3 TIMES DAILY  Patient taking differently: Take 50 mg by mouth 2 times daily  6/28/20   DEMETRIUS Law   HYDROcodone-acetaminophen (NORCO)  MG per tablet Take 1 tablet by mouth 3 times daily. Historical Provider, MD   levothyroxine (SYNTHROID) 200 MCG tablet TAKE ONE TABLET BY MOUTH ONCE DAILY 3/19/20   DEMETRIUS Law   pregabalin (LYRICA) 75 MG capsule Take 75 mg by mouth 2 times daily. Historical Provider, MD   omeprazole (PRILOSEC) 40 MG delayed release capsule Take 40 mg by mouth 2 times daily   Patient not taking: Reported on 10/18/2021    Historical Provider, MD        Allergies   Lac bovis and Food    Social History     Social History     Tobacco History     Smoking Status  Never Smoker    Smokeless Tobacco Use  Never Used          Alcohol History     Alcohol Use Status  No          Drug Use     Drug Use Status  No Comment  History of Cocaine and Marijuana usage 2010          Sexual Activity     Sexually Active  Yes Partners  Female                Family History     Family History   Problem Relation Age of Onset    Diabetes Other     Heart Disease Other        Review of Systems   Review of Systems   Constitutional: Positive for activity change. HENT: Negative. Eyes: Negative. Respiratory: Negative. Cardiovascular: Positive for chest pain and palpitations. Gastrointestinal: Negative. Endocrine: Negative. Genitourinary: Negative. Musculoskeletal: Negative. Allergic/Immunologic: Negative. Neurological: Negative. Hematological: Negative. Psychiatric/Behavioral: Negative. All other systems reviewed and are negative.       Physical Exam   /78   Pulse 87   Temp 97.9 °F (36.6 °C) (Temporal)   Resp 16   Ht 6' (1.829 m) Wt 203 lb 9.6 oz (92.4 kg)   SpO2 99%   BMI 27.61 kg/m²     Physical Exam  Vitals and nursing note reviewed. Constitutional:       Appearance: He is obese. He is ill-appearing. HENT:      Head: Normocephalic. Nose: Nose normal.      Mouth/Throat:      Mouth: Mucous membranes are moist.   Eyes:      Conjunctiva/sclera: Conjunctivae normal.      Pupils: Pupils are equal, round, and reactive to light. Cardiovascular:      Rate and Rhythm: Normal rate and regular rhythm. Pulses: Normal pulses. Heart sounds: Normal heart sounds. Pulmonary:      Effort: Pulmonary effort is normal.      Breath sounds: Normal breath sounds. Abdominal:      General: Bowel sounds are normal.   Musculoskeletal:         General: Normal range of motion. Cervical back: Normal range of motion. Skin:     General: Skin is warm. Neurological:      General: No focal deficit present.    Psychiatric:         Mood and Affect: Mood normal.         Labs      Recent Results (from the past 24 hour(s))   CBC Auto Differential    Collection Time: 11/14/21 12:59 AM   Result Value Ref Range    WBC 6.4 4.8 - 10.8 K/uL    RBC 3.97 (L) 4.70 - 6.10 M/uL    Hemoglobin 10.2 (L) 14.0 - 18.0 g/dL    Hematocrit 34.5 (L) 42.0 - 52.0 %    MCV 86.9 80.0 - 94.0 fL    MCH 25.7 (L) 27.0 - 31.0 pg    MCHC 29.6 (L) 33.0 - 37.0 g/dL    RDW 17.8 (H) 11.5 - 14.5 %    Platelets 578 837 - 223 K/uL    MPV 9.3 (L) 9.4 - 12.4 fL    Neutrophils % 60.1 50.0 - 65.0 %    Lymphocytes % 27.6 20.0 - 40.0 %    Monocytes % 10.6 (H) 0.0 - 10.0 %    Eosinophils % 0.9 0.0 - 5.0 %    Basophils % 0.5 0.0 - 1.0 %    Neutrophils Absolute 3.9 1.5 - 7.5 K/uL    Immature Granulocytes # 0.0 K/uL    Lymphocytes Absolute 1.8 1.1 - 4.5 K/uL    Monocytes Absolute 0.70 0.00 - 0.90 K/uL    Eosinophils Absolute 0.10 0.00 - 0.60 K/uL    Basophils Absolute 0.00 0.00 - 0.20 K/uL   Comprehensive Metabolic Panel    Collection Time: 11/14/21 12:59 AM   Result Value Ref Range    Sodium 146 (H) 136 - 145 mmol/L    Potassium 3.3 (L) 3.5 - 5.0 mmol/L    Chloride 114 (H) 98 - 111 mmol/L    CO2 14 (L) 22 - 29 mmol/L    Anion Gap 18 7 - 19 mmol/L    Glucose 124 (H) 74 - 109 mg/dL    BUN 12 6 - 20 mg/dL    CREATININE 0.9 0.5 - 1.2 mg/dL    GFR Non-African American >60 >60    GFR African American >59 >59    Calcium 8.4 (L) 8.6 - 10.0 mg/dL    Total Protein 6.4 (L) 6.6 - 8.7 g/dL    Albumin 4.0 3.5 - 5.2 g/dL    Total Bilirubin <0.2 0.2 - 1.2 mg/dL    Alkaline Phosphatase 79 40 - 130 U/L    ALT 20 5 - 41 U/L    AST 41 (H) 5 - 40 U/L   Troponin    Collection Time: 11/14/21 12:59 AM   Result Value Ref Range    Troponin <0.01 0.00 - 0.03 ng/mL   D-Dimer, Quantitative    Collection Time: 11/14/21 12:59 AM   Result Value Ref Range    D-Dimer, Quant 0.39 0.00 - 0.48 ug/mL FEU   Protime-INR    Collection Time: 11/14/21 12:59 AM   Result Value Ref Range    Protime 13.1 12.0 - 14.6 sec    INR 0.97 0.88 - 1.18   APTT    Collection Time: 11/14/21 12:59 AM   Result Value Ref Range    aPTT 28.2 26.0 - 36.2 sec   Ethanol    Collection Time: 11/14/21 12:59 AM   Result Value Ref Range    Ethanol Lvl 131 mg/dL   COVID-19, Rapid    Collection Time: 11/14/21  1:25 AM    Specimen: Nasopharyngeal Swab   Result Value Ref Range    SARS-CoV-2, NAAT Not Detected Not Detected   Lactic Acid, Plasma    Collection Time: 11/14/21  3:27 AM   Result Value Ref Range    Lactic Acid 3.9 (HH) 0.5 - 1.9 mmol/L   Troponin    Collection Time: 11/14/21  3:56 AM   Result Value Ref Range    Troponin <0.01 0.00 - 0.03 ng/mL        Imaging/Diagnostics Last 24 Hours   No results found.     Assessment      Hospital Problems           Last Modified POA    * (Principal) Chest pain with moderate risk for cardiac etiology 11/14/2021 Yes    Chest pain, atypical 11/14/2021 Yes    Overview Signed 5/2/2018 10:37 AM by Jackie Herrera MD     History of normal cardiac catheterizations circa 2008 and 2010         Gastroesophageal reflux disease (Chronic) 11/14/2021 Yes    Morbid obesity with BMI of 45.0-49.9, adult ([facility] (Chronic) 11/14/2021 Yes    Acquired hypothyroidism (Chronic) 11/14/2021 Yes    Overview Signed 5/2/2018 10:38 AM by Nilay Flores MD     Thyroid replacement therapy         Hypertensive disorder (Chronic) 11/14/2021 Yes    Lactic acidosis 11/14/2021 Yes    Anxiety 11/14/2021 Yes    Chronic obstructive pulmonary disease ([facility] 11/14/2021 Yes    Paroxysmal A-fib ([facility] 11/14/2021 Yes    Obstructive sleep apnea 11/14/2021 Yes    Overview Signed 5/2/2018 10:36 AM by Nilay Flores MD     CPAP noncompliance           Bipolar disorder with severe depression ([facility] 11/14/2021 Yes    Alcohol intoxication ([facility] 11/14/2021 Yes          Plan   1. Patient Is being admitted for typical chest pain   2. He has multiple cv risk factors. 3. And monitor and control bp and assess and address cv risk factors. However, cardiac cath done 2/2021 was negative for any coronary obstructive disease     Monitor overnight and assess for need for cv etiology     4. Hypertension on meds     5. Type ii dm somewhat controlled. 6.  Acute etoh intoxication and monitor and use ciwa protocol. 7.  Obesity     8. Sedentary     9. Chest pain with exertion (during intercourse).      10.  Trend troponin's etc         Consultations Ordered:  IP CONSULT TO SOCIAL WORK    Electronically signed by Russ Bartholomew MD on 11/14/21 at 4:28 AM CST

## 2021-11-14 NOTE — ED NOTES
Bed: 07  Expected date:   Expected time:   Means of arrival:   Comments:  Vasu Herndon RN  11/14/21 1572
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
## 2021-11-14 NOTE — PLAN OF CARE
Problem: Cardiac:  Goal: Ability to maintain an adequate cardiac output will improve  Description: Ability to maintain an adequate cardiac output will improve  Outcome: Ongoing  Goal: Hemodynamic stability will improve  Description: Hemodynamic stability will improve  Outcome: Ongoing  Goal: Diagnostic test results will improve  Description: Diagnostic test results will improve  Outcome: Ongoing     Problem: Sensory:  Goal: Pain level will decrease  Description: Pain level will decrease  Outcome: Ongoing

## 2021-11-15 ENCOUNTER — APPOINTMENT (OUTPATIENT)
Dept: CT IMAGING | Age: 51
DRG: 894 | End: 2021-11-15
Payer: MEDICAID

## 2021-11-15 VITALS
SYSTOLIC BLOOD PRESSURE: 120 MMHG | OXYGEN SATURATION: 100 % | HEART RATE: 73 BPM | HEIGHT: 72 IN | WEIGHT: 206 LBS | RESPIRATION RATE: 16 BRPM | DIASTOLIC BLOOD PRESSURE: 69 MMHG | BODY MASS INDEX: 27.9 KG/M2 | TEMPERATURE: 97.7 F

## 2021-11-15 LAB
ALBUMIN SERPL-MCNC: 3.6 G/DL (ref 3.5–5.2)
ALP BLD-CCNC: 67 U/L (ref 40–130)
ALT SERPL-CCNC: 17 U/L (ref 5–41)
AMPHETAMINE SCREEN, URINE: NEGATIVE
AMYLASE: 85 U/L (ref 28–100)
ANION GAP SERPL CALCULATED.3IONS-SCNC: 9 MMOL/L (ref 7–19)
AST SERPL-CCNC: 27 U/L (ref 5–40)
BARBITURATE SCREEN URINE: NEGATIVE
BASOPHILS ABSOLUTE: 0 K/UL (ref 0–0.2)
BASOPHILS RELATIVE PERCENT: 0.4 % (ref 0–1)
BENZODIAZEPINE SCREEN, URINE: NEGATIVE
BILIRUB SERPL-MCNC: 0.3 MG/DL (ref 0.2–1.2)
BUN BLDV-MCNC: 11 MG/DL (ref 6–20)
CALCIUM SERPL-MCNC: 8.6 MG/DL (ref 8.6–10)
CANNABINOID SCREEN URINE: NEGATIVE
CHLORIDE BLD-SCNC: 110 MMOL/L (ref 98–111)
CO2: 22 MMOL/L (ref 22–29)
COCAINE METABOLITE SCREEN URINE: POSITIVE
CREAT SERPL-MCNC: 0.8 MG/DL (ref 0.5–1.2)
EKG P AXIS: 46 DEGREES
EKG P AXIS: 58 DEGREES
EKG P AXIS: 73 DEGREES
EKG P-R INTERVAL: 108 MS
EKG P-R INTERVAL: 112 MS
EKG P-R INTERVAL: 80 MS
EKG Q-T INTERVAL: 308 MS
EKG Q-T INTERVAL: 364 MS
EKG Q-T INTERVAL: 388 MS
EKG QRS DURATION: 74 MS
EKG QRS DURATION: 84 MS
EKG QRS DURATION: 92 MS
EKG QTC CALCULATION (BAZETT): 402 MS
EKG QTC CALCULATION (BAZETT): 414 MS
EKG QTC CALCULATION (BAZETT): 441 MS
EKG T AXIS: -24 DEGREES
EKG T AXIS: -62 DEGREES
EKG T AXIS: 0 DEGREES
EOSINOPHILS ABSOLUTE: 0.1 K/UL (ref 0–0.6)
EOSINOPHILS RELATIVE PERCENT: 2 % (ref 0–5)
GFR AFRICAN AMERICAN: >59
GFR NON-AFRICAN AMERICAN: >60
GLUCOSE BLD-MCNC: 114 MG/DL (ref 70–99)
GLUCOSE BLD-MCNC: 141 MG/DL (ref 70–99)
GLUCOSE BLD-MCNC: 176 MG/DL (ref 70–99)
GLUCOSE BLD-MCNC: 206 MG/DL (ref 74–109)
HBA1C MFR BLD: 6.2 % (ref 4–6)
HCT VFR BLD CALC: 33.9 % (ref 42–52)
HEMOGLOBIN: 10.1 G/DL (ref 14–18)
IMMATURE GRANULOCYTES #: 0 K/UL
LIPASE: 31 U/L (ref 13–60)
LYMPHOCYTES ABSOLUTE: 1.5 K/UL (ref 1.1–4.5)
LYMPHOCYTES RELATIVE PERCENT: 33.1 % (ref 20–40)
Lab: ABNORMAL
MAGNESIUM: 1.5 MG/DL (ref 1.6–2.6)
MCH RBC QN AUTO: 26 PG (ref 27–31)
MCHC RBC AUTO-ENTMCNC: 29.8 G/DL (ref 33–37)
MCV RBC AUTO: 87.4 FL (ref 80–94)
MONOCYTES ABSOLUTE: 0.4 K/UL (ref 0–0.9)
MONOCYTES RELATIVE PERCENT: 8.1 % (ref 0–10)
NEUTROPHILS ABSOLUTE: 2.6 K/UL (ref 1.5–7.5)
NEUTROPHILS RELATIVE PERCENT: 56.2 % (ref 50–65)
OPIATE SCREEN URINE: NEGATIVE
PDW BLD-RTO: 17.9 % (ref 11.5–14.5)
PERFORMED ON: ABNORMAL
PLATELET # BLD: 288 K/UL (ref 130–400)
PMV BLD AUTO: 10.2 FL (ref 9.4–12.4)
POTASSIUM SERPL-SCNC: 4.3 MMOL/L (ref 3.5–5)
RBC # BLD: 3.88 M/UL (ref 4.7–6.1)
SODIUM BLD-SCNC: 141 MMOL/L (ref 136–145)
TOTAL PROTEIN: 5.5 G/DL (ref 6.6–8.7)
WBC # BLD: 4.6 K/UL (ref 4.8–10.8)

## 2021-11-15 PROCEDURE — 2580000003 HC RX 258: Performed by: INTERNAL MEDICINE

## 2021-11-15 PROCEDURE — 83690 ASSAY OF LIPASE: CPT

## 2021-11-15 PROCEDURE — 2500000003 HC RX 250 WO HCPCS: Performed by: INTERNAL MEDICINE

## 2021-11-15 PROCEDURE — 6360000004 HC RX CONTRAST MEDICATION: Performed by: INTERNAL MEDICINE

## 2021-11-15 PROCEDURE — 83735 ASSAY OF MAGNESIUM: CPT

## 2021-11-15 PROCEDURE — 83036 HEMOGLOBIN GLYCOSYLATED A1C: CPT

## 2021-11-15 PROCEDURE — 93010 ELECTROCARDIOGRAM REPORT: CPT | Performed by: INTERNAL MEDICINE

## 2021-11-15 PROCEDURE — 6370000000 HC RX 637 (ALT 250 FOR IP): Performed by: INTERNAL MEDICINE

## 2021-11-15 PROCEDURE — 93005 ELECTROCARDIOGRAM TRACING: CPT | Performed by: INTERNAL MEDICINE

## 2021-11-15 PROCEDURE — 85025 COMPLETE CBC W/AUTO DIFF WBC: CPT

## 2021-11-15 PROCEDURE — 74178 CT ABD&PLV WO CNTR FLWD CNTR: CPT

## 2021-11-15 PROCEDURE — 82947 ASSAY GLUCOSE BLOOD QUANT: CPT

## 2021-11-15 PROCEDURE — 80307 DRUG TEST PRSMV CHEM ANLYZR: CPT

## 2021-11-15 PROCEDURE — 36415 COLL VENOUS BLD VENIPUNCTURE: CPT

## 2021-11-15 PROCEDURE — 80053 COMPREHEN METABOLIC PANEL: CPT

## 2021-11-15 PROCEDURE — 6360000002 HC RX W HCPCS: Performed by: INTERNAL MEDICINE

## 2021-11-15 PROCEDURE — 82150 ASSAY OF AMYLASE: CPT

## 2021-11-15 RX ORDER — ONDANSETRON 2 MG/ML
4 INJECTION INTRAMUSCULAR; INTRAVENOUS EVERY 8 HOURS PRN
Status: DISCONTINUED | OUTPATIENT
Start: 2021-11-15 | End: 2021-11-15 | Stop reason: HOSPADM

## 2021-11-15 RX ORDER — ACETAMINOPHEN 325 MG/1
650 TABLET ORAL EVERY 4 HOURS PRN
Status: DISCONTINUED | OUTPATIENT
Start: 2021-11-15 | End: 2021-11-15 | Stop reason: HOSPADM

## 2021-11-15 RX ORDER — FAMOTIDINE 20 MG/1
20 TABLET, FILM COATED ORAL 2 TIMES DAILY
Status: DISCONTINUED | OUTPATIENT
Start: 2021-11-15 | End: 2021-11-15 | Stop reason: HOSPADM

## 2021-11-15 RX ADMIN — ENOXAPARIN SODIUM 40 MG: 40 INJECTION SUBCUTANEOUS at 08:24

## 2021-11-15 RX ADMIN — FAMOTIDINE 20 MG: 10 INJECTION, SOLUTION INTRAVENOUS at 08:24

## 2021-11-15 RX ADMIN — OXYCODONE 5 MG: 5 TABLET ORAL at 11:42

## 2021-11-15 RX ADMIN — HYDRALAZINE HYDROCHLORIDE 50 MG: 25 TABLET, FILM COATED ORAL at 08:24

## 2021-11-15 RX ADMIN — ATORVASTATIN CALCIUM 20 MG: 20 TABLET, FILM COATED ORAL at 08:24

## 2021-11-15 RX ADMIN — ISOSORBIDE MONONITRATE 30 MG: 30 TABLET, EXTENDED RELEASE ORAL at 08:24

## 2021-11-15 RX ADMIN — IOPAMIDOL 90 ML: 755 INJECTION, SOLUTION INTRAVENOUS at 14:44

## 2021-11-15 RX ADMIN — METOPROLOL SUCCINATE 50 MG: 50 TABLET, EXTENDED RELEASE ORAL at 08:24

## 2021-11-15 RX ADMIN — OXYCODONE 5 MG: 5 TABLET ORAL at 04:51

## 2021-11-15 RX ADMIN — PREGABALIN 75 MG: 75 CAPSULE ORAL at 08:24

## 2021-11-15 RX ADMIN — SODIUM CHLORIDE, PRESERVATIVE FREE 10 ML: 5 INJECTION INTRAVENOUS at 08:25

## 2021-11-15 ASSESSMENT — PAIN DESCRIPTION - LOCATION
LOCATION: CHEST
LOCATION: GENERALIZED

## 2021-11-15 ASSESSMENT — PAIN DESCRIPTION - FREQUENCY
FREQUENCY: INTERMITTENT

## 2021-11-15 ASSESSMENT — PAIN SCALES - GENERAL
PAINLEVEL_OUTOF10: 8
PAINLEVEL_OUTOF10: 10
PAINLEVEL_OUTOF10: 10
PAINLEVEL_OUTOF10: 8
PAINLEVEL_OUTOF10: 0
PAINLEVEL_OUTOF10: 8
PAINLEVEL_OUTOF10: 9

## 2021-11-15 ASSESSMENT — PAIN DESCRIPTION - PAIN TYPE
TYPE: ACUTE PAIN
TYPE: ACUTE PAIN
TYPE: CHRONIC PAIN
TYPE: ACUTE PAIN

## 2021-11-15 NOTE — PROGRESS NOTES
Patient complains of chest pain radiating to left and right jaw. He states the pain is worse on inspiration. EKG appears unremarkable. Patient states that nitro and oxycodone have not provided him relief. Have notified Dr Sheba Kumar. Will continue to monitor patient.    Electronically signed by Rena Butler RN on 11/15/2021 at 8:45 AM

## 2021-11-15 NOTE — PROGRESS NOTES
Patient states he gets his meds from [facility] Nurse will need to call Monday to verify his home medications: phone [phone]

## 2021-11-15 NOTE — PLAN OF CARE
Problem: Cardiac:  Goal: Ability to maintain an adequate cardiac output will improve  Description: Ability to maintain an adequate cardiac output will improve  Outcome: Ongoing  Goal: Hemodynamic stability will improve  Description: Hemodynamic stability will improve  Outcome: Ongoing  Goal: Diagnostic test results will improve  Description: Diagnostic test results will improve  Outcome: Ongoing     Problem: Sensory:  Goal: Pain level will decrease  Description: Pain level will decrease  Outcome: Ongoing     Problem: Falls - Risk of:  Goal: Will remain free from falls  Description: Will remain free from falls  Outcome: Ongoing  Goal: Absence of physical injury  Description: Absence of physical injury  Outcome: Ongoing

## 2021-11-15 NOTE — PROGRESS NOTES
Length of Stay:   LOS: 1 day      Chief complaint:  Chief Complaint   Patient presents with    Chest Pain         Subjective:      Physical Examination:  /84   Pulse 74   Temp 97.2 °F (36.2 °C) (Temporal)   Resp 18   Ht 6' (1.829 m)   Wt 206 lb (93.4 kg)   SpO2 100%   BMI 27.94 kg/m²   Constitutional - Appropriately groomed, good nutritional status  Psychiatric - AOX3, baseline mood  Eyes - EOMI, conjunctivae and lids intact  ENMT - lips, teeth, gums intact; hearing intact  Respiratory - CTAB, no accessory muscle use  Cardiovascular - Clear S1, S2 no gross m/r/g; pedal pulses intact  Abd - Soft, non-tender;  No gross hernia  MSK - UE and LE joints intact, no gross clubbing  Skin - No rashes or wounds; no gross capillary refill defects  Neurologic - CN 2-12 grossly intact, sensation intact    Medications:    famotidine, 20 mg, Oral, BID    levothyroxine, 200 mcg, Oral, Daily    atorvastatin, 20 mg, Oral, Daily    hydrALAZINE, 50 mg, Oral, BID    isosorbide mononitrate, 30 mg, Oral, Daily    pregabalin, 75 mg, Oral, BID    Liraglutide, 1.8 mg, SubCUTAneous, Nightly    sodium chloride flush, 5-40 mL, IntraVENous, 2 times per day    insulin lispro, 0-12 Units, SubCUTAneous, TID WC    insulin lispro, 0-6 Units, SubCUTAneous, Nightly    enoxaparin, 40 mg, SubCUTAneous, Daily    influenza virus vaccine, 0.5 mL, IntraMUSCular, Prior to discharge    metoprolol succinate, 50 mg, Oral, Daily      Problem list:  Principal Problem:    Chest pain with moderate risk for cardiac etiology  Active Problems:    Chest pain, atypical    Gastroesophageal reflux disease    Morbid obesity with BMI of 45.0-49.9, adult (HCC)    Acquired hypothyroidism    Hypertensive disorder    Lactic acidosis    Anxiety    Chronic obstructive pulmonary disease (HCC)    Paroxysmal A-fib ([facility])    Obstructive sleep apnea    Bipolar disorder with severe depression ([facility])    Alcohol intoxication ([facility] [address]  Resolved Problems:    * No resolved hospital problems.  *        A/P:  []CP, cocaine abuse  -Troponins negative, still complaining CP but looks very comfortable  -Cardiology consulted  -Advised to stop using cocaine, likely cause of CP  -Concern for possible drug seeking  -Does have significant risk factors but workup thus far negative    []Abd pain acute on chronic  Pt says worse than normal  Consult GI    []Alcohol abuse  Advised to stop drinking      Meir Terrazas M.D.,  11/15/2021

## 2021-11-15 NOTE — PROGRESS NOTES
Pharmacy Intravenous to Oral Protocol    Medication changed per [facility] IV to PO protocol: Famotidine    Patient meets the following inclusion criteria and none of the exclusion criteria:    Inclusion criteria:  - IV therapy > 24 hours (antibiotics only)  - Tolerating diet more advanced than clear liquids  - Tolerating PO medications  - No vasopressor blood pressure support (ie no signs of shock)  - Patient hasn't had a seizure for 72 hrs (antiepileptic medications only)    Exclusion criteria:  - Infections requiring IV therapy (ie meningitis, endocarditis, osteomyelitis, pancreatitis)   - Nausea and/or vomiting or severe diarrhea within past 24 hours   - Has gastrectomy, ileus, gastric outlet or bowel obstruction, or malabsorption syndromes   - Has significant painful oral ulceration   - TPN with an NPO order   - Active GI bleed   - Unable to swallow   - NPO   - Febrile in the last 24 hours (antibiotics only)   - Clinical deteriorating or unstable (antibiotics only)   - Pediatric patients and patients who are not euthyroid (not on oral levothyroxine/not stabilized on oral levothyroxine)- Levothyroxine only     Electronically signed by AMMY Vaz [facility] on 11/15/2021 at 2:10 PM

## 2021-11-16 NOTE — PROGRESS NOTES
Patient states that he has urinated several times today without difficulty. Nurse educated patient on importance of accurate intake and output and use of urinal for accurate measurement of urine. Patient verbalized understanding and reluctance to comply. Will continue to reinforce teaching.    Electronically signed by Fadi Ko RN on 11/15/2021 at 6:39 PM

## 2021-11-16 NOTE — PROGRESS NOTES
Patient states he is leaving AMA. States that he can lay in pain at home. Nurse has explained to patient that he would be leaving against the physicians' advise and that his insurance may not cover costs accrued during hospital stay. Patient states that he has left AMA before and knows how it works. Nurse notified Dr Leelee Ace.    Electronically signed by Kirk Sr RN on 11/15/2021 at 8:29 PM

## 2021-11-16 NOTE — PROGRESS NOTES
States is leaving AMA. Refusing to sign AMA papers. IID and telemetry removed. Explained/read AMA papers to patient.

## 2021-11-16 NOTE — DISCHARGE SUMMARY
Admission Diagnosis:  Chief Complaint   Patient presents with    Chest Pain       Comorbid conditions:  Principal Problem:    Chest pain with moderate risk for cardiac etiology  Active Problems:    Chest pain, atypical    Gastroesophageal reflux disease    Morbid obesity with BMI of 45.0-49.9, adult (HCC)    Acquired hypothyroidism    Hypertensive disorder    Lactic acidosis    Anxiety    Chronic obstructive pulmonary disease (HCC)    Paroxysmal A-fib (HCC)    Obstructive sleep apnea    Bipolar disorder with severe depression ([address]    Alcohol intoxication ([facility] [address]  Resolved Problems:    * No resolved hospital problems. *      Procedures Performed:  None    Hospital Course:  Pt presented with CP while on cocaine and significant alcohol abuse. Cardiac workup negative for ACS. Pt left AMA overnight . Aggressive request for opiates, high risk for drug seeking behavior. History of leaving AMA. DC Medications:  See Home medication reconciliation    Condition on Discharge:   AMA    Disposition:  AMA    Recommendations/Follow-up:  -F/u PCP within 3 days      Alicia Russo M.D.,  11/16/2021

## 2021-11-19 LAB
BLOOD CULTURE, ROUTINE: NORMAL
CULTURE, BLOOD 2: NORMAL

## 2022-01-19 ENCOUNTER — NURSE TRIAGE (OUTPATIENT)
Dept: OTHER | Facility: CLINIC | Age: 52
End: 2022-01-19

## 2022-01-19 NOTE — TELEPHONE ENCOUNTER
Received call from Licha rogers at [facility] VERONICA with [employer] Complaint. Subjective: Caller states about 4 days ago pt became very sob and it has gotten worse with pt being extremely fatigued. Pt is on oxygen intermittently normally but has had to increase it d/t sob. Pts wife took bp this morning and told him it exteremly but would not disclose numbers. Pt states that he has hx of cp that has been increasing lately as well. Current Symptoms: see above    Onset: 4 days ago; gradual    Associated Symptoms: reduced activity    Pain Severity: 9/10; sharp; constant    Temperature: denies    What has been tried: oxygen worn more often      Recommended disposition: ED Now    Care advice provided, patient verbalizes understanding; denies any other questions or concerns; instructed to call back for any new or worsening symptoms. Patient/caller proceeding to nearest Emergency Department     Attention Provider: Thank you for allowing me to participate in the care of your patient. The patient was connected to triage in response to information provided to the ECC/PSC. Please do not respond through this encounter as the response is not directed to a shared pool.         Reason for Disposition   MODERATE difficulty breathing (e.g., speaks in phrases, SOB even at rest, pulse 100-120) of new-onset or worse than normal    Protocols used: BREATHING DIFFICULTY-ADULT-OH

## 2022-01-20 DIAGNOSIS — N52.9 DIFFICULTY ATTAINING ERECTION: ICD-10-CM

## 2022-01-20 DIAGNOSIS — I10 ESSENTIAL HYPERTENSION: Chronic | ICD-10-CM

## 2022-01-20 DIAGNOSIS — E03.9 ACQUIRED HYPOTHYROIDISM: Chronic | ICD-10-CM

## 2022-01-20 RX ORDER — SILDENAFIL 50 MG/1
50 TABLET, FILM COATED ORAL PRN
Qty: 30 TABLET | Refills: 3 | OUTPATIENT
Start: 2022-01-20

## 2022-01-20 RX ORDER — LEVOTHYROXINE SODIUM 0.2 MG/1
TABLET ORAL
Qty: 30 TABLET | Refills: 11 | OUTPATIENT
Start: 2022-01-20

## 2022-01-20 RX ORDER — HYDRALAZINE HYDROCHLORIDE 50 MG/1
TABLET, FILM COATED ORAL
Qty: 90 TABLET | Refills: 11 | OUTPATIENT
Start: 2022-01-20

## 2022-01-22 DIAGNOSIS — E11.9 TYPE 2 DIABETES MELLITUS WITHOUT COMPLICATION, WITHOUT LONG-TERM CURRENT USE OF INSULIN (HCC): Chronic | ICD-10-CM

## 2022-01-22 RX ORDER — PEN NEEDLE, DIABETIC 31 GX5/16"
NEEDLE, DISPOSABLE MISCELLANEOUS
Refills: 1 | OUTPATIENT
Start: 2022-01-22

## 2022-01-24 ENCOUNTER — TELEPHONE (OUTPATIENT)
Dept: PRIMARY CARE CLINIC | Age: 52
End: 2022-01-24

## 2022-01-24 NOTE — TELEPHONE ENCOUNTER
Pt called to ask why his medication keeps being denied. I told him that after further research, I see that he is dismissed from the department per Kenny Guerrero due to no show history. He began to argue stating that medication was fill, however it was not. After calling [facility] pharmacy, it was determine that those were old refill that were sent before his dismissal date. All refills under Kenny Guerrero has been canceled at [id number].

## 2022-02-19 DIAGNOSIS — Z79.4 TYPE 2 DIABETES MELLITUS WITHOUT COMPLICATION, WITH LONG-TERM CURRENT USE OF INSULIN (HCC): Chronic | ICD-10-CM

## 2022-02-19 DIAGNOSIS — E11.9 TYPE 2 DIABETES MELLITUS WITHOUT COMPLICATION, WITH LONG-TERM CURRENT USE OF INSULIN (HCC): Chronic | ICD-10-CM

## 2022-02-19 RX ORDER — LIRAGLUTIDE 6 MG/ML
INJECTION SUBCUTANEOUS
Qty: 9 ML | Refills: 3 | OUTPATIENT
Start: 2022-02-19

## 2022-02-22 DIAGNOSIS — E11.9 TYPE 2 DIABETES MELLITUS WITHOUT COMPLICATION, WITHOUT LONG-TERM CURRENT USE OF INSULIN (HCC): Chronic | ICD-10-CM

## 2022-02-22 DIAGNOSIS — N52.9 DIFFICULTY ATTAINING ERECTION: ICD-10-CM

## 2022-02-22 RX ORDER — ISOSORBIDE MONONITRATE 30 MG/1
30 TABLET, EXTENDED RELEASE ORAL DAILY
Qty: 30 TABLET | Refills: 3 | Status: SHIPPED | OUTPATIENT
Start: 2022-02-22 | End: 2022-09-07

## 2022-02-23 RX ORDER — PEN NEEDLE, DIABETIC 31 GX5/16"
1 NEEDLE, DISPOSABLE MISCELLANEOUS PRN
Qty: 100 EACH | Refills: 1 | OUTPATIENT
Start: 2022-02-23

## 2022-02-23 RX ORDER — SILDENAFIL 50 MG/1
50 TABLET, FILM COATED ORAL PRN
Qty: 30 TABLET | Refills: 3 | OUTPATIENT
Start: 2022-02-23

## 2022-02-23 NOTE — TELEPHONE ENCOUNTER
Lopez Johnson    1/24/22 2:06 PM  Note  Pt called to ask why his medication keeps being denied. I told him that after further research, I see that he is dismissed from the department per Dahiana Lozada due to no show history. He began to argue stating that medication was fill, however it was not.      After calling [facility] pharmacy, it was determine that those were old refill that were sent before his dismissal date.      All refills under [place] [facility] has been canceled at [facility].

## 2022-02-23 NOTE — TELEPHONE ENCOUNTER
Lopez Riley    1/24/22 2:06 PM  Note  Pt called to ask why his medication keeps being denied. I told him that after further research, I see that he is dismissed from the department per Josue Beauchamp due to no show history. He began to argue stating that medication was fill, however it was not.      After calling [facility] pharmacy, it was determine that those were old refill that were sent before his dismissal date.      All refills under Josue Beauchamp has been canceled at [facility].

## 2022-03-24 ENCOUNTER — NURSE TRIAGE (OUTPATIENT)
Dept: CALL CENTER | Facility: HOSPITAL | Age: 52
End: 2022-03-24

## 2022-03-24 ENCOUNTER — APPOINTMENT (OUTPATIENT)
Dept: CT IMAGING | Age: 52
End: 2022-03-24
Payer: MEDICAID

## 2022-03-24 ENCOUNTER — HOSPITAL ENCOUNTER (EMERGENCY)
Age: 52
Discharge: HOME | End: 2022-03-24
Attending: EMERGENCY MEDICINE
Payer: MEDICAID

## 2022-03-24 VITALS
HEART RATE: 107 BPM | OXYGEN SATURATION: 94 % | WEIGHT: 215 LBS | SYSTOLIC BLOOD PRESSURE: 195 MMHG | BODY MASS INDEX: 29.12 KG/M2 | RESPIRATION RATE: 24 BRPM | DIASTOLIC BLOOD PRESSURE: 79 MMHG | HEIGHT: 72 IN | TEMPERATURE: 98.2 F

## 2022-03-24 DIAGNOSIS — K52.9 COLITIS: Primary | ICD-10-CM

## 2022-03-24 LAB
ALBUMIN SERPL-MCNC: 4.9 G/DL (ref 3.5–5.2)
ALP BLD-CCNC: 104 U/L (ref 40–130)
ALT SERPL-CCNC: 22 U/L (ref 5–41)
ANION GAP SERPL CALCULATED.3IONS-SCNC: 16 MMOL/L (ref 7–19)
AST SERPL-CCNC: 40 U/L (ref 5–40)
BASOPHILS ABSOLUTE: 0 K/UL (ref 0–0.2)
BASOPHILS RELATIVE PERCENT: 0.4 % (ref 0–1)
BILIRUB SERPL-MCNC: <0.2 MG/DL (ref 0.2–1.2)
BUN BLDV-MCNC: 8 MG/DL (ref 6–20)
CALCIUM SERPL-MCNC: 9 MG/DL (ref 8.6–10)
CHLORIDE BLD-SCNC: 106 MMOL/L (ref 98–111)
CO2: 20 MMOL/L (ref 22–29)
CREAT SERPL-MCNC: 0.8 MG/DL (ref 0.5–1.2)
EOSINOPHILS ABSOLUTE: 0 K/UL (ref 0–0.6)
EOSINOPHILS RELATIVE PERCENT: 0.1 % (ref 0–5)
GFR AFRICAN AMERICAN: >59
GFR NON-AFRICAN AMERICAN: >60
GLUCOSE BLD-MCNC: 133 MG/DL (ref 74–109)
HCT VFR BLD CALC: 34.6 % (ref 42–52)
HEMOGLOBIN: 10.4 G/DL (ref 14–18)
IMMATURE GRANULOCYTES #: 0 K/UL
LIPASE: 32 U/L (ref 13–60)
LYMPHOCYTES ABSOLUTE: 0.6 K/UL (ref 1.1–4.5)
LYMPHOCYTES RELATIVE PERCENT: 7.4 % (ref 20–40)
MCH RBC QN AUTO: 24.9 PG (ref 27–31)
MCHC RBC AUTO-ENTMCNC: 30.1 G/DL (ref 33–37)
MCV RBC AUTO: 83 FL (ref 80–94)
MONOCYTES ABSOLUTE: 0.4 K/UL (ref 0–0.9)
MONOCYTES RELATIVE PERCENT: 5.1 % (ref 0–10)
NEUTROPHILS ABSOLUTE: 7.2 K/UL (ref 1.5–7.5)
NEUTROPHILS RELATIVE PERCENT: 86.8 % (ref 50–65)
PDW BLD-RTO: 17.7 % (ref 11.5–14.5)
PLATELET # BLD: 347 K/UL (ref 130–400)
PMV BLD AUTO: 9.1 FL (ref 9.4–12.4)
POTASSIUM SERPL-SCNC: 3.4 MMOL/L (ref 3.5–5)
RBC # BLD: 4.17 M/UL (ref 4.7–6.1)
SARS-COV-2, NAAT: NOT DETECTED
SODIUM BLD-SCNC: 142 MMOL/L (ref 136–145)
TOTAL PROTEIN: 7.4 G/DL (ref 6.6–8.7)
WBC # BLD: 8.3 K/UL (ref 4.8–10.8)

## 2022-03-24 PROCEDURE — 83690 ASSAY OF LIPASE: CPT

## 2022-03-24 PROCEDURE — 6360000002 HC RX W HCPCS: Performed by: EMERGENCY MEDICINE

## 2022-03-24 PROCEDURE — 96374 THER/PROPH/DIAG INJ IV PUSH: CPT

## 2022-03-24 PROCEDURE — 96375 TX/PRO/DX INJ NEW DRUG ADDON: CPT

## 2022-03-24 PROCEDURE — 74177 CT ABD & PELVIS W/CONTRAST: CPT

## 2022-03-24 PROCEDURE — 2580000003 HC RX 258: Performed by: EMERGENCY MEDICINE

## 2022-03-24 PROCEDURE — 85025 COMPLETE CBC W/AUTO DIFF WBC: CPT

## 2022-03-24 PROCEDURE — 99282 EMERGENCY DEPT VISIT SF MDM: CPT

## 2022-03-24 PROCEDURE — 36415 COLL VENOUS BLD VENIPUNCTURE: CPT

## 2022-03-24 PROCEDURE — 87635 SARS-COV-2 COVID-19 AMP PRB: CPT

## 2022-03-24 PROCEDURE — 96361 HYDRATE IV INFUSION ADD-ON: CPT

## 2022-03-24 PROCEDURE — 80053 COMPREHEN METABOLIC PANEL: CPT

## 2022-03-24 PROCEDURE — 6360000004 HC RX CONTRAST MEDICATION: Performed by: EMERGENCY MEDICINE

## 2022-03-24 RX ORDER — CIPROFLOXACIN 500 MG/1
500 TABLET, FILM COATED ORAL 2 TIMES DAILY
Qty: 20 TABLET | Refills: 0 | Status: SHIPPED | OUTPATIENT
Start: 2022-03-24 | End: 2022-04-03

## 2022-03-24 RX ORDER — ONDANSETRON 2 MG/ML
4 INJECTION INTRAMUSCULAR; INTRAVENOUS ONCE
Status: COMPLETED | OUTPATIENT
Start: 2022-03-24 | End: 2022-03-24

## 2022-03-24 RX ORDER — MORPHINE SULFATE 4 MG/ML
4 INJECTION, SOLUTION INTRAMUSCULAR; INTRAVENOUS ONCE
Status: COMPLETED | OUTPATIENT
Start: 2022-03-24 | End: 2022-03-24

## 2022-03-24 RX ORDER — OXYCODONE HYDROCHLORIDE 5 MG/1
5 TABLET ORAL EVERY 6 HOURS PRN
Qty: 12 TABLET | Refills: 0 | Status: SHIPPED | OUTPATIENT
Start: 2022-03-24 | End: 2022-03-27

## 2022-03-24 RX ORDER — HYDROMORPHONE HYDROCHLORIDE 1 MG/ML
1 INJECTION, SOLUTION INTRAMUSCULAR; INTRAVENOUS; SUBCUTANEOUS ONCE
Status: COMPLETED | OUTPATIENT
Start: 2022-03-24 | End: 2022-03-24

## 2022-03-24 RX ORDER — 0.9 % SODIUM CHLORIDE 0.9 %
1000 INTRAVENOUS SOLUTION INTRAVENOUS ONCE
Status: COMPLETED | OUTPATIENT
Start: 2022-03-24 | End: 2022-03-24

## 2022-03-24 RX ORDER — METRONIDAZOLE 500 MG/1
500 TABLET ORAL 3 TIMES DAILY
Qty: 30 TABLET | Refills: 0 | Status: SHIPPED | OUTPATIENT
Start: 2022-03-24 | End: 2022-04-03

## 2022-03-24 RX ADMIN — HYDROMORPHONE HYDROCHLORIDE 1 MG: 1 INJECTION, SOLUTION INTRAMUSCULAR; INTRAVENOUS; SUBCUTANEOUS at 07:20

## 2022-03-24 RX ADMIN — ONDANSETRON 4 MG: 2 INJECTION INTRAMUSCULAR; INTRAVENOUS at 06:24

## 2022-03-24 RX ADMIN — MORPHINE SULFATE 4 MG: 4 INJECTION INTRAVENOUS at 06:24

## 2022-03-24 RX ADMIN — IOPAMIDOL 90 ML: 755 INJECTION, SOLUTION INTRAVENOUS at 06:44

## 2022-03-24 RX ADMIN — SODIUM CHLORIDE 1000 ML: 9 INJECTION, SOLUTION INTRAVENOUS at 06:24

## 2022-03-24 ASSESSMENT — ENCOUNTER SYMPTOMS
RHINORRHEA: 0
SHORTNESS OF BREATH: 0
SORE THROAT: 0
BACK PAIN: 0
VOMITING: 1
NAUSEA: 1
ABDOMINAL PAIN: 1
DIARRHEA: 0

## 2022-03-24 ASSESSMENT — PAIN SCALES - GENERAL
PAINLEVEL_OUTOF10: 10

## 2022-03-24 NOTE — TELEPHONE ENCOUNTER
"    Reason for Disposition  • [1] Constant abdominal pain AND [2] present > 2 hours    Additional Information  • Negative: [1] Abdomen pain is main symptom AND [2] male  • Negative: [1] Abdomen pain is main symptom AND [2] adult female  • Negative: Rectal bleeding or blood in stool is main symptom  • Negative: Rectal pain or itching is main symptom  • Negative: Constipation in a cancer patient who is currently (or recently) receiving chemotherapy or radiation therapy, or cancer patient who has metastatic or end-stage cancer and is receiving palliative care  • Negative: Patient sounds very sick or weak to the triager  • Negative: [1] Vomiting AND [2] abdomen looks much more swollen than usual  • Negative: [1] Vomiting AND [2] contains bile (green color)    Answer Assessment - Initial Assessment Questions  1. STOOL PATTERN OR FREQUENCY: \"How often do you pass bowel movements (BMs)?\"  (Normal range: tid to q 3 days)  \"When was the last BM passed?\"        Usually 6 or 7 times a day  2. STRAINING: \"Do you have to strain to have a BM?\"       yes  3. RECTAL PAIN: \"Does your rectum hurt when the stool comes out?\" If Yes, ask: \"Do you have hemorrhoids? How bad is the pain?\"  (Scale 1-10; or mild, moderate, severe)     yes  4. STOOL COMPOSITION: \"Are the stools hard?\"       sometimes  5. BLOOD ON STOOLS: \"Has there been any blood on the toilet tissue or on the surface of the BM?\" If Yes, ask: \"When was the last time?\"      no  6. CHRONIC CONSTIPATION: \"Is this a new problem for you?\"  If no, ask: \"How long have you had this problem?\" (days, weeks, months)       Just 2 days  7. CHANGES IN DIET OR HYDRATION: \"Have there been any recent changes in your diet?\" \"How much fluids are you drinking consuming on a daily basis?\"  \"How much have you had to drink today?\"      no  8. MEDICATIONS: \"Have you been taking any new medications?\" \"Are you taking any narcotic pain medications?\" (e.g., Vicoden, Percocet, morphine, dilaudid)     " "no  9. LAXATIVES: \"Have you been using any stool softeners, laxatives, or enemas?\"  If yes, ask \"What, how often, and when was the last time?\"  10.ACTIVITY:  \"How much walking do you do every day? on a daily basis?\"  \"Has your activity level decreased in the past week?\"         no  11. CAUSE: \"What do you think is causing the constipation?\"         unsure  12. OTHER SYMPTOMS: \"Do you have any other symptoms?\" (e.g., abdominal pain, bloating, fever, vomiting)        Pain n/v   13. MEDICAL HISTORY: \"Do you have a history of hemorrhoids, rectal fissures, or rectal surgery or rectal abscess?\"    \"twisted intestines\"      :  14. PREGNANCY: \"Is there any chance you are pregnant?\" \"When was your last menstrual period?\"        NA    Protocols used: CONSTIPATION-ADULT-AH      "
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
## 2022-04-01 ENCOUNTER — HOSPITAL ENCOUNTER (EMERGENCY)
Age: 52
Discharge: HOME | End: 2022-04-01
Attending: EMERGENCY MEDICINE
Payer: MEDICAID

## 2022-04-01 ENCOUNTER — APPOINTMENT (OUTPATIENT)
Dept: GENERAL RADIOLOGY | Age: 52
End: 2022-04-01
Payer: MEDICAID

## 2022-04-01 ENCOUNTER — APPOINTMENT (OUTPATIENT)
Dept: CT IMAGING | Age: 52
End: 2022-04-01
Payer: MEDICAID

## 2022-04-01 VITALS
WEIGHT: 210 LBS | HEART RATE: 85 BPM | BODY MASS INDEX: 31.1 KG/M2 | RESPIRATION RATE: 13 BRPM | TEMPERATURE: 98.2 F | HEIGHT: 69 IN | DIASTOLIC BLOOD PRESSURE: 88 MMHG | SYSTOLIC BLOOD PRESSURE: 162 MMHG | OXYGEN SATURATION: 94 %

## 2022-04-01 DIAGNOSIS — K52.9 COLITIS: ICD-10-CM

## 2022-04-01 DIAGNOSIS — F14.10 NONDEPENDENT COCAINE ABUSE (HCC): ICD-10-CM

## 2022-04-01 DIAGNOSIS — R10.9 ABDOMINAL PAIN, UNSPECIFIED ABDOMINAL LOCATION: ICD-10-CM

## 2022-04-01 DIAGNOSIS — R07.9 CHEST PAIN, UNSPECIFIED TYPE: Primary | ICD-10-CM

## 2022-04-01 DIAGNOSIS — I10 HYPERTENSION, UNSPECIFIED TYPE: ICD-10-CM

## 2022-04-01 LAB
ALBUMIN SERPL-MCNC: 3.7 G/DL (ref 3.5–5.2)
ALP BLD-CCNC: 67 U/L (ref 40–130)
ALT SERPL-CCNC: 8 U/L (ref 5–41)
AMPHETAMINE SCREEN, URINE: NEGATIVE
ANION GAP SERPL CALCULATED.3IONS-SCNC: 14 MMOL/L (ref 7–19)
AST SERPL-CCNC: 14 U/L (ref 5–40)
BARBITURATE SCREEN URINE: NEGATIVE
BASOPHILS ABSOLUTE: 0 K/UL (ref 0–0.2)
BASOPHILS RELATIVE PERCENT: 0.6 % (ref 0–1)
BENZODIAZEPINE SCREEN, URINE: NEGATIVE
BILIRUB SERPL-MCNC: 1.3 MG/DL (ref 0.2–1.2)
BILIRUBIN URINE: NEGATIVE
BLOOD, URINE: NEGATIVE
BUN BLDV-MCNC: 15 MG/DL (ref 6–20)
CALCIUM SERPL-MCNC: 9.5 MG/DL (ref 8.6–10)
CANNABINOID SCREEN URINE: NEGATIVE
CHLORIDE BLD-SCNC: 91 MMOL/L (ref 98–111)
CLARITY: CLEAR
CO2: 31 MMOL/L (ref 22–29)
COCAINE METABOLITE SCREEN URINE: POSITIVE
COLOR: YELLOW
CREAT SERPL-MCNC: 0.5 MG/DL (ref 0.5–1.2)
EKG P AXIS: 66 DEGREES
EKG P AXIS: 69 DEGREES
EKG P-R INTERVAL: 118 MS
EKG P-R INTERVAL: 94 MS
EKG Q-T INTERVAL: 336 MS
EKG Q-T INTERVAL: 360 MS
EKG QRS DURATION: 82 MS
EKG QRS DURATION: 86 MS
EKG QTC CALCULATION (BAZETT): 399 MS
EKG QTC CALCULATION (BAZETT): 400 MS
EKG T AXIS: -104 DEGREES
EKG T AXIS: 4 DEGREES
EOSINOPHILS ABSOLUTE: 0.1 K/UL (ref 0–0.6)
EOSINOPHILS RELATIVE PERCENT: 1.4 % (ref 0–5)
ETHANOL: <10 MG/DL (ref 0–0.08)
GFR AFRICAN AMERICAN: >59
GFR NON-AFRICAN AMERICAN: >60
GLUCOSE BLD-MCNC: 138 MG/DL (ref 74–109)
GLUCOSE URINE: NEGATIVE MG/DL
HCT VFR BLD CALC: 35.2 % (ref 42–52)
HEMOGLOBIN: 10.5 G/DL (ref 14–18)
IMMATURE GRANULOCYTES #: 0 K/UL
INR BLD: 1 (ref 0.88–1.18)
KETONES, URINE: NEGATIVE MG/DL
LEUKOCYTE ESTERASE, URINE: NEGATIVE
LIPASE: 9 U/L (ref 13–60)
LYMPHOCYTES ABSOLUTE: 1.7 K/UL (ref 1.1–4.5)
LYMPHOCYTES RELATIVE PERCENT: 33.7 % (ref 20–40)
Lab: ABNORMAL
MCH RBC QN AUTO: 24.8 PG (ref 27–31)
MCHC RBC AUTO-ENTMCNC: 29.8 G/DL (ref 33–37)
MCV RBC AUTO: 83 FL (ref 80–94)
MONOCYTES ABSOLUTE: 0.5 K/UL (ref 0–0.9)
MONOCYTES RELATIVE PERCENT: 9.7 % (ref 0–10)
NEUTROPHILS ABSOLUTE: 2.7 K/UL (ref 1.5–7.5)
NEUTROPHILS RELATIVE PERCENT: 54.2 % (ref 50–65)
NITRITE, URINE: NEGATIVE
OPIATE SCREEN URINE: POSITIVE
PDW BLD-RTO: 17.7 % (ref 11.5–14.5)
PH UA: 6 (ref 5–8)
PLATELET # BLD: 297 K/UL (ref 130–400)
PMV BLD AUTO: 9.8 FL (ref 9.4–12.4)
POTASSIUM SERPL-SCNC: 4.4 MMOL/L (ref 3.5–5)
PRO-BNP: 1309 PG/ML (ref 0–900)
PROTEIN UA: NEGATIVE MG/DL
PROTHROMBIN TIME: 13.4 SEC (ref 12–14.6)
RBC # BLD: 4.24 M/UL (ref 4.7–6.1)
SODIUM BLD-SCNC: 136 MMOL/L (ref 136–145)
SPECIFIC GRAVITY UA: 1.04 (ref 1–1.03)
TOTAL PROTEIN: 6.9 G/DL (ref 6.6–8.7)
TROPONIN: <0.01 NG/ML (ref 0–0.03)
TROPONIN: <0.01 NG/ML (ref 0–0.03)
UROBILINOGEN, URINE: 0.2 E.U./DL
WBC # BLD: 5.1 K/UL (ref 4.8–10.8)

## 2022-04-01 PROCEDURE — 81003 URINALYSIS AUTO W/O SCOPE: CPT

## 2022-04-01 PROCEDURE — 85610 PROTHROMBIN TIME: CPT

## 2022-04-01 PROCEDURE — 6370000000 HC RX 637 (ALT 250 FOR IP)

## 2022-04-01 PROCEDURE — 93010 ELECTROCARDIOGRAM REPORT: CPT | Performed by: INTERNAL MEDICINE

## 2022-04-01 PROCEDURE — 83690 ASSAY OF LIPASE: CPT

## 2022-04-01 PROCEDURE — 96375 TX/PRO/DX INJ NEW DRUG ADDON: CPT

## 2022-04-01 PROCEDURE — 6360000002 HC RX W HCPCS

## 2022-04-01 PROCEDURE — 6360000002 HC RX W HCPCS: Performed by: EMERGENCY MEDICINE

## 2022-04-01 PROCEDURE — 80053 COMPREHEN METABOLIC PANEL: CPT

## 2022-04-01 PROCEDURE — 84484 ASSAY OF TROPONIN QUANT: CPT

## 2022-04-01 PROCEDURE — 85025 COMPLETE CBC W/AUTO DIFF WBC: CPT

## 2022-04-01 PROCEDURE — 96374 THER/PROPH/DIAG INJ IV PUSH: CPT

## 2022-04-01 PROCEDURE — 83880 ASSAY OF NATRIURETIC PEPTIDE: CPT

## 2022-04-01 PROCEDURE — 6360000004 HC RX CONTRAST MEDICATION: Performed by: EMERGENCY MEDICINE

## 2022-04-01 PROCEDURE — 99283 EMERGENCY DEPT VISIT LOW MDM: CPT

## 2022-04-01 PROCEDURE — 96376 TX/PRO/DX INJ SAME DRUG ADON: CPT

## 2022-04-01 PROCEDURE — 71045 X-RAY EXAM CHEST 1 VIEW: CPT

## 2022-04-01 PROCEDURE — 74177 CT ABD & PELVIS W/CONTRAST: CPT

## 2022-04-01 PROCEDURE — 80307 DRUG TEST PRSMV CHEM ANLYZR: CPT

## 2022-04-01 PROCEDURE — 82077 ASSAY SPEC XCP UR&BREATH IA: CPT

## 2022-04-01 PROCEDURE — 93005 ELECTROCARDIOGRAM TRACING: CPT | Performed by: EMERGENCY MEDICINE

## 2022-04-01 PROCEDURE — 36415 COLL VENOUS BLD VENIPUNCTURE: CPT

## 2022-04-01 RX ORDER — LORAZEPAM 2 MG/ML
0.5 INJECTION INTRAMUSCULAR ONCE
Status: COMPLETED | OUTPATIENT
Start: 2022-04-01 | End: 2022-04-01

## 2022-04-01 RX ORDER — NITROGLYCERIN 0.4 MG/1
0.4 TABLET SUBLINGUAL EVERY 5 MIN PRN
Status: DISCONTINUED | OUTPATIENT
Start: 2022-04-01 | End: 2022-04-01 | Stop reason: HOSPADM

## 2022-04-01 RX ORDER — METOCLOPRAMIDE HYDROCHLORIDE 5 MG/ML
10 INJECTION INTRAMUSCULAR; INTRAVENOUS ONCE
Status: COMPLETED | OUTPATIENT
Start: 2022-04-01 | End: 2022-04-01

## 2022-04-01 RX ORDER — MORPHINE SULFATE 4 MG/ML
4 INJECTION, SOLUTION INTRAMUSCULAR; INTRAVENOUS ONCE
Status: COMPLETED | OUTPATIENT
Start: 2022-04-01 | End: 2022-04-01

## 2022-04-01 RX ORDER — MORPHINE SULFATE 4 MG/ML
INJECTION, SOLUTION INTRAMUSCULAR; INTRAVENOUS
Status: COMPLETED
Start: 2022-04-01 | End: 2022-04-01

## 2022-04-01 RX ORDER — ONDANSETRON 2 MG/ML
4 INJECTION INTRAMUSCULAR; INTRAVENOUS ONCE
Status: COMPLETED | OUTPATIENT
Start: 2022-04-01 | End: 2022-04-01

## 2022-04-01 RX ORDER — NITROGLYCERIN 0.4 MG/1
TABLET SUBLINGUAL
Status: COMPLETED
Start: 2022-04-01 | End: 2022-04-01

## 2022-04-01 RX ADMIN — MORPHINE SULFATE 4 MG: 4 INJECTION INTRAVENOUS at 10:23

## 2022-04-01 RX ADMIN — ONDANSETRON 4 MG: 2 INJECTION INTRAMUSCULAR; INTRAVENOUS at 10:23

## 2022-04-01 RX ADMIN — MORPHINE SULFATE 4 MG: 4 INJECTION, SOLUTION INTRAMUSCULAR; INTRAVENOUS at 11:08

## 2022-04-01 RX ADMIN — IOPAMIDOL 90 ML: 755 INJECTION, SOLUTION INTRAVENOUS at 11:32

## 2022-04-01 RX ADMIN — METOCLOPRAMIDE 10 MG: 5 INJECTION, SOLUTION INTRAMUSCULAR; INTRAVENOUS at 11:42

## 2022-04-01 RX ADMIN — NITROGLYCERIN: 0.4 TABLET, ORALLY DISINTEGRATING SUBLINGUAL at 09:44

## 2022-04-01 RX ADMIN — NITROGLYCERIN: 0.4 TABLET SUBLINGUAL at 09:44

## 2022-04-01 RX ADMIN — MORPHINE SULFATE 4 MG: 4 INJECTION INTRAVENOUS at 11:08

## 2022-04-01 RX ADMIN — LORAZEPAM 0.5 MG: 2 INJECTION INTRAMUSCULAR; INTRAVENOUS at 13:15

## 2022-04-01 ASSESSMENT — ENCOUNTER SYMPTOMS
NAUSEA: 1
SHORTNESS OF BREATH: 0
ABDOMINAL PAIN: 1
VOMITING: 1
COUGH: 0

## 2022-04-01 ASSESSMENT — PAIN - FUNCTIONAL ASSESSMENT: PAIN_FUNCTIONAL_ASSESSMENT: 0-10

## 2022-04-01 ASSESSMENT — PAIN SCALES - GENERAL
PAINLEVEL_OUTOF10: 9
PAINLEVEL_OUTOF10: 10
PAINLEVEL_OUTOF10: 8
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
## 2022-04-04 ENCOUNTER — TELEPHONE (OUTPATIENT)
Dept: PRIMARY CARE CLINIC | Age: 52
End: 2022-04-04

## 2022-04-04 NOTE — TELEPHONE ENCOUNTER
Received paperwork from [employer]. Called [facility] to tell them pt was dismissed from department and is no longer under the care of [facility].

## 2022-05-11 ENCOUNTER — TELEPHONE (OUTPATIENT)
Dept: CARDIOLOGY CLINIC | Age: 52
End: 2022-05-11

## 2022-05-23 ENCOUNTER — TELEPHONE (OUTPATIENT)
Dept: CARDIOLOGY CLINIC | Age: 52
End: 2022-05-23

## 2022-05-24 ENCOUNTER — TELEPHONE (OUTPATIENT)
Dept: CARDIOLOGY CLINIC | Age: 52
End: 2022-05-24

## 2022-06-06 ENCOUNTER — TELEPHONE (OUTPATIENT)
Dept: CARDIOLOGY CLINIC | Age: 52
End: 2022-06-06

## 2022-07-11 RX ORDER — NITROGLYCERIN 0.4 MG/1
0.4 TABLET SUBLINGUAL EVERY 5 MIN PRN
Qty: 25 TABLET | Refills: 3 | OUTPATIENT
Start: 2022-07-11

## 2022-08-08 RX ORDER — METOPROLOL SUCCINATE 25 MG/1
25 TABLET, EXTENDED RELEASE ORAL DAILY
Qty: 90 TABLET | Refills: 0 | Status: SHIPPED | OUTPATIENT
Start: 2022-08-08

## 2022-08-22 ENCOUNTER — APPOINTMENT (OUTPATIENT)
Dept: CT IMAGING | Age: 52
DRG: 894 | End: 2022-08-22
Payer: MEDICAID

## 2022-08-22 ENCOUNTER — HOSPITAL ENCOUNTER (INPATIENT)
Age: 52
LOS: 1 days | Discharge: LEFT BEFORE DISCHARGE | DRG: 894 | End: 2022-08-23
Attending: FAMILY MEDICINE | Admitting: INTERNAL MEDICINE
Payer: MEDICAID

## 2022-08-22 ENCOUNTER — APPOINTMENT (OUTPATIENT)
Dept: GENERAL RADIOLOGY | Age: 52
DRG: 894 | End: 2022-08-22
Payer: MEDICAID

## 2022-08-22 VITALS
RESPIRATION RATE: 18 BRPM | DIASTOLIC BLOOD PRESSURE: 73 MMHG | WEIGHT: 190 LBS | BODY MASS INDEX: 28.06 KG/M2 | OXYGEN SATURATION: 94 % | HEART RATE: 73 BPM | TEMPERATURE: 97.8 F | SYSTOLIC BLOOD PRESSURE: 111 MMHG

## 2022-08-22 DIAGNOSIS — R53.1 GENERALIZED WEAKNESS: Primary | ICD-10-CM

## 2022-08-22 DIAGNOSIS — R47.01 EXPRESSIVE APHASIA: ICD-10-CM

## 2022-08-22 PROBLEM — G93.41 ACUTE METABOLIC ENCEPHALOPATHY: Status: ACTIVE | Noted: 2022-08-22

## 2022-08-22 LAB
ALBUMIN SERPL-MCNC: 3.8 G/DL (ref 3.5–5.2)
ALP BLD-CCNC: 85 U/L (ref 40–130)
ALT SERPL-CCNC: 19 U/L (ref 5–41)
AMPHETAMINE SCREEN, URINE: NEGATIVE
ANION GAP SERPL CALCULATED.3IONS-SCNC: 10 MMOL/L (ref 7–19)
AST SERPL-CCNC: 36 U/L (ref 5–40)
BARBITURATE SCREEN URINE: NEGATIVE
BASOPHILS ABSOLUTE: 0 K/UL (ref 0–0.2)
BASOPHILS RELATIVE PERCENT: 0.4 % (ref 0–1)
BENZODIAZEPINE SCREEN, URINE: NEGATIVE
BILIRUB SERPL-MCNC: 0.3 MG/DL (ref 0.2–1.2)
BUN BLDV-MCNC: 12 MG/DL (ref 6–20)
BUPRENORPHINE URINE: NEGATIVE
CALCIUM SERPL-MCNC: 8.2 MG/DL (ref 8.6–10)
CANNABINOID SCREEN URINE: NEGATIVE
CHLORIDE BLD-SCNC: 103 MMOL/L (ref 98–111)
CHP ED QC CHECK: YES
CO2: 25 MMOL/L (ref 22–29)
COCAINE METABOLITE SCREEN URINE: NEGATIVE
CREAT SERPL-MCNC: 0.9 MG/DL (ref 0.5–1.2)
EOSINOPHILS ABSOLUTE: 0.1 K/UL (ref 0–0.6)
EOSINOPHILS RELATIVE PERCENT: 1 % (ref 0–5)
GFR AFRICAN AMERICAN: >59
GFR NON-AFRICAN AMERICAN: >60
GLUCOSE BLD-MCNC: 238 MG/DL (ref 74–109)
GLUCOSE BLD-MCNC: 249 MG/DL
GLUCOSE BLD-MCNC: 249 MG/DL (ref 70–99)
HCT VFR BLD CALC: 33.4 % (ref 42–52)
HEMOGLOBIN: 10.2 G/DL (ref 14–18)
IMMATURE GRANULOCYTES #: 0 K/UL
INR BLD: 0.95 (ref 0.88–1.18)
LYMPHOCYTES ABSOLUTE: 1.1 K/UL (ref 1.1–4.5)
LYMPHOCYTES RELATIVE PERCENT: 21.7 % (ref 20–40)
Lab: ABNORMAL
MCH RBC QN AUTO: 25.6 PG (ref 27–31)
MCHC RBC AUTO-ENTMCNC: 30.5 G/DL (ref 33–37)
MCV RBC AUTO: 83.9 FL (ref 80–94)
METHADONE SCREEN, URINE: NEGATIVE
METHAMPHETAMINE, URINE: NEGATIVE
MONOCYTES ABSOLUTE: 0.7 K/UL (ref 0–0.9)
MONOCYTES RELATIVE PERCENT: 12.7 % (ref 0–10)
NEUTROPHILS ABSOLUTE: 3.4 K/UL (ref 1.5–7.5)
NEUTROPHILS RELATIVE PERCENT: 63.6 % (ref 50–65)
OPIATE SCREEN URINE: POSITIVE
OXYCODONE URINE: NEGATIVE
PDW BLD-RTO: 18.4 % (ref 11.5–14.5)
PERFORMED ON: ABNORMAL
PHENCYCLIDINE SCREEN URINE: NEGATIVE
PLATELET # BLD: 314 K/UL (ref 130–400)
PMV BLD AUTO: 9.8 FL (ref 9.4–12.4)
POTASSIUM SERPL-SCNC: 3.8 MMOL/L (ref 3.5–5)
PROPOXYPHENE SCREEN: NEGATIVE
PROTHROMBIN TIME: 12.6 SEC (ref 12–14.6)
RBC # BLD: 3.98 M/UL (ref 4.7–6.1)
SARS-COV-2, NAAT: NOT DETECTED
SODIUM BLD-SCNC: 138 MMOL/L (ref 136–145)
TOTAL CK: 354 U/L (ref 39–308)
TOTAL PROTEIN: 6.2 G/DL (ref 6.6–8.7)
TRICYCLIC, URINE: NEGATIVE
TROPONIN: <0.01 NG/ML (ref 0–0.03)
WBC # BLD: 5.3 K/UL (ref 4.8–10.8)

## 2022-08-22 PROCEDURE — 87635 SARS-COV-2 COVID-19 AMP PRB: CPT

## 2022-08-22 PROCEDURE — 96372 THER/PROPH/DIAG INJ SC/IM: CPT

## 2022-08-22 PROCEDURE — 6370000000 HC RX 637 (ALT 250 FOR IP): Performed by: FAMILY MEDICINE

## 2022-08-22 PROCEDURE — 93005 ELECTROCARDIOGRAM TRACING: CPT | Performed by: FAMILY MEDICINE

## 2022-08-22 PROCEDURE — 70450 CT HEAD/BRAIN W/O DYE: CPT | Performed by: RADIOLOGY

## 2022-08-22 PROCEDURE — 6370000000 HC RX 637 (ALT 250 FOR IP)

## 2022-08-22 PROCEDURE — G0378 HOSPITAL OBSERVATION PER HR: HCPCS

## 2022-08-22 PROCEDURE — 82947 ASSAY GLUCOSE BLOOD QUANT: CPT

## 2022-08-22 PROCEDURE — 1210000000 HC MED SURG R&B

## 2022-08-22 PROCEDURE — 85025 COMPLETE CBC W/AUTO DIFF WBC: CPT

## 2022-08-22 PROCEDURE — 71045 X-RAY EXAM CHEST 1 VIEW: CPT | Performed by: RADIOLOGY

## 2022-08-22 PROCEDURE — 80053 COMPREHEN METABOLIC PANEL: CPT

## 2022-08-22 PROCEDURE — 85610 PROTHROMBIN TIME: CPT

## 2022-08-22 PROCEDURE — 96374 THER/PROPH/DIAG INJ IV PUSH: CPT

## 2022-08-22 PROCEDURE — 82550 ASSAY OF CK (CPK): CPT

## 2022-08-22 PROCEDURE — 70496 CT ANGIOGRAPHY HEAD: CPT | Performed by: RADIOLOGY

## 2022-08-22 PROCEDURE — 6360000002 HC RX W HCPCS: Performed by: FAMILY MEDICINE

## 2022-08-22 PROCEDURE — 84484 ASSAY OF TROPONIN QUANT: CPT

## 2022-08-22 PROCEDURE — 99285 EMERGENCY DEPT VISIT HI MDM: CPT

## 2022-08-22 PROCEDURE — 6360000004 HC RX CONTRAST MEDICATION: Performed by: FAMILY MEDICINE

## 2022-08-22 PROCEDURE — 80306 DRUG TEST PRSMV INSTRMNT: CPT

## 2022-08-22 PROCEDURE — 70498 CT ANGIOGRAPHY NECK: CPT

## 2022-08-22 PROCEDURE — 70450 CT HEAD/BRAIN W/O DYE: CPT

## 2022-08-22 PROCEDURE — 71045 X-RAY EXAM CHEST 1 VIEW: CPT

## 2022-08-22 PROCEDURE — 36415 COLL VENOUS BLD VENIPUNCTURE: CPT

## 2022-08-22 PROCEDURE — 6360000002 HC RX W HCPCS: Performed by: INTERNAL MEDICINE

## 2022-08-22 PROCEDURE — 70498 CT ANGIOGRAPHY NECK: CPT | Performed by: RADIOLOGY

## 2022-08-22 RX ORDER — SODIUM CHLORIDE 0.9 % (FLUSH) 0.9 %
5-40 SYRINGE (ML) INJECTION EVERY 12 HOURS SCHEDULED
Status: CANCELLED | OUTPATIENT
Start: 2022-08-22

## 2022-08-22 RX ORDER — SODIUM CHLORIDE 0.9 % (FLUSH) 0.9 %
5-40 SYRINGE (ML) INJECTION PRN
Status: CANCELLED | OUTPATIENT
Start: 2022-08-22

## 2022-08-22 RX ORDER — BUMETANIDE 0.25 MG/ML
1 INJECTION, SOLUTION INTRAMUSCULAR; INTRAVENOUS ONCE
Status: CANCELLED | OUTPATIENT
Start: 2022-08-22

## 2022-08-22 RX ORDER — MAGNESIUM SULFATE IN WATER 40 MG/ML
2000 INJECTION, SOLUTION INTRAVENOUS PRN
Status: CANCELLED | OUTPATIENT
Start: 2022-08-22

## 2022-08-22 RX ORDER — ISOSORBIDE MONONITRATE 30 MG/1
1 TABLET, EXTENDED RELEASE ORAL DAILY
Status: CANCELLED | OUTPATIENT
Start: 2022-08-23

## 2022-08-22 RX ORDER — ACETAMINOPHEN 325 MG/1
650 TABLET ORAL EVERY 6 HOURS PRN
Status: CANCELLED | OUTPATIENT
Start: 2022-08-22

## 2022-08-22 RX ORDER — POLYETHYLENE GLYCOL 3350 17 G/17G
17 POWDER, FOR SOLUTION ORAL DAILY PRN
Status: CANCELLED | OUTPATIENT
Start: 2022-08-22

## 2022-08-22 RX ORDER — NALOXONE HYDROCHLORIDE 0.4 MG/ML
0.4 INJECTION, SOLUTION INTRAMUSCULAR; INTRAVENOUS; SUBCUTANEOUS ONCE
Status: COMPLETED | OUTPATIENT
Start: 2022-08-22 | End: 2022-08-22

## 2022-08-22 RX ORDER — ACETAMINOPHEN 650 MG/1
650 SUPPOSITORY RECTAL EVERY 6 HOURS PRN
Status: CANCELLED | OUTPATIENT
Start: 2022-08-22

## 2022-08-22 RX ORDER — FOLIC ACID 1 MG/1
1 TABLET ORAL DAILY
Status: CANCELLED | OUTPATIENT
Start: 2022-08-23

## 2022-08-22 RX ORDER — NITROGLYCERIN 0.4 MG/1
TABLET SUBLINGUAL
Status: COMPLETED
Start: 2022-08-22 | End: 2022-08-22

## 2022-08-22 RX ORDER — HYDRALAZINE HYDROCHLORIDE 20 MG/ML
10 INJECTION INTRAMUSCULAR; INTRAVENOUS EVERY 4 HOURS PRN
Status: CANCELLED | OUTPATIENT
Start: 2022-08-22

## 2022-08-22 RX ORDER — IPRATROPIUM BROMIDE AND ALBUTEROL SULFATE 2.5; .5 MG/3ML; MG/3ML
1 SOLUTION RESPIRATORY (INHALATION) EVERY 4 HOURS PRN
Status: CANCELLED | OUTPATIENT
Start: 2022-08-22

## 2022-08-22 RX ORDER — THIAMINE HYDROCHLORIDE 100 MG/ML
200 INJECTION, SOLUTION INTRAMUSCULAR; INTRAVENOUS DAILY
Status: CANCELLED | OUTPATIENT
Start: 2022-08-22 | End: 2022-08-26

## 2022-08-22 RX ORDER — ASPIRIN 300 MG/1
600 SUPPOSITORY RECTAL ONCE
Status: COMPLETED | OUTPATIENT
Start: 2022-08-22 | End: 2022-08-22

## 2022-08-22 RX ORDER — SODIUM CHLORIDE 9 MG/ML
INJECTION, SOLUTION INTRAVENOUS PRN
Status: CANCELLED | OUTPATIENT
Start: 2022-08-22

## 2022-08-22 RX ORDER — ONDANSETRON 2 MG/ML
4 INJECTION INTRAMUSCULAR; INTRAVENOUS EVERY 6 HOURS PRN
Status: CANCELLED | OUTPATIENT
Start: 2022-08-22

## 2022-08-22 RX ORDER — NITROGLYCERIN 0.4 MG/1
0.4 TABLET SUBLINGUAL EVERY 5 MIN PRN
Status: DISCONTINUED | OUTPATIENT
Start: 2022-08-22 | End: 2022-08-23 | Stop reason: HOSPADM

## 2022-08-22 RX ORDER — ONDANSETRON 4 MG/1
4 TABLET, ORALLY DISINTEGRATING ORAL EVERY 8 HOURS PRN
Status: CANCELLED | OUTPATIENT
Start: 2022-08-22

## 2022-08-22 RX ORDER — MAGNESIUM SULFATE 1 G/100ML
1000 INJECTION INTRAVENOUS ONCE
Status: CANCELLED | OUTPATIENT
Start: 2022-08-22

## 2022-08-22 RX ORDER — INSULIN LISPRO 100 [IU]/ML
0-8 INJECTION, SOLUTION INTRAVENOUS; SUBCUTANEOUS
Status: CANCELLED | OUTPATIENT
Start: 2022-08-23

## 2022-08-22 RX ORDER — 0.9 % SODIUM CHLORIDE 0.9 %
500 INTRAVENOUS SOLUTION INTRAVENOUS ONCE
Status: CANCELLED | OUTPATIENT
Start: 2022-08-22 | End: 2022-08-22

## 2022-08-22 RX ORDER — INSULIN LISPRO 100 [IU]/ML
0-4 INJECTION, SOLUTION INTRAVENOUS; SUBCUTANEOUS NIGHTLY
Status: CANCELLED | OUTPATIENT
Start: 2022-08-22

## 2022-08-22 RX ORDER — POTASSIUM CHLORIDE 7.45 MG/ML
10 INJECTION INTRAVENOUS PRN
Status: CANCELLED | OUTPATIENT
Start: 2022-08-22

## 2022-08-22 RX ORDER — CYANOCOBALAMIN 1000 UG/ML
1000 INJECTION INTRAMUSCULAR; SUBCUTANEOUS ONCE
Status: CANCELLED | OUTPATIENT
Start: 2022-08-22

## 2022-08-22 RX ORDER — ENOXAPARIN SODIUM 100 MG/ML
1 INJECTION SUBCUTANEOUS ONCE
Status: COMPLETED | OUTPATIENT
Start: 2022-08-22 | End: 2022-08-22

## 2022-08-22 RX ADMIN — ENOXAPARIN SODIUM 90 MG: 100 INJECTION SUBCUTANEOUS at 20:22

## 2022-08-22 RX ADMIN — NALOXONE HYDROCHLORIDE 0.4 MG: 0.4 INJECTION, SOLUTION INTRAMUSCULAR; INTRAVENOUS; SUBCUTANEOUS at 23:41

## 2022-08-22 RX ADMIN — IOPAMIDOL 70 ML: 755 INJECTION, SOLUTION INTRAVENOUS at 18:10

## 2022-08-22 RX ADMIN — ASPIRIN 600 MG: 300 SUPPOSITORY RECTAL at 21:03

## 2022-08-22 RX ADMIN — NITROGLYCERIN: 0.4 TABLET, ORALLY DISINTEGRATING SUBLINGUAL at 20:34

## 2022-08-22 RX ADMIN — NITROGLYCERIN: 0.4 TABLET SUBLINGUAL at 20:34

## 2022-08-22 ASSESSMENT — ENCOUNTER SYMPTOMS
COUGH: 0
CHEST TIGHTNESS: 0
CONSTIPATION: 0
VOMITING: 0
TROUBLE SWALLOWING: 0
SORE THROAT: 0
SHORTNESS OF BREATH: 0
ABDOMINAL PAIN: 0
BACK PAIN: 0
WHEEZING: 0
DIARRHEA: 0
APNEA: 0
ABDOMINAL DISTENTION: 0

## 2022-08-22 ASSESSMENT — PAIN SCALES - GENERAL: PAINLEVEL_OUTOF10: 10

## 2022-08-22 ASSESSMENT — PAIN - FUNCTIONAL ASSESSMENT: PAIN_FUNCTIONAL_ASSESSMENT: 0-10

## 2022-08-22 NOTE — Clinical Note
Discharge Plan[de-identified] Other/[facility])   Telemetry/Cardiac Monitoring Required?: No

## 2022-08-22 NOTE — ED NOTES
Notified Dr. Narayan Feldman that the pt is having difficulty speaking and processing words. Pt has diffuse weakness all over his body and woke up with chest pain. Pt wife states LKW was 8pm last night when the pt started having chest pain and changes with his speech. Wife states this morning speech was worse and chest pain was 10/10 and pt was having trouble ambulating.       Ade Esquivel RN  08/22/22 6225

## 2022-08-22 NOTE — ED NOTES
Dr. Jeet Villalobos called Code Stroke at 5:41 p.m.     CT notified by Chuck Cutler at 5:42 p.m. Code Stroke announced over PA at 5:42 p.m. Dr. Ever Noyola (Neuro) notified at 5:43 p.m. No stroke coordinator on staff at this time.           Yumiko Farnsworth  08/22/22 9417

## 2022-08-22 NOTE — ED NOTES
IV blew during CT with contrast, new IV started in CT to obtain the rest of the scans     Zoila Cornelius RN  08/22/22 8589

## 2022-08-23 PROBLEM — F19.929 DRUG INTOXICATION (HCC): Status: ACTIVE | Noted: 2022-08-23

## 2022-08-23 PROCEDURE — G0378 HOSPITAL OBSERVATION PER HR: HCPCS

## 2022-08-23 ASSESSMENT — ENCOUNTER SYMPTOMS
EYES NEGATIVE: 1
BACK PAIN: 1
SHORTNESS OF BREATH: 1
GASTROINTESTINAL NEGATIVE: 1

## 2022-08-23 NOTE — H&P
HISTORY AND PHYSICAL             Date: 8/23/2022        Patient Name: Lisa Martin     YOB: 1970      Age:  47 y.o. Chief Complaint     Chief Complaint   Patient presents with    Chest Pain     Woke up with chest pain and it has not improved. EMS states new stutter with speech and administered 162mg ASA      Came in per wife, had to call ems   He stated to her that he was \"dying\"  He could not walk and could not talk. Stutter new he has not had. History Obtained From   patient, spouse    History of Present Illness   47year old male with repeated admissions in the past for chest pain . Has had a complete cardiac work up at [facility] and has no acute obstructive cardiac pathology noted. (See Dr Alida Schultz note in April). He is brought in by wife calling ems because  at home, has not been himself. He is not employed. He stays at home and is on disability. He has had significant issues with abuse of illicit drugs, ie cocaine, opoids, marijuana, and alcohol abuse disorder. He was found to be extremely sleepy, not conversant   Slurring his words. Not able to get out the words to communicate but could comprehend what other person was saying to him, when he will open his eyes and concentrate. He was not able to he says, move his legs or his arms and cannot walk and is lethargic. However, on neuro exam there was some significant embellishment noted when asked to move his legs against gravity   Or to flex and extend his feet   Or when asked to squeeze my fingers or when asked to lift arms against gravity. When distracted could speak well   And communicate     Ie to ask for pain medications as the chest pain was not relieved with nitroglycerin   He was able to sit up and tell me, this has happened to him before,   He needs morphine   He needs something to help relieve his pain   And then promptly falls back asleep.    He could not be aroused by his wife anymore than what I was able to get from him. He would promptly close his eyes and go back to sleep, and complain of chest pain. (Of note there was no changes in blood pressure and / or pulse or oxygen levels as he complained of pain from head  to toe. ). After much evaluation and going over and his chart and his previous history and going over reason for admissions etc.     We decided in there ER to try narcan and to try to place a campbell catheter and obtain and drug screen. As soon as he received the narcan. The new and completely different patient emerged    He was screaming and yelling and agitated and could not believe he was in the hospital and he was not going to stay here and we were not going to keep him against his will. His mental status and cognitive thought process was not being questioned and nor was he or is he under \"24 hour commitment due to self harm, etc\"     He was being evaluated as wife brought him in voluntarily for not being able to communicate and concern that he is having stroke like sx. Once awake he was alert and oriented and knew exactly what he wanted and if he was not going to get that, then he wished to leave against medical advice. (He was told we are here to take care of him. I have spent an inordinate amount of time trying to fit abnormal neurological exam and findings with a single disease process). No acute alcohol intoxication   Drug screen not obtained till later. He takes norco at home (? Overdose). He is sedentary with severe morbid exogenous obesity and uncontrolled type ii dm   Dehydration note. ? Syncope   ? Rhabdomyolysis   ? Diabetes and dysautonomia   Unwitnessed fall and brain trauma (head ct scan did not show that )     Speech (initial expressive aphasia, would dramatically change, when he wanted you to understand what he was saying. ). Then he would go back to mumbling and not specfically dysarthria.       He could completely understand what you were saying and follow commands    Embellishment on strength and sensation examination for upper and lower extremities. No eyes drooping   No worsening loss of strength with use of muscles  No myasthenia sx. No dysphagia. No choking episodes. He has been able to eat at home just fine   No respiratory issue. No cough and no uri type sx. No ascending paralysis he could relate this to. No sick contact   No sx of guillan barre. Disease process. This simply did not have the sx of multiple sclerosis and waxing and waning sx and came on all of a sudden. No exposure to pesticides. Or round up     He is sedentary and stays inside the house most of the time. Given all of the above history finally decided to try narcan and reaction stated above. My gut feeling: he simply took too much of his hydrocodone for the pain he perceived he was feeling which led to this state of lethargy. Narcan resolved sx. He was able to walk out the er against medical advice.      Past Medical History     Past Medical History:   Diagnosis Date    Anxiety     Atrial fibrillation ([facility])     Bipolar 1 disorder ([facility])     Chronic pain     COPD (chronic obstructive pulmonary disease) (HCC)     Depression     Depression     Diabetes mellitus (HCC)     GERD (gastroesophageal reflux disease)     Gout     Hernia     Hyperlipidemia     Hypertension     Hypothyroid     Hypothyroid     Myocardial infarct, old     [place] - Cardiac Cath - no blockage    Neuropathy     Osteoarthritis     Psychiatric illness         Past Surgical History     Past Surgical History:   Procedure Laterality Date    ABDOMINOPLASTY      ABDOMINOPLASTY      Nov 2019    ANKLE SURGERY  1990    fracture repair    CARDIAC CATHETERIZATION      Outing - No blockage    ESOPHAGUS SURGERY      GASTRIC BAND      HERNIA REPAIR      LAPAROTOMY N/A 2/15/2021    LAPAROTOMY EXPLORATORY performed by Yandy Dimas MD at [facility] N/A 1/3/2020 LAPAROTOMY EXPLORATORY performed by Tracey Rodriguez MD at [address] [place] [address]  02/2018    gastric sleeve        Medications Prior to Admission     Prior to Admission medications    Medication Sig Start Date End Date Taking? Authorizing Provider   metoprolol succinate (TOPROL XL) 25 MG extended release tablet TAKE 1 TABLET BY MOUTH DAILY 8/8/22   DEMETRIUS Sullivan   isosorbide mononitrate (IMDUR) 30 MG extended release tablet TAKE 1 TABLET BY MOUTH DAILY 2/22/22   DEMETRIUS Turcios NP   atorvastatin (LIPITOR) 20 MG tablet Take 1 tablet by mouth daily 10/18/21   DEMETRIUS Turcios NP   nitroGLYCERIN (NITROSTAT) 0.4 MG SL tablet Place 1 tablet under the tongue every 5 minutes as needed for Chest pain 10/18/21   DEMETRIUS Turcios NP   metFORMIN (GLUCOPHAGE) 1000 MG tablet TAKE ONE TABLET BY MOUTH TWO TIMES DAILY (WITH MEALS) 5/20/21   DEMETRIUS Garcia   diclofenac sodium (VOLTAREN) 1 % GEL Apply 4 g topically daily   Patient not taking: Reported on 10/18/2021 10/28/20   Historical Provider, MD   VICTOZA 18 MG/3ML SOPN SC injection INJECT 1.8 MG INTO THE SKIN DAILY  Patient taking differently: Inject 1.8 mg into the skin nightly  1/29/21   DEMETRIUS Garcia   Insulin Pen Needle (B-D ULTRAFINE III SHORT PEN) 31G X 8 MM MISC Inject 1 each into the skin as needed (FOR DM)  Patient not taking: Reported on 10/18/2021 9/30/20   DEMETRIUS Garcia   hydrALAZINE (APRESOLINE) 50 MG tablet TAKE 1 TABLET BY MOUTH 3 TIMES DAILY  Patient taking differently: Take 50 mg by mouth 2 times daily  6/28/20   DEMETRIUS Garcia   HYDROcodone-acetaminophen (NORCO)  MG per tablet Take 1 tablet by mouth 3 times daily. Historical Provider, MD   pregabalin (LYRICA) 75 MG capsule Take 75 mg by mouth 2 times daily.     Historical Provider, MD        Allergies   Lac bovis and Food    Social History     Social History       Tobacco History       Smoking Status  Never      Smokeless Tobacco Use  Never              Alcohol Breath sounds: Normal breath sounds. Abdominal:      General: Bowel sounds are normal.      Palpations: Abdomen is soft. Comments: Morbidly obese gentleman    Musculoskeletal:      Cervical back: Normal range of motion. Right lower leg: Edema present. Left lower leg: Edema present. Skin:     General: Skin is warm. Neurological:      Mental Status: He is disoriented. Motor: Weakness present.       Gait: Gait abnormal.       Labs      Recent Results (from the past 24 hour(s))   POCT Glucose    Collection Time: 08/22/22  5:15 PM   Result Value Ref Range    POC Glucose 249 (H) 70 - 99 mg/dl    Performed on [employer]    CBC with Auto Differential    Collection Time: 08/22/22  5:33 PM   Result Value Ref Range    WBC 5.3 4.8 - 10.8 K/uL    RBC 3.98 (L) 4.70 - 6.10 M/uL    Hemoglobin 10.2 (L) 14.0 - 18.0 g/dL    Hematocrit 33.4 (L) 42.0 - 52.0 %    MCV 83.9 80.0 - 94.0 fL    MCH 25.6 (L) 27.0 - 31.0 pg    MCHC 30.5 (L) 33.0 - 37.0 g/dL    RDW 18.4 (H) 11.5 - 14.5 %    Platelets 627 157 - 853 K/uL    MPV 9.8 9.4 - 12.4 fL    Neutrophils % 63.6 50.0 - 65.0 %    Lymphocytes % 21.7 20.0 - 40.0 %    Monocytes % 12.7 (H) 0.0 - 10.0 %    Eosinophils % 1.0 0.0 - 5.0 %    Basophils % 0.4 0.0 - 1.0 %    Neutrophils Absolute 3.4 1.5 - 7.5 K/uL    Immature Granulocytes # 0.0 K/uL    Lymphocytes Absolute 1.1 1.1 - 4.5 K/uL    Monocytes Absolute 0.70 0.00 - 0.90 K/uL    Eosinophils Absolute 0.10 0.00 - 0.60 K/uL    Basophils Absolute 0.00 0.00 - 0.20 K/uL   Comprehensive Metabolic Panel    Collection Time: 08/22/22  5:33 PM   Result Value Ref Range    Sodium 138 136 - 145 mmol/L    Potassium 3.8 3.5 - 5.0 mmol/L    Chloride 103 98 - 111 mmol/L    CO2 25 22 - 29 mmol/L    Anion Gap 10 7 - 19 mmol/L    Glucose 238 (H) 74 - 109 mg/dL    BUN 12 6 - 20 mg/dL    Creatinine 0.9 0.5 - 1.2 mg/dL    GFR Non-African American >60 >60    GFR African American >59 >59    Calcium 8.2 (L) 8.6 - 10.0 mg/dL    Total Protein 6.2 (L) 6.6 - 8.7 g/dL    Albumin 3.8 3.5 - 5.2 g/dL    Total Bilirubin 0.3 0.2 - 1.2 mg/dL    Alkaline Phosphatase 85 40 - 130 U/L    ALT 19 5 - 41 U/L    AST 36 5 - 40 U/L   Troponin    Collection Time: 08/22/22  5:33 PM   Result Value Ref Range    Troponin <0.01 0.00 - 0.03 ng/mL   Protime-INR    Collection Time: 08/22/22  5:33 PM   Result Value Ref Range    Protime 12.6 12.0 - 14.6 sec    INR 0.95 0.88 - 1.18   CK    Collection Time: 08/22/22  5:33 PM   Result Value Ref Range    Total  (H) 39 - 308 U/L   POCT Glucose    Collection Time: 08/22/22  7:15 PM   Result Value Ref Range    Glucose 249 mg/dL    QC OK? yes    COVID-19, Rapid    Collection Time: 08/22/22  8:54 PM    Specimen: Nasopharyngeal Swab   Result Value Ref Range    SARS-CoV-2, NAAT Not Detected Not Detected   Drug SCRN, Buprenorphine    Collection Time: 08/22/22  9:01 PM   Result Value Ref Range    Amphetamine Screen, Urine Negative Negative <500 ng/mL    Barbiturate Screen, Ur Negative Negative < 200 ng/mL    Benzodiazepine Screen, Urine Negative Negative <150 ng/mL    Cannabinoid Scrn, Ur Negative Negative <50 ng/mL    Cocaine Metabolite Screen, Urine Negative Negative <150 ng/mL    Opiate Scrn, Ur POSITIVE (A) Negative < 100 ng/mL    PCP Screen, Urine Negative Negative <25 ng/mL    Methadone Screen, Urine Negative Negative <200 ng/mL    Propoxyphene Scrn, Ur Negative Negative <677 ng/mL    Tricyclic Negative Negative <300 ng/mL    Oxycodone Urine Negative Negative <100 ng/mL    Buprenorphine Urine Negative Negative <10 ng/mL    Methamphetamine, Urine Negative Negative <500 ng/mL    Drug Screen Comment: see below         Imaging/Diagnostics Last 24 Hours   CT HEAD WO CONTRAST    Result Date: 8/22/2022  NO PRIOR REPORT AVAILABLE Exam: CT OF THE BRAIN WITHOUT CONTRAST Clinical data: Stroke symptoms. Speech difficulty, no history of stroke.  Technique: Contiguous axial images are obtained from the skull base to vertex without intravenous contrast. Reformatted/MPR images were performed. Radiation dose: CTDIvol =175 mGy, DLP =1302 mGy x cm. Prior studies: No prior studies submitted. Findings: No acute intracranial abnormality is present. No evidence of acute cortical infarction, hemorrhage, mass or mass effect. No hydrocephalus or abnormal extra-axial fluid collections are present. The posterior fossa is unremarkable. The skull base and calvarium are intact. The included portions of the paranasal sinuses and mastoid air cells are clear. 1.  No acute intracranial abnormality. Recommendation: Follow up as clinically indicated. All CT scans at this facility utilize dose modulation, iterative reconstruction, and/or weight based dosing when appropriate to reduce radiation dose to as low as reasonably achievable. Electronically Signed by Marible Matute MD at 22-Aug-2022 07:08:01 PM             XR CHEST PORTABLE    Result Date: 8/22/2022  NO PRIOR REPORT AVAILABLE Exam: X-RAY OF [facility] Clinical data:Stroke symptoms. Technique:Single view of the chest. Prior studies: No prior studies submitted. Findings:Borderline Cardiomegaly and hilar congestion is noted. The lungs are grossly clear; noevidence of acute infiltrate or pleural effusion. No acute osseous abnormality is detected. Bilateral hilar congestion. No parenchymal consolidation. Recommendation: Follow up as clinically indicated. Electronically Signed by Britt Dumont MD at 22-Aug-2022 07:39:37 PM             CTA HEAD NECK W CONTRAST    Result Date: 8/22/2022  NO PRIOR REPORT AVAILABLE CTA BRAIN AND CTA NECK Exam: CTA OF THEINTRACRANIAL ARTERIES (COW) Clinical data:Stroke symptoms. Speech problems. No history of stroke. Technique: Axial CT angiography of the intracranial arteries within the brain was performed with administration of intravenous contrast. Coronal, sagittal, and 3D volume reconstructions of theintracranial arteries were performed. Reformatted/3D-MPR images were performed.  Radiation dose: CTDIvol = 242.25 mGy, DLP = 2025 mGy x cm. Prior studies: CT of the brain done on same day. Findings: No definite abnormality seen on 3D reformatted images. Anterior cerebral arteries demonstrate enhance normally. Anterior communicating artery is opacified. Middle Cerebral arteries are unremarkable. Posterior communicating arteries are opacified. Posterior cerebral arteries are intact. Vertebral arteries are visualized. Basilar artery is unremarkable. Intracranial internal carotid arteries demonstrate normal enhancement. No evidence of aneurysm (greater than 4 mm) orarteriovenous lesion. No evidence of occlusion or vascular lesion. Recommendation: Follow up as clinically indicated. All CT scans at this facility utilize dose modulation, iterative reconstruction, and/or weight based dosing when appropriate to reduce radiation dose to as low as reasonably achievable. Exam: CTA OF THE CAROTID ARTERIES Clinical data:Stroke symptoms. Speech problems. No history of stroke. Technique: Axial CT angiography of the carotid arteries within the neck was performed with the administration of intravenous contrast for evaluation of the carotid bifurcation. Oral contrast was not utilized. Coronal, sagittal, and 3D volume reconstructions of the carotid arteries were performed. Reformatted/3D-MPR images were performed. NASCET Criteria was used in evaluating the study. Radiation dose: CTDIvol = 242.25 mGy, DLP = 2025 mGy x cm. Prior studies: CT of the brain done on same day. Findings: No definite abnormality seen on 3D reformatted images. A few tiny calcified plaque at left CCA bulb without stenosis. CCA, Carotid Bulb, ICA, and origin of the ECA are well opacified. Vertebral arteries are well opacified. Jugular veins are well opacified Included great vessels of the aortic arch are grossly unremarkable. Included lung apices are grossly unremarkable. IMPRESSION: No evidence of occlusion or stenosis. No vascular lesion. Recommendation: Follow up as clinically indicated. All CT scans at this facility utilize dose modulation, iterative reconstruction, and/or weight based dosing when appropriate to reduce radiation dose to as low as reasonably achievable. Electronically Signed by Farrukh Novoa MD at 22-Aug-2022 07:38:39 PM               Assessment      Hospital Problems             Last Modified POA    * (Principal) Acute metabolic encephalopathy 3/53/8092 Yes    Drug intoxication ([address] 8/23/2022 Yes    Chest pain, atypical 8/23/2022 Yes    Overview Signed 5/2/2018 10:37 AM by Kristine Wilson MD     History of normal cardiac catheterizations circa 2008 and 2010         Morbid obesity with BMI of 45.0-49.9, adult (HCC) (Chronic) 8/23/2022 Yes    Type 2 diabetes mellitus without complication (HCC) (Chronic) 8/23/2022 Yes    Hypertensive disorder (Chronic) 8/23/2022 Yes    Chronic obstructive pulmonary disease ([address] 8/23/2022 Yes    Paroxysmal A-fib ([address] 8/23/2022 Yes    Obstructive sleep apnea 8/23/2022 Yes    Overview Signed 5/2/2018 10:36 AM by Kristine Wilson MD     CPAP noncompliance           Bipolar disorder with severe depression ([address] 8/23/2022 Yes    Diabetes mellitus ([address] 8/23/2022 Yes       Plan   Evaluated him for all the above. The history of expressive aphasia not making sense with the ct scan findings. Then the weakness and lack of strength symmetric in upper and lower extremities. He could not tell you if this was ascending or descending or when this started and what could have led to this occurring. He responded to fluids somewhat   He continued to remain lethargic. Each time I went in to see him, he complained of the same thing, chest pain and could barely get the words out and then fall back asleep and was very somnolent throughout the entire visit. 2.  We tried nitro and tried to control blood pressure and give him fluids and assess neuro status. He would simply fall asleep and not cooperate      3.   Put in shannan and got urinalysis and positive for opiates. He does take hydrocodone at home and ? Could he have overdosed on this. He had an extremely dramatic response to narcan. Sat straight up and he could converse and answer questions. Angry and belligerent and \"none of us in the er knew what we were doing and he was going home. \"  Interestingly, his wife did not answer the phone when he called her either. He was oriented and able to ambulate and dress himself and be defiant and belligerent and left the ER against medical advice. Admission and discharge on the same day, within 2 hours of being seen     Meds on discharge :  he has his meds at home. Told him to be careful of norco   And not to take too many     Dm monitor blood sugars. Take his blood pressure meds. Diabetic diet. He was advised that should he feel that he needs medical attention and care   ----  Should he have any sx that will recur or cause him issues, that he feels are \"not normal for him\" to please please come back to the ER at [facility] and let the personnel here take care of him. We can only define what we see the one moment in time and disease processes change slowly and rapidly. We are here to care for him and his leaving against medical advice does not EVER preclude or prohibit him coming back to this hospital for his continued care. Charges. :  admission and discharge on same day. There is no separate discharge summary that needs doing. Level 3 admit and discharge on same day   Critical care time spent going over patient and going over problem list and formulating plan of care, :  30 minutes.      Consultations Ordered:  IP CONSULT TO PHARMACY  PHARMACY TO CHANGE BASE FLUIDS  IP CONSULT TO NEUROLOGY    Electronically signed by Kylee Cruz MD on 8/22/22 at 11:17 PM CDT

## 2022-08-23 NOTE — ED NOTES
Pt lethargic and sleeping in bed. Wife at bedside. Pt in no distress at this time, VSS.       Pal Tejada RN  08/22/22 [id number]

## 2022-08-23 NOTE — ED NOTES
Pt in bed resting, wife went home. Pt in no distress at this time, VSS. Pt alert to verbal response but speech is still slurred. Awaiting for admission orders at this time.       Casey Barton RN  08/22/22 [id number]
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
## 2022-08-23 NOTE — ED NOTES
Admitting physician had order for campbell catheter, Pt refused campbell. ER nurse talked to patient and addressed possible issues of him getting up walking and falling due to lethargy and also possible incontinence issues. ER nurse administered [facility] as ordered. Pt has been lethargic entire ER visit. Shortly after administering narcan patient ask nurse when he would receive medication for pain and that we weren't doing anything for him. Pt became agitated \"What the hell is wrong with y'all. Lakisha Sep are retarded. \" Pt wanting to leave AMA. Primary nurse in room entire time. Attending physician notified.       Cristian Baron, GODFREY  08/22/22 [id number]

## 2022-08-23 NOTE — ED NOTES
Dr. Benjamin Dunlap notified of pt wanting to leave AMA. Pt became very agitated and calling this nurse and Kamilla Jang RN \"retarded\" after receiving Carli Reus. Pt began to jump out of bed and pulled IV out. Pt changed into his street clothes and said he was leaving and would not be staying. Dr. Benjamin Dunlap made aware. This nurse tried to call pt spouse Telma De León, but no answer. Pt informed he would be leaving against medical advice and informed of health risk if pt left. Pt signed AMA form. Pt ambulated to [facility] with no difficulties.       Merced Meier RN  08/23/22 1933

## 2022-08-23 NOTE — ED NOTES
Not completing swallow screen at this time due to pt falling asleep and lethargic.  Pt NPO at this time       Rosa Duron, GODFREY  08/22/22 607 [facility], RN  08/22/22 3604

## 2022-08-24 LAB
EKG P AXIS: 55 DEGREES
EKG P-R INTERVAL: 106 MS
EKG Q-T INTERVAL: 392 MS
EKG QRS DURATION: 92 MS
EKG QTC CALCULATION (BAZETT): 416 MS
EKG T AXIS: -94 DEGREES

## 2022-08-24 PROCEDURE — 93010 ELECTROCARDIOGRAM REPORT: CPT | Performed by: INTERNAL MEDICINE

## 2022-09-07 RX ORDER — ISOSORBIDE MONONITRATE 30 MG/1
TABLET, EXTENDED RELEASE ORAL
Qty: 30 TABLET | Refills: 0 | Status: SHIPPED | OUTPATIENT
Start: 2022-09-07

## 2022-11-07 RX ORDER — METOPROLOL SUCCINATE 25 MG/1
25 TABLET, EXTENDED RELEASE ORAL DAILY
Qty: 90 TABLET | Refills: 0 | OUTPATIENT
Start: 2022-11-07

## 2023-06-04 ENCOUNTER — HOSPITAL ENCOUNTER (EMERGENCY)
Age: 53
Discharge: LEFT BEFORE DISCHARGE | End: 2023-06-04
Payer: MEDICAID

## 2023-06-04 VITALS
TEMPERATURE: 98 F | OXYGEN SATURATION: 98 % | WEIGHT: 220 LBS | BODY MASS INDEX: 32.58 KG/M2 | SYSTOLIC BLOOD PRESSURE: 174 MMHG | DIASTOLIC BLOOD PRESSURE: 113 MMHG | HEIGHT: 69 IN | HEART RATE: 78 BPM

## 2023-06-04 LAB
ANISOCYTOSIS BLD QL SMEAR: ABNORMAL
BASOPHILS # BLD: 0 K/UL (ref 0–0.2)
BASOPHILS NFR BLD: 0.6 % (ref 0–1)
EOSINOPHIL # BLD: 0.1 K/UL (ref 0–0.6)
EOSINOPHIL NFR BLD: 1.5 % (ref 0–5)
ERYTHROCYTE [DISTWIDTH] IN BLOOD BY AUTOMATED COUNT: 18.6 % (ref 11.5–14.5)
HCT VFR BLD AUTO: 37.7 % (ref 42–52)
HGB BLD-MCNC: 10.9 G/DL (ref 14–18)
HYPOCHROMIA BLD QL SMEAR: ABNORMAL
IMM GRANULOCYTES # BLD: 0 K/UL
LYMPHOCYTES # BLD: 1.4 K/UL (ref 1.1–4.5)
LYMPHOCYTES NFR BLD: 29.1 % (ref 20–40)
MCH RBC QN AUTO: 24.4 PG (ref 27–31)
MCHC RBC AUTO-ENTMCNC: 28.9 G/DL (ref 33–37)
MCV RBC AUTO: 84.5 FL (ref 80–94)
MONOCYTES # BLD: 0.4 K/UL (ref 0–0.9)
MONOCYTES NFR BLD: 8.2 % (ref 0–10)
NEUTROPHILS # BLD: 2.9 K/UL (ref 1.5–7.5)
NEUTS SEG NFR BLD: 60 % (ref 50–65)
OVALOCYTES BLD QL SMEAR: ABNORMAL
PLATELET # BLD AUTO: 286 K/UL (ref 130–400)
PMV BLD AUTO: 11.2 FL (ref 9.4–12.4)
RBC # BLD AUTO: 4.46 M/UL (ref 4.7–6.1)
REASON FOR REJECTION: NORMAL
REJECTED TEST: NORMAL
WBC # BLD AUTO: 4.8 K/UL (ref 4.8–10.8)

## 2023-06-04 PROCEDURE — 36415 COLL VENOUS BLD VENIPUNCTURE: CPT

## 2023-06-04 PROCEDURE — 85025 COMPLETE CBC W/AUTO DIFF WBC: CPT

## 2023-06-04 PROCEDURE — 4500000002 HC ER NO CHARGE

## 2023-06-04 ASSESSMENT — PAIN - FUNCTIONAL ASSESSMENT: PAIN_FUNCTIONAL_ASSESSMENT: NONE - DENIES PAIN

## 2023-06-19 NOTE — PROGRESS NOTES
[facility] PROGRESS NOTE:  Date: 6/19/2023    SUBJECTIVE:   Patient seen and examined at bedside, daughter at bedside, patient is still generally weak, walked with PT but got exhausted easily, still right hip pain episodes but better tolerated with pain meds      NOTE: This is a prep statement, and this note has not yet been reviewed by the attending physician.    CURRENT MEDICATIONS:    Current Facility-Administered Medications   Medication   • magnesium sulfate 2 g in 50 mL premix IVPB   • magnesium oxide (MAG-OX) tablet 400 mg   • melatonin tablet 9 mg   • traMADol (ULTRAM) tablet 50 mg   • phenazopyridine (PYRIDIUM) tablet 100 mg   • sodium chloride 0.9% infusion   • sodium chloride (NORMAL SALINE) 0.9 % bolus 500 mL   • pantoprazole (PROTONIX INJECT) injection 40 mg   • acetaminophen (TYLENOL) tablet 650 mg   • ondansetron (ZOFRAN) injection 4 mg   • sodium chloride 0.9 % flush bag 25 mL   • sodium chloride (PF) 0.9 % injection 2 mL   • sodium chloride 0.9 % flush bag 25 mL   • sodium chloride 0.9% infusion   • folic acid (FOLATE) tablet 1 mg   • thiamine (VITAMIN B1) tablet 100 mg   • LORazepam (ATIVAN) tablet 2 mg    Or   • LORazepam (ATIVAN) tablet 3 mg    Or   • LORazepam (ATIVAN) tablet 4 mg    Or   • LORazepam (ATIVAN) injection 2 mg    Or   • LORazepam (ATIVAN) injection 3 mg    Or   • LORazepam (ATIVAN) injection 4 mg   • morphine injection 1 mg   • potassium CHLORIDE ER tablet 40 mEq   • atorvastatin (LIPITOR) tablet 10 mg   • ferrous sulfate (65 mg Fe per 325 mg) tablet 325 mg   • fluticasone-umeclidin-vilanterol (TRELEGY ELLIPTA) 100-62.5-25 MCG/ACT inhaler 1 puff   • levothyroxine (SYNTHROID, LEVOTHROID) tablet 88 mcg   • metoPROLOL succinate (TOPROL-XL) ER tablet 50 mg   • nicotine (NICODERM) 21 MG/24HR patch 1 patch        HOME MEDICATIONS:  Medications Prior to Admission   Medication Sig Dispense Refill   • ketorolac (TORADOL) 10 MG tablet Take 10 mg by mouth every 12 hours.     •  PATIENT TRANSFERRED TO THE ICU WITH MONITOR AT BEDSIDE. LYNN RN AND ICU STAFF AT BEDSIDE TO RECEIVE PATIENT. Budeson-Glycopyrrol-Formoterol (Breztri Aerosphere) 160-9-4.8 MCG/ACT Aerosol Inhale 1 puff into the lungs in the morning and 1 puff in the evening.     • acetaminophen (TYLENOL) 325 MG tablet Take 2 tablets by mouth every 4 hours as needed for Pain. (Patient taking differently: Take 500 mg by mouth in the morning and 500 mg in the evening.)     • umeclidinium-vilanterol (ANORO ELLIPTA) 62.5-25 MCG/INH inhaler Inhale 1 puff into the lungs daily.      • esomeprazole (NEXIUM) 40 MG capsule Take 40 mg by mouth daily (before breakfast).      • atorvastatin (LIPITOR) 10 MG tablet Take 10 mg by mouth daily.     • clopidogrel (PLAVIX) 75 MG tablet Take 1 tablet by mouth daily. 90 tablet 3   • metoPROLOL succinate (TOPROL-XL) 50 MG 24 hr tablet Take 50 mg by mouth daily.      • levothyroxine (SYNTHROID, LEVOTHROID) 88 MCG tablet Take 88 mcg by mouth daily.      • aspirin (ASPIR-LOW) 81 MG EC tablet Take 81 mg by mouth daily.             OBJECTIVE:    VITAL SIGNS:     Vital Last Value 24 Hour Range   Temperature 98.6 °F (37 °C) (06/19/23 0512) Temp  Min: 96.4 °F (35.8 °C)  Max: 98.6 °F (37 °C)   Pulse 82 (06/19/23 1010) Pulse  Min: 74  Max: 86   Respiratory 18 (06/19/23 0512) Resp  Min: 16  Max: 18   Non-Invasive  Blood Pressure 118/69 (06/19/23 1010) BP  Min: 107/61  Max: 160/96   Pulse Oximetry 97 % (06/19/23 0512) SpO2  Min: 96 %  Max: 97 %     Vital Today Admitted   Weight 64.9 kg (143 lb 1.3 oz) (06/16/23 0430) Weight: 73.6 kg (162 lb 4.1 oz) (06/16/23 0121)   Height N/A Height: 5' 8\" (172.7 cm) (06/16/23 0121)   BMI N/A BMI (Calculated): 24.67 (06/16/23 0121)       INTAKE/OUTPUT:      Intake/Output Summary (Last 24 hours) at 6/19/2023 1024  Last data filed at 6/19/2023 0520  Gross per 24 hour   Intake 994.05 ml   Output 250 ml   Net 744.05 ml         PHYSICAL EXAM:    Physical Exam  Constitutional:       General: He is not in acute distress.     Appearance: He is ill-appearing.   HENT:      Head: Normocephalic.       Mouth/Throat:      Mouth: Mucous membranes are moist.   Eyes:      Pupils: Pupils are equal, round, and reactive to light.   Cardiovascular:      Rate and Rhythm: Normal rate and regular rhythm.   Pulmonary:      Effort: Pulmonary effort is normal.      Comments: Few scattered crackles bilaterally  Abdominal:      General: Bowel sounds are normal. There is no distension.      Palpations: Abdomen is soft.      Tenderness: There is no abdominal tenderness.      Comments: Gastrostomy tube covered by dry dressing    Musculoskeletal:      Right lower leg: No edema.      Left lower leg: No edema.   Skin:     Capillary Refill: Capillary refill takes less than 2 seconds.      Coloration: Skin is pale.   Neurological:      General: No focal deficit present.      Mental Status: He is alert and oriented to person, place, and time.   Psychiatric:      Comments: Less anxious          LABORATORY DATA:    Recent Labs   Lab 06/19/23  0724 06/18/23  0557 06/17/23  0634 06/16/23  1059 06/16/23  0623 06/16/23  0135   WBC 7.8 7.4 6.8  --  6.7 7.8   HCT 23.2* 23.4* 25.9*   < > 22.3* 18.6*   HGB 8.0* 8.0* 8.6*   < > 7.7* 6.4*    207 172  --  166 198   INR  --   --   --   --   --  1.1   PTT  --   --   --   --   --  30   SODIUM 129* 131* 131*  --  136 135   POTASSIUM 3.8 3.9 3.7   < > 2.8* 2.6*   CHLORIDE 96* 96* 97  --  98 95*   CO2 26 27 24  --  28 29   CALCIUM 7.9* 7.8* 7.7*  --  7.8* 7.7*   GLUCOSE 113* 107* 99  --  82 82   BUN 11 11 18  --  42* 52*   CREATININE 0.66* 0.60* 0.58*  --  0.72 0.82   AST  --   --  85*  --   --  67*   GPT  --   --  29  --   --  23   ALKPT  --   --  134*  --   --  136*   BILIRUBIN  --   --  1.0  --   --  0.7   ALBUMIN  --   --  2.2*  --   --  2.3*   PHOS  --   --  3.2  --  3.3  --     < > = values in this interval not displayed.        IMAGING STUDIES:    No results found.      ASSESSMENT/PLAN:    Fall s/p syncopal event, pt fell on head possible dt alcohol intoxication and or orthostatic hypotension    CT Head shows No acute intracranial abnormality  CT C-spine shows No fracture or subluxation  MRI Brain No visible metastatic disease  Continue IVFs  Check Orthostatic vitals   Pain medications and antiemetics PRN  Monitor mentation     Right hip pain s/p fall   XR hips:  As above  Pain medications and antiemetics PRN  PT to see patient      Symptomatic severe anemia mostly dt blood loss anemia    Melena   S/P Egd 6/17   Intact gastrostomy with a patent G-tube present characterized by healthy appearing mucosa. Normal stomach. Non-bleeding duodenal ulcer with no stigmata of bleeding. A single duodenal polyp. Polyp was not removed due to cocern for GI bleed and patient was recently on Plavix. No specimens collected.  HGB 6.4 ->7.7 -> 8.6 -> 8.0-->8.0  S/p 1 pRBC transfusion  Monitor and Transfuse for H&H <7.0  Continue Pantoprazole   Start PO ferrous sulfate   Start PO folic acid   GI following .     Alcohol intoxication  Alcohol ethyl 92 on arrival   AST 6, Alk phos 136  on arrival   Monitoring on CIWA  Seizure precautions     Lactic acidosis mostly dt above   Lactic acid  3.7 on arrival  Repeat lactic acid 2.8 -> 0.9   Blood cultures negative, final pending   Continue IVF   Monitor      Lung mass in pt with   Hx of NSCLC s/p cyberknife SBRT 2018  Hx laryngeal cancer s/p chemo+RT in 2008   S/P peg tube placement 2 weeks ago  XR chest reviewed   CT chest WO 6/16: reviewed   CT Chest / A/P W contrast 6/17: reviewed   CT ABD/PELVIS: reviewed  Oncology following, Plan for PET-CT as outpatient followed by likely tissue biopsy of pulmonary nodules pending PET-CT results      Hypokalemia, and hypocalcemia    Replete as needed and monitor.     Hypochloremia and hypomagnesia  Monitor and replace PRN     Severe Malnutrition  AEB Weight Change: -21% x 1 year. NFPE: Body Fat  Cheek Region: Mild/Moderate. Upper Arm Region: Severe  Nutrition following     COPD- Continue home inhalers  HTN - Continue home metoprolol   HLD-  Continue home statin  Hx of CAD  Hypothyroidism- Continue home Synthroid  GERD- Continue home Protonix    Code Status:   Code Status: Full Resuscitation    DVT PPX: SCDs    DISPOSITION:  Pending clinical improvement in above. Continue IVFs    TONYA:TBD    PCP:  Rashaad Trevino MD       Charting performed by linda Holland for Dr. Marcus Lewis.    All medical record entries made by the scribe were at my direction. I have reviewed the chart and agree that the record accurately reflects my personal performance of the history, physical exam, hospital course, and assessment and plan.    The 21st Century Cures Act makes medical notes like these available to patients in the interest of transparency. However, be advised this is a medical document. It is intended as [id number]-bsek communication. It is written in medical language and may contain abbreviations or verbiage that are unfamiliar. It may appear blunt or direct. Medical documents are intended to carry relevant information, facts as evident, and the clinical opinion of the practitioner.  This note may have been transcribed using a voice dictation system. Voice recognition errors may occur. This should not be taken to alter the content or meaning of this note.

## 2023-07-10 NOTE — ED PROVIDER NOTES
[facility]  eMERGENCY dEPARTMENT eNCOUnter      Pt Name: Raymond Pierre  MRN: [id number]  [place] 1970  Date of evaluation: 6/30/2019  Provider: Abelardo Brunson MD    CHIEF COMPLAINT       Chief Complaint   Patient presents with    Abdominal Pain    Chest Pain         HISTORY OF PRESENT ILLNESS   (Location/Symptom, Timing/Onset,Context/Setting, Quality, Duration, Modifying Factors, Severity)  Note limiting factors. Raymond Pierre is a 51 y.o. male who presents to the emergency department with multiple complaints. Patient is anxious. Here for nausea, diarrhea, abdominal pain and chest pain. Patient said that he had several episodes of watery diarrhea yesterday. Early this morning developed some discomfort in his chest.  His chest discomfort has resolved now. He said the discomfort moved to his abdomen and now complaining of diffuse abdominal pain. Felt short of breath earlier--this is better now. Still very anxious and complaining of abdominal discomfort. Has had multiple abdominal surgeries in the past (hiatal hernia and gastric sleeve in 2018. Revision of hiatal hernia repair and Radhika-en-Y in April 2019). Has had nausea. No vomiting. No blood in the stools. No fevers. No unilateral leg swelling or pain. No history of DVT or PE. Said he has cramping in his legs today. No significant cough. Has COPD and is on oxygen as needed. Currently on his home oxygen regimen and O2 sats 100%. Has a history of A. fib and is on Coumadin. HPI    NursingNotes were reviewed. REVIEW OF SYSTEMS    (2-9 systems for level 4, 10 or more for level 5)     Review of Systems   Constitutional: Negative for fever. Eyes: Negative for pain. Respiratory: Positive for shortness of breath. Cardiovascular: Positive for chest pain. Negative for palpitations. Gastrointestinal: Positive for abdominal pain, diarrhea and nausea. Negative for vomiting.    Genitourinary: Negative for difficulty urinating and swelling of lower extremities dysuria. Musculoskeletal: Positive for myalgias (cramping in legs). Skin: Negative for rash. Neurological: Negative for weakness, numbness and headaches. All other systems reviewed and are negative. A complete review of systems was performed and is negative except as noted above in the HPI.        PAST MEDICAL HISTORY     Past Medical History:   Diagnosis Date    Anxiety     Atrial fibrillation (HCC)     Chronic pain     COPD [facility]ronic obstructive pulmonary disease) ([facility] [address]     Depression     Diabetes mellitus (HCC)     GERD (gastroesophageal reflux disease)     Gout     Hernia     Hyperlipidemia     Hypertension     Hypothyroid     Myocardial infarct, old     [place] - Cardiac Cath - no blockage    Neuropathy     Osteoarthritis     Psychiatric illness          SURGICAL HISTORY       Past Surgical History:   Procedure Laterality Date    ANKLE SURGERY  1990    fracture repair   [address] - No blockage    ESOPHAGUS SURGERY      GASTRIC BAND      HERNIA REPAIR      STOMACH SURGERY  02/2018    gastric sleeve         CURRENT MEDICATIONS       Previous Medications    ALBUTEROL SULFATE HFA (VENTOLIN HFA) 108 (90 BASE) MCG/ACT INHALER    Inhale 2 puffs into the lungs every 6 hours as needed for Wheezing    ALLOPURINOL (ZYLOPRIM) 300 MG TABLET    TAKE ONE TABLET BY MOUTH ONCE DAILY    B-D ULTRAFINE III SHORT PEN 31G X 8 MM MISC    USE WITH INSULIN    BLOOD GLUCOSE MONITORING SUPPL (FREESTYLE LITE) DON    USE AS DIRECTED    ESCITALOPRAM (LEXAPRO) 20 MG TABLET    Take 1 tablet by mouth daily    FENOFIBRATE (TRICOR) 145 MG TABLET    TAKE ONE TABLET BY MOUTH DAILY    FREESTYLE LITE STRIP        GLUCOSE MONITORING KIT (FREESTYLE) MONITORING KIT    1 kit by Does not apply route daily    HYDRALAZINE (APRESOLINE) 50 MG TABLET    Take 1 tablet by mouth 2 times daily    HYDROCODONE-ACETAMINOPHEN (NORCO)  MG PER TABLET    Take 1 tablet by mouth every 8 hours as (*)     Hemoglobin 13.2 (*)     MCV 96.6 (*)     MCHC 30.8 (*)     RDW 14.7 (*)     All other components within normal limits   COMPREHENSIVE METABOLIC PANEL - Abnormal; Notable for the following components:    CO2 19 (*)     Glucose 179 (*)     All other components within normal limits   LACTIC ACID, PLASMA - Abnormal; Notable for the following components:    Lactic Acid 2.6 (*)     All other components within normal limits    Narrative:     CALL  Braden  [facility] tel. ,  Chemistry results called to and read back by Josselyn Hansen RN in ED, 06/30/2019  08:50, by [facility]   URINE RT REFLEX TO CULTURE - Abnormal; Notable for the following components:    Glucose, Ur >=1000 (*)     All other components within normal limits   TROPONIN   LIPASE   PROTIME-INR   TROPONIN   LACTIC ACID, PLASMA   TROPONIN       All other labs were within normal range or not returned as of this dictation. EMERGENCY DEPARTMENT COURSE and DIFFERENTIALDIAGNOSIS/MDM:   Vitals:    Vitals:    06/30/19 1232 06/30/19 1302 06/30/19 1331 06/30/19 1401   BP: 127/82 129/87 132/85 (!) 125/95   Pulse: 72 78 75 76   Resp: 17 16 14 15   Temp:       SpO2: 99% 97% 98% 97%   Weight:       Height:           MDM  Patient said when symptoms first started this morning he was having discomfort in his chest.  At that time he felt short of breath. This has resolved. No chest pain or dyspnea now. No unilateral leg swelling or pain. No history of DVT or PE. He is not tachycardic. O2 sats 100% on his home oxygen regimen. Given the above and the fact that he has had resolution of chest pain and dyspnea I think PE is very unlikely. Resting comfortably at this time. Feels much better. Did rectal exam and I could not feel any stool in the rectal vault which is very confusing given there was large amount of stool seen in rectal vault on CT scan. Did thorough digital rectal exam and do not feel any stool on my exam at this time. No further chest pain.   I think chest pain dictations butoccasionally words are mis-transcribed.)    Joshua Frank MD (electronically signed)  AttendingEmergency Physician        Joshua Frank MD  06/30/19 [id number]

## 2023-08-12 ENCOUNTER — HOSPITAL ENCOUNTER (OUTPATIENT)
Age: 53
Setting detail: OBSERVATION
LOS: 1 days | Discharge: HOME | End: 2023-08-14
Attending: EMERGENCY MEDICINE
Payer: MEDICAID

## 2023-08-12 DIAGNOSIS — R07.9 CHEST PAIN, UNSPECIFIED TYPE: Primary | ICD-10-CM

## 2023-08-12 DIAGNOSIS — R10.9 ABDOMINAL PAIN, UNSPECIFIED ABDOMINAL LOCATION: ICD-10-CM

## 2023-08-12 DIAGNOSIS — F10.920 ACUTE ALCOHOLIC INTOXICATION WITHOUT COMPLICATION (HCC): ICD-10-CM

## 2023-08-12 PROCEDURE — 96374 THER/PROPH/DIAG INJ IV PUSH: CPT

## 2023-08-12 PROCEDURE — 99285 EMERGENCY DEPT VISIT HI MDM: CPT

## 2023-08-12 PROCEDURE — 93005 ELECTROCARDIOGRAM TRACING: CPT | Performed by: EMERGENCY MEDICINE

## 2023-08-12 PROCEDURE — 96375 TX/PRO/DX INJ NEW DRUG ADDON: CPT

## 2023-08-13 ENCOUNTER — APPOINTMENT (OUTPATIENT)
Dept: CT IMAGING | Age: 53
End: 2023-08-13
Payer: MEDICAID

## 2023-08-13 ENCOUNTER — APPOINTMENT (OUTPATIENT)
Dept: CARDIAC CATH/INVASIVE PROCEDURES | Age: 53
End: 2023-08-13
Payer: MEDICAID

## 2023-08-13 PROBLEM — E87.20 LACTIC ACIDOSIS: Status: ACTIVE | Noted: 2023-08-13

## 2023-08-13 LAB
ALBUMIN SERPL-MCNC: 4.1 G/DL (ref 3.5–5.2)
ALBUMIN SERPL-MCNC: 4.1 G/DL (ref 3.5–5.2)
ALP SERPL-CCNC: 56 U/L (ref 40–130)
ALP SERPL-CCNC: 61 U/L (ref 40–130)
ALT SERPL-CCNC: 20 U/L (ref 5–41)
ALT SERPL-CCNC: 21 U/L (ref 5–41)
AMPHET UR QL SCN: NEGATIVE
ANION GAP SERPL CALCULATED.3IONS-SCNC: 11 MMOL/L (ref 7–19)
ANION GAP SERPL CALCULATED.3IONS-SCNC: 18 MMOL/L (ref 7–19)
AST SERPL-CCNC: 26 U/L (ref 5–40)
AST SERPL-CCNC: 39 U/L (ref 5–40)
BARBITURATES UR QL SCN: NEGATIVE
BENZODIAZ UR QL SCN: NEGATIVE
BILIRUB SERPL-MCNC: <0.2 MG/DL (ref 0.2–1.2)
BILIRUB SERPL-MCNC: <0.2 MG/DL (ref 0.2–1.2)
BILIRUB UR QL STRIP: NEGATIVE
BNP BLD-MCNC: 52 PG/ML (ref 0–124)
BUN SERPL-MCNC: 11 MG/DL (ref 6–20)
BUN SERPL-MCNC: 9 MG/DL (ref 6–20)
BUPRENORPHINE URINE: NEGATIVE
CALCIUM SERPL-MCNC: 8.7 MG/DL (ref 8.6–10)
CALCIUM SERPL-MCNC: 8.9 MG/DL (ref 8.6–10)
CANNABINOIDS UR QL SCN: NEGATIVE
CHLORIDE SERPL-SCNC: 110 MMOL/L (ref 98–111)
CHLORIDE SERPL-SCNC: 112 MMOL/L (ref 98–111)
CHOLEST SERPL-MCNC: 149 MG/DL (ref 160–199)
CLARITY UR: CLEAR
CO2 SERPL-SCNC: 14 MMOL/L (ref 22–29)
CO2 SERPL-SCNC: 21 MMOL/L (ref 22–29)
COCAINE UR QL SCN: NEGATIVE
COLOR UR: YELLOW
CREAT SERPL-MCNC: 0.8 MG/DL (ref 0.5–1.2)
CREAT SERPL-MCNC: 1 MG/DL (ref 0.5–1.2)
D DIMER PPP FEU-MCNC: 0.36 UG/ML FEU (ref 0–0.48)
DRUG SCREEN COMMENT UR-IMP: NORMAL
ERYTHROCYTE [DISTWIDTH] IN BLOOD BY AUTOMATED COUNT: 18.6 % (ref 11.5–14.5)
ETHANOLAMINE SERPL-MCNC: 140 MG/DL (ref 0–0.08)
GLUCOSE BLD-MCNC: 102 MG/DL (ref 70–99)
GLUCOSE BLD-MCNC: 151 MG/DL (ref 70–99)
GLUCOSE BLD-MCNC: 203 MG/DL (ref 70–99)
GLUCOSE BLD-MCNC: 90 MG/DL (ref 70–99)
GLUCOSE SERPL-MCNC: 117 MG/DL (ref 74–109)
GLUCOSE SERPL-MCNC: 233 MG/DL (ref 74–109)
GLUCOSE UR STRIP.AUTO-MCNC: 250 MG/DL
HBA1C MFR BLD: 6.3 % (ref 4–6)
HCT VFR BLD AUTO: 36.5 % (ref 42–52)
HDLC SERPL-MCNC: 76 MG/DL (ref 55–121)
HGB BLD-MCNC: 10.6 G/DL (ref 14–18)
HGB UR STRIP.AUTO-MCNC: NEGATIVE MG/L
INR PPP: 0.93 (ref 0.88–1.18)
KETONES UR STRIP.AUTO-MCNC: NEGATIVE MG/DL
LACTATE BLDV-SCNC: 1.1 MMOL/L (ref 0.5–1.9)
LACTATE BLDV-SCNC: 2.4 MMOL/L (ref 0.5–1.9)
LACTATE BLDV-SCNC: 4.6 MMOL/L (ref 0.5–1.9)
LDLC SERPL CALC-MCNC: 61 MG/DL
LEUKOCYTE ESTERASE UR QL STRIP.AUTO: NEGATIVE
LIPASE SERPL-CCNC: 30 U/L (ref 13–60)
LIPASE SERPL-CCNC: 45 U/L (ref 13–60)
LV EF: 68 %
LVEF MODALITY: NORMAL
MAGNESIUM SERPL-MCNC: 1.3 MG/DL (ref 1.6–2.6)
MCH RBC QN AUTO: 24.5 PG (ref 27–31)
MCHC RBC AUTO-ENTMCNC: 29 G/DL (ref 33–37)
MCV RBC AUTO: 84.3 FL (ref 80–94)
METHADONE UR QL SCN: NEGATIVE
METHAMPHETAMINE, URINE: NEGATIVE
NITRITE UR QL STRIP.AUTO: NEGATIVE
OPIATES UR QL SCN: NEGATIVE
OXYCODONE UR QL SCN: NEGATIVE
PCP UR QL SCN: NEGATIVE
PERFORMED ON: ABNORMAL
PERFORMED ON: NORMAL
PH UR STRIP.AUTO: 6 [PH] (ref 5–8)
PHOSPHATE SERPL-MCNC: 3.9 MG/DL (ref 2.5–4.5)
PLATELET # BLD AUTO: 305 K/UL (ref 130–400)
PMV BLD AUTO: 10.7 FL (ref 9.4–12.4)
POTASSIUM SERPL-SCNC: 3.6 MMOL/L (ref 3.5–5)
POTASSIUM SERPL-SCNC: 4.1 MMOL/L (ref 3.5–5)
PROPOXYPH UR QL SCN: NEGATIVE
PROT SERPL-MCNC: 6.6 G/DL (ref 6.6–8.7)
PROT SERPL-MCNC: 6.8 G/DL (ref 6.6–8.7)
PROT UR STRIP.AUTO-MCNC: NEGATIVE MG/DL
PROTHROMBIN TIME: 12.2 SEC (ref 12–14.6)
RBC # BLD AUTO: 4.33 M/UL (ref 4.7–6.1)
SARS-COV-2 RDRP RESP QL NAA+PROBE: NOT DETECTED
SODIUM SERPL-SCNC: 142 MMOL/L (ref 136–145)
SODIUM SERPL-SCNC: 144 MMOL/L (ref 136–145)
SP GR UR STRIP.AUTO: 1.01 (ref 1–1.03)
TRICYCLIC, URINE: NEGATIVE
TRIGL SERPL-MCNC: 60 MG/DL (ref 0–149)
TROPONIN T SERPL-MCNC: <0.01 NG/ML (ref 0–0.03)
TSH SERPL DL<=0.005 MIU/L-ACNC: 3.63 UIU/ML (ref 0.35–5.5)
UROBILINOGEN UR STRIP.AUTO-MCNC: 0.2 E.U./DL
WBC # BLD AUTO: 3.9 K/UL (ref 4.8–10.8)

## 2023-08-13 PROCEDURE — 83735 ASSAY OF MAGNESIUM: CPT

## 2023-08-13 PROCEDURE — 6370000000 HC RX 637 (ALT 250 FOR IP): Performed by: HOSPITALIST

## 2023-08-13 PROCEDURE — 70450 CT HEAD/BRAIN W/O DYE: CPT

## 2023-08-13 PROCEDURE — 96361 HYDRATE IV INFUSION ADD-ON: CPT

## 2023-08-13 PROCEDURE — 80053 COMPREHEN METABOLIC PANEL: CPT

## 2023-08-13 PROCEDURE — 2580000003 HC RX 258: Performed by: EMERGENCY MEDICINE

## 2023-08-13 PROCEDURE — 96375 TX/PRO/DX INJ NEW DRUG ADDON: CPT

## 2023-08-13 PROCEDURE — 6360000002 HC RX W HCPCS: Performed by: HOSPITALIST

## 2023-08-13 PROCEDURE — C9113 INJ PANTOPRAZOLE SODIUM, VIA: HCPCS | Performed by: EMERGENCY MEDICINE

## 2023-08-13 PROCEDURE — 93005 ELECTROCARDIOGRAM TRACING: CPT | Performed by: EMERGENCY MEDICINE

## 2023-08-13 PROCEDURE — 84443 ASSAY THYROID STIM HORMONE: CPT

## 2023-08-13 PROCEDURE — 2580000003 HC RX 258: Performed by: HOSPITALIST

## 2023-08-13 PROCEDURE — G0378 HOSPITAL OBSERVATION PER HR: HCPCS

## 2023-08-13 PROCEDURE — 83605 ASSAY OF LACTIC ACID: CPT

## 2023-08-13 PROCEDURE — C1769 GUIDE WIRE: HCPCS

## 2023-08-13 PROCEDURE — 96372 THER/PROPH/DIAG INJ SC/IM: CPT

## 2023-08-13 PROCEDURE — 99152 MOD SED SAME PHYS/QHP 5/>YRS: CPT

## 2023-08-13 PROCEDURE — 83690 ASSAY OF LIPASE: CPT

## 2023-08-13 PROCEDURE — 85610 PROTHROMBIN TIME: CPT

## 2023-08-13 PROCEDURE — 96366 THER/PROPH/DIAG IV INF ADDON: CPT

## 2023-08-13 PROCEDURE — 99153 MOD SED SAME PHYS/QHP EA: CPT

## 2023-08-13 PROCEDURE — 85027 COMPLETE CBC AUTOMATED: CPT

## 2023-08-13 PROCEDURE — 84484 ASSAY OF TROPONIN QUANT: CPT

## 2023-08-13 PROCEDURE — 93306 TTE W/DOPPLER COMPLETE: CPT

## 2023-08-13 PROCEDURE — 82077 ASSAY SPEC XCP UR&BREATH IA: CPT

## 2023-08-13 PROCEDURE — 74177 CT ABD & PELVIS W/CONTRAST: CPT

## 2023-08-13 PROCEDURE — 85379 FIBRIN DEGRADATION QUANT: CPT

## 2023-08-13 PROCEDURE — 81003 URINALYSIS AUTO W/O SCOPE: CPT

## 2023-08-13 PROCEDURE — 80306 DRUG TEST PRSMV INSTRMNT: CPT

## 2023-08-13 PROCEDURE — 83880 ASSAY OF NATRIURETIC PEPTIDE: CPT

## 2023-08-13 PROCEDURE — 6360000004 HC RX CONTRAST MEDICATION: Performed by: EMERGENCY MEDICINE

## 2023-08-13 PROCEDURE — 93458 L HRT ARTERY/VENTRICLE ANGIO: CPT

## 2023-08-13 PROCEDURE — 6360000002 HC RX W HCPCS: Performed by: EMERGENCY MEDICINE

## 2023-08-13 PROCEDURE — 6360000004 HC RX CONTRAST MEDICATION: Performed by: INTERNAL MEDICINE

## 2023-08-13 PROCEDURE — 83036 HEMOGLOBIN GLYCOSYLATED A1C: CPT

## 2023-08-13 PROCEDURE — C1894 INTRO/SHEATH, NON-LASER: HCPCS

## 2023-08-13 PROCEDURE — 80061 LIPID PANEL: CPT

## 2023-08-13 PROCEDURE — 82962 GLUCOSE BLOOD TEST: CPT

## 2023-08-13 PROCEDURE — 87635 SARS-COV-2 COVID-19 AMP PRB: CPT

## 2023-08-13 PROCEDURE — 84100 ASSAY OF PHOSPHORUS: CPT

## 2023-08-13 PROCEDURE — 36415 COLL VENOUS BLD VENIPUNCTURE: CPT

## 2023-08-13 PROCEDURE — 96365 THER/PROPH/DIAG IV INF INIT: CPT

## 2023-08-13 PROCEDURE — 71260 CT THORAX DX C+: CPT

## 2023-08-13 PROCEDURE — C1887 CATHETER, GUIDING: HCPCS

## 2023-08-13 PROCEDURE — 2709999900 HC NON-CHARGEABLE SUPPLY

## 2023-08-13 PROCEDURE — 6360000002 HC RX W HCPCS

## 2023-08-13 PROCEDURE — 94760 N-INVAS EAR/PLS OXIMETRY 1: CPT

## 2023-08-13 PROCEDURE — 2500000003 HC RX 250 WO HCPCS

## 2023-08-13 RX ORDER — MAGNESIUM SULFATE IN WATER 40 MG/ML
2000 INJECTION, SOLUTION INTRAVENOUS PRN
Status: DISCONTINUED | OUTPATIENT
Start: 2023-08-13 | End: 2023-08-14 | Stop reason: HOSPADM

## 2023-08-13 RX ORDER — ONDANSETRON 2 MG/ML
4 INJECTION INTRAMUSCULAR; INTRAVENOUS ONCE
Status: COMPLETED | OUTPATIENT
Start: 2023-08-13 | End: 2023-08-13

## 2023-08-13 RX ORDER — METOPROLOL SUCCINATE 25 MG/1
25 TABLET, EXTENDED RELEASE ORAL DAILY
Status: DISCONTINUED | OUTPATIENT
Start: 2023-08-13 | End: 2023-08-14 | Stop reason: HOSPADM

## 2023-08-13 RX ORDER — IBUPROFEN 400 MG/1
600 TABLET ORAL EVERY 6 HOURS PRN
Status: DISCONTINUED | OUTPATIENT
Start: 2023-08-13 | End: 2023-08-14 | Stop reason: HOSPADM

## 2023-08-13 RX ORDER — SODIUM CHLORIDE 9 MG/ML
INJECTION, SOLUTION INTRAVENOUS CONTINUOUS
Status: DISCONTINUED | OUTPATIENT
Start: 2023-08-13 | End: 2023-08-14 | Stop reason: HOSPADM

## 2023-08-13 RX ORDER — SODIUM CHLORIDE 0.9 % (FLUSH) 0.9 %
5-40 SYRINGE (ML) INJECTION EVERY 12 HOURS SCHEDULED
Status: DISCONTINUED | OUTPATIENT
Start: 2023-08-13 | End: 2023-08-14 | Stop reason: HOSPADM

## 2023-08-13 RX ORDER — ISOSORBIDE MONONITRATE 30 MG/1
30 TABLET, EXTENDED RELEASE ORAL DAILY
Status: DISCONTINUED | OUTPATIENT
Start: 2023-08-13 | End: 2023-08-14 | Stop reason: HOSPADM

## 2023-08-13 RX ORDER — NITROGLYCERIN 0.4 MG/1
0.4 TABLET SUBLINGUAL EVERY 5 MIN PRN
Status: DISCONTINUED | OUTPATIENT
Start: 2023-08-13 | End: 2023-08-14 | Stop reason: HOSPADM

## 2023-08-13 RX ORDER — ASPIRIN 81 MG/1
81 TABLET ORAL DAILY
Status: DISCONTINUED | OUTPATIENT
Start: 2023-08-13 | End: 2023-08-14 | Stop reason: HOSPADM

## 2023-08-13 RX ORDER — ATORVASTATIN CALCIUM 40 MG/1
40 TABLET, FILM COATED ORAL NIGHTLY
Status: DISCONTINUED | OUTPATIENT
Start: 2023-08-13 | End: 2023-08-14 | Stop reason: HOSPADM

## 2023-08-13 RX ORDER — PREGABALIN 25 MG/1
75 CAPSULE ORAL 2 TIMES DAILY
Status: DISCONTINUED | OUTPATIENT
Start: 2023-08-13 | End: 2023-08-14 | Stop reason: HOSPADM

## 2023-08-13 RX ORDER — ACETAMINOPHEN 650 MG/1
650 SUPPOSITORY RECTAL EVERY 4 HOURS PRN
Status: DISCONTINUED | OUTPATIENT
Start: 2023-08-13 | End: 2023-08-14 | Stop reason: HOSPADM

## 2023-08-13 RX ORDER — LEVOTHYROXINE SODIUM 0.1 MG/1
1 TABLET ORAL DAILY
COMMUNITY

## 2023-08-13 RX ORDER — POTASSIUM CHLORIDE 7.45 MG/ML
10 INJECTION INTRAVENOUS PRN
Status: DISCONTINUED | OUTPATIENT
Start: 2023-08-13 | End: 2023-08-14 | Stop reason: HOSPADM

## 2023-08-13 RX ORDER — 0.9 % SODIUM CHLORIDE 0.9 %
1000 INTRAVENOUS SOLUTION INTRAVENOUS ONCE
Status: COMPLETED | OUTPATIENT
Start: 2023-08-13 | End: 2023-08-13

## 2023-08-13 RX ORDER — SODIUM CHLORIDE 9 MG/ML
INJECTION, SOLUTION INTRAVENOUS PRN
Status: DISCONTINUED | OUTPATIENT
Start: 2023-08-13 | End: 2023-08-14 | Stop reason: HOSPADM

## 2023-08-13 RX ORDER — ACETAMINOPHEN 325 MG/1
650 TABLET ORAL EVERY 4 HOURS PRN
Status: DISCONTINUED | OUTPATIENT
Start: 2023-08-13 | End: 2023-08-14 | Stop reason: HOSPADM

## 2023-08-13 RX ORDER — POTASSIUM CHLORIDE 20 MEQ/1
40 TABLET, EXTENDED RELEASE ORAL PRN
Status: DISCONTINUED | OUTPATIENT
Start: 2023-08-13 | End: 2023-08-14 | Stop reason: HOSPADM

## 2023-08-13 RX ORDER — ONDANSETRON 2 MG/ML
4 INJECTION INTRAMUSCULAR; INTRAVENOUS EVERY 6 HOURS PRN
Status: DISCONTINUED | OUTPATIENT
Start: 2023-08-13 | End: 2023-08-14 | Stop reason: HOSPADM

## 2023-08-13 RX ORDER — FAMOTIDINE 20 MG/1
20 TABLET, FILM COATED ORAL 2 TIMES DAILY
Status: DISCONTINUED | OUTPATIENT
Start: 2023-08-13 | End: 2023-08-14 | Stop reason: HOSPADM

## 2023-08-13 RX ORDER — ONDANSETRON 4 MG/1
4 TABLET, ORALLY DISINTEGRATING ORAL EVERY 8 HOURS PRN
Status: DISCONTINUED | OUTPATIENT
Start: 2023-08-13 | End: 2023-08-14 | Stop reason: HOSPADM

## 2023-08-13 RX ORDER — HYDRALAZINE HYDROCHLORIDE 50 MG/1
50 TABLET, FILM COATED ORAL 3 TIMES DAILY
Status: DISCONTINUED | OUTPATIENT
Start: 2023-08-13 | End: 2023-08-14 | Stop reason: HOSPADM

## 2023-08-13 RX ORDER — SODIUM CHLORIDE 0.9 % (FLUSH) 0.9 %
5-40 SYRINGE (ML) INJECTION PRN
Status: DISCONTINUED | OUTPATIENT
Start: 2023-08-13 | End: 2023-08-14 | Stop reason: HOSPADM

## 2023-08-13 RX ORDER — ENOXAPARIN SODIUM 100 MG/ML
40 INJECTION SUBCUTANEOUS DAILY
Status: DISCONTINUED | OUTPATIENT
Start: 2023-08-13 | End: 2023-08-14 | Stop reason: HOSPADM

## 2023-08-13 RX ADMIN — IOPAMIDOL 172 ML: 612 INJECTION, SOLUTION INTRAVENOUS at 09:53

## 2023-08-13 RX ADMIN — PREGABALIN 75 MG: 25 CAPSULE ORAL at 21:01

## 2023-08-13 RX ADMIN — NITROGLYCERIN 0.4 MG: 0.4 TABLET, ORALLY DISINTEGRATING SUBLINGUAL at 06:50

## 2023-08-13 RX ADMIN — ATORVASTATIN CALCIUM 40 MG: 40 TABLET, FILM COATED ORAL at 21:01

## 2023-08-13 RX ADMIN — FAMOTIDINE 20 MG: 20 TABLET, FILM COATED ORAL at 21:01

## 2023-08-13 RX ADMIN — MAGNESIUM SULFATE HEPTAHYDRATE 2000 MG: 40 INJECTION, SOLUTION INTRAVENOUS at 15:32

## 2023-08-13 RX ADMIN — SODIUM CHLORIDE, PRESERVATIVE FREE 10 ML: 5 INJECTION INTRAVENOUS at 10:24

## 2023-08-13 RX ADMIN — SODIUM CHLORIDE, PRESERVATIVE FREE 40 MG: 5 INJECTION INTRAVENOUS at 03:46

## 2023-08-13 RX ADMIN — SODIUM CHLORIDE 1000 ML: 9 INJECTION, SOLUTION INTRAVENOUS at 00:35

## 2023-08-13 RX ADMIN — ASPIRIN 81 MG: 81 TABLET, COATED ORAL at 10:21

## 2023-08-13 RX ADMIN — METOPROLOL SUCCINATE 25 MG: 25 TABLET, EXTENDED RELEASE ORAL at 10:20

## 2023-08-13 RX ADMIN — MAGNESIUM SULFATE HEPTAHYDRATE 2000 MG: 40 INJECTION, SOLUTION INTRAVENOUS at 13:30

## 2023-08-13 RX ADMIN — MAGNESIUM SULFATE HEPTAHYDRATE 2000 MG: 40 INJECTION, SOLUTION INTRAVENOUS at 17:08

## 2023-08-13 RX ADMIN — HYDRALAZINE HYDROCHLORIDE 50 MG: 50 TABLET, FILM COATED ORAL at 21:07

## 2023-08-13 RX ADMIN — ISOSORBIDE MONONITRATE 30 MG: 30 TABLET, EXTENDED RELEASE ORAL at 10:21

## 2023-08-13 RX ADMIN — HYDRALAZINE HYDROCHLORIDE 50 MG: 50 TABLET, FILM COATED ORAL at 10:21

## 2023-08-13 RX ADMIN — ACETAMINOPHEN 650 MG: 325 TABLET ORAL at 21:03

## 2023-08-13 RX ADMIN — NITROGLYCERIN 0.4 MG: 0.4 TABLET, ORALLY DISINTEGRATING SUBLINGUAL at 06:54

## 2023-08-13 RX ADMIN — ENOXAPARIN SODIUM 40 MG: 100 INJECTION SUBCUTANEOUS at 10:21

## 2023-08-13 RX ADMIN — NITROGLYCERIN 0.4 MG: 0.4 TABLET, ORALLY DISINTEGRATING SUBLINGUAL at 06:43

## 2023-08-13 RX ADMIN — ONDANSETRON 4 MG: 2 INJECTION INTRAMUSCULAR; INTRAVENOUS at 03:46

## 2023-08-13 RX ADMIN — IOPAMIDOL 90 ML: 755 INJECTION, SOLUTION INTRAVENOUS at 01:28

## 2023-08-13 RX ADMIN — SODIUM CHLORIDE: 9 INJECTION, SOLUTION INTRAVENOUS at 06:31

## 2023-08-13 RX ADMIN — NITROGLYCERIN 0.5 INCH: 20 OINTMENT TOPICAL at 21:01

## 2023-08-13 RX ADMIN — ACETAMINOPHEN 650 MG: 325 TABLET ORAL at 17:06

## 2023-08-13 RX ADMIN — NITROGLYCERIN 0.5 INCH: 20 OINTMENT TOPICAL at 07:14

## 2023-08-13 RX ADMIN — FAMOTIDINE 20 MG: 20 TABLET, FILM COATED ORAL at 10:21

## 2023-08-13 RX ADMIN — SODIUM CHLORIDE, PRESERVATIVE FREE 10 ML: 5 INJECTION INTRAVENOUS at 21:01

## 2023-08-13 RX ADMIN — PREGABALIN 75 MG: 25 CAPSULE ORAL at 10:20

## 2023-08-13 SDOH — ECONOMIC STABILITY: FOOD INSECURITY: WITHIN THE PAST 12 MONTHS, YOU WORRIED THAT YOUR FOOD WOULD RUN OUT BEFORE YOU GOT MONEY TO BUY MORE.: NEVER TRUE

## 2023-08-13 SDOH — ECONOMIC STABILITY: INCOME INSECURITY: HOW HARD IS IT FOR YOU TO PAY FOR THE VERY BASICS LIKE FOOD, HOUSING, MEDICAL CARE, AND HEATING?: SOMEWHAT HARD

## 2023-08-13 SDOH — ECONOMIC STABILITY: INCOME INSECURITY: IN THE PAST 12 MONTHS, HAS THE ELECTRIC, GAS, OIL, OR WATER COMPANY THREATENED TO SHUT OFF SERVICE IN YOUR HOME?: NO

## 2023-08-13 ASSESSMENT — ENCOUNTER SYMPTOMS
VOMITING: 0
CHEST TIGHTNESS: 1
NAUSEA: 0
SHORTNESS OF BREATH: 1

## 2023-08-13 ASSESSMENT — LIFESTYLE VARIABLES
HOW OFTEN DO YOU HAVE A DRINK CONTAINING ALCOHOL: 2-4 TIMES A MONTH
HOW MANY STANDARD DRINKS CONTAINING ALCOHOL DO YOU HAVE ON A TYPICAL DAY: 1 OR 2

## 2023-08-13 ASSESSMENT — PATIENT HEALTH QUESTIONNAIRE - PHQ9
1. LITTLE INTEREST OR PLEASURE IN DOING THINGS: 0
SUM OF ALL RESPONSES TO PHQ9 QUESTIONS 1 & 2: 0
SUM OF ALL RESPONSES TO PHQ QUESTIONS 1-9: 0
2. FEELING DOWN, DEPRESSED OR HOPELESS: 0
SUM OF ALL RESPONSES TO PHQ QUESTIONS 1-9: 0

## 2023-08-13 ASSESSMENT — PAIN DESCRIPTION - LOCATION
LOCATION: CHEST
LOCATION: HEAD
LOCATION: BACK

## 2023-08-13 ASSESSMENT — PAIN DESCRIPTION - DESCRIPTORS
DESCRIPTORS: ACHING
DESCRIPTORS: ACHING;JABBING
DESCRIPTORS: DISCOMFORT

## 2023-08-13 ASSESSMENT — PAIN SCALES - GENERAL
PAINLEVEL_OUTOF10: 5
PAINLEVEL_OUTOF10: 8
PAINLEVEL_OUTOF10: 5

## 2023-08-13 ASSESSMENT — PAIN DESCRIPTION - ORIENTATION
ORIENTATION: MID

## 2023-08-13 ASSESSMENT — PAIN DESCRIPTION - PAIN TYPE: TYPE: ACUTE PAIN

## 2023-08-13 ASSESSMENT — PAIN - FUNCTIONAL ASSESSMENT
PAIN_FUNCTIONAL_ASSESSMENT: PREVENTS OR INTERFERES SOME ACTIVE ACTIVITIES AND ADLS
PAIN_FUNCTIONAL_ASSESSMENT: ACTIVITIES ARE NOT PREVENTED
PAIN_FUNCTIONAL_ASSESSMENT: ACTIVITIES ARE NOT PREVENTED

## 2023-08-13 NOTE — PROGRESS NOTES
Social Determinants of Health     Tobacco Use: Low Risk     Smoking Tobacco Use: Never    Smokeless Tobacco Use: Never    Passive Exposure: Not on file   Alcohol Use: Not At Risk    Frequency of Alcohol Consumption: 2-4 times a month    Average Number of Drinks: 1 or 2    Frequency of Binge Drinking: Never   Financial Resource Strain: Not on file   Food Insecurity: Not on file   Transportation Needs: Not on file   Physical Activity: Not on file   Stress: Not on file   Social Connections: Not on file   Intimate Partner Violence: Not on file   Depression: Not on file   Housing Stability: Not on file     Depression screening from [facility] wheel:         PHQ-9 Score (if applicable):         SDOH: Patient Able to answer Social Determinant of Health screening questions. History    Tobacco Use      Smoking status: Never      Smokeless tobacco: Never    E-cigarette/Vaping       Questions Responses    E-cigarette/Vaping Use Never User    Start Date     Passive Exposure No    Quit Date     Counseling Given No    Comments             Social History     Substance and Sexual Activity   Drug Use Not Currently    Types: Cocaine, Marijuana (Weed)    Comment: History of Cocaine and Marijuana usage 2010      HX: Patient Able to answer Tobacco History and/or Substance use screening questions. Psychosocial: Abuse Screening  Physical Abuse: Denies  Verbal Abuse: Denies  Emotional abuse: Denies  Financial Abuse: Denies  Sexual abuse: Denies  Possible abuse reported to: None needed    Functional Screening: ADL Screening  Because of a physical, mental, or emotional condition, do you have serious difficulty concentrating, remembering, or making decisions? No  Because of a physical, mental, or emotional condition, do you have difficulty doing errands alone such as visiting a doctor's office or shopping? No    Utilities  In the past 12 months, has the electric, gas, oil or water company threatened to shut off services in your home? No    Education  Do you want help with school or training? For example, starting or completing job training or getting a high school diploma, GED, or equivalent? No  Do you speak a language other than English at home? No    Employment  Do you want help finding or keeping work or a job? I do not need or want help    Safety  How often does anyone including family and friends, physically hurt you? Never  How often does anyone including family and friends, insult or talk down to you? Never  How often does anyone including family and friends, threaten you with harm? Never  How often does anyone including family and friends, scream or curse at you? Never    Basic Daily Needs  Think about the place you live. Do you have problems with any of the following? None of the above    Family & Community Support  How often do you feel lonely or isolated from those around you? Never    Care Management consult needed?  No
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
## 2023-08-13 NOTE — PROGRESS NOTES
Per secure massage from Dr Madalyn Jamison give 6 grams of Mag IV for low mag lvl. Order was repeated. Hold of on CTA until MD reviews results of other tests.

## 2023-08-13 NOTE — PROCEDURES
[facility] [address], [address] [place] [address]                            CARDIAC CATHETERIZATION    PATIENT NAME: Brandan Weber                       :        1970  MED REC NO:   [id number]                              ROOM:       [facility]  ACCOUNT NO:   [de-identified]                           ADMIT DATE: 2023  PROVIDER:     Azucena Lundy MD    DATE OF PROCEDURE:  2023    PROCEDURE PERFORMED:  1. Right and left selective coronary angiography. 2.  Left ventriculography. 3.  Left heart catheterization. Informed consent was obtained after discussing the risks and benefits  with the patient. ATTENDING PHYSICIAN:   Dr. Azucena Lundy. DESCRIPTION OF PROCEDURE:   The patient was prepped and draped in usual  sterile fashion. After obtaining an informed consent, the patient was  brought to Cath Lab in a fasting condition. He was given a moderate  sedation with 2 mg of Versed and 100 mg of fentanyl. The right radial  artery was accessed using modified Seldinger technique after injection  of local lidocaine and 5 x 6 Slender right radial sheath was inserted  into the right radial artery. 3,000 units of heparin, 2.5 mg of  Verapamil and 200 mcg of nitro. Radial cocktail was injected into the  right radial sheath. Following which, a TIG, a 5-Zimbabwean TIG catheter  was used to perform the right selective coronary angiography. Then, a  6-Zimbabwean EBU guide catheter was used to perform the left selective  angiography. Then, this catheter was exchanged over the wire with a  5-Zimbabwean pigtail catheter, which was used to cross the LV, and LV  manometric pressures were obtained, and left ventriculography was  performed. The catheter was pulled back from LV to AO, manometric  pressures were obtained, and the catheter along with the wire were  removed. The radial sheath was removed and hemostasis was achieved with  a TR band.   The patient 08/13/2023 11:08:54      T: 08/13/2023 15:53:21     [id number]  Job#: [id number]     Doc#: [id number]    CC:

## 2023-08-13 NOTE — H&P
Care of patient assumed when they were found to be on the cardiac cortes           [facility] History and Physical    Patient Information:  Patient: Mónica Lozano  MRN: [id number]   Acct: [de-identified]  YOB: 1970  Admit Date: 8/13/2023  Primary Care Physician: No primary care provider on file. Advance Directive: DNR  Health Care Proxy: \"Nobody\"         SUBJECTIVE:    Chief Complaint   Patient presents with    Chest Pain     HPI & ROS:  Mr. Mónica Lozano is a pleasant 47year old  gentleman from home. He has a past medical history of CAD, HTN, HLD, DM, and having recovered s/p gastric surgery from obesity. His medical history is also significant for a MI in 2012 where he had a cardiac catheterization and told there were no blockages. He presented tonight for chest pain. The chest pain was left sided and substernal. The pain did not radiate. The pain has a pressure like quality. The pain is made worse by laying on his left side. The pain has not been alleviated by anything, no nitroglycerin has yet been tried. The pain began at 10:30pm. The pain has been then for the 7 & 2/3 hours since then. The pain stays the same in it's intensity. The pain is 8/10 in intensity laying on his back at rest when he was first seen by me. He has had this same pain in the pat, the last time was \"8 months ago and they told me I had a light stroke\". The pain is not pleuritic. The pain is exacerbated by leaning forward POSITIONAL. The pain is not exacerbated by pushing on his own chest.   Associated symptoms:  Review of Systems   Constitutional:  Positive for diaphoresis and fatigue (x days). Respiratory:  Positive for chest tightness and shortness of breath. Cardiovascular:  Positive for palpitations. Negative for chest pain. HAS chest pressure   Gastrointestinal:  Negative for nausea and vomiting.    Neurological:  Positive for dizziness (RESOLVED), syncope, weakness and Mental Status: He is alert. Sensory: No sensory deficit. Motor: No tremor or seizure activity. Psychiatric:         Mood and Affect: Mood normal.         Behavior: Behavior normal.        LABORATORY DATA:    CBC:   Recent Labs     08/12/23 2354   WBC 3.9*   HGB 10.6*   HCT 36.5*        BMP:   Recent Labs     08/12/23 2354      K 3.6   *   CO2 14*   BUN 11   CREATININE 1.0   CALCIUM 8.9     Hepatic Profile:   Recent Labs     08/12/23 2354   AST 39   ALT 21   BILITOT <0.2   ALKPHOS 61     Coag Panel:   Recent Labs     08/12/23 2354   INR 0.93   PROTIME 12.2     Cardiac Enzymes:   Recent Labs     08/12/23 2354 08/13/23  0233   TROPONINI <0.01 <0.01     Pro-BNP:   Recent Labs     08/12/23 2354   PROBNP 52     A1C: No results for input(s): LABA1C in the last 72 hours. TSH: Invalid input(s): LABTSH  Lipid Panel: Invalid input(s): LABLIP    Urinalysis:   Lab Results   Component Value Date/Time    NITRU Negative 08/13/2023 02:25 AM    WBCUA 0 06/03/2016 02:40 AM    BACTERIA NEGATIVE 06/03/2016 02:40 AM    RBCUA 0 06/03/2016 02:40 AM    BLOODU Negative 08/13/2023 02:25 AM    SPECGRAV 1.006 08/13/2023 02:25 AM    GLUCOSEU 250 08/13/2023 02:25 AM     EKG:   From ED:   Sinus Tach, QTc 414, No ST changes  On Wards:   ST elevations of 1 mm in V1 and V2 and then 1/2 mm in V3, QTc 425, Sinus rhythmn    IMAGING:  CT HEAD WO CONTRAST  Result Date: 8/13/2023  Impression:  Normal head CT  All CT scans are performed using dose optimization techniques as appropriate to the performed exam and include at least one of the following: Automated exposure control, adjustment of the mA and/or kV according to size, and the use of iterative reconstruction technique. ______________________________________ Electronically signed by: Tres De M.D. Date:     08/13/2023 Time:    01:35     CT CHEST W CONTRAST  Result Date: 8/13/2023  Impression: 1. No acute intrathoracic process.  2.  Probable coronary

## 2023-08-13 NOTE — PROGRESS NOTES
Pt moved emergently to 725 as result of enviromental event that occurred in the ED. Dr. Dmitri Dyson advised of the emergent move for the well being of the patient and need to see the patient on the floor and verbalized understanding of the move for the safety of the patient.

## 2023-08-13 NOTE — ED PROVIDER NOTES
[facility] [place]Y [place]  eMERGENCY dEPARTMENT eNCOUnter      Pt Name: Radha Pompa  MRN: [id number]  [address] [facility] 1970  Date of evaluation: 8/12/2023  Provider: Pamella Srivastava MD    CHIEF COMPLAINT       Chief Complaint   Patient presents with    Chest Pain         HISTORY OF PRESENT ILLNESS   (Location/Symptom, Timing/Onset,Context/Setting, Quality, Duration, Modifying Factors, Severity)  Note limiting factors. Radha Pompa is a 47 y.o. male who presents to the emergency department complaining of chest pain. Patient brought by EMS. Very odd affect. Admits to alcohol consumption tonight. Indicates that he has had abdominal pain that started around 4:00 this afternoon and now has abdominal and chest pain. Has had some vomiting. No blood in his emesis. Said that he has noticed blood in his stools off and on but cannot tell me if this is new or chronic. Cannot get any further history from the patient. Seems a bit altered. Do not know if this is from intoxication or something else. Unfortunately, very limited history at this time. HPI    NursingNotes were reviewed. REVIEW OF SYSTEMS    (2-9 systems for level 4, 10 or more for level 5)     Review of Systems   Unable to perform ROS: Mental status change     A complete review of systems was performed and is negative except as noted above in the HPI.        PAST MEDICAL HISTORY     Past Medical History:   Diagnosis Date    Anxiety     Atrial fibrillation (HCC)     Bipolar 1 disorder (HCC)     Chronic pain     COPD (chronic obstructive pulmonary disease) (HCC)     Depression     Depression     Diabetes mellitus (HCC)     GERD (gastroesophageal reflux disease)     Gout     Hernia     Hyperlipidemia     Hypertension     Hypothyroid     Hypothyroid     Myocardial infarct, old     [place] - Cardiac Cath - no blockage    Neuropathy     Osteoarthritis     Psychiatric illness          SURGICAL HISTORY       Past Surgical History:   Procedure Laterality Date ABDOMINOPLASTY      ABDOMINOPLASTY      Nov 2019    ANKLE SURGERY  1990    fracture repair    CARDIAC CATHETERIZATION      [place] -  blockage    ESOPHAGUS SURGERY      GASTRIC BAND      HERNIA REPAIR      LAPAROTOMY N/A 2/15/2021    LAPAROTOMY EXPLORATORY performed by Surendra Chowdhury MD at [address] 1/3/2020    LAPAROTOMY EXPLORATORY performed by Anna Disla MD at [address]  02/2018    gastric sleeve         CURRENT MEDICATIONS       Previous Medications    ATORVASTATIN (LIPITOR) 20 MG TABLET    Take 1 tablet by mouth daily    DICLOFENAC SODIUM (VOLTAREN) 1 % GEL    Apply 4 g topically daily     HYDRALAZINE (APRESOLINE) 50 MG TABLET    TAKE 1 TABLET BY MOUTH 3 TIMES DAILY    HYDROCODONE-ACETAMINOPHEN (NORCO)  MG PER TABLET    Take 1 tablet by mouth 3 times daily. INSULIN PEN NEEDLE (B-D ULTRAFINE III SHORT PEN) 31G X 8 MM MISC    Inject 1 each into the skin as needed (FOR DM)    ISOSORBIDE MONONITRATE (IMDUR) 30 MG EXTENDED RELEASE TABLET    TAKE ONE TABLET BY MOUTH ONCE DAILY    METFORMIN (GLUCOPHAGE) 1000 MG TABLET    TAKE ONE TABLET BY MOUTH TWO TIMES DAILY (WITH MEALS)    METOPROLOL SUCCINATE (TOPROL XL) 25 MG EXTENDED RELEASE TABLET    TAKE 1 TABLET BY MOUTH DAILY    NITROGLYCERIN (NITROSTAT) 0.4 MG SL TABLET    Place 1 tablet under the tongue every 5 minutes as needed for Chest pain    PREGABALIN (LYRICA) 75 MG CAPSULE    Take 75 mg by mouth 2 times daily.     VICTOZA 18 MG/3ML SOPN SC INJECTION    INJECT 1.8 MG INTO THE SKIN DAILY       ALLERGIES     Milk (cow) and Food    FAMILY HISTORY       Family History   Problem Relation Age of Onset    Diabetes Other     Heart Disease Other           SOCIAL HISTORY       Social History     Socioeconomic History    Marital status:     Number of children: 2   Tobacco Use    Smoking status: Never    Smokeless tobacco: Never   Vaping Use    Vaping Use: Never used   Substance and Sexual Activity    Alcohol
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
## 2023-08-13 NOTE — CONSULTS
[facility]  Cardiology Consult      Requesting MD:  Mendoza Bell MD   Admit Status:         History obtained from:   [x] Patient  [] Other (specify):     Reason for consultation : Unstable angina       PRESENTATION: Magno Umana is a 47y.o. year old male who presents with  HTN, HLP, DM type 2 ,  alcohol abuse and s/p gastric surgery for obesity cardiology consulted for chest pain.  The chest pain was left sided and substernal. Localized 9/10, pressure like quality not been alleviated by anything started  at 10:30pm. 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       REVIEW OF SYSTEMS:  As per HPI, all other 12 systems reviewed and negative     Past Medical History:      Diagnosis Date    Alcohol abuse, in remission     has a single drink on week ends - no longer drinks more than the one drink per week    Anxiety     Atrial fibrillation ([address])     Bipolar 1 disorder (HCC)     Chronic pain     COPD (chronic obstructive pulmonary disease) ([address])     Depression     Diabetes mellitus ([address])     GERD (gastroesophageal reflux disease)     Gout     Hernia     Hyperlipidemia     Hypertension     Hypothyroid     Myocardial infarct, old 2012 [facility] - Cardiac Cath - no blockage    Neuropathy     Osteoarthritis     Psychiatric illness        Past Surgical History:      Procedure Laterality Date    ABDOMINOPLASTY N/A     Nov 2019    ANKLE SURGERY Right 1990    fracture repair    CARDIAC CATHETERIZATION N/A 2012 [facility] - No blockage    ESOPHAGUS SURGERY N/A     done with the gastric bypass    GASTRIC BAND N/A     HERNIA REPAIR N/A     incisional hernia    LAPAROTOMY N/A 02/15/2021    LAPAROTOMY EXPLORATORY performed by Josefina Cabral MD at [address] [facility] N/A 01/03/2020    LAPAROTOMY EXPLORATORY performed by Ashia Orourke MD at [address] [facility] N/A 02/2018    gastric sleeve

## 2023-08-14 VITALS
HEART RATE: 68 BPM | RESPIRATION RATE: 16 BRPM | HEIGHT: 72 IN | DIASTOLIC BLOOD PRESSURE: 90 MMHG | TEMPERATURE: 97.5 F | WEIGHT: 198.38 LBS | SYSTOLIC BLOOD PRESSURE: 134 MMHG | OXYGEN SATURATION: 100 % | BODY MASS INDEX: 26.87 KG/M2

## 2023-08-14 LAB
ANION GAP SERPL CALCULATED.3IONS-SCNC: 9 MMOL/L (ref 7–19)
BASOPHILS # BLD: 0 K/UL (ref 0–0.2)
BASOPHILS NFR BLD: 0.5 % (ref 0–1)
BUN SERPL-MCNC: 12 MG/DL (ref 6–20)
CALCIUM SERPL-MCNC: 8.7 MG/DL (ref 8.6–10)
CHLORIDE SERPL-SCNC: 106 MMOL/L (ref 98–111)
CO2 SERPL-SCNC: 23 MMOL/L (ref 22–29)
CREAT SERPL-MCNC: 0.8 MG/DL (ref 0.5–1.2)
EKG P AXIS: 60 DEGREES
EKG P AXIS: 70 DEGREES
EKG P-R INTERVAL: 108 MS
EKG P-R INTERVAL: 110 MS
EKG Q-T INTERVAL: 256 MS
EKG Q-T INTERVAL: 336 MS
EKG QRS DURATION: 80 MS
EKG QRS DURATION: 90 MS
EKG QTC CALCULATION (BAZETT): 383 MS
EKG QTC CALCULATION (BAZETT): 385 MS
EKG T AXIS: -110 DEGREES
EKG T AXIS: -99 DEGREES
EOSINOPHIL # BLD: 0.1 K/UL (ref 0–0.6)
EOSINOPHIL NFR BLD: 1.1 % (ref 0–5)
ERYTHROCYTE [DISTWIDTH] IN BLOOD BY AUTOMATED COUNT: 18.6 % (ref 11.5–14.5)
GLUCOSE BLD-MCNC: 101 MG/DL (ref 70–99)
GLUCOSE SERPL-MCNC: 104 MG/DL (ref 74–109)
HCT VFR BLD AUTO: 32.9 % (ref 42–52)
HGB BLD-MCNC: 10 G/DL (ref 14–18)
IMM GRANULOCYTES # BLD: 0 K/UL
LYMPHOCYTES # BLD: 1.6 K/UL (ref 1.1–4.5)
LYMPHOCYTES NFR BLD: 36.4 % (ref 20–40)
MCH RBC QN AUTO: 24.7 PG (ref 27–31)
MCHC RBC AUTO-ENTMCNC: 30.4 G/DL (ref 33–37)
MCV RBC AUTO: 81.2 FL (ref 80–94)
MONOCYTES # BLD: 0.4 K/UL (ref 0–0.9)
MONOCYTES NFR BLD: 9.6 % (ref 0–10)
NEUTROPHILS # BLD: 2.3 K/UL (ref 1.5–7.5)
NEUTS SEG NFR BLD: 52.2 % (ref 50–65)
PERFORMED ON: ABNORMAL
PLATELET # BLD AUTO: 276 K/UL (ref 130–400)
PMV BLD AUTO: 11.1 FL (ref 9.4–12.4)
POTASSIUM SERPL-SCNC: 4 MMOL/L (ref 3.5–5)
RBC # BLD AUTO: 4.05 M/UL (ref 4.7–6.1)
SODIUM SERPL-SCNC: 138 MMOL/L (ref 136–145)
WBC # BLD AUTO: 4.4 K/UL (ref 4.8–10.8)

## 2023-08-14 PROCEDURE — 85025 COMPLETE CBC W/AUTO DIFF WBC: CPT

## 2023-08-14 PROCEDURE — 93010 ELECTROCARDIOGRAM REPORT: CPT | Performed by: INTERNAL MEDICINE

## 2023-08-14 PROCEDURE — 94760 N-INVAS EAR/PLS OXIMETRY 1: CPT

## 2023-08-14 PROCEDURE — 36415 COLL VENOUS BLD VENIPUNCTURE: CPT

## 2023-08-14 PROCEDURE — 82962 GLUCOSE BLOOD TEST: CPT

## 2023-08-14 PROCEDURE — 96361 HYDRATE IV INFUSION ADD-ON: CPT

## 2023-08-14 PROCEDURE — G0378 HOSPITAL OBSERVATION PER HR: HCPCS

## 2023-08-14 PROCEDURE — 80048 BASIC METABOLIC PNL TOTAL CA: CPT

## 2023-08-14 RX ORDER — ATORVASTATIN CALCIUM 20 MG/1
40 TABLET, FILM COATED ORAL DAILY
Qty: 90 TABLET | Refills: 3 | Status: SHIPPED | OUTPATIENT
Start: 2023-08-14

## 2023-08-14 RX ORDER — FAMOTIDINE 20 MG/1
20 TABLET, FILM COATED ORAL 2 TIMES DAILY
Qty: 60 TABLET | Refills: 3 | Status: SHIPPED | OUTPATIENT
Start: 2023-08-14

## 2023-08-14 RX ORDER — ASPIRIN 81 MG/1
81 TABLET ORAL DAILY
Qty: 30 TABLET | Refills: 3 | Status: SHIPPED | OUTPATIENT
Start: 2023-08-15

## 2023-08-14 SDOH — ECONOMIC STABILITY: FOOD INSECURITY: WITHIN THE PAST 12 MONTHS, YOU WORRIED THAT YOUR FOOD WOULD RUN OUT BEFORE YOU GOT MONEY TO BUY MORE.: SOMETIMES TRUE

## 2023-08-14 SDOH — ECONOMIC STABILITY: INCOME INSECURITY: HOW HARD IS IT FOR YOU TO PAY FOR THE VERY BASICS LIKE FOOD, HOUSING, MEDICAL CARE, AND HEATING?: SOMEWHAT HARD

## 2023-08-14 SDOH — ECONOMIC STABILITY: INCOME INSECURITY: IN THE PAST 12 MONTHS, HAS THE ELECTRIC, GAS, OIL, OR WATER COMPANY THREATENED TO SHUT OFF SERVICE IN YOUR HOME?: NO

## 2023-08-14 ASSESSMENT — PATIENT HEALTH QUESTIONNAIRE - PHQ9
SUM OF ALL RESPONSES TO PHQ QUESTIONS 1-9: 1
SUM OF ALL RESPONSES TO PHQ QUESTIONS 1-9: 1
SUM OF ALL RESPONSES TO PHQ9 QUESTIONS 1 & 2: 1
1. LITTLE INTEREST OR PLEASURE IN DOING THINGS: 1
SUM OF ALL RESPONSES TO PHQ QUESTIONS 1-9: 1
2. FEELING DOWN, DEPRESSED OR HOPELESS: 0
SUM OF ALL RESPONSES TO PHQ QUESTIONS 1-9: 1

## 2023-08-14 NOTE — PROGRESS NOTES
CLINICAL PHARMACY NOTE: MEDS TO BEDS    Total # of Prescriptions Filled: 3   The following medications were delivered to the patient:  Discharge Medication List as of 8/14/2023 10:06 AM        START taking these medications    Details   aspirin 81 MG EC tablet Take 1 tablet by mouth daily, Disp-30 tablet, R-3Normal      famotidine (PEPCID) 20 MG tablet Take 1 tablet by mouth 2 times daily, Disp-60 tablet, R-3Normal           Atorvastatin 40 mg    Additional Documentation:    Handed scripts to patient. Zero copay.

## 2023-08-14 NOTE — DISCHARGE SUMMARY
Discharge Summary      Date:8/14/2023        Patient Name:Kavin Lock     YOB: 1970     Age:53 y.o. Admit Date:8/12/2023   Admission Condition:fair   Discharged Condition:stable  Discharge Date: 08/14/23       Discharge Diagnoses   Principal Problem:    Chest pain  Active Problems:    Metabolic acidosis    Lactic acidosis  Resolved Problems:    * No resolved hospital problems. [facility] Stay   Narrative of Hospital Course:     28-year-old male with history coronary disease, anxiety depression, bipolar 1 disorder, type 2 diabetes, GERD, gastric bypass, hyperlipidemia, hypertension, hypothyroidism, presented to the hospital s/p TAVR 2020 consider chest pain. On admission troponin was obtained and noted to be negative, EKG with elevated acute ST-T wave abnormality, patient with persistent chest pain unrelieved by nitro glycerin, subsequently seen by cardiology for concerns. Cardiac cath was completed that showed mild nonobstructive coronary artery disease, cardiology recommended adequate blood pressure control, cholesterol management and addition of Toprol to medication. Echocardiogram was completed in-house which showed normal left ventricular size with preserved LV function of 67%. Normal diastolic dysfunction. Patient remain stable in house with no recurrence of symptoms, etiology might be attributed to reflux and encouraged to continue his Pepcid. Patient was encouraged to follow-up with PCP for continued management of chronic medical problems and return for reevaluation if symptoms recurs. Physical Examination:  General: Well-developed, no acute distress lying comfortably in bed. HEENT: Atraumatic normocephalic, range of motion normal, no JVD, no tracheal deviation noted. Cardiac: Normal S1-S2 no murmurs rub or gallop.   Respiratory: clear To auscultation bilaterally, no rhonchi or rales, no wheezing  Abdomen: Soft, positive bowel sounds in all quadrants, no distention,

## 2023-08-14 NOTE — PLAN OF CARE
Problem: Discharge Planning  Goal: Discharge to home or other facility with appropriate resources  Outcome: Progressing  Flowsheets (Taken 8/13/2023 1215 by Patti Murphy RN)  Discharge to home or other facility with appropriate resources: Identify barriers to discharge with patient and caregiver

## 2023-10-15 ENCOUNTER — APPOINTMENT (OUTPATIENT)
Dept: GENERAL RADIOLOGY | Age: 53
DRG: 885 | End: 2023-10-15
Payer: MEDICAID

## 2023-10-15 ENCOUNTER — HOSPITAL ENCOUNTER (INPATIENT)
Age: 53
LOS: 4 days | Discharge: NURSING, REHAB OR LONG-TERM CARE | DRG: 885 | End: 2023-10-19
Attending: EMERGENCY MEDICINE | Admitting: PSYCHIATRY & NEUROLOGY
Payer: MEDICAID

## 2023-10-15 DIAGNOSIS — R93.6 ABNORMAL X-RAY OF SHOULDER: ICD-10-CM

## 2023-10-15 DIAGNOSIS — R45.851 DEPRESSION WITH SUICIDAL IDEATION: ICD-10-CM

## 2023-10-15 DIAGNOSIS — G63 POLYNEUROPATHY ASSOCIATED WITH UNDERLYING DISEASE (HCC): Primary | ICD-10-CM

## 2023-10-15 DIAGNOSIS — F32.A DEPRESSION WITH SUICIDAL IDEATION: ICD-10-CM

## 2023-10-15 PROBLEM — F99 PSYCHIATRIC DIAGNOSIS: Status: ACTIVE | Noted: 2023-10-15

## 2023-10-15 LAB
ALBUMIN SERPL-MCNC: 4.4 G/DL (ref 3.5–5.2)
ALLENS TEST: ABNORMAL
ALP SERPL-CCNC: 72 U/L (ref 40–130)
ALT SERPL-CCNC: 25 U/L (ref 5–41)
AMPHET UR QL SCN: NEGATIVE
ANION GAP SERPL CALCULATED.3IONS-SCNC: 14 MMOL/L (ref 7–19)
APAP SERPL-MCNC: <5 UG/ML (ref 10–30)
AST SERPL-CCNC: 37 U/L (ref 5–40)
BARBITURATES UR QL SCN: NEGATIVE
BASE EXCESS ARTERIAL: 0.6 MMOL/L (ref -2–2)
BASOPHILS # BLD: 0 K/UL (ref 0–0.2)
BASOPHILS NFR BLD: 0.2 % (ref 0–1)
BENZODIAZ UR QL SCN: POSITIVE
BILIRUB SERPL-MCNC: 0.4 MG/DL (ref 0.2–1.2)
BUN SERPL-MCNC: 11 MG/DL (ref 6–20)
BUPRENORPHINE URINE: NEGATIVE
CALCIUM SERPL-MCNC: 9 MG/DL (ref 8.6–10)
CANNABINOIDS UR QL SCN: POSITIVE
CARBOXYHEMOGLOBIN ARTERIAL: 2.6 % (ref 0–5)
CHLORIDE SERPL-SCNC: 103 MMOL/L (ref 98–111)
CO2 SERPL-SCNC: 22 MMOL/L (ref 22–29)
COCAINE UR QL SCN: POSITIVE
CREAT SERPL-MCNC: 0.8 MG/DL (ref 0.5–1.2)
DRUG SCREEN COMMENT UR-IMP: ABNORMAL
EOSINOPHIL # BLD: 0 K/UL (ref 0–0.6)
EOSINOPHIL NFR BLD: 0.9 % (ref 0–5)
ERYTHROCYTE [DISTWIDTH] IN BLOOD BY AUTOMATED COUNT: 17 % (ref 11.5–14.5)
ETHANOLAMINE SERPL-MCNC: <10 MG/DL (ref 0–0.08)
FENTANYL SCREEN, URINE: NEGATIVE
FIO2: 21 %
GLUCOSE SERPL-MCNC: 111 MG/DL (ref 74–109)
HCO3 ARTERIAL: 24 MMOL/L (ref 22–26)
HCT VFR BLD AUTO: 37.9 % (ref 42–52)
HEMOGLOBIN, ART, EXTENDED: 10.1 G/DL (ref 14–18)
HGB BLD-MCNC: 11.9 G/DL (ref 14–18)
IMM GRANULOCYTES # BLD: 0 K/UL
LYMPHOCYTES # BLD: 1.3 K/UL (ref 1.1–4.5)
LYMPHOCYTES NFR BLD: 26.9 % (ref 20–40)
MCH RBC QN AUTO: 26.3 PG (ref 27–31)
MCHC RBC AUTO-ENTMCNC: 31.4 G/DL (ref 33–37)
MCV RBC AUTO: 83.7 FL (ref 80–94)
METHADONE UR QL SCN: NEGATIVE
METHAMPHETAMINE, URINE: NEGATIVE
METHEMOGLOBIN ARTERIAL: 0.9 %
MONOCYTES # BLD: 0.3 K/UL (ref 0–0.9)
MONOCYTES NFR BLD: 7.3 % (ref 0–10)
NEUTROPHILS # BLD: 3 K/UL (ref 1.5–7.5)
NEUTS SEG NFR BLD: 64.5 % (ref 50–65)
O2 CONTENT ARTERIAL: 13.5 ML/DL
O2 SAT, ARTERIAL: 94.4 %
O2 THERAPY: ABNORMAL
OPIATES UR QL SCN: POSITIVE
OXYCODONE UR QL SCN: NEGATIVE
PCO2 ARTERIAL: 33 MMHG (ref 35–45)
PCP UR QL SCN: NEGATIVE
PH ARTERIAL: 7.47 (ref 7.35–7.45)
PLATELET # BLD AUTO: 257 K/UL (ref 130–400)
PMV BLD AUTO: 10.2 FL (ref 9.4–12.4)
PO2 ARTERIAL: 91 MMHG (ref 80–100)
POTASSIUM BLD-SCNC: 3.4 MMOL/L
POTASSIUM SERPL-SCNC: 3.8 MMOL/L (ref 3.5–5)
PROT SERPL-MCNC: 7.3 G/DL (ref 6.6–8.7)
RBC # BLD AUTO: 4.53 M/UL (ref 4.7–6.1)
SALICYLATES SERPL-MCNC: <0.3 MG/DL (ref 3–10)
SAMPLE SOURCE: ABNORMAL
SARS-COV-2 RDRP RESP QL NAA+PROBE: NOT DETECTED
SODIUM SERPL-SCNC: 139 MMOL/L (ref 136–145)
TRICYCLIC, URINE: NEGATIVE
TROPONIN T SERPL-MCNC: <0.01 NG/ML (ref 0–0.03)
WBC # BLD AUTO: 4.7 K/UL (ref 4.8–10.8)

## 2023-10-15 PROCEDURE — 73030 X-RAY EXAM OF SHOULDER: CPT

## 2023-10-15 PROCEDURE — 82077 ASSAY SPEC XCP UR&BREATH IA: CPT

## 2023-10-15 PROCEDURE — 99285 EMERGENCY DEPT VISIT HI MDM: CPT

## 2023-10-15 PROCEDURE — 80179 DRUG ASSAY SALICYLATE: CPT

## 2023-10-15 PROCEDURE — 80307 DRUG TEST PRSMV CHEM ANLYZR: CPT

## 2023-10-15 PROCEDURE — 1240000000 HC EMOTIONAL WELLNESS R&B

## 2023-10-15 PROCEDURE — 36600 WITHDRAWAL OF ARTERIAL BLOOD: CPT

## 2023-10-15 PROCEDURE — 87635 SARS-COV-2 COVID-19 AMP PRB: CPT

## 2023-10-15 PROCEDURE — 82803 BLOOD GASES ANY COMBINATION: CPT

## 2023-10-15 PROCEDURE — 71045 X-RAY EXAM CHEST 1 VIEW: CPT

## 2023-10-15 PROCEDURE — 6370000000 HC RX 637 (ALT 250 FOR IP): Performed by: EMERGENCY MEDICINE

## 2023-10-15 PROCEDURE — 80143 DRUG ASSAY ACETAMINOPHEN: CPT

## 2023-10-15 PROCEDURE — 80053 COMPREHEN METABOLIC PANEL: CPT

## 2023-10-15 PROCEDURE — 93005 ELECTROCARDIOGRAM TRACING: CPT | Performed by: EMERGENCY MEDICINE

## 2023-10-15 PROCEDURE — 6370000000 HC RX 637 (ALT 250 FOR IP): Performed by: PSYCHIATRY & NEUROLOGY

## 2023-10-15 PROCEDURE — 36415 COLL VENOUS BLD VENIPUNCTURE: CPT

## 2023-10-15 PROCEDURE — 84484 ASSAY OF TROPONIN QUANT: CPT

## 2023-10-15 PROCEDURE — 85025 COMPLETE CBC W/AUTO DIFF WBC: CPT

## 2023-10-15 RX ORDER — HYDROXYZINE HYDROCHLORIDE 25 MG/1
25 TABLET, FILM COATED ORAL EVERY 4 HOURS PRN
Status: DISCONTINUED | OUTPATIENT
Start: 2023-10-15 | End: 2023-10-19 | Stop reason: HOSPADM

## 2023-10-15 RX ORDER — POLYETHYLENE GLYCOL 3350 17 G
2 POWDER IN PACKET (EA) ORAL
Status: DISCONTINUED | OUTPATIENT
Start: 2023-10-15 | End: 2023-10-19 | Stop reason: HOSPADM

## 2023-10-15 RX ORDER — LORAZEPAM 2 MG/1
2 TABLET ORAL EVERY 4 HOURS PRN
Status: DISCONTINUED | OUTPATIENT
Start: 2023-10-15 | End: 2023-10-16

## 2023-10-15 RX ORDER — NICOTINE 21 MG/24HR
1 PATCH, TRANSDERMAL 24 HOURS TRANSDERMAL DAILY
Status: DISCONTINUED | OUTPATIENT
Start: 2023-10-15 | End: 2023-10-15

## 2023-10-15 RX ORDER — METOPROLOL SUCCINATE 50 MG/1
25 TABLET, EXTENDED RELEASE ORAL ONCE
Status: COMPLETED | OUTPATIENT
Start: 2023-10-15 | End: 2023-10-15

## 2023-10-15 RX ORDER — MECOBALAMIN 5000 MCG
5 TABLET,DISINTEGRATING ORAL NIGHTLY
Status: DISCONTINUED | OUTPATIENT
Start: 2023-10-15 | End: 2023-10-19 | Stop reason: HOSPADM

## 2023-10-15 RX ORDER — ACETAMINOPHEN 325 MG/1
650 TABLET ORAL EVERY 4 HOURS PRN
Status: DISCONTINUED | OUTPATIENT
Start: 2023-10-15 | End: 2023-10-19 | Stop reason: HOSPADM

## 2023-10-15 RX ORDER — POLYETHYLENE GLYCOL 3350 17 G/17G
17 POWDER, FOR SOLUTION ORAL DAILY PRN
Status: DISCONTINUED | OUTPATIENT
Start: 2023-10-15 | End: 2023-10-19 | Stop reason: HOSPADM

## 2023-10-15 RX ORDER — LORAZEPAM 1 MG/1
1 TABLET ORAL EVERY 4 HOURS PRN
Status: DISCONTINUED | OUTPATIENT
Start: 2023-10-15 | End: 2023-10-16

## 2023-10-15 RX ORDER — TRAZODONE HYDROCHLORIDE 50 MG/1
50 TABLET ORAL NIGHTLY
Status: DISCONTINUED | OUTPATIENT
Start: 2023-10-15 | End: 2023-10-16

## 2023-10-15 RX ADMIN — METOPROLOL SUCCINATE 25 MG: 50 TABLET, EXTENDED RELEASE ORAL at 18:36

## 2023-10-15 RX ADMIN — TRAZODONE HYDROCHLORIDE 50 MG: 50 TABLET ORAL at 22:40

## 2023-10-15 RX ADMIN — Medication 5 MG: at 22:40

## 2023-10-15 RX ADMIN — HYDROXYZINE HYDROCHLORIDE 25 MG: 25 TABLET, FILM COATED ORAL at 22:40

## 2023-10-15 RX ADMIN — ACETAMINOPHEN 650 MG: 325 TABLET ORAL at 19:56

## 2023-10-15 SDOH — HEALTH STABILITY: PHYSICAL HEALTH: ON AVERAGE, HOW MANY MINUTES DO YOU ENGAGE IN EXERCISE AT THIS LEVEL?: 0 MIN

## 2023-10-15 SDOH — HEALTH STABILITY: PHYSICAL HEALTH: ON AVERAGE, HOW MANY DAYS PER WEEK DO YOU ENGAGE IN MODERATE TO STRENUOUS EXERCISE (LIKE A BRISK WALK)?: 0 DAYS

## 2023-10-15 ASSESSMENT — LIFESTYLE VARIABLES
HOW MANY STANDARD DRINKS CONTAINING ALCOHOL DO YOU HAVE ON A TYPICAL DAY: 7 TO 9
HOW OFTEN DO YOU HAVE A DRINK CONTAINING ALCOHOL: 4 OR MORE TIMES A WEEK
HOW OFTEN DO YOU HAVE A DRINK CONTAINING ALCOHOL: 2-4 TIMES A MONTH
HOW MANY STANDARD DRINKS CONTAINING ALCOHOL DO YOU HAVE ON A TYPICAL DAY: 10 OR MORE

## 2023-10-15 ASSESSMENT — PATIENT HEALTH QUESTIONNAIRE - PHQ9
8. MOVING OR SPEAKING SO SLOWLY THAT OTHER PEOPLE COULD HAVE NOTICED. OR THE OPPOSITE, BEING SO FIGETY OR RESTLESS THAT YOU HAVE BEEN MOVING AROUND A LOT MORE THAN USUAL: 0
5. POOR APPETITE OR OVEREATING: 3
SUM OF ALL RESPONSES TO PHQ QUESTIONS 1-9: 27
6. FEELING BAD ABOUT YOURSELF - OR THAT YOU ARE A FAILURE OR HAVE LET YOURSELF OR YOUR FAMILY DOWN: 2
4. FEELING TIRED OR HAVING LITTLE ENERGY: 3
SUM OF ALL RESPONSES TO PHQ9 QUESTIONS 1 & 2: 6
9. THOUGHTS THAT YOU WOULD BE BETTER OFF DEAD, OR OF HURTING YOURSELF: 3
2. FEELING DOWN, DEPRESSED OR HOPELESS: 3
2. FEELING DOWN, DEPRESSED OR HOPELESS: 3
SUM OF ALL RESPONSES TO PHQ QUESTIONS 1-9: 27
SUM OF ALL RESPONSES TO PHQ QUESTIONS 1-9: 20
5. POOR APPETITE OR OVEREATING: 3
1. LITTLE INTEREST OR PLEASURE IN DOING THINGS: 3
8. MOVING OR SPEAKING SO SLOWLY THAT OTHER PEOPLE COULD HAVE NOTICED. OR THE OPPOSITE, BEING SO FIGETY OR RESTLESS THAT YOU HAVE BEEN MOVING AROUND A LOT MORE THAN USUAL: 3
6. FEELING BAD ABOUT YOURSELF - OR THAT YOU ARE A FAILURE OR HAVE LET YOURSELF OR YOUR FAMILY DOWN: 3
SUM OF ALL RESPONSES TO PHQ QUESTIONS 1-9: 23
3. TROUBLE FALLING OR STAYING ASLEEP: 3
SUM OF ALL RESPONSES TO PHQ QUESTIONS 1-9: 23
SUM OF ALL RESPONSES TO PHQ QUESTIONS 1-9: 23
10. IF YOU CHECKED OFF ANY PROBLEMS, HOW DIFFICULT HAVE THESE PROBLEMS MADE IT FOR YOU TO DO YOUR WORK, TAKE CARE OF THINGS AT HOME, OR GET ALONG WITH OTHER PEOPLE: 3
9. THOUGHTS THAT YOU WOULD BE BETTER OFF DEAD, OR OF HURTING YOURSELF: 3
7. TROUBLE CONCENTRATING ON THINGS, SUCH AS READING THE NEWSPAPER OR WATCHING TELEVISION: 3
SUM OF ALL RESPONSES TO PHQ QUESTIONS 1-9: 27
10. IF YOU CHECKED OFF ANY PROBLEMS, HOW DIFFICULT HAVE THESE PROBLEMS MADE IT FOR YOU TO DO YOUR WORK, TAKE CARE OF THINGS AT HOME, OR GET ALONG WITH OTHER PEOPLE: 3
1. LITTLE INTEREST OR PLEASURE IN DOING THINGS: 3
7. TROUBLE CONCENTRATING ON THINGS, SUCH AS READING THE NEWSPAPER OR WATCHING TELEVISION: 3
4. FEELING TIRED OR HAVING LITTLE ENERGY: 3
3. TROUBLE FALLING OR STAYING ASLEEP: 3
SUM OF ALL RESPONSES TO PHQ9 QUESTIONS 1 & 2: 6
SUM OF ALL RESPONSES TO PHQ QUESTIONS 1-9: 24

## 2023-10-15 ASSESSMENT — ENCOUNTER SYMPTOMS
RHINORRHEA: 0
NAUSEA: 0
SHORTNESS OF BREATH: 0
ABDOMINAL PAIN: 0
DIARRHEA: 0
VOMITING: 0
COUGH: 0
SORE THROAT: 0
BACK PAIN: 0

## 2023-10-15 ASSESSMENT — PAIN SCALES - GENERAL
PAINLEVEL_OUTOF10: 0
PAINLEVEL_OUTOF10: 4
PAINLEVEL_OUTOF10: 0

## 2023-10-15 ASSESSMENT — PAIN - FUNCTIONAL ASSESSMENT: PAIN_FUNCTIONAL_ASSESSMENT: ACTIVITIES ARE NOT PREVENTED

## 2023-10-15 ASSESSMENT — SLEEP AND FATIGUE QUESTIONNAIRES
AVERAGE NUMBER OF SLEEP HOURS: 2
DO YOU HAVE DIFFICULTY SLEEPING: YES
DO YOU USE A SLEEP AID: NO

## 2023-10-15 ASSESSMENT — PAIN DESCRIPTION - ORIENTATION: ORIENTATION: OTHER (COMMENT)

## 2023-10-15 ASSESSMENT — PAIN DESCRIPTION - LOCATION: LOCATION: HEAD

## 2023-10-15 ASSESSMENT — PAIN DESCRIPTION - DESCRIPTORS: DESCRIPTORS: ACHING;THROBBING
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
## 2023-10-16 PROBLEM — F32.2 MAJOR DEPRESSIVE DISORDER, SINGLE EPISODE, SEVERE WITHOUT PSYCHOTIC FEATURES (HCC): Status: RESOLVED | Noted: 2023-10-16 | Resolved: 2023-10-16

## 2023-10-16 PROBLEM — F32.2 MAJOR DEPRESSIVE DISORDER, SINGLE EPISODE, SEVERE WITHOUT PSYCHOTIC FEATURES (HCC): Status: ACTIVE | Noted: 2023-10-16

## 2023-10-16 PROBLEM — F33.2 MAJOR DEPRESSIVE DISORDER, RECURRENT SEVERE WITHOUT PSYCHOTIC FEATURES (HCC): Status: ACTIVE | Noted: 2023-10-16

## 2023-10-16 LAB
CHOLEST SERPL-MCNC: 167 MG/DL (ref 160–199)
EKG P AXIS: 65 DEGREES
EKG P-R INTERVAL: 120 MS
EKG Q-T INTERVAL: 390 MS
EKG QRS DURATION: 96 MS
EKG QTC CALCULATION (BAZETT): 409 MS
EKG T AXIS: -13 DEGREES
GLUCOSE BLD-MCNC: 104 MG/DL (ref 70–99)
GLUCOSE BLD-MCNC: 183 MG/DL (ref 70–99)
HBA1C MFR BLD: 5.9 % (ref 4–6)
HDLC SERPL-MCNC: 77 MG/DL (ref 55–121)
LDLC SERPL CALC-MCNC: 73 MG/DL
PERFORMED ON: ABNORMAL
PERFORMED ON: ABNORMAL
TRIGL SERPL-MCNC: 87 MG/DL (ref 0–149)
TSH SERPL DL<=0.005 MIU/L-ACNC: 2.96 UIU/ML (ref 0.35–5.5)
VIT B12 SERPL-MCNC: 309 PG/ML (ref 211–946)

## 2023-10-16 PROCEDURE — 83036 HEMOGLOBIN GLYCOSYLATED A1C: CPT

## 2023-10-16 PROCEDURE — 36415 COLL VENOUS BLD VENIPUNCTURE: CPT

## 2023-10-16 PROCEDURE — 1240000000 HC EMOTIONAL WELLNESS R&B

## 2023-10-16 PROCEDURE — 6370000000 HC RX 637 (ALT 250 FOR IP): Performed by: PSYCHIATRY & NEUROLOGY

## 2023-10-16 PROCEDURE — 84443 ASSAY THYROID STIM HORMONE: CPT

## 2023-10-16 PROCEDURE — 6370000000 HC RX 637 (ALT 250 FOR IP): Performed by: FAMILY MEDICINE

## 2023-10-16 PROCEDURE — 99223 1ST HOSP IP/OBS HIGH 75: CPT | Performed by: PSYCHIATRY & NEUROLOGY

## 2023-10-16 PROCEDURE — 6360000002 HC RX W HCPCS: Performed by: PSYCHIATRY & NEUROLOGY

## 2023-10-16 PROCEDURE — 93010 ELECTROCARDIOGRAM REPORT: CPT | Performed by: INTERNAL MEDICINE

## 2023-10-16 PROCEDURE — 82962 GLUCOSE BLOOD TEST: CPT

## 2023-10-16 PROCEDURE — 82607 VITAMIN B-12: CPT

## 2023-10-16 PROCEDURE — 82306 VITAMIN D 25 HYDROXY: CPT

## 2023-10-16 PROCEDURE — 80061 LIPID PANEL: CPT

## 2023-10-16 RX ORDER — INSULIN LISPRO 100 [IU]/ML
0-4 INJECTION, SOLUTION INTRAVENOUS; SUBCUTANEOUS NIGHTLY
Status: DISCONTINUED | OUTPATIENT
Start: 2023-10-16 | End: 2023-10-19 | Stop reason: HOSPADM

## 2023-10-16 RX ORDER — NITROGLYCERIN 0.4 MG/1
0.4 TABLET SUBLINGUAL EVERY 5 MIN PRN
Status: DISCONTINUED | OUTPATIENT
Start: 2023-10-16 | End: 2023-10-19 | Stop reason: HOSPADM

## 2023-10-16 RX ORDER — DEXTROSE MONOHYDRATE 100 MG/ML
INJECTION, SOLUTION INTRAVENOUS CONTINUOUS PRN
Status: DISCONTINUED | OUTPATIENT
Start: 2023-10-16 | End: 2023-10-19 | Stop reason: HOSPADM

## 2023-10-16 RX ORDER — INSULIN LISPRO 100 [IU]/ML
0-4 INJECTION, SOLUTION INTRAVENOUS; SUBCUTANEOUS
Status: DISCONTINUED | OUTPATIENT
Start: 2023-10-16 | End: 2023-10-19 | Stop reason: HOSPADM

## 2023-10-16 RX ORDER — LEVOTHYROXINE SODIUM 0.05 MG/1
50 TABLET ORAL DAILY
Status: DISCONTINUED | OUTPATIENT
Start: 2023-10-16 | End: 2023-10-19 | Stop reason: HOSPADM

## 2023-10-16 RX ORDER — GABAPENTIN 300 MG/1
600 CAPSULE ORAL 3 TIMES DAILY
Status: DISCONTINUED | OUTPATIENT
Start: 2023-10-16 | End: 2023-10-17

## 2023-10-16 RX ORDER — FAMOTIDINE 20 MG/1
20 TABLET, FILM COATED ORAL 2 TIMES DAILY
Status: DISCONTINUED | OUTPATIENT
Start: 2023-10-16 | End: 2023-10-19 | Stop reason: HOSPADM

## 2023-10-16 RX ORDER — ASPIRIN 81 MG/1
81 TABLET ORAL DAILY
Status: DISCONTINUED | OUTPATIENT
Start: 2023-10-16 | End: 2023-10-19 | Stop reason: HOSPADM

## 2023-10-16 RX ORDER — DULOXETIN HYDROCHLORIDE 30 MG/1
30 CAPSULE, DELAYED RELEASE ORAL DAILY
Status: COMPLETED | OUTPATIENT
Start: 2023-10-16 | End: 2023-10-18

## 2023-10-16 RX ORDER — ATORVASTATIN CALCIUM 40 MG/1
40 TABLET, FILM COATED ORAL DAILY
Status: DISCONTINUED | OUTPATIENT
Start: 2023-10-16 | End: 2023-10-19 | Stop reason: HOSPADM

## 2023-10-16 RX ORDER — KETOROLAC TROMETHAMINE 30 MG/ML
15 INJECTION, SOLUTION INTRAMUSCULAR; INTRAVENOUS DAILY PRN
Status: DISCONTINUED | OUTPATIENT
Start: 2023-10-16 | End: 2023-10-19 | Stop reason: HOSPADM

## 2023-10-16 RX ORDER — CHOLECALCIFEROL (VITAMIN D3) 125 MCG
500 CAPSULE ORAL DAILY
Status: DISCONTINUED | OUTPATIENT
Start: 2023-10-16 | End: 2023-10-19 | Stop reason: HOSPADM

## 2023-10-16 RX ORDER — TRAZODONE HYDROCHLORIDE 100 MG/1
100 TABLET ORAL NIGHTLY
Status: DISCONTINUED | OUTPATIENT
Start: 2023-10-16 | End: 2023-10-17

## 2023-10-16 RX ORDER — DULOXETIN HYDROCHLORIDE 60 MG/1
60 CAPSULE, DELAYED RELEASE ORAL DAILY
Status: DISCONTINUED | OUTPATIENT
Start: 2023-10-19 | End: 2023-10-19 | Stop reason: HOSPADM

## 2023-10-16 RX ORDER — HYDRALAZINE HYDROCHLORIDE 50 MG/1
50 TABLET, FILM COATED ORAL 2 TIMES DAILY
Status: DISCONTINUED | OUTPATIENT
Start: 2023-10-16 | End: 2023-10-19 | Stop reason: HOSPADM

## 2023-10-16 RX ORDER — METOPROLOL SUCCINATE 25 MG/1
25 TABLET, EXTENDED RELEASE ORAL DAILY
Status: DISCONTINUED | OUTPATIENT
Start: 2023-10-16 | End: 2023-10-19 | Stop reason: HOSPADM

## 2023-10-16 RX ORDER — ISOSORBIDE MONONITRATE 30 MG/1
30 TABLET, EXTENDED RELEASE ORAL DAILY
Status: DISCONTINUED | OUTPATIENT
Start: 2023-10-16 | End: 2023-10-19 | Stop reason: HOSPADM

## 2023-10-16 RX ADMIN — GABAPENTIN 600 MG: 300 CAPSULE ORAL at 21:16

## 2023-10-16 RX ADMIN — ACETAMINOPHEN 650 MG: 325 TABLET ORAL at 19:39

## 2023-10-16 RX ADMIN — DULOXETINE HYDROCHLORIDE 30 MG: 30 CAPSULE, DELAYED RELEASE ORAL at 14:40

## 2023-10-16 RX ADMIN — Medication 5 MG: at 21:16

## 2023-10-16 RX ADMIN — LEVOTHYROXINE SODIUM 50 MCG: 50 TABLET ORAL at 14:46

## 2023-10-16 RX ADMIN — METOPROLOL SUCCINATE 25 MG: 25 TABLET, EXTENDED RELEASE ORAL at 16:19

## 2023-10-16 RX ADMIN — ATORVASTATIN CALCIUM 40 MG: 40 TABLET, FILM COATED ORAL at 14:39

## 2023-10-16 RX ADMIN — ASPIRIN 81 MG: 81 TABLET, COATED ORAL at 14:41

## 2023-10-16 RX ADMIN — HYDRALAZINE HYDROCHLORIDE 50 MG: 50 TABLET, FILM COATED ORAL at 14:39

## 2023-10-16 RX ADMIN — FAMOTIDINE 20 MG: 20 TABLET, FILM COATED ORAL at 21:16

## 2023-10-16 RX ADMIN — FAMOTIDINE 20 MG: 20 TABLET, FILM COATED ORAL at 14:39

## 2023-10-16 RX ADMIN — METFORMIN HYDROCHLORIDE 1000 MG: 500 TABLET, FILM COATED ORAL at 17:06

## 2023-10-16 RX ADMIN — TRAZODONE HYDROCHLORIDE 100 MG: 100 TABLET ORAL at 21:16

## 2023-10-16 RX ADMIN — CYANOCOBALAMIN TAB 500 MCG 500 MCG: 500 TAB at 12:57

## 2023-10-16 RX ADMIN — GABAPENTIN 600 MG: 300 CAPSULE ORAL at 14:39

## 2023-10-16 RX ADMIN — HYDRALAZINE HYDROCHLORIDE 50 MG: 50 TABLET, FILM COATED ORAL at 21:16

## 2023-10-16 RX ADMIN — KETOROLAC TROMETHAMINE 15 MG: 30 INJECTION, SOLUTION INTRAMUSCULAR; INTRAVENOUS at 14:40

## 2023-10-16 RX ADMIN — ISOSORBIDE MONONITRATE 30 MG: 30 TABLET, EXTENDED RELEASE ORAL at 14:39

## 2023-10-16 SDOH — ECONOMIC STABILITY: FOOD INSECURITY: WITHIN THE PAST 12 MONTHS, YOU WORRIED THAT YOUR FOOD WOULD RUN OUT BEFORE YOU GOT MONEY TO BUY MORE.: NEVER TRUE

## 2023-10-16 SDOH — ECONOMIC STABILITY: HOUSING INSECURITY
IN THE LAST 12 MONTHS, WAS THERE A TIME WHEN YOU DID NOT HAVE A STEADY PLACE TO SLEEP OR SLEPT IN A SHELTER (INCLUDING NOW)?: NO

## 2023-10-16 SDOH — ECONOMIC STABILITY: INCOME INSECURITY: HOW HARD IS IT FOR YOU TO PAY FOR THE VERY BASICS LIKE FOOD, HOUSING, MEDICAL CARE, AND HEATING?: SOMEWHAT HARD

## 2023-10-16 SDOH — ECONOMIC STABILITY: INCOME INSECURITY: IN THE PAST 12 MONTHS, HAS THE ELECTRIC, GAS, OIL, OR WATER COMPANY THREATENED TO SHUT OFF SERVICE IN YOUR HOME?: NO

## 2023-10-16 SDOH — ECONOMIC STABILITY: HOUSING INSECURITY: IN THE LAST 12 MONTHS, HOW MANY PLACES HAVE YOU LIVED?: 1

## 2023-10-16 SDOH — ECONOMIC STABILITY: INCOME INSECURITY: IN THE LAST 12 MONTHS, WAS THERE A TIME WHEN YOU WERE NOT ABLE TO PAY THE MORTGAGE OR RENT ON TIME?: NO

## 2023-10-16 ASSESSMENT — PATIENT HEALTH QUESTIONNAIRE - PHQ9
7. TROUBLE CONCENTRATING ON THINGS, SUCH AS READING THE NEWSPAPER OR WATCHING TELEVISION: 2
8. MOVING OR SPEAKING SO SLOWLY THAT OTHER PEOPLE COULD HAVE NOTICED. OR THE OPPOSITE, BEING SO FIGETY OR RESTLESS THAT YOU HAVE BEEN MOVING AROUND A LOT MORE THAN USUAL: 3
3. TROUBLE FALLING OR STAYING ASLEEP: 3
SUM OF ALL RESPONSES TO PHQ QUESTIONS 1-9: 22
SUM OF ALL RESPONSES TO PHQ9 QUESTIONS 1 & 2: 6
SUM OF ALL RESPONSES TO PHQ QUESTIONS 1-9: 22
6. FEELING BAD ABOUT YOURSELF - OR THAT YOU ARE A FAILURE OR HAVE LET YOURSELF OR YOUR FAMILY DOWN: 2
10. IF YOU CHECKED OFF ANY PROBLEMS, HOW DIFFICULT HAVE THESE PROBLEMS MADE IT FOR YOU TO DO YOUR WORK, TAKE CARE OF THINGS AT HOME, OR GET ALONG WITH OTHER PEOPLE: 3
SUM OF ALL RESPONSES TO PHQ QUESTIONS 1-9: 22
2. FEELING DOWN, DEPRESSED OR HOPELESS: 3
1. LITTLE INTEREST OR PLEASURE IN DOING THINGS: 3
5. POOR APPETITE OR OVEREATING: 2
4. FEELING TIRED OR HAVING LITTLE ENERGY: 2
SUM OF ALL RESPONSES TO PHQ QUESTIONS 1-9: 20
9. THOUGHTS THAT YOU WOULD BE BETTER OFF DEAD, OR OF HURTING YOURSELF: 2

## 2023-10-16 ASSESSMENT — PAIN DESCRIPTION - DESCRIPTORS
DESCRIPTORS: ACHING
DESCRIPTORS: POUNDING

## 2023-10-16 ASSESSMENT — PAIN DESCRIPTION - LOCATION
LOCATION: HEAD
LOCATION: HEAD
LOCATION: BACK;LEG

## 2023-10-16 ASSESSMENT — PAIN - FUNCTIONAL ASSESSMENT
PAIN_FUNCTIONAL_ASSESSMENT: ACTIVITIES ARE NOT PREVENTED
PAIN_FUNCTIONAL_ASSESSMENT: ACTIVITIES ARE NOT PREVENTED
PAIN_FUNCTIONAL_ASSESSMENT: PREVENTS OR INTERFERES SOME ACTIVE ACTIVITIES AND ADLS

## 2023-10-16 ASSESSMENT — PAIN SCALES - GENERAL
PAINLEVEL_OUTOF10: 0
PAINLEVEL_OUTOF10: 7
PAINLEVEL_OUTOF10: 7
PAINLEVEL_OUTOF10: 10

## 2023-10-16 ASSESSMENT — PAIN DESCRIPTION - ORIENTATION
ORIENTATION: OTHER (COMMENT)
ORIENTATION: RIGHT;LEFT

## 2023-10-16 NOTE — PLAN OF CARE
Group Therapy Note    Date: 10/16/2023  Start Time: 1100  End Time:  1130  Number of Participants: 8    Type of Group: Healthy Living/Wellness    Wellness Binder Information  Module Name:  Bk Campos  Session Number:  1    Group Goal for Pt: To improve knowledge of practical facts about depression    Notes:  Pt did not attend group activity. Pt was invited/encouraged. Status After Intervention:      Participation Level:     Participation Quality:       Speech:         Thought Process/Content:       Affective Functioning:       Mood:       Level of consciousness:        Response to Learning:       Endings:     Modes of Intervention:       Discipline Responsible:       Signature:  Calrk Lira

## 2023-10-16 NOTE — PLAN OF CARE
Problem: Chronic Conditions and Co-morbidities  Goal: Patient's chronic conditions and co-morbidity symptoms are monitored and maintained or improved  Outcome: Progressing  Flowsheets (Taken 10/16/2023 1549)  Care Plan - Patient's Chronic Conditions and Co-Morbidity Symptoms are Monitored and Maintained or Improved: Monitor and assess patient's chronic conditions and comorbid symptoms for stability, deterioration, or improvement     Problem: Discharge Planning  Goal: Discharge to home or other facility with appropriate resources  Outcome: Progressing  Flowsheets (Taken 10/16/2023 1549)  Discharge to home or other facility with appropriate resources: Identify barriers to discharge with patient and caregiver     Problem: Self Harm/Suicidality  Goal: Will have no self-injury during hospital stay  Description: INTERVENTIONS:  1. Ensure constant observer at bedside with Q15M safety checks  2. Maintain a safe environment  3. Secure patient belongings  4. Ensure family/visitors adhere to safety recommendations  5. Ensure safety tray has been added to patient's diet order  6.   Every shift and PRN: Re-assess suicidal risk via Frequent Screener    10/16/2023 1555 by Esau Treviño RN  Outcome: Progressing  10/16/2023 1551 by LOYDA Nieves  Outcome: Progressing  Flowsheets (Taken 10/16/2023 1549 by Esau Treviño RN)  Will have no self-injury during hospital stay: Maintain a safe environment     Problem: Safety - Adult  Goal: Free from fall injury  Outcome: Progressing     Problem: Safety - Adult  Goal: Free from fall injury  Outcome: Progressing

## 2023-10-16 NOTE — PLAN OF CARE
Problem: Self Harm/Suicidality  Goal: Will have no self-injury during hospital stay  Description: INTERVENTIONS:  1. Ensure constant observer at bedside with Q15M safety checks  2. Maintain a safe environment  3. Secure patient belongings  4. Ensure family/visitors adhere to safety recommendations  5. Ensure safety tray has been added to patient's diet order  6. Every shift and PRN: Re-assess suicidal risk via Frequent Screener    Outcome: Progressing                                                      Group Note    Date: 10/16/23  Start Time: 3:00 PM   End Time:3:30 PM     Number of Participants: 5    Type of Group: Activity     Patient's Goal: Motivating patient to interact and express different emotions in group. Notes:  Encouraged patient to participate and discuss with group members     Participation Level:  Active Listener    Participation Quality: Appropriate    Speech:  normal    Thought Process/Content: Logical    Mood: Calm     Level of consciousness:  Alert    Response to Learning: Able to retain information    Modes of Intervention: Education and Support    Discipline Responsible: /Counselor     Signature:  LOYDA Pringle

## 2023-10-16 NOTE — PLAN OF CARE
Group Therapy Note    Date: 10/16/2023  Start Time: 1000  End Time:  1030  Number of Participants: 9    Type of Group: Psychoeducation    Wellness Binder Information  Module Name:  Relapse Prevention  Session Number:  5    Group Goal for Pt: To improve knowledge of relapse prevention strategies    Notes:  Pt did not attend group discussion. Pt was invited/encouraged. Status After Intervention:      Participation Level:     Participation Quality:       Speech:         Thought Process/Content:       Affective Functioning:       Mood:       Level of consciousness:        Response to Learning:       Endings:     Modes of Intervention:       Discipline Responsible:       Signature:  Donovan Yoon

## 2023-10-17 LAB
25(OH)D3 SERPL-MCNC: 8.6 NG/ML
GLUCOSE BLD-MCNC: 131 MG/DL (ref 70–99)
PERFORMED ON: ABNORMAL

## 2023-10-17 PROCEDURE — 99232 SBSQ HOSP IP/OBS MODERATE 35: CPT | Performed by: PSYCHIATRY & NEUROLOGY

## 2023-10-17 PROCEDURE — 6370000000 HC RX 637 (ALT 250 FOR IP): Performed by: FAMILY MEDICINE

## 2023-10-17 PROCEDURE — 6370000000 HC RX 637 (ALT 250 FOR IP): Performed by: PSYCHIATRY & NEUROLOGY

## 2023-10-17 PROCEDURE — 6360000002 HC RX W HCPCS: Performed by: PSYCHIATRY & NEUROLOGY

## 2023-10-17 PROCEDURE — 82962 GLUCOSE BLOOD TEST: CPT

## 2023-10-17 PROCEDURE — 1240000000 HC EMOTIONAL WELLNESS R&B

## 2023-10-17 RX ORDER — TRAZODONE HYDROCHLORIDE 150 MG/1
150 TABLET ORAL NIGHTLY
Status: DISCONTINUED | OUTPATIENT
Start: 2023-10-17 | End: 2023-10-19 | Stop reason: HOSPADM

## 2023-10-17 RX ORDER — ERGOCALCIFEROL 1.25 MG/1
50000 CAPSULE ORAL WEEKLY
Status: DISCONTINUED | OUTPATIENT
Start: 2023-10-17 | End: 2023-10-19 | Stop reason: HOSPADM

## 2023-10-17 RX ORDER — GABAPENTIN 400 MG/1
800 CAPSULE ORAL 3 TIMES DAILY
Status: DISCONTINUED | OUTPATIENT
Start: 2023-10-17 | End: 2023-10-19 | Stop reason: HOSPADM

## 2023-10-17 RX ADMIN — LEVOTHYROXINE SODIUM 50 MCG: 50 TABLET ORAL at 06:17

## 2023-10-17 RX ADMIN — HYDRALAZINE HYDROCHLORIDE 50 MG: 50 TABLET, FILM COATED ORAL at 20:29

## 2023-10-17 RX ADMIN — GABAPENTIN 600 MG: 300 CAPSULE ORAL at 09:11

## 2023-10-17 RX ADMIN — HYDRALAZINE HYDROCHLORIDE 50 MG: 50 TABLET, FILM COATED ORAL at 09:11

## 2023-10-17 RX ADMIN — CYANOCOBALAMIN TAB 500 MCG 500 MCG: 500 TAB at 09:11

## 2023-10-17 RX ADMIN — ISOSORBIDE MONONITRATE 30 MG: 30 TABLET, EXTENDED RELEASE ORAL at 09:11

## 2023-10-17 RX ADMIN — ERGOCALCIFEROL 50000 UNITS: 1.25 CAPSULE ORAL at 10:58

## 2023-10-17 RX ADMIN — ASPIRIN 81 MG: 81 TABLET, COATED ORAL at 09:11

## 2023-10-17 RX ADMIN — DULOXETINE HYDROCHLORIDE 30 MG: 30 CAPSULE, DELAYED RELEASE ORAL at 09:11

## 2023-10-17 RX ADMIN — GABAPENTIN 800 MG: 400 CAPSULE ORAL at 13:42

## 2023-10-17 RX ADMIN — FAMOTIDINE 20 MG: 20 TABLET, FILM COATED ORAL at 09:11

## 2023-10-17 RX ADMIN — METOPROLOL SUCCINATE 25 MG: 25 TABLET, EXTENDED RELEASE ORAL at 09:11

## 2023-10-17 RX ADMIN — ATORVASTATIN CALCIUM 40 MG: 40 TABLET, FILM COATED ORAL at 09:12

## 2023-10-17 RX ADMIN — METFORMIN HYDROCHLORIDE 1000 MG: 500 TABLET, FILM COATED ORAL at 09:11

## 2023-10-17 RX ADMIN — TRAZODONE HYDROCHLORIDE 150 MG: 150 TABLET ORAL at 20:29

## 2023-10-17 RX ADMIN — KETOROLAC TROMETHAMINE 15 MG: 30 INJECTION, SOLUTION INTRAMUSCULAR; INTRAVENOUS at 10:24

## 2023-10-17 RX ADMIN — Medication 5 MG: at 20:29

## 2023-10-17 RX ADMIN — METFORMIN HYDROCHLORIDE 1000 MG: 500 TABLET, FILM COATED ORAL at 16:36

## 2023-10-17 RX ADMIN — FAMOTIDINE 20 MG: 20 TABLET, FILM COATED ORAL at 20:29

## 2023-10-17 RX ADMIN — GABAPENTIN 800 MG: 400 CAPSULE ORAL at 20:29

## 2023-10-17 ASSESSMENT — PAIN SCALES - GENERAL
PAINLEVEL_OUTOF10: 10
PAINLEVEL_OUTOF10: 7

## 2023-10-17 ASSESSMENT — PAIN DESCRIPTION - ORIENTATION: ORIENTATION: LOWER

## 2023-10-17 ASSESSMENT — PAIN DESCRIPTION - FREQUENCY: FREQUENCY: CONTINUOUS

## 2023-10-17 ASSESSMENT — PAIN DESCRIPTION - PAIN TYPE: TYPE: CHRONIC PAIN

## 2023-10-17 ASSESSMENT — PAIN - FUNCTIONAL ASSESSMENT: PAIN_FUNCTIONAL_ASSESSMENT: ACTIVITIES ARE NOT PREVENTED

## 2023-10-17 ASSESSMENT — PAIN DESCRIPTION - DESCRIPTORS: DESCRIPTORS: THROBBING;SHARP;STABBING

## 2023-10-17 ASSESSMENT — PAIN DESCRIPTION - ONSET: ONSET: ON-GOING

## 2023-10-17 ASSESSMENT — PAIN DESCRIPTION - LOCATION: LOCATION: BACK

## 2023-10-17 NOTE — PLAN OF CARE
Problem: Chronic Conditions and Co-morbidities  Goal: Patient's chronic conditions and co-morbidity symptoms are monitored and maintained or improved  Outcome: Progressing     Problem: Discharge Planning  Goal: Discharge to home or other facility with appropriate resources  Outcome: Progressing     Problem: Self Harm/Suicidality  Goal: Will have no self-injury during hospital stay  Description: INTERVENTIONS:  1. Ensure constant observer at bedside with Q15M safety checks  2. Maintain a safe environment  3. Secure patient belongings  4. Ensure family/visitors adhere to safety recommendations  5. Ensure safety tray has been added to patient's diet order  6.   Every shift and PRN: Re-assess suicidal risk via Frequent Screener    Outcome: Progressing     Problem: Safety - Adult  Goal: Free from fall injury  Outcome: Progressing     Problem: Pain  Goal: Verbalizes/displays adequate comfort level or baseline comfort level  10/17/2023 1244 by Shakir Sullivan RN  Outcome: Progressing  10/17/2023 1207 by Gael Waters LPC  Outcome: Progressing

## 2023-10-17 NOTE — PLAN OF CARE
Problem: Pain  Goal: Verbalizes/displays adequate comfort level or baseline comfort level  10/17/2023 1207 by Chris Lopez LPC  Outcome: Progressing     Group Therapy Note     Date: 10/17/2023  Start Time: 1000  End Time:  6886  Number of Participants: 7     Type of Group: Psychotherapy     Patient's Goal: Patient will process emotions and actions and how to relate to other or their with others. Patient discussed open communication and feelings and emotions. Notes:  Patient attended process group as scheduled, patient identified a issue to work on today regarding how they will interact and relate to others. Status After Intervention:  Improved     Participation Level:  Active Listener     Participation Quality: Appropriate, Attentive, and Sharing        Speech:  normal        Thought Process/Content: Logical        Affective Functioning: Congruent        Mood: euthymic        Level of consciousness:  Alert        Response to Learning: Able to verbalize current knowledge/experience        Endings: None Reported     Modes of Intervention: Education, Support, and Socialization        Discipline Responsible: /Counselor        Signature:  Chris Lopez [facility]

## 2023-10-18 LAB
GLUCOSE BLD-MCNC: 129 MG/DL (ref 70–99)
PERFORMED ON: ABNORMAL

## 2023-10-18 PROCEDURE — 6370000000 HC RX 637 (ALT 250 FOR IP): Performed by: PSYCHIATRY & NEUROLOGY

## 2023-10-18 PROCEDURE — 1240000000 HC EMOTIONAL WELLNESS R&B

## 2023-10-18 PROCEDURE — 99232 SBSQ HOSP IP/OBS MODERATE 35: CPT | Performed by: PSYCHIATRY & NEUROLOGY

## 2023-10-18 PROCEDURE — 6370000000 HC RX 637 (ALT 250 FOR IP): Performed by: FAMILY MEDICINE

## 2023-10-18 PROCEDURE — 6360000002 HC RX W HCPCS: Performed by: PSYCHIATRY & NEUROLOGY

## 2023-10-18 PROCEDURE — 82962 GLUCOSE BLOOD TEST: CPT

## 2023-10-18 RX ADMIN — FAMOTIDINE 20 MG: 20 TABLET, FILM COATED ORAL at 20:27

## 2023-10-18 RX ADMIN — METFORMIN HYDROCHLORIDE 1000 MG: 500 TABLET, FILM COATED ORAL at 08:48

## 2023-10-18 RX ADMIN — GABAPENTIN 800 MG: 400 CAPSULE ORAL at 08:46

## 2023-10-18 RX ADMIN — ACETAMINOPHEN 650 MG: 325 TABLET ORAL at 09:58

## 2023-10-18 RX ADMIN — ASPIRIN 81 MG: 81 TABLET, COATED ORAL at 08:49

## 2023-10-18 RX ADMIN — ACETAMINOPHEN 650 MG: 325 TABLET ORAL at 18:29

## 2023-10-18 RX ADMIN — HYDRALAZINE HYDROCHLORIDE 50 MG: 50 TABLET, FILM COATED ORAL at 20:27

## 2023-10-18 RX ADMIN — FAMOTIDINE 20 MG: 20 TABLET, FILM COATED ORAL at 08:48

## 2023-10-18 RX ADMIN — KETOROLAC TROMETHAMINE 15 MG: 30 INJECTION, SOLUTION INTRAMUSCULAR; INTRAVENOUS at 09:07

## 2023-10-18 RX ADMIN — GABAPENTIN 800 MG: 400 CAPSULE ORAL at 20:28

## 2023-10-18 RX ADMIN — DULOXETINE HYDROCHLORIDE 30 MG: 30 CAPSULE, DELAYED RELEASE ORAL at 08:47

## 2023-10-18 RX ADMIN — METFORMIN HYDROCHLORIDE 1000 MG: 500 TABLET, FILM COATED ORAL at 16:55

## 2023-10-18 RX ADMIN — Medication 5 MG: at 20:27

## 2023-10-18 RX ADMIN — GABAPENTIN 800 MG: 400 CAPSULE ORAL at 14:24

## 2023-10-18 RX ADMIN — CYANOCOBALAMIN TAB 500 MCG 500 MCG: 500 TAB at 08:49

## 2023-10-18 RX ADMIN — ATORVASTATIN CALCIUM 40 MG: 40 TABLET, FILM COATED ORAL at 08:48

## 2023-10-18 RX ADMIN — ISOSORBIDE MONONITRATE 30 MG: 30 TABLET, EXTENDED RELEASE ORAL at 08:49

## 2023-10-18 RX ADMIN — HYDROXYZINE HYDROCHLORIDE 25 MG: 25 TABLET, FILM COATED ORAL at 20:30

## 2023-10-18 RX ADMIN — HYDRALAZINE HYDROCHLORIDE 50 MG: 50 TABLET, FILM COATED ORAL at 08:49

## 2023-10-18 RX ADMIN — METOPROLOL SUCCINATE 25 MG: 25 TABLET, EXTENDED RELEASE ORAL at 08:48

## 2023-10-18 RX ADMIN — LEVOTHYROXINE SODIUM 50 MCG: 50 TABLET ORAL at 06:15

## 2023-10-18 RX ADMIN — TRAZODONE HYDROCHLORIDE 150 MG: 150 TABLET ORAL at 20:28

## 2023-10-18 ASSESSMENT — PAIN SCALES - GENERAL
PAINLEVEL_OUTOF10: 8
PAINLEVEL_OUTOF10: 10

## 2023-10-18 ASSESSMENT — PAIN DESCRIPTION - DESCRIPTORS
DESCRIPTORS: THROBBING
DESCRIPTORS: THROBBING;STABBING;SHARP

## 2023-10-18 ASSESSMENT — PAIN DESCRIPTION - ONSET
ONSET: GRADUAL
ONSET: ON-GOING

## 2023-10-18 ASSESSMENT — PAIN - FUNCTIONAL ASSESSMENT
PAIN_FUNCTIONAL_ASSESSMENT: ACTIVITIES ARE NOT PREVENTED

## 2023-10-18 ASSESSMENT — PAIN DESCRIPTION - PAIN TYPE
TYPE: ACUTE PAIN
TYPE: CHRONIC PAIN

## 2023-10-18 ASSESSMENT — PAIN DESCRIPTION - ORIENTATION
ORIENTATION: LOWER
ORIENTATION: LOWER

## 2023-10-18 ASSESSMENT — PAIN DESCRIPTION - FREQUENCY
FREQUENCY: INTERMITTENT
FREQUENCY: CONTINUOUS

## 2023-10-18 ASSESSMENT — PAIN DESCRIPTION - LOCATION
LOCATION: BACK
LOCATION: BACK
LOCATION: HEAD

## 2023-10-18 NOTE — PLAN OF CARE
Problem: Coping  Goal: Pt/Family able to verbalize concerns and demonstrate effective coping strategies  Description: INTERVENTIONS:  1. Assist patient/family to identify coping skills, available support systems and cultural and spiritual values  2. Provide emotional support, including active listening and acknowledgement of concerns of patient and caregivers  3. Reduce environmental stimuli, as able  4. Instruct patient/family in relaxation techniques, as appropriate  5. Assess for spiritual pain/suffering and initiate Spiritual Care, Psychosocial Clinical Specialist consults as needed  10/18/2023 1234 by Cortney Rossi  Outcome: Progressing  Note:                                                                     Group Therapy Note    Date: 10/18/2023  Start Time: 1100  End Time:  1130  Number of Participants: 8    Type of Group: Healthy Living/Wellness    Wellness Binder Information  Module Name:  Men's Issues  Session Number:  1    Group Goal for Pt: To improve knowledge of effective stress management techniques    Notes:  Pt demonstrated improved knowledge of effective stress management techniques by actively participating in group discussion. Status After Intervention:  Unchanged    Participation Level:  Active Listener and Interactive    Participation Quality: Appropriate and Attentive      Speech:  normal      Thought Process/Content: Logical      Affective Functioning: Congruent      Mood: anxious and depressed      Level of consciousness:  Alert and Oriented x4      Response to Learning: Able to verbalize current knowledge/experience, Able to verbalize/acknowledge new learning, and Progressing to goal      Endings: None Reported    Modes of Intervention: Education      Discipline Responsible: Psychoeducational Specialist      Signature:  Cortney Rossi

## 2023-10-18 NOTE — PLAN OF CARE
Problem: Chronic Conditions and Co-morbidities  Goal: Patient's chronic conditions and co-morbidity symptoms are monitored and maintained or improved  Outcome: Progressing     Problem: Discharge Planning  Goal: Discharge to home or other facility with appropriate resources  Outcome: Progressing     Problem: Self Harm/Suicidality  Goal: Will have no self-injury during hospital stay  Description: INTERVENTIONS:  1. Ensure constant observer at bedside with Q15M safety checks  2. Maintain a safe environment  3. Secure patient belongings  4. Ensure family/visitors adhere to safety recommendations  5. Ensure safety tray has been added to patient's diet order  6. Every shift and PRN: Re-assess suicidal risk via Frequent Screener    Outcome: Progressing     Problem: Safety - Adult  Goal: Free from fall injury  Outcome: Progressing     Problem: Pain  Goal: Verbalizes/displays adequate comfort level or baseline comfort level  Outcome: Progressing     Problem: Coping  Goal: Pt/Family able to verbalize concerns and demonstrate effective coping strategies  Description: INTERVENTIONS:  1. Assist patient/family to identify coping skills, available support systems and cultural and spiritual values  2. Provide emotional support, including active listening and acknowledgement of concerns of patient and caregivers  3. Reduce environmental stimuli, as able  4. Instruct patient/family in relaxation techniques, as appropriate  5.  Assess for spiritual pain/suffering and initiate Spiritual Care, Psychosocial Clinical Specialist consults as needed  10/18/2023 1432 by Kyrie Alva RN  Outcome: Progressing  10/18/2023 1234 by Tien Jim  Outcome: Progressing  Note:                                                                     Group Therapy Note    Date: 10/18/2023  Start Time: 1100  End Time:  1130  Number of Participants: 8    Type of Group: Healthy Living/Wellness    Wellness Binder Information  Module Name:

## 2023-10-18 NOTE — PLAN OF CARE
Problem: Coping  Goal: Pt/Family able to verbalize concerns and demonstrate effective coping strategies  Description: INTERVENTIONS:  1. Assist patient/family to identify coping skills, available support systems and cultural and spiritual values  2. Provide emotional support, including active listening and acknowledgement of concerns of patient and caregivers  3. Reduce environmental stimuli, as able  4. Instruct patient/family in relaxation techniques, as appropriate  5. Assess for spiritual pain/suffering and initiate Spiritual Care, Psychosocial Clinical Specialist consults as needed  Outcome: Progressing  Note:                                                                     Group Therapy Note    Date: 10/18/2023  Start Time: 1000  End Time:  1030  Number of Participants: 6    Type of Group: Psychoeducation    Wellness Binder Information  Module Name:  Women's Issues  Session Number:  4    Group Goal for Pt: To raise awareness of how thoughts influence feelings    Notes:  Pt demonstrated improved awareness of how thoughts influence feelings by actively participating in group discussion. Status After Intervention:  Unchanged    Participation Level:  Active Listener and Interactive    Participation Quality: Appropriate and Attentive      Speech:  normal      Thought Process/Content: Logical      Affective Functioning: Congruent      Mood: anxious and depressed      Level of consciousness:  Alert and Oriented x4      Response to Learning: Able to verbalize current knowledge/experience, Able to verbalize/acknowledge new learning, and Progressing to goal      Endings: None Reported    Modes of Intervention: Education      Discipline Responsible: Psychoeducational Specialist      Signature:  Joya Panchal

## 2023-10-18 NOTE — PSYCHOTHERAPY
Group Therapy Note    Date: 10/18/2023  Start Time: 1330  End Time:  1400  Number of Participants: 6    Type of Group: SPIRITUALITY     Wellness Binder Information  Module Name:  Mindfulness  Session Number:      Patient's Goal:  To rest the mind///    Notes:      Status After Intervention:  Improved    Participation Level:  Active Listener    Participation Quality: Appropriate and Attentive      Speech:  normal      Thought Process/Content:       Affective Functioning: Congruent      Mood: calm      Level of consciousness:  Oriented x4 and Attentive      Response to Learning: Able to verbalize current knowledge/experience and Capable of insight      Endings:     Modes of Intervention: Education, Support, and Activity      Discipline Responsible:       Signature:  Emely Norris MA [facility]

## 2023-10-18 NOTE — PLAN OF CARE
Problem: Coping  Goal: Pt/Family able to verbalize concerns and demonstrate effective coping strategies  Description: INTERVENTIONS:  1. Assist patient/family to identify coping skills, available support systems and cultural and spiritual values  2. Provide emotional support, including active listening and acknowledgement of concerns of patient and caregivers  3. Reduce environmental stimuli, as able  4. Instruct patient/family in relaxation techniques, as appropriate  5. Assess for spiritual pain/suffering and initiate Spiritual Care, Psychosocial Clinical Specialist consults as needed  10/18/2023 1545 by LOYDA Farris  Outcome: Progressing  Flowsheets (Taken 10/18/2023 1441 by Yi Reyes RN)  Patient/family able to verbalize anxieties, fears, and concerns, and demonstrate effective coping:   Assist patient/family to identify coping skills, available support systems and cultural and spiritual values   Provide emotional support, including active listening and acknowledgement of concerns of patient and caregivers   Reduce environmental stimuli, as able   Instruct patient/family in relaxation techniques, as appropriate   Group Note    Date: 10/18/23  Start Time: 2:30 PM   End Time:3:00 PM     Number of Participants: 7    Type of Group: Activity     Patient's Goal: Motivating patient to interact and express different emotions in group. Notes:  Encouraged patient to participate and discuss with group members      Participation Level:  Active Listener    Participation Quality: Appropriate    Speech:  normal    Thought Process/Content: Logical    Mood: calm    Level of consciousness:  Alert    Response to Learning: Able to verbalize/acknowledge new learning    Modes of Intervention: Education and Support    Discipline Responsible: /Counselor     Signature:  LOYDA Farris

## 2023-10-19 VITALS
TEMPERATURE: 98.2 F | RESPIRATION RATE: 18 BRPM | SYSTOLIC BLOOD PRESSURE: 120 MMHG | OXYGEN SATURATION: 100 % | HEART RATE: 104 BPM | DIASTOLIC BLOOD PRESSURE: 79 MMHG

## 2023-10-19 PROCEDURE — 5130000000 HC BRIDGE APPOINTMENT

## 2023-10-19 PROCEDURE — 6370000000 HC RX 637 (ALT 250 FOR IP): Performed by: PSYCHIATRY & NEUROLOGY

## 2023-10-19 PROCEDURE — 99239 HOSP IP/OBS DSCHRG MGMT >30: CPT | Performed by: PSYCHIATRY & NEUROLOGY

## 2023-10-19 PROCEDURE — 6360000002 HC RX W HCPCS: Performed by: PSYCHIATRY & NEUROLOGY

## 2023-10-19 PROCEDURE — 6370000000 HC RX 637 (ALT 250 FOR IP): Performed by: FAMILY MEDICINE

## 2023-10-19 RX ORDER — ATORVASTATIN CALCIUM 20 MG/1
40 TABLET, FILM COATED ORAL DAILY
Qty: 60 TABLET | Refills: 0 | Status: SHIPPED | OUTPATIENT
Start: 2023-10-19

## 2023-10-19 RX ORDER — ASPIRIN 81 MG/1
81 TABLET ORAL DAILY
Qty: 30 TABLET | Refills: 0 | Status: SHIPPED | OUTPATIENT
Start: 2023-10-19

## 2023-10-19 RX ORDER — ERGOCALCIFEROL 1.25 MG/1
50000 CAPSULE ORAL WEEKLY
Qty: 5 CAPSULE | Refills: 0 | Status: SHIPPED | OUTPATIENT
Start: 2023-10-24

## 2023-10-19 RX ORDER — GABAPENTIN 400 MG/1
800 CAPSULE ORAL 3 TIMES DAILY
Qty: 180 CAPSULE | Refills: 0 | Status: SHIPPED | OUTPATIENT
Start: 2023-10-19 | End: 2023-11-18

## 2023-10-19 RX ORDER — LEVOTHYROXINE SODIUM 0.05 MG/1
50 TABLET ORAL DAILY
Qty: 30 TABLET | Refills: 0 | Status: SHIPPED | OUTPATIENT
Start: 2023-10-20

## 2023-10-19 RX ORDER — METOPROLOL SUCCINATE 25 MG/1
25 TABLET, EXTENDED RELEASE ORAL DAILY
Qty: 30 TABLET | Refills: 0 | Status: SHIPPED | OUTPATIENT
Start: 2023-10-20

## 2023-10-19 RX ORDER — MECOBALAMIN 5000 MCG
5 TABLET,DISINTEGRATING ORAL NIGHTLY
Qty: 30 TABLET | Refills: 0 | Status: SHIPPED | OUTPATIENT
Start: 2023-10-19

## 2023-10-19 RX ORDER — ISOSORBIDE MONONITRATE 30 MG/1
30 TABLET, EXTENDED RELEASE ORAL DAILY
Qty: 30 TABLET | Refills: 0 | Status: SHIPPED | OUTPATIENT
Start: 2023-10-20

## 2023-10-19 RX ORDER — TRAZODONE HYDROCHLORIDE 150 MG/1
150 TABLET ORAL NIGHTLY
Qty: 30 TABLET | Refills: 0 | Status: SHIPPED | OUTPATIENT
Start: 2023-10-19

## 2023-10-19 RX ORDER — HYDRALAZINE HYDROCHLORIDE 50 MG/1
50 TABLET, FILM COATED ORAL 2 TIMES DAILY
Qty: 60 TABLET | Refills: 0 | Status: SHIPPED | OUTPATIENT
Start: 2023-10-19

## 2023-10-19 RX ORDER — NITROGLYCERIN 0.4 MG/1
0.4 TABLET SUBLINGUAL EVERY 5 MIN PRN
Qty: 25 TABLET | Refills: 0 | Status: SHIPPED | OUTPATIENT
Start: 2023-10-19

## 2023-10-19 RX ORDER — DULOXETIN HYDROCHLORIDE 60 MG/1
60 CAPSULE, DELAYED RELEASE ORAL DAILY
Qty: 30 CAPSULE | Refills: 0 | Status: SHIPPED | OUTPATIENT
Start: 2023-10-20

## 2023-10-19 RX ORDER — HYDROXYZINE HYDROCHLORIDE 25 MG/1
25 TABLET, FILM COATED ORAL EVERY 4 HOURS PRN
Qty: 30 TABLET | Refills: 0 | Status: SHIPPED | OUTPATIENT
Start: 2023-10-19 | End: 2023-10-29

## 2023-10-19 RX ADMIN — HYDROXYZINE HYDROCHLORIDE 25 MG: 25 TABLET, FILM COATED ORAL at 08:06

## 2023-10-19 RX ADMIN — DULOXETINE HYDROCHLORIDE 60 MG: 60 CAPSULE, DELAYED RELEASE ORAL at 07:59

## 2023-10-19 RX ADMIN — GABAPENTIN 800 MG: 400 CAPSULE ORAL at 08:00

## 2023-10-19 RX ADMIN — LEVOTHYROXINE SODIUM 50 MCG: 50 TABLET ORAL at 06:05

## 2023-10-19 RX ADMIN — ATORVASTATIN CALCIUM 40 MG: 40 TABLET, FILM COATED ORAL at 08:00

## 2023-10-19 RX ADMIN — METOPROLOL SUCCINATE 25 MG: 25 TABLET, EXTENDED RELEASE ORAL at 08:00

## 2023-10-19 RX ADMIN — ISOSORBIDE MONONITRATE 30 MG: 30 TABLET, EXTENDED RELEASE ORAL at 08:00

## 2023-10-19 RX ADMIN — GABAPENTIN 800 MG: 400 CAPSULE ORAL at 13:55

## 2023-10-19 RX ADMIN — HYDRALAZINE HYDROCHLORIDE 50 MG: 50 TABLET, FILM COATED ORAL at 08:00

## 2023-10-19 RX ADMIN — ACETAMINOPHEN 650 MG: 325 TABLET ORAL at 06:05

## 2023-10-19 RX ADMIN — ACETAMINOPHEN 650 MG: 325 TABLET ORAL at 12:39

## 2023-10-19 RX ADMIN — CYANOCOBALAMIN TAB 500 MCG 500 MCG: 500 TAB at 08:01

## 2023-10-19 RX ADMIN — FAMOTIDINE 20 MG: 20 TABLET, FILM COATED ORAL at 08:01

## 2023-10-19 RX ADMIN — KETOROLAC TROMETHAMINE 15 MG: 30 INJECTION, SOLUTION INTRAMUSCULAR; INTRAVENOUS at 06:05

## 2023-10-19 RX ADMIN — METFORMIN HYDROCHLORIDE 1000 MG: 500 TABLET, FILM COATED ORAL at 07:59

## 2023-10-19 RX ADMIN — ASPIRIN 81 MG: 81 TABLET, COATED ORAL at 08:00

## 2023-10-19 ASSESSMENT — PAIN - FUNCTIONAL ASSESSMENT: PAIN_FUNCTIONAL_ASSESSMENT: ACTIVITIES ARE NOT PREVENTED

## 2023-10-19 ASSESSMENT — PAIN SCALES - GENERAL
PAINLEVEL_OUTOF10: 10
PAINLEVEL_OUTOF10: 8

## 2023-10-19 ASSESSMENT — PAIN DESCRIPTION - LOCATION
LOCATION: HEAD;BACK
LOCATION: BACK

## 2023-10-19 ASSESSMENT — PAIN DESCRIPTION - ORIENTATION: ORIENTATION: LOWER

## 2023-10-19 ASSESSMENT — PAIN DESCRIPTION - DESCRIPTORS
DESCRIPTORS: ACHING
DESCRIPTORS: THROBBING;BURNING

## 2023-10-19 ASSESSMENT — LIFESTYLE VARIABLES
HOW MANY STANDARD DRINKS CONTAINING ALCOHOL DO YOU HAVE ON A TYPICAL DAY: 10 OR MORE
HOW OFTEN DO YOU HAVE A DRINK CONTAINING ALCOHOL: 2-4 TIMES A MONTH

## 2023-10-19 NOTE — DISCHARGE INSTRUCTIONS
Medications:   Medication Summary Provided. I understand that I should take only the medications on my list.   --why and when I need to take each medication. --which side effects to watch for.   --that I should carry my medication information at all times in case of emergency    Situations. --I will take all medications until discontinued by physician. I will take all my medications to follow up appointments. --check with my physician or pharmacist before taking any new medication, over    the counter product or drink alcohol.   --ask about food, drug and dietary supplement interactions. --discard old lists and update records with medication providers. Behavior Health Follow Up:  Original Referral Source: ED  Discharge Diagnosis: [unfilled]  Recomendations for Level of Care: Follow Up  Patient Status at Discharge: Stable  [facility]  was: Aftercare plan faxed:    Faxed by: Social Work staff Jennifer   Date: 10/19/23   Time: Will be faxed by staff  Prescriptions sent with pt.     General Information:   Questions regarding your bill: Call [phone]   Suicide Hotline ([facility] Crisis) 9-173.125.9816   To obtain results of pending studies call Medical Records at: [phone]   For emergencies and 24 hour/7 days a week contact information: [phone]

## 2023-10-19 NOTE — PLAN OF CARE
Problem: Chronic Conditions and Co-morbidities  Goal: Patient's chronic conditions and co-morbidity symptoms are monitored and maintained or improved  Outcome: Progressing     Problem: Discharge Planning  Goal: Discharge to home or other facility with appropriate resources  Outcome: Progressing     Problem: Self Harm/Suicidality  Goal: Will have no self-injury during hospital stay  Description: INTERVENTIONS:  1. Ensure constant observer at bedside with Q15M safety checks  2. Maintain a safe environment  3. Secure patient belongings  4. Ensure family/visitors adhere to safety recommendations  5. Ensure safety tray has been added to patient's diet order  6. Every shift and PRN: Re-assess suicidal risk via Frequent Screener    Outcome: Progressing  Flowsheets (Taken 10/19/2023 0834)  Will have no self-injury during hospital stay:   Ensure constant observer at bedside with Q15M safety checks   Ensure family/visitors adhere to safety recommendations   Every shift and PRN: Re-assess suicidal risk via Frequent Screener   Maintain a safe environment     Problem: Safety - Adult  Goal: Free from fall injury  Outcome: Progressing     Problem: Pain  Goal: Verbalizes/displays adequate comfort level or baseline comfort level  Outcome: Progressing     Problem: Coping  Goal: Pt/Family able to verbalize concerns and demonstrate effective coping strategies  Description: INTERVENTIONS:  1. Assist patient/family to identify coping skills, available support systems and cultural and spiritual values  2. Provide emotional support, including active listening and acknowledgement of concerns of patient and caregivers  3. Reduce environmental stimuli, as able  4. Instruct patient/family in relaxation techniques, as appropriate  5.  Assess for spiritual pain/suffering and initiate Spiritual Care, Psychosocial Clinical Specialist consults as needed  Outcome: Progressing  Flowsheets (Taken 10/19/2023 0834)  Patient/family able to

## 2023-10-19 NOTE — DISCHARGE INSTR - DIET

## 2023-10-19 NOTE — PLAN OF CARE
Problem: Chronic Conditions and Co-morbidities  Goal: Patient's chronic conditions and co-morbidity symptoms are monitored and maintained or improved  10/19/2023 0854 by Williams Mchugh RN  Outcome: Adequate for Discharge  10/19/2023 0843 by Williams Mchugh RN  Outcome: Progressing     Problem: Discharge Planning  Goal: Discharge to home or other facility with appropriate resources  10/19/2023 0854 by Williams Mchugh RN  Outcome: Adequate for Discharge  10/19/2023 0843 by Williams Mchugh RN  Outcome: Progressing     Problem: Self Harm/Suicidality  Goal: Will have no self-injury during hospital stay  Description: INTERVENTIONS:  1. Ensure constant observer at bedside with Q15M safety checks  2. Maintain a safe environment  3. Secure patient belongings  4. Ensure family/visitors adhere to safety recommendations  5. Ensure safety tray has been added to patient's diet order  6. Every shift and PRN: Re-assess suicidal risk via Frequent Screener    10/19/2023 0854 by Williams Mchugh RN  Outcome: Adequate for Discharge  10/19/2023 6213 by Williams Mchugh RN  Outcome: Progressing  Flowsheets (Taken 10/19/2023 9609)  Will have no self-injury during hospital stay:   Ensure constant observer at bedside with Q15M safety checks   Ensure family/visitors adhere to safety recommendations   Every shift and PRN: Re-assess suicidal risk via Frequent Screener   Maintain a safe environment     Problem: Safety - Adult  Goal: Free from fall injury  10/19/2023 0854 by Williams Mchugh RN  Outcome: Adequate for Discharge  10/19/2023 0843 by Williams Mchugh RN  Outcome: Progressing     Problem: Pain  Goal: Verbalizes/displays adequate comfort level or baseline comfort level  10/19/2023 0854 by Williams Mchugh RN  Outcome: Adequate for Discharge  10/19/2023 0843 by Williams Mchugh RN  Outcome: Progressing     Problem: Coping  Goal: Pt/Family able to verbalize concerns and demonstrate effective coping strategies  Description: INTERVENTIONS:  1.

## 2023-10-19 NOTE — DISCHARGE SUMMARY
Patient ID:  Mp Doherty  [id number]  47 y.o.  1970    Admit date: 10/15/2023  Discharge date: 10/19/2023    Admitting Physician: Asher Claudio MD   Attending Physician: Oseas Light MD  Discharge Provider: Oseas Light MD     Admission Diagnoses: Depression with suicidal ideation [F32. A, R45.851]  Psychiatric diagnosis [F99]  Major depressive disorder, single episode, severe without psychotic features ([address]) [F32.2]    Discharge Diagnoses: Major depressive disorder recurrent, severe, without psychotic features  Alcohol use disorder, severe, uncomplicated  Stimulants abuse  Benzodiazepine abuse  Insomnia  Relationship issues  Treatment noncompliance  Suicidal ideations  Grief     Medical conditions pertaining to the patient's mental health  Diabetes  COPD  Hypertension  Hyperlipidemia  Osteoarthritis  Hypothyroidism  A-fib  Chronic pain  Peripheral neuropathy  Vitamin B12 deficiency  Vitamin D deficiency     Admission Condition: poor    Discharged Condition: stable    Indication for Admission: depression, suicidal ideations    HPI:    The patient is a 47 y.o. male with previous psychiatric history of depression, questionable bipolar disorder, who has been admitted to our psychiatric unit secondary to recent history of drug use, drinking alcohol, worsening of the depression and suicidal ideations. Patient has been seen in treatment team room with presence of the patient's nurse. Patient reported that he experiences significant life stressors, stated that his oldest son was killed 2 months ago and he was dealing with his loss by drinking excessive amount of alcohol and using drugs. He reported that during the last 2 months he was drinking between half of the pint and pint of whiskey or a bottle of wine a day, daily. Also, patient reported that he was abusing stimulants, benzodiazepines and cannabinoids, however, not on a regular basis.   Patient stated that he stopped taking all of his prescribed

## 2023-10-27 ENCOUNTER — APPOINTMENT (OUTPATIENT)
Dept: CT IMAGING | Age: 53
End: 2023-10-27
Payer: MEDICAID

## 2023-10-27 ENCOUNTER — HOSPITAL ENCOUNTER (OUTPATIENT)
Age: 53
Setting detail: OBSERVATION
Discharge: HOME HEALTH | End: 2023-10-28
Attending: EMERGENCY MEDICINE | Admitting: INTERNAL MEDICINE
Payer: MEDICAID

## 2023-10-27 ENCOUNTER — APPOINTMENT (OUTPATIENT)
Dept: MRI IMAGING | Age: 53
End: 2023-10-27
Payer: MEDICAID

## 2023-10-27 ENCOUNTER — APPOINTMENT (OUTPATIENT)
Dept: GENERAL RADIOLOGY | Age: 53
End: 2023-10-27
Payer: MEDICAID

## 2023-10-27 DIAGNOSIS — R53.1 ACUTE LEFT-SIDED WEAKNESS: Primary | ICD-10-CM

## 2023-10-27 PROBLEM — R29.90 STROKE-LIKE SYMPTOMS: Status: ACTIVE | Noted: 2023-10-27

## 2023-10-27 LAB
ALBUMIN SERPL-MCNC: 3.7 G/DL (ref 3.5–5.2)
ALP SERPL-CCNC: 47 U/L (ref 40–130)
ALT SERPL-CCNC: 22 U/L (ref 5–41)
ANION GAP SERPL CALCULATED.3IONS-SCNC: 11 MMOL/L (ref 7–19)
AST SERPL-CCNC: 30 U/L (ref 5–40)
BASOPHILS # BLD: 0 K/UL (ref 0–0.2)
BASOPHILS NFR BLD: 0.4 % (ref 0–1)
BILIRUB SERPL-MCNC: <0.2 MG/DL (ref 0.2–1.2)
BUN SERPL-MCNC: 12 MG/DL (ref 6–20)
CALCIUM SERPL-MCNC: 8.1 MG/DL (ref 8.6–10)
CHLORIDE SERPL-SCNC: 111 MMOL/L (ref 98–111)
CO2 SERPL-SCNC: 20 MMOL/L (ref 22–29)
CREAT SERPL-MCNC: 0.8 MG/DL (ref 0.5–1.2)
EOSINOPHIL # BLD: 0.1 K/UL (ref 0–0.6)
EOSINOPHIL NFR BLD: 0.9 % (ref 0–5)
ERYTHROCYTE [DISTWIDTH] IN BLOOD BY AUTOMATED COUNT: 17.6 % (ref 11.5–14.5)
GLUCOSE BLD-MCNC: 134 MG/DL (ref 70–99)
GLUCOSE BLD-MCNC: 99 MG/DL (ref 70–99)
GLUCOSE SERPL-MCNC: 71 MG/DL (ref 74–109)
HCT VFR BLD AUTO: 31.7 % (ref 42–52)
HGB BLD-MCNC: 9.5 G/DL (ref 14–18)
IMM GRANULOCYTES # BLD: 0 K/UL
INR PPP: 1.05 (ref 0.88–1.18)
LYMPHOCYTES # BLD: 1.9 K/UL (ref 1.1–4.5)
LYMPHOCYTES NFR BLD: 34.3 % (ref 20–40)
MCH RBC QN AUTO: 26.1 PG (ref 27–31)
MCHC RBC AUTO-ENTMCNC: 30 G/DL (ref 33–37)
MCV RBC AUTO: 87.1 FL (ref 80–94)
MONOCYTES # BLD: 0.5 K/UL (ref 0–0.9)
MONOCYTES NFR BLD: 9.1 % (ref 0–10)
NEUTROPHILS # BLD: 3.1 K/UL (ref 1.5–7.5)
NEUTS SEG NFR BLD: 55.1 % (ref 50–65)
PERFORMED ON: ABNORMAL
PERFORMED ON: NORMAL
PLATELET # BLD AUTO: 285 K/UL (ref 130–400)
PMV BLD AUTO: 10.2 FL (ref 9.4–12.4)
POTASSIUM SERPL-SCNC: 4 MMOL/L (ref 3.5–5)
PROT SERPL-MCNC: 5.6 G/DL (ref 6.6–8.7)
PROTHROMBIN TIME: 13.4 SEC (ref 12–14.6)
RBC # BLD AUTO: 3.64 M/UL (ref 4.7–6.1)
SODIUM SERPL-SCNC: 142 MMOL/L (ref 136–145)
TROPONIN T SERPL-MCNC: <0.01 NG/ML (ref 0–0.03)
WBC # BLD AUTO: 5.6 K/UL (ref 4.8–10.8)

## 2023-10-27 PROCEDURE — G0378 HOSPITAL OBSERVATION PER HR: HCPCS

## 2023-10-27 PROCEDURE — 96372 THER/PROPH/DIAG INJ SC/IM: CPT

## 2023-10-27 PROCEDURE — 99223 1ST HOSP IP/OBS HIGH 75: CPT | Performed by: PSYCHIATRY & NEUROLOGY

## 2023-10-27 PROCEDURE — 93005 ELECTROCARDIOGRAM TRACING: CPT | Performed by: EMERGENCY MEDICINE

## 2023-10-27 PROCEDURE — 6370000000 HC RX 637 (ALT 250 FOR IP): Performed by: EMERGENCY MEDICINE

## 2023-10-27 PROCEDURE — 96365 THER/PROPH/DIAG IV INF INIT: CPT

## 2023-10-27 PROCEDURE — 2580000003 HC RX 258: Performed by: INTERNAL MEDICINE

## 2023-10-27 PROCEDURE — 2580000003 HC RX 258: Performed by: NURSE PRACTITIONER

## 2023-10-27 PROCEDURE — 6370000000 HC RX 637 (ALT 250 FOR IP): Performed by: NURSE PRACTITIONER

## 2023-10-27 PROCEDURE — 72148 MRI LUMBAR SPINE W/O DYE: CPT

## 2023-10-27 PROCEDURE — 70450 CT HEAD/BRAIN W/O DYE: CPT

## 2023-10-27 PROCEDURE — 85610 PROTHROMBIN TIME: CPT

## 2023-10-27 PROCEDURE — 96366 THER/PROPH/DIAG IV INF ADDON: CPT

## 2023-10-27 PROCEDURE — 85025 COMPLETE CBC W/AUTO DIFF WBC: CPT

## 2023-10-27 PROCEDURE — 80053 COMPREHEN METABOLIC PANEL: CPT

## 2023-10-27 PROCEDURE — 6370000000 HC RX 637 (ALT 250 FOR IP): Performed by: HOSPITALIST

## 2023-10-27 PROCEDURE — 6360000002 HC RX W HCPCS: Performed by: INTERNAL MEDICINE

## 2023-10-27 PROCEDURE — 70551 MRI BRAIN STEM W/O DYE: CPT

## 2023-10-27 PROCEDURE — 6360000004 HC RX CONTRAST MEDICATION: Performed by: EMERGENCY MEDICINE

## 2023-10-27 PROCEDURE — 36415 COLL VENOUS BLD VENIPUNCTURE: CPT

## 2023-10-27 PROCEDURE — 70498 CT ANGIOGRAPHY NECK: CPT

## 2023-10-27 PROCEDURE — 71045 X-RAY EXAM CHEST 1 VIEW: CPT

## 2023-10-27 PROCEDURE — 82962 GLUCOSE BLOOD TEST: CPT

## 2023-10-27 PROCEDURE — 99285 EMERGENCY DEPT VISIT HI MDM: CPT

## 2023-10-27 PROCEDURE — 6360000002 HC RX W HCPCS: Performed by: NURSE PRACTITIONER

## 2023-10-27 PROCEDURE — 84484 ASSAY OF TROPONIN QUANT: CPT

## 2023-10-27 RX ORDER — LABETALOL HYDROCHLORIDE 5 MG/ML
10 INJECTION, SOLUTION INTRAVENOUS EVERY 10 MIN PRN
Status: DISCONTINUED | OUTPATIENT
Start: 2023-10-27 | End: 2023-10-28 | Stop reason: HOSPADM

## 2023-10-27 RX ORDER — INSULIN LISPRO 100 [IU]/ML
0-4 INJECTION, SOLUTION INTRAVENOUS; SUBCUTANEOUS
Status: DISCONTINUED | OUTPATIENT
Start: 2023-10-27 | End: 2023-10-28 | Stop reason: HOSPADM

## 2023-10-27 RX ORDER — DULOXETIN HYDROCHLORIDE 60 MG/1
60 CAPSULE, DELAYED RELEASE ORAL DAILY
Status: DISCONTINUED | OUTPATIENT
Start: 2023-10-27 | End: 2023-10-28 | Stop reason: HOSPADM

## 2023-10-27 RX ORDER — ASPIRIN 300 MG/1
300 SUPPOSITORY RECTAL DAILY
Status: DISCONTINUED | OUTPATIENT
Start: 2023-10-28 | End: 2023-10-28 | Stop reason: HOSPADM

## 2023-10-27 RX ORDER — POLYETHYLENE GLYCOL 3350 17 G/17G
17 POWDER, FOR SOLUTION ORAL DAILY PRN
Status: DISCONTINUED | OUTPATIENT
Start: 2023-10-27 | End: 2023-10-28 | Stop reason: HOSPADM

## 2023-10-27 RX ORDER — HYDRALAZINE HYDROCHLORIDE 50 MG/1
50 TABLET, FILM COATED ORAL 2 TIMES DAILY
Status: DISCONTINUED | OUTPATIENT
Start: 2023-10-27 | End: 2023-10-28 | Stop reason: HOSPADM

## 2023-10-27 RX ORDER — MECOBALAMIN 5000 MCG
5 TABLET,DISINTEGRATING ORAL NIGHTLY
Status: DISCONTINUED | OUTPATIENT
Start: 2023-10-27 | End: 2023-10-28 | Stop reason: HOSPADM

## 2023-10-27 RX ORDER — ATORVASTATIN CALCIUM 80 MG/1
80 TABLET, FILM COATED ORAL NIGHTLY
Status: DISCONTINUED | OUTPATIENT
Start: 2023-10-27 | End: 2023-10-28 | Stop reason: HOSPADM

## 2023-10-27 RX ORDER — ACETAMINOPHEN 325 MG/1
650 TABLET ORAL EVERY 4 HOURS PRN
Status: DISCONTINUED | OUTPATIENT
Start: 2023-10-27 | End: 2023-10-28 | Stop reason: HOSPADM

## 2023-10-27 RX ORDER — ACETAMINOPHEN 325 MG/1
650 TABLET ORAL ONCE
Status: COMPLETED | OUTPATIENT
Start: 2023-10-27 | End: 2023-10-27

## 2023-10-27 RX ORDER — ENOXAPARIN SODIUM 100 MG/ML
40 INJECTION SUBCUTANEOUS DAILY
Status: DISCONTINUED | OUTPATIENT
Start: 2023-10-27 | End: 2023-10-28 | Stop reason: HOSPADM

## 2023-10-27 RX ORDER — ASPIRIN 325 MG
325 TABLET ORAL ONCE
Status: COMPLETED | OUTPATIENT
Start: 2023-10-27 | End: 2023-10-27

## 2023-10-27 RX ORDER — ONDANSETRON 4 MG/1
4 TABLET, ORALLY DISINTEGRATING ORAL EVERY 8 HOURS PRN
Status: DISCONTINUED | OUTPATIENT
Start: 2023-10-27 | End: 2023-10-28 | Stop reason: HOSPADM

## 2023-10-27 RX ORDER — SODIUM CHLORIDE 0.9 % (FLUSH) 0.9 %
5-40 SYRINGE (ML) INJECTION EVERY 12 HOURS SCHEDULED
Status: DISCONTINUED | OUTPATIENT
Start: 2023-10-27 | End: 2023-10-28 | Stop reason: HOSPADM

## 2023-10-27 RX ORDER — NITROGLYCERIN 0.4 MG/1
0.4 TABLET SUBLINGUAL EVERY 5 MIN PRN
Status: DISCONTINUED | OUTPATIENT
Start: 2023-10-27 | End: 2023-10-28 | Stop reason: HOSPADM

## 2023-10-27 RX ORDER — METOPROLOL SUCCINATE 25 MG/1
25 TABLET, EXTENDED RELEASE ORAL DAILY
Status: DISCONTINUED | OUTPATIENT
Start: 2023-10-27 | End: 2023-10-28 | Stop reason: HOSPADM

## 2023-10-27 RX ORDER — ASPIRIN 81 MG/1
81 TABLET, CHEWABLE ORAL DAILY
Status: DISCONTINUED | OUTPATIENT
Start: 2023-10-28 | End: 2023-10-28 | Stop reason: HOSPADM

## 2023-10-27 RX ORDER — LEVOTHYROXINE SODIUM 0.05 MG/1
50 TABLET ORAL DAILY
Status: DISCONTINUED | OUTPATIENT
Start: 2023-10-27 | End: 2023-10-28 | Stop reason: HOSPADM

## 2023-10-27 RX ORDER — DEXTROSE MONOHYDRATE 100 MG/ML
INJECTION, SOLUTION INTRAVENOUS CONTINUOUS PRN
Status: DISCONTINUED | OUTPATIENT
Start: 2023-10-27 | End: 2023-10-28 | Stop reason: HOSPADM

## 2023-10-27 RX ORDER — CHOLECALCIFEROL (VITAMIN D3) 125 MCG
500 CAPSULE ORAL DAILY
Status: DISCONTINUED | OUTPATIENT
Start: 2023-10-27 | End: 2023-10-28 | Stop reason: HOSPADM

## 2023-10-27 RX ORDER — ONDANSETRON 2 MG/ML
4 INJECTION INTRAMUSCULAR; INTRAVENOUS EVERY 6 HOURS PRN
Status: DISCONTINUED | OUTPATIENT
Start: 2023-10-27 | End: 2023-10-28 | Stop reason: HOSPADM

## 2023-10-27 RX ORDER — INSULIN LISPRO 100 [IU]/ML
0-4 INJECTION, SOLUTION INTRAVENOUS; SUBCUTANEOUS NIGHTLY
Status: DISCONTINUED | OUTPATIENT
Start: 2023-10-27 | End: 2023-10-28 | Stop reason: HOSPADM

## 2023-10-27 RX ORDER — SODIUM CHLORIDE 9 MG/ML
INJECTION, SOLUTION INTRAVENOUS PRN
Status: DISCONTINUED | OUTPATIENT
Start: 2023-10-27 | End: 2023-10-28 | Stop reason: HOSPADM

## 2023-10-27 RX ORDER — SODIUM CHLORIDE 0.9 % (FLUSH) 0.9 %
5-40 SYRINGE (ML) INJECTION PRN
Status: DISCONTINUED | OUTPATIENT
Start: 2023-10-27 | End: 2023-10-28 | Stop reason: HOSPADM

## 2023-10-27 RX ORDER — ISOSORBIDE MONONITRATE 30 MG/1
30 TABLET, EXTENDED RELEASE ORAL DAILY
Status: DISCONTINUED | OUTPATIENT
Start: 2023-10-27 | End: 2023-10-28 | Stop reason: HOSPADM

## 2023-10-27 RX ORDER — GABAPENTIN 400 MG/1
800 CAPSULE ORAL 3 TIMES DAILY
Status: DISCONTINUED | OUTPATIENT
Start: 2023-10-27 | End: 2023-10-28 | Stop reason: HOSPADM

## 2023-10-27 RX ADMIN — ATORVASTATIN CALCIUM 80 MG: 80 TABLET, FILM COATED ORAL at 19:52

## 2023-10-27 RX ADMIN — TRAZODONE HYDROCHLORIDE 150 MG: 100 TABLET ORAL at 19:52

## 2023-10-27 RX ADMIN — GABAPENTIN 800 MG: 400 CAPSULE ORAL at 19:52

## 2023-10-27 RX ADMIN — ASPIRIN 325 MG: 325 TABLET ORAL at 13:50

## 2023-10-27 RX ADMIN — ACETAMINOPHEN 650 MG: 325 TABLET ORAL at 13:50

## 2023-10-27 RX ADMIN — Medication 5 MG: at 19:53

## 2023-10-27 RX ADMIN — METHOCARBAMOL 1000 MG: 100 INJECTION INTRAMUSCULAR; INTRAVENOUS at 19:52

## 2023-10-27 RX ADMIN — GABAPENTIN 800 MG: 400 CAPSULE ORAL at 17:10

## 2023-10-27 RX ADMIN — IOPAMIDOL 70 ML: 755 INJECTION, SOLUTION INTRAVENOUS at 10:16

## 2023-10-27 RX ADMIN — HYDRALAZINE HYDROCHLORIDE 50 MG: 50 TABLET, FILM COATED ORAL at 19:52

## 2023-10-27 RX ADMIN — SODIUM CHLORIDE, PRESERVATIVE FREE 10 ML: 5 INJECTION INTRAVENOUS at 19:53

## 2023-10-27 RX ADMIN — ENOXAPARIN SODIUM 40 MG: 100 INJECTION SUBCUTANEOUS at 17:10

## 2023-10-27 RX ADMIN — ACETAMINOPHEN 650 MG: 325 TABLET ORAL at 23:38

## 2023-10-27 SDOH — ECONOMIC STABILITY: INCOME INSECURITY: IN THE PAST 12 MONTHS, HAS THE ELECTRIC, GAS, OIL, OR WATER COMPANY THREATENED TO SHUT OFF SERVICE IN YOUR HOME?: YES

## 2023-10-27 SDOH — ECONOMIC STABILITY: INCOME INSECURITY: HOW HARD IS IT FOR YOU TO PAY FOR THE VERY BASICS LIKE FOOD, HOUSING, MEDICAL CARE, AND HEATING?: HARD

## 2023-10-27 SDOH — ECONOMIC STABILITY: FOOD INSECURITY: WITHIN THE PAST 12 MONTHS, YOU WORRIED THAT YOUR FOOD WOULD RUN OUT BEFORE YOU GOT MONEY TO BUY MORE.: NEVER TRUE

## 2023-10-27 ASSESSMENT — ENCOUNTER SYMPTOMS
DIARRHEA: 0
NAUSEA: 0
FACIAL SWELLING: 0
PHOTOPHOBIA: 0
CHEST TIGHTNESS: 0
RECTAL PAIN: 0
SHORTNESS OF BREATH: 0
CONSTIPATION: 0
ABDOMINAL PAIN: 0
EYE PAIN: 0
COUGH: 0
WHEEZING: 0
SORE THROAT: 1
BACK PAIN: 0
EYE DISCHARGE: 0
EYE ITCHING: 0

## 2023-10-27 ASSESSMENT — PAIN SCALES - GENERAL: PAINLEVEL_OUTOF10: 10

## 2023-10-27 ASSESSMENT — PATIENT HEALTH QUESTIONNAIRE - PHQ9
SUM OF ALL RESPONSES TO PHQ QUESTIONS 1-9: 6
SUM OF ALL RESPONSES TO PHQ QUESTIONS 1-9: 6
2. FEELING DOWN, DEPRESSED OR HOPELESS: 3
1. LITTLE INTEREST OR PLEASURE IN DOING THINGS: 3
SUM OF ALL RESPONSES TO PHQ QUESTIONS 1-9: 6
SUM OF ALL RESPONSES TO PHQ9 QUESTIONS 1 & 2: 6
SUM OF ALL RESPONSES TO PHQ QUESTIONS 1-9: 6

## 2023-10-27 NOTE — PROGRESS NOTES
above    [x]    Individualized Stroke/TIA Education template added, including patient specific risk factors:       Hypertension, High Cholesterol, Atrial fibrillation, and Diabetes    [x]    Stroke education initiated with patient and/or caregiver. [x]    Stroke booklet given and reviewed completely and efficiently with patient and/or caregiver. All questions and concerns addressed. Will continue to educate appropriately. [x]    Individualized Stroke/TIA Care Plan added      Valery Swallow Screen (YSS)    [x]    Bedside swallow screen completed using the YSS Protocol, and documented prior to any PO meds, food, or drink:     []    Completed in ED    []    Passed    []    Failed and awaiting SLP eval     []    Completed upon admission to this unit    [x]    Passed    []    Failed and awaiting SLP eval     []    Patient strict NPO status for procedure/testing      [x]  NIHSS on admission         Swallow Screening  Is the patient unable to remain alert for testing?: No  Is the patient on a modified diet (thickened liquids) due to pre-existing dysphagia?: No  Is there presence of existing enteral tube feeding via the stomach or nose?: No  Is there presence of head-of-bed restrictions (less than 30 degrees)?: No  Is there presence of tracheotomy tube?: No  Is the patient ordered nothing-by-mouth status?: No  3 oz Water Swallow Screen: Pass    VTE Prophylaxis  (ASA, Plavix and tPA are not VTE prophylaxis)      SCDs   []    On   [x]    Off   []    Refused   []    Contraindicated see MD orders      Medication(s)   [x]    Lovenox   []    Eliquis   []    Heparin   []    Coumadin/Warfarin   []    Other:     []    Contraindicated see MD orders      [x]    I attest as the admitting nurse that I have completed a thorough stroke assessment and admission on this patient. All checked assessment areas listed above have been addressed and completed.     Nurse eSignature:  Debby Oshea RN  Date:   10/27/2023   Time:   5:22 PM

## 2023-10-27 NOTE — PROGRESS NOTES
4 Eyes Skin Assessment     NAME:  Joesph Nuon  YOB: 1970  MEDICAL RECORD NUMBER:  [id number]    The patient is being assessed for  Admission    I agree that at least one RN has performed a thorough Head to Toe Skin Assessment on the patient. ALL assessment sites listed below have been assessed. Areas assessed by both nurses:    Head, Face, Ears, Shoulders, Back, Chest, Arms, Elbows, Hands, Sacrum. Buttock, Coccyx, Ischium, and Legs. Feet and Heels        Does the Patient have a Wound?  No noted wound(s)       Alok Prevention initiated by RN: No  Wound Care Orders initiated by RN: No    Pressure Injury (Stage 3,4, Unstageable, DTI, NWPT, and Complex wounds) if present, place Wound referral order by RN under : No    New Ostomies, if present place, Ostomy referral order under : No     Nurse 1 eSignature: Electronically signed by Makenzie Gonzalez RN on 10/27/23 at 5:38 PM CDT    **SHARE this note so that the co-signing nurse can place an eSignature**    Nurse 2 eSignature: Electronically signed by Gabrielle Yang RN on 10/27/23 at 5:54 PM CDT

## 2023-10-27 NOTE — ED NOTES
9: 39 a.m  [facility] notified charge nurse of potential stroke alert    10:00 a.m. Patient arrived to ER     10:02 a.m. Dr. Welby Meckel called Stroke Alert     10:03 a.m. CT notified     10:04 a.m. Announced over PA     10:06 a.m. Notified Dr. Gila Mims, Neurology    10:12 a.m.  Notified Stroke Coordinator      Bruce Scott  10/27/23 102

## 2023-10-27 NOTE — H&P
[facility]      [facility]ist - History & Physical      PCP: Good Jimenez MD    Date of Admission: 10/27/2023    Date of Service: 10/27/2023    Chief Complaint:  Stroke like symptoms     History Of Present Illness: The patient is a 47 y.o. male who presented to [facility] ED for evaluation of stroke like symptoms. Pt has history of diabetes, polyneuropathy, atrial fibrillation, HTN, hyperlipidemia, bipolar disorder, gastric bypass, TAVR, suicide attempt per patient discharged earlier this month, hypertension and hypothyroidism. Pt reports that around 9am today while in therapy at inpatient rehabilitation facility for cocaine and alcohol abuse he developed stroke like symptoms. He reports right arm and leg weakness, speech difficulty, weakness of jaw \"quivering\" and confusion. He relates that his speech and weakness of right arm have improved. In ED, neurology consulted, MRI brain-No acute intracranial abnormality. No MR finding to correspond to the abnormality seen on recent CT and therefore was likely artifactual. Sodium 142, creatinine 0.8, glucose 71, cta head/neck-No intracranial large vessel occlusion or high-grade stenosis. Moderate stenosis of the right cavernous internal carotid artery secondary to atherosclerotic plaque. No extracranial internal carotid or vertebral artery stenosis. Ct head-Subtle right frontal lobe white matter low density, not well characterized, although small infarction difficult to exclude. Recommend MRI for further evaluation. Wbc 5.6, hgb 9.5, platelets 476W. Pt is admitted observation for further evaluation.      Past Medical History:        Diagnosis Date    Alcohol abuse, in remission     has a single drink on week ends - no longer drinks more than the one drink per week    Anxiety     Atrial fibrillation (HCC)     Bipolar 1 disorder (HCC)     Chronic pain     COPD (chronic obstructive pulmonary disease) ([address] [facility])     Depression     Diabetes mellitus ([address] [facility])     GERD

## 2023-10-27 NOTE — CONSULTS
Medications:     aspirin 81 MG EC tablet, Take 1 tablet by mouth daily, Disp: 30 tablet, Rfl: 0    isosorbide mononitrate (IMDUR) 30 MG extended release tablet, Take 1 tablet by mouth daily, Disp: 30 tablet, Rfl: 0    nitroGLYCERIN (NITROSTAT) 0.4 MG SL tablet, Place 1 tablet under the tongue every 5 minutes as needed for Chest pain up to max of 3 total doses. If no relief after 1 dose, call 911., Disp: 25 tablet, Rfl: 0    hydrOXYzine HCl (ATARAX) 25 MG tablet, Take 1 tablet by mouth every 4 hours as needed for Anxiety, Disp: 30 tablet, Rfl: 0    gabapentin (NEURONTIN) 400 MG capsule, Take 2 capsules by mouth 3 times daily for 30 days.  Max Daily Amount: 2,400 mg, Disp: 180 capsule, Rfl: 0    DULoxetine (CYMBALTA) 60 MG extended release capsule, Take 1 capsule by mouth daily, Disp: 30 capsule, Rfl: 0    traZODone (DESYREL) 150 MG tablet, Take 1 tablet by mouth nightly, Disp: 30 tablet, Rfl: 0    metFORMIN (GLUCOPHAGE) 1000 MG tablet, Take 1 tablet by mouth 2 times daily (with meals), Disp: 60 tablet, Rfl: 0    atorvastatin (LIPITOR) 20 MG tablet, Take 2 tablets by mouth daily, Disp: 60 tablet, Rfl: 0    hydrALAZINE (APRESOLINE) 50 MG tablet, Take 1 tablet by mouth 2 times daily, Disp: 60 tablet, Rfl: 0    metoprolol succinate (TOPROL XL) 25 MG extended release tablet, Take 1 tablet by mouth daily, Disp: 30 tablet, Rfl: 0    vitamin B-12 500 MCG tablet, Take 1 tablet by mouth daily, Disp: 30 tablet, Rfl: 0    melatonin 5 MG TBDP disintegrating tablet, Take 1 tablet by mouth nightly, Disp: 30 tablet, Rfl: 0    levothyroxine (SYNTHROID) 50 MCG tablet, Take 1 tablet by mouth Daily, Disp: 30 tablet, Rfl: 0    Ergocalciferol (VITAMIN D) 83230 units CAPS, Take 50,000 Units by mouth once a week, Disp: 5 capsule, Rfl: 0    Insulin Pen Needle (B-D ULTRAFINE III SHORT PEN) 31G X 8 MM MISC, Inject 1 each into the skin as needed (FOR DM) (Patient not taking: Reported on 10/18/2021), Disp: 100 each, Rfl: 1    ALLERGIES: atelectasis of the lungs. No intracranial large vessel occlusion or high-grade stenosis. Moderate stenosis of the right cavernous internal carotid artery secondary to atherosclerotic plaque. No extracranial internal carotid or vertebral artery stenosis. All CT scans are performed using dose optimization techniques as appropriate to the performed exam and include at least one of the following: Automated exposure control, adjustment of the mA and/or kV according to size, and the use of iterative reconstruction technique. ______________________________________ Electronically signed by: Jefefry Ashby D.O. Date:     10/27/2023 Time:    10:53     CT HEAD WO CONTRAST    Result Date: 10/27/2023  EXAM:  CT HEAD WITHOUT CONTRAST. HISTORY:  Stroke-like symptoms. COMPARISON:  08/13/2023. TECHNIQUE:  Multiple axial images of the brain were obtained from the skull base through the vertex without intravenous contrast.  Multiplanar reformats were provided. FINDINGS:  There is no intracranial hemorrhage or extraaxial collection. The gray-white differentiation is maintained without evidence for acute large vascular territory infarction. There is subtle low density at the right frontal vertex example axial images 32-35, coronal images 38-39 and sagittal image 27, not well characterized. The cortical sulci and basal cisterns are well visualized. There is no hydrocephalus, mass effect, or midline shift. The paranasal sinuses and mastoid air cells are clear. The calvarium is intact.  --------------------------------    1. No acute intracranial hemorrhage or large vascular territory infarction. 2.  Subtle right frontal lobe white matter low density, not well characterized, although small infarction difficult to exclude. Recommend MRI for further evaluation.    Comment:  Findings were discussed with Dr. Silvia Styles at 10:41 a.m. on 10/27/2023.      ---------------------------  All CT scans are performed using dose

## 2023-10-27 NOTE — ED PROVIDER NOTES
[facility] EMERGENCY DEPT  eMERGENCY dEPARTMENT eNCOUnter      Pt Name: Heidi Kruse  MRN: [id number]  [facility] 1970  Date of evaluation: 10/27/2023  Provider: Leola Lamb MD    CHIEF COMPLAINT       Chief Complaint   Patient presents with    Aphasia    Extremity Weakness     Right sided          HISTORY OF PRESENT ILLNESS   (Location/Symptom, Timing/Onset,Context/Setting, Quality, Duration, Modifying Factors, Severity)  Note limiting factors. Heidi Kruse is a 47 y.o. male who presents to the emergency department aphasia and right sided weakness     HPI    Patient is a 71-year-old American male with history of atrial fibrillation status post TAVR; anxiety; alcohol abuse in remission; bipolar disorder; chronic pain; COPD; depression; diabetes mellitus; reflux disease; hernia; hyperlipidemia; hypertension; CAD; prior TIA; who is at a rehab facility for alcoholism who at 916 suddenly developed left-sided facial droop and right-sided weakness associated with numbness to his right leg. Able to speak with some difficulty. Blood glucose was 85. NursingNotes were reviewed. REVIEW OF SYSTEMS    (2-9 systems for level 4, 10 or more for level 5)     Review of Systems   Constitutional:  Negative for activity change, appetite change, chills, diaphoresis, fatigue and fever. HENT:  Negative for congestion, facial swelling, hearing loss and nosebleeds. Eyes:  Negative for photophobia, pain, discharge and itching. Respiratory:  Negative for cough, chest tightness, shortness of breath and wheezing. Cardiovascular:  Negative for chest pain and palpitations. Gastrointestinal:  Negative for abdominal pain, constipation, diarrhea, nausea and rectal pain. Endocrine: Negative for cold intolerance and heat intolerance. Genitourinary:  Negative for difficulty urinating and dysuria. Musculoskeletal:  Negative for arthralgias, back pain, joint swelling and neck stiffness. Skin:  Negative for pallor and rash.

## 2023-10-27 NOTE — ED NOTES
Dr. Silvia Styles at bedside upon patient arrival      [facility]  10/27/23 1002

## 2023-10-28 VITALS
HEART RATE: 70 BPM | TEMPERATURE: 97.5 F | WEIGHT: 215 LBS | HEIGHT: 72 IN | OXYGEN SATURATION: 98 % | SYSTOLIC BLOOD PRESSURE: 139 MMHG | BODY MASS INDEX: 29.12 KG/M2 | RESPIRATION RATE: 16 BRPM | DIASTOLIC BLOOD PRESSURE: 88 MMHG

## 2023-10-28 LAB
CHOLEST SERPL-MCNC: 100 MG/DL (ref 160–199)
ERYTHROCYTE [DISTWIDTH] IN BLOOD BY AUTOMATED COUNT: 17.6 % (ref 11.5–14.5)
GLUCOSE BLD-MCNC: 140 MG/DL (ref 70–99)
GLUCOSE BLD-MCNC: 90 MG/DL (ref 70–99)
HBA1C MFR BLD: 6.1 % (ref 4–6)
HCT VFR BLD AUTO: 29.6 % (ref 42–52)
HDLC SERPL-MCNC: 59 MG/DL (ref 55–121)
HGB BLD-MCNC: 8.9 G/DL (ref 14–18)
LDLC SERPL CALC-MCNC: 32 MG/DL
MCH RBC QN AUTO: 25.9 PG (ref 27–31)
MCHC RBC AUTO-ENTMCNC: 30.1 G/DL (ref 33–37)
MCV RBC AUTO: 86 FL (ref 80–94)
PERFORMED ON: ABNORMAL
PERFORMED ON: NORMAL
PLATELET # BLD AUTO: 288 K/UL (ref 130–400)
PMV BLD AUTO: 11.2 FL (ref 9.4–12.4)
RBC # BLD AUTO: 3.44 M/UL (ref 4.7–6.1)
TRIGL SERPL-MCNC: 46 MG/DL (ref 0–149)
WBC # BLD AUTO: 5.1 K/UL (ref 4.8–10.8)

## 2023-10-28 PROCEDURE — G0378 HOSPITAL OBSERVATION PER HR: HCPCS

## 2023-10-28 PROCEDURE — 95816 EEG AWAKE AND DROWSY: CPT

## 2023-10-28 PROCEDURE — 6360000002 HC RX W HCPCS: Performed by: NURSE PRACTITIONER

## 2023-10-28 PROCEDURE — 92522 EVALUATE SPEECH PRODUCTION: CPT

## 2023-10-28 PROCEDURE — 6370000000 HC RX 637 (ALT 250 FOR IP): Performed by: HOSPITALIST

## 2023-10-28 PROCEDURE — 92610 EVALUATE SWALLOWING FUNCTION: CPT

## 2023-10-28 PROCEDURE — 85027 COMPLETE CBC AUTOMATED: CPT

## 2023-10-28 PROCEDURE — 96372 THER/PROPH/DIAG INJ SC/IM: CPT

## 2023-10-28 PROCEDURE — 36415 COLL VENOUS BLD VENIPUNCTURE: CPT

## 2023-10-28 PROCEDURE — 96375 TX/PRO/DX INJ NEW DRUG ADDON: CPT

## 2023-10-28 PROCEDURE — 6360000002 HC RX W HCPCS: Performed by: INTERNAL MEDICINE

## 2023-10-28 PROCEDURE — 99223 1ST HOSP IP/OBS HIGH 75: CPT | Performed by: NEUROLOGICAL SURGERY

## 2023-10-28 PROCEDURE — 83036 HEMOGLOBIN GLYCOSYLATED A1C: CPT

## 2023-10-28 PROCEDURE — 94760 N-INVAS EAR/PLS OXIMETRY 1: CPT

## 2023-10-28 PROCEDURE — 2580000003 HC RX 258: Performed by: INTERNAL MEDICINE

## 2023-10-28 PROCEDURE — 82962 GLUCOSE BLOOD TEST: CPT

## 2023-10-28 PROCEDURE — 6370000000 HC RX 637 (ALT 250 FOR IP): Performed by: NURSE PRACTITIONER

## 2023-10-28 PROCEDURE — 99232 SBSQ HOSP IP/OBS MODERATE 35: CPT | Performed by: PSYCHIATRY & NEUROLOGY

## 2023-10-28 PROCEDURE — 80061 LIPID PANEL: CPT

## 2023-10-28 PROCEDURE — 95819 EEG AWAKE AND ASLEEP: CPT | Performed by: PSYCHIATRY & NEUROLOGY

## 2023-10-28 PROCEDURE — 2580000003 HC RX 258: Performed by: NURSE PRACTITIONER

## 2023-10-28 PROCEDURE — 96366 THER/PROPH/DIAG IV INF ADDON: CPT

## 2023-10-28 RX ORDER — METHOCARBAMOL 750 MG/1
750 TABLET, FILM COATED ORAL 3 TIMES DAILY
Qty: 30 TABLET | Refills: 0 | Status: SHIPPED | OUTPATIENT
Start: 2023-10-28 | End: 2023-11-07

## 2023-10-28 RX ADMIN — LEVOTHYROXINE SODIUM 50 MCG: 50 TABLET ORAL at 07:13

## 2023-10-28 RX ADMIN — CYANOCOBALAMIN TAB 500 MCG 500 MCG: 500 TAB at 09:25

## 2023-10-28 RX ADMIN — GABAPENTIN 800 MG: 400 CAPSULE ORAL at 09:25

## 2023-10-28 RX ADMIN — ISOSORBIDE MONONITRATE 30 MG: 30 TABLET, EXTENDED RELEASE ORAL at 09:25

## 2023-10-28 RX ADMIN — HYDRALAZINE HYDROCHLORIDE 50 MG: 50 TABLET, FILM COATED ORAL at 09:25

## 2023-10-28 RX ADMIN — ACETAMINOPHEN 650 MG: 325 TABLET ORAL at 07:12

## 2023-10-28 RX ADMIN — METHOCARBAMOL 1000 MG: 100 INJECTION INTRAMUSCULAR; INTRAVENOUS at 02:26

## 2023-10-28 RX ADMIN — SODIUM CHLORIDE, PRESERVATIVE FREE 10 ML: 5 INJECTION INTRAVENOUS at 09:27

## 2023-10-28 RX ADMIN — DULOXETINE HYDROCHLORIDE 60 MG: 60 CAPSULE, DELAYED RELEASE ORAL at 09:25

## 2023-10-28 RX ADMIN — METHOCARBAMOL 1000 MG: 100 INJECTION INTRAMUSCULAR; INTRAVENOUS at 12:08

## 2023-10-28 RX ADMIN — ACETAMINOPHEN 650 MG: 325 TABLET ORAL at 14:23

## 2023-10-28 RX ADMIN — ASPIRIN 81 MG: 81 TABLET, CHEWABLE ORAL at 09:25

## 2023-10-28 RX ADMIN — METOPROLOL SUCCINATE 25 MG: 25 TABLET, FILM COATED, EXTENDED RELEASE ORAL at 09:27

## 2023-10-28 RX ADMIN — ONDANSETRON 4 MG: 2 INJECTION INTRAMUSCULAR; INTRAVENOUS at 09:54

## 2023-10-28 RX ADMIN — ENOXAPARIN SODIUM 40 MG: 100 INJECTION SUBCUTANEOUS at 09:26

## 2023-10-28 RX ADMIN — GABAPENTIN 800 MG: 400 CAPSULE ORAL at 14:23

## 2023-10-28 ASSESSMENT — PAIN SCALES - GENERAL: PAINLEVEL_OUTOF10: 3

## 2023-10-28 ASSESSMENT — ENCOUNTER SYMPTOMS
RESPIRATORY NEGATIVE: 1
EYES NEGATIVE: 1
ALLERGIC/IMMUNOLOGIC NEGATIVE: 1
GASTROINTESTINAL NEGATIVE: 1
BACK PAIN: 1

## 2023-10-28 ASSESSMENT — PAIN DESCRIPTION - ORIENTATION: ORIENTATION: MID;LOWER

## 2023-10-28 ASSESSMENT — PAIN DESCRIPTION - LOCATION: LOCATION: BACK

## 2023-10-28 ASSESSMENT — PAIN DESCRIPTION - DESCRIPTORS: DESCRIPTORS: ACHING;DISCOMFORT

## 2023-10-28 NOTE — DISCHARGE INSTR - DIET
Good nutrition is important when healing from an illness, injury, or surgery. Follow any nutrition recommendations given to you during your hospital stay. If you were given an oral nutrition supplement while in the hospital, continue to take this supplement at home. You can take it with meals, in-between meals, and/or before bedtime. These supplements can be purchased at most local grocery stores, pharmacies, and chain [employer]-stores. If you have any questions about your diet or nutrition, call the hospital and ask for the dietitian.       Resume regular diet

## 2023-10-28 NOTE — CONSULTS
[facility] Neurosurgery Consultation        REASON FOR CONSULTATION:  Back pain, RLE weakness, abnormal lumbar MRI      HISTORY OF PRESENT ILLNESS:      The patient is a 47 y.o. male who presented to the [facility] ED from an inpatient rehab center where he is in treatment for alcohol and cocaine addiction. He states he was participating in a group therapy session when his speech became abnormal and he developed weakness on the right side of his body (face, arm and leg). He states bystanders also observed him to be \"shaking\". He was brought by EMS to [facility] and he was admitted to the hospital and neurology was consulted. An MRI scan of his brain and CT angiogram studies were obtained that were largely unrevealing. He denies any history of seizures, however he does report a history of syncopal events. He has a history of atrial fibrillation. He is taking baby ASA but is not on anticoagulation. He also reports a history of longstanding chronic back pain. He has been in pain management for this but has never been evaluated by a surgeon. He describes mostly back pain and pain radiating down his right leg. He also reports pain and numbness in his feet from neuropathy. After admission, it was felt that he was asymmetrically weak in his right leg and an MRI of his lumbar spine was obtained and I have been asked to provide neurosurgical consultation. He currently reports that his right leg weakness has resolved, though he still feels weak in his right arm. He endorses ongoing back pain. He denies any difficulty with bowel or bladder control.       MEDICAL HISTORY:             Past Medical History:   Diagnosis Date    Alcohol abuse, in remission     has a single drink on week ends - no longer drinks more than the one drink per week    Anxiety     Atrial fibrillation (HCC)     Bipolar 1 disorder (HCC)     Chronic pain     COPD (chronic obstructive pulmonary disease) ([address])     Depression     Diabetes mellitus ([facility])     GERD (gastroesophageal reflux disease)     Gout     Hernia     Hyperlipidemia     Hypertension     Hypothyroid     Myocardial infarct, old 2012    [place] - Cardiac Cath - no blockage    Neuropathy     Osteoarthritis     Psychiatric illness        Past Surgical History:   Procedure Laterality Date    ABDOMINOPLASTY N/A     Nov 2019    ANKLE SURGERY Right 1990    fracture repair    CARDIAC CATHETERIZATION N/A 2012 [facility] - No blockage    ESOPHAGUS SURGERY N/A     done with the gastric bypass    GASTRIC BAND N/A     HERNIA REPAIR N/A     incisional hernia    LAPAROTOMY N/A 02/15/2021    LAPAROTOMY EXPLORATORY performed by Kierra Taylor MD at [facility] N/A 01/03/2020    LAPAROTOMY EXPLORATORY performed by Lena Lamas MD at [facility] N/A 02/2018    gastric sleeve         Current Facility-Administered Medications:     methocarbamol (ROBAXIN) 1,000 mg in dextrose 5 % 100 mL IVPB, 1,000 mg, IntraVENous, 4x Daily PRN, Arlette Pinzon MD    sodium chloride flush 0.9 % injection 5-40 mL, 5-40 mL, IntraVENous, 2 times per day, DEMETRIUS Regalado CNP, 10 mL at 10/28/23 3272    sodium chloride flush 0.9 % injection 5-40 mL, 5-40 mL, IntraVENous, PRN, DEMETRIUS Regalado - CNP    0.9 % sodium chloride infusion, , IntraVENous, PRN, DEMETRIUS Regalado CNP    ondansetron (ZOFRAN-ODT) disintegrating tablet 4 mg, 4 mg, Oral, Q8H PRN **OR** ondansetron (ZOFRAN) injection 4 mg, 4 mg, IntraVENous, Q6H PRN, DEMETRIUS Regalado CNP, 4 mg at 10/28/23 0954    polyethylene glycol (GLYCOLAX) packet 17 g, 17 g, Oral, Daily PRN, DEMETRIUS Regalado CNP    enoxaparin (LOVENOX) injection 40 mg, 40 mg, SubCUTAneous, Daily, Jose Euceda, APRN - CNP, 40 mg at 10/28/23 3650    aspirin chewable tablet 81 mg, 81 mg, Oral, Daily, 81 mg at 10/28/23 0999 **OR** aspirin suppository 300 mg, 300 mg, Rectal, Daily, Cassandria [place], APRN - CNP    labetalol (NORMODYNE;TRANDATE) injection 10

## 2023-10-28 NOTE — DISCHARGE SUMMARY
Discharge Summary    NAME: Parveen Campbell  :  1970  MRN:  [id number]    Admit date:  10/27/2023  Discharge date:      Admitting Physician:  Tashia Martin MD    Advance Directive: Full Code    Consults: neurology and neurosurgery    Primary Care Physician:  Latosha Burgos MD    HOSPITAL COURSE: The patient is a 47 y.o. male who presented to [facility] ED for evaluation of stroke like symptoms. Pt has history of diabetes, polyneuropathy, atrial fibrillation, HTN, hyperlipidemia, bipolar disorder, gastric bypass, TAVR, suicide attempt per patient discharged earlier this month, hypertension and hypothyroidism. Pt reports that around 9am today while in therapy at inpatient rehabilitation facility for cocaine and alcohol abuse he developed stroke like symptoms. He reports right arm and leg weakness, speech difficulty, weakness of jaw \"quivering\" and confusion. He relates that his speech and weakness of right arm have improved. In ED, neurology consulted, MRI brain-No acute intracranial abnormality. No MR finding to correspond to the abnormality seen on recent CT and therefore was likely artifactual.No intracranial large vessel occlusion or high-grade stenosis. Moderate stenosis of the right cavernous internal carotid artery secondary to atherosclerotic plaque. No extracranial internal carotid or vertebral artery stenosis. Ct head-Subtle right frontal lobe white matter low density, not well characterized, although small infarction difficult to exclude. Recommend MRI for further evaluation. 10/28 seen and evaluated at the bedside the patient remarked that his back pain greatly relieved with IV Robaxin and he requested oral medication going home. Seen by neurologist and neurosurgeon, getting EEG plan to be discharge if EEG is normal and neurologist cleared the patient. Neurosurgeon recommended to follow-up as outpatient.      DISCHARGE DIAGNOSES WITH COURSE OF MANAGEMENT :   Principal Problem:    Stroke-like TECHNIQUE:  Computed tomography angiography was obtained from the skull base to the aortic arch following administration of intravenous contrast. IV Contrast: Administered. 3D/MIP/VR images were utilized. CT Dose Reduction Techniques Employed: Yes  COMPARISON: CT head 10/27/2023  FINDINGS:  LIMITED PARENCHYMAL FINDINGS:  Please see the concurrently dictated CT of the head for detailed evaluation. HEAD ANGIOGRAPHIC FINDINGS:  [employer] [place]: Hypoplastic posterior communicating arteries. Anterior Circulation:  - Internal Carotid Arteries: Atherosclerotic calcification of the bilateral carotid siphons with moderate stenosis of the right cavernous segment. - Middle Cerebral Arteries: No significant stenosis. - Anterior Cerebral Arteries: No significant stenosis. Posterior (Vertebrobasilar) Circulation:  - Vertebral Arteries: Co-dominant. No significant stenosis. - Basilar Artery: No significant stenosis. - Posterior Cerebral Arteries: No significant stenosis. Other: No aneurysm. No vascular malformation. NECK ANGIOGRAPHIC FINDINGS:  Great Vessels: Normal configuration of the great vessels. Normal branching of aortic arch vessels. No atherosclerotic plaque of the aorta. Common Carotid Arteries: - Right: Normal course and caliber. No atherosclerotic plaque. - Left: Normal course and caliber. No atherosclerotic plaque. Internal Carotid Arteries: - Right: Normal course. No atherosclerotic plaque. 0% narrowing at the carotid bifurcation by NASCET criteria. - Left: Normal course. No atherosclerotic plaque. 0% narrowing at the carotid bifurcation by NASCET criteria. Vertebral Arteries: Co-dominant. - Right: Normal course and caliber. - Left: Normal course and caliber. SOFT TISSUE NECK:  No lymphadenopathy. The muscles of the neck are normal.  Fascial planes are preserved and the superficial and deep spaces of the neck are normal.  The visualized airway is patent.   Thyroid gland is normal.  Mild to

## 2023-10-28 NOTE — PROCEDURES
ADULT INPATIENT ELECTROENCEPHALOGRAM REPORT    Patient:   Nate Bashir  MR#:    [id number]  Room #:    INPATIENT  YOB: 1970  Date of Evaluation:  10/28/2023  Primary Physician:     Whitley Alicea MD   Referring Physician:   Valeria Arevalo DO      CLINICAL INFORMATION:     This patient is a 47 y.o. male with a history of TIA. MEDICATIONS:     See MAR. RECORDING CONDITIONS:     This EEG was performed utilizing standard International 10-20 System of electrode placement, with additional channels monitored for eye movement. One channel electrocardiogram was monitored. Data was obtained, stored, and interpreted according to ACNS guidelines (J Clin Neurophysiol 2006;23(2):) utilizing referential montage recording, with reformatting to longitudinal, transverse bipolar, and referential montages as necessary for interpretation, along with the digital/automated EEG analysis. Patient tolerated entire procedure well. Photic stimulation and hyperventilation were utilized as activation procedures unless otherwise specified below. E.E.G. DESCRIPTION:     The resting predominant posterior background frequency is a 9-10 Hz 30-40 uV rhythm. No overt focal, lateralizing, or paroxysmal abnormalities were noted through the recording. Intermixed the architecture was noted. Hyperventilation was not performed. Photic stimulation was performed and had little change on the recording. Muscle, motion, and eye movement artifacts were noted. Significant electrode artifact limited the study. EEG INTERPRETATION:    Normal EEG for age in the awake, drowsy, and sleep states. Significant electrode artifact limited the study, but nothing epileptiform was seen. CLINICAL CORRELATION:     The absence of epileptiform abnormalities does not preclude a clinical diagnosis of seizures.          Valeria Arevalo DO  Board Certified Neurologist      Date reported: 10/28/2023  Date signed: 10/28/2023

## 2023-10-28 NOTE — PROGRESS NOTES
Physical Therapy   Name: Laz Bennett  MRN:  [id number]  Date of service:  10/28/2023    Pt. declined PT this AM due to chronic low back pain. Pt. requesting pain meds and states he has been up to the bathroom on his own. RN, Amy Mitchell, aware and has sent a message to the doctor.     Electronically signed by Devyn Devi PT on 10/28/2023 at 12:00 PM

## 2023-10-28 NOTE — PROGRESS NOTES
Facility/Department: [facility] SURG SERVICES   CLINICAL BEDSIDE SWALLOW EVALUATION  INITIAL EVALUATION OF SPEECH PRODUCTION    NAME: Reji Anderson  : 1970  MRN: [id number]    ADMISSION DATE: 10/27/2023  ADMITTING DIAGNOSIS: has Chest pain, atypical; Gastroesophageal reflux disease; Morbid obesity with BMI of 45.0-49.9, adult ([address]); Type 2 diabetes mellitus without complication ([address]); Acquired hypothyroidism; Hypertensive disorder; Metabolic acidosis; Chest pain; Hiatal hernia; Anxiety; Chronic obstructive pulmonary disease ([address]); Paroxysmal A-fib ([address]); Obstructive sleep apnea; Abnormal EKG; Dyslipidemia; Bipolar disorder with severe depression ([address]); Diabetes mellitus ([address]); Low back pain; Morbid obesity ([address]); Small bowel volvulus ([address]); Volvulus of small intestine ([address]); Strangulation of small intestine ([address]); Occlusion of superior mesenteric artery ([address]); Abdominal pain; Internal hernia; Chest pain with moderate risk for cardiac etiology; Alcohol intoxication ([address]); Acute metabolic encephalopathy; Drug intoxication ([address]); Lactic acidosis; Psychiatric diagnosis; Major depressive disorder, recurrent severe without psychotic features ([address]); and Stroke-like symptoms on their problem list.    Date of Eval: 10/28/2023  Evaluating Therapist: GEMA Muniz    Clinical Impression  Speech assessment completed on this date. No dysarthria present at this time. Clinician ranks speech intelligibility approximately 100% intelligible with context known and background noise present. Clinical Bedside Swallow Evaluation completed on this date. Oral prep reveals adequate rotary jaw movement observed with ice chips and regular solids. Adequate labial seal observed. Oral transit timing ranges from 1-2 seconds with ice chips, puree consistencies, regular solids, and thin liquids via consecutive sips side of cup. Minimum residue noted with regular solids and cleared with liquid wash.  Liquid wash was effective in clearing minimal treatment  Therapeutic Interventions: Pharyngeal exercises;Diet tolerance monitoring; Therapeutic PO trials with SLP;Oral care;Oral motor exercises; Patient/Family education    Compensatory Swallowing Strategies  Compensatory Swallowing Strategies : Alternate solids and liquids;Eat/Feed slowly; No straws;Upright as possible for all oral intake;Remain upright for 30-45 minutes after meals; Check for pocketing of food on the Left;Small bites/sips; Check for pocketing of food on the Right    Treatment/Goals  LTG  Goal 1: Patient will tolerate least restrictive diet with minimal overt s/s of aspiration/penetration during hospitalization. STG  Goal 1: Patient will tolerate regular solids with thin liquids with minimal overt s/s of aspiration/penetration during hospitalization. Goal 2: Patient will demonstrate awareness of general aspiration precautions. Goal 3: Patient will participate in swallowing reassessments to ensure safest diet consistencies. Goal 4: Patient will independently complete daily oral hygiene protocol. Goal 5: Patient will complete breath support, oral motor, lingual, and pharyngeal exercises with provision of mod cues/prompts. General  Chart Reviewed: Yes  Behavior/Cognition: Alert; Cooperative  Temperature Spikes Noted: No  Respiratory Status: Room air  O2 Device: None (Room air)  Communication Observation: Functional  Follows Directions: Simple  Dentition: Adequate  Patient Positioning: Upright in bed  Baseline Vocal Quality: Normal  Prior Dysphagia History: Denies hx dysphagia with solids, liquids, and/or medications at this time. Education  Patient Education Response: Verbalizes understanding     Communication Observation  Speech assessment completed on this date. No dysarthria present at this time. Clinician ranks speech intelligibility approximately 100% intelligible with context known and background noise present.     Oral Mechanism Examination:  Labial retraction: adequate  Labial

## 2023-10-28 NOTE — CARE COORDINATION
At the pt's request the sw sent a referral to [facility]. No transportation on the weekend.  [employer] provided a cab voucher for the pt to go to [facility] [address]  4.8 miles at a cost of $10

## 2023-10-30 LAB
EKG P AXIS: 65 DEGREES
EKG P-R INTERVAL: 122 MS
EKG Q-T INTERVAL: 382 MS
EKG QRS DURATION: 98 MS
EKG QTC CALCULATION (BAZETT): 397 MS
EKG T AXIS: -86 DEGREES

## 2023-10-30 PROCEDURE — 93010 ELECTROCARDIOGRAM REPORT: CPT | Performed by: INTERNAL MEDICINE

## 2023-11-13 ENCOUNTER — TELEPHONE (OUTPATIENT)
Dept: NEUROSURGERY | Age: 53
End: 2023-11-13

## 2023-11-13 NOTE — TELEPHONE ENCOUNTER
Pt called to re schedule appt , he said he arrived too late for, appt was marked No Show. Please reschedule visit, thanks. Best time to call is anytime. [NI] : Endocrine [Nl] : Hematologic/Lymphatic

## 2023-11-14 NOTE — TELEPHONE ENCOUNTER
Patient calling back to get his appointment for HFU rescheduled.  Please called the patient [phone]      Thank You

## 2023-11-15 ENCOUNTER — HOSPITAL ENCOUNTER (EMERGENCY)
Facility: HOSPITAL | Age: 53
Discharge: HOME | End: 2023-11-15
Attending: EMERGENCY MEDICINE
Payer: COMMERCIAL

## 2023-11-15 VITALS
HEART RATE: 82 BPM | RESPIRATION RATE: 16 BRPM | BODY MASS INDEX: 27.63 KG/M2 | WEIGHT: 204 LBS | SYSTOLIC BLOOD PRESSURE: 154 MMHG | OXYGEN SATURATION: 100 % | TEMPERATURE: 97.9 F | DIASTOLIC BLOOD PRESSURE: 82 MMHG | HEIGHT: 72 IN

## 2023-11-15 DIAGNOSIS — M54.16 LUMBAR RADICULOPATHY: Primary | ICD-10-CM

## 2023-11-15 PROCEDURE — 99283 EMERGENCY DEPT VISIT LOW MDM: CPT

## 2023-11-15 PROCEDURE — 63710000001 PREDNISONE PER 1 MG: Performed by: EMERGENCY MEDICINE

## 2023-11-15 RX ORDER — PREDNISONE 20 MG/1
60 TABLET ORAL ONCE
Status: COMPLETED | OUTPATIENT
Start: 2023-11-15 | End: 2023-11-15

## 2023-11-15 RX ORDER — METHOCARBAMOL 750 MG/1
750 TABLET, FILM COATED ORAL 3 TIMES DAILY PRN
Qty: 20 TABLET | Refills: 0 | Status: SHIPPED | OUTPATIENT
Start: 2023-11-15

## 2023-11-15 RX ORDER — PREDNISONE 20 MG/1
60 TABLET ORAL DAILY
Qty: 12 TABLET | Refills: 0 | Status: SHIPPED | OUTPATIENT
Start: 2023-11-15 | End: 2023-11-19

## 2023-11-15 RX ORDER — METHOCARBAMOL 500 MG/1
1000 TABLET, FILM COATED ORAL ONCE
Status: COMPLETED | OUTPATIENT
Start: 2023-11-15 | End: 2023-11-15

## 2023-11-15 RX ORDER — KETOROLAC TROMETHAMINE 30 MG/ML
30 INJECTION, SOLUTION INTRAMUSCULAR; INTRAVENOUS ONCE
Status: DISCONTINUED | OUTPATIENT
Start: 2023-11-15 | End: 2023-11-15

## 2023-11-15 RX ADMIN — METHOCARBAMOL 1000 MG: 500 TABLET ORAL at 06:26

## 2023-11-15 RX ADMIN — PREDNISONE 60 MG: 20 TABLET ORAL at 06:26

## 2023-11-15 NOTE — ED PROVIDER NOTES
Subjective   History of Present Illness  Pt presents to the  with report of lower back pain radiating to L LE.  Pt reports hx of this chronically.  No injuries.  No abdominal pain. Denies any urinary incontinence/hematuria/dysuria.  States that he is supposed to have surgery on his back.          Review of Systems   Constitutional:  Negative for chills and fever.   Gastrointestinal:  Negative for abdominal pain, nausea and vomiting.   Musculoskeletal:  Positive for back pain. Negative for joint swelling and myalgias.   Skin:  Negative for rash and wound.   All other systems reviewed and are negative.      Past Medical History:   Diagnosis Date    A-fib     Arthritis     Diabetes mellitus     Disease of thyroid gland     GERD (gastroesophageal reflux disease)     Hernia     Hyperlipidemia     Hypertension        No Known Allergies    Past Surgical History:   Procedure Laterality Date    GASTRIC SLEEVE LAPAROSCOPIC      NISSEN FUNDOPLICATION         Family History   Problem Relation Age of Onset    No Known Problems Mother     Diabetes Father     Hyperlipidemia Father     Hypertension Father     No Known Problems Sister     Cancer Brother        Social History     Socioeconomic History    Marital status:    Tobacco Use    Smoking status: Never   Substance and Sexual Activity    Alcohol use: No    Drug use: No    Sexual activity: Yes     Partners: Female           Objective   Physical Exam  Vitals and nursing note reviewed.   Constitutional:       Appearance: Normal appearance.   Cardiovascular:      Rate and Rhythm: Normal rate and regular rhythm.      Pulses: Normal pulses.      Heart sounds: Normal heart sounds.   Abdominal:      General: Abdomen is flat.      Palpations: Abdomen is soft.   Musculoskeletal:      Comments: No stepoff/def.  MS 5/5 bilat LEs   Skin:     General: Skin is warm and dry.   Neurological:      General: No focal deficit present.      Mental Status: He is alert and oriented to person,  place, and time.      Sensory: No sensory deficit.      Motor: No weakness.         Procedures           ED Course                                           Medical Decision Making  Pt stable in EC - no evid of CRAFTI.  Pt with chronic lower back pain and sx c/w lumbar radiculopathy.  Will give short course of robaxin/prednisone.  Recommend pt contact PCM/spine surgery today to discuss further pain mgmt.     Risk  Prescription drug management.        Final diagnoses:   Lumbar radiculopathy       ED Disposition  ED Disposition       ED Disposition   Discharge    Condition   Stable    Comment   --               Mesha Parker, JOB  [address] [place] [address] [address]  [facility] [address]  [phone]               Medication List        New Prescriptions      methocarbamol 750 MG tablet  Commonly known as: ROBAXIN  Take 1 tablet by mouth 3 (Three) Times a Day As Needed for Muscle Spasms.     predniSONE 20 MG tablet  Commonly known as: DELTASONE  Take 3 tablets by mouth Daily for 4 days.               Where to Get Your Medications        These medications were sent to [facility] - [place] - [address] - [phone]  - [phone]   [address], [facility] [address]      Phone: [phone]   methocarbamol 750 MG tablet  predniSONE 20 MG tablet            Agusto Olmos DO  11/15/23 0614

## 2024-02-07 ENCOUNTER — OFFICE VISIT (OUTPATIENT)
Dept: NEUROSURGERY | Age: 54
End: 2024-02-07
Payer: MEDICAID

## 2024-02-07 VITALS
WEIGHT: 214.95 LBS | DIASTOLIC BLOOD PRESSURE: 110 MMHG | HEART RATE: 90 BPM | SYSTOLIC BLOOD PRESSURE: 170 MMHG | HEIGHT: 72 IN | BODY MASS INDEX: 29.11 KG/M2

## 2024-02-07 DIAGNOSIS — M48.062 LUMBAR STENOSIS WITH NEUROGENIC CLAUDICATION: ICD-10-CM

## 2024-02-07 DIAGNOSIS — M43.16 SPONDYLOLISTHESIS, LUMBAR REGION: Primary | ICD-10-CM

## 2024-02-07 DIAGNOSIS — M54.16 LUMBAR RADICULOPATHY: ICD-10-CM

## 2024-02-07 PROCEDURE — 3017F COLORECTAL CA SCREEN DOC REV: CPT | Performed by: NEUROLOGICAL SURGERY

## 2024-02-07 PROCEDURE — 99213 OFFICE O/P EST LOW 20 MIN: CPT | Performed by: NEUROLOGICAL SURGERY

## 2024-02-07 PROCEDURE — 1036F TOBACCO NON-USER: CPT | Performed by: NEUROLOGICAL SURGERY

## 2024-02-07 PROCEDURE — 3077F SYST BP >= 140 MM HG: CPT | Performed by: NEUROLOGICAL SURGERY

## 2024-02-07 PROCEDURE — G8419 CALC BMI OUT NRM PARAM NOF/U: HCPCS | Performed by: NEUROLOGICAL SURGERY

## 2024-02-07 PROCEDURE — G8427 DOCREV CUR MEDS BY ELIG CLIN: HCPCS | Performed by: NEUROLOGICAL SURGERY

## 2024-02-07 PROCEDURE — 3080F DIAST BP >= 90 MM HG: CPT | Performed by: NEUROLOGICAL SURGERY

## 2024-02-07 PROCEDURE — G8484 FLU IMMUNIZE NO ADMIN: HCPCS | Performed by: NEUROLOGICAL SURGERY

## 2024-02-07 RX ORDER — IBUPROFEN 800 MG/1
TABLET ORAL
COMMUNITY
Start: 2023-11-03

## 2024-02-07 RX ORDER — SILDENAFIL 50 MG/1
50 TABLET, FILM COATED ORAL DAILY PRN
COMMUNITY

## 2024-02-07 RX ORDER — METHOCARBAMOL 750 MG/1
750 TABLET, FILM COATED ORAL 3 TIMES DAILY PRN
COMMUNITY
Start: 2023-11-15

## 2024-02-07 RX ORDER — OMEPRAZOLE 40 MG/1
40 CAPSULE, DELAYED RELEASE ORAL
COMMUNITY

## 2024-02-07 ASSESSMENT — ENCOUNTER SYMPTOMS
RESPIRATORY NEGATIVE: 1
GASTROINTESTINAL NEGATIVE: 1
EYES NEGATIVE: 1
BACK PAIN: 1

## 2024-02-07 NOTE — PROGRESS NOTES
NEUROSURGERY FOLLOW UP      Chief Complaint:   Chief Complaint   Patient presents with    Follow-Up from Hospital     Patient states that he is feeling worse since leaving the hospital 3 months ago. He states that his back pain is worse and is radiating into his legs.         Interval Update:    Patient returns for follow-up after evaluation in the hospital in 10/2023.  He continues to endorse lower back pain.  He also complains of radicular pain in both legs as well as pain, numbness and weakness in his legs with walking.  He has not attempted any recent physical therapy for his back.  He has been in pain management with Dr. Beltran's group previously but he has not seen them recently.      HPI:     The patient is a 53 y.o. male who presented to the [facility] ED from an inpatient rehab center where he is in treatment for alcohol and cocaine addiction.  He states he was participating in a group therapy session when his speech became abnormal and he developed weakness on the right side of his body (face, arm and leg).  He states bystanders also observed him to be \"shaking\".  He was brought by EMS to [facility] and he was admitted to the hospital and neurology was consulted.  An MRI scan of his brain and CT angiogram studies were obtained that were largely unrevealing.  He denies any history of seizures, however he does report a history of syncopal events.  He has a history of atrial fibrillation.  He is taking baby ASA but is not on anticoagulation.       He also reports a history of longstanding chronic back pain.  He has been in pain management for this but has never been evaluated by a surgeon.  He describes mostly back pain and pain radiating down his right leg.  He also reports pain and numbness in his feet from neuropathy.     After admission, it was felt that he was asymmetrically weak in his right leg and an MRI of his lumbar spine was obtained and I have been asked to provide neurosurgical consultation.  He

## 2024-03-28 ENCOUNTER — TELEPHONE (OUTPATIENT)
Dept: NEUROSURGERY | Age: 54
End: 2024-03-28

## 2024-03-28 NOTE — TELEPHONE ENCOUNTER
Called to ask patient if he has been going to PT anywhere else since he no showed [facility]. When he asked the phone and I stated who I was, he demanded that I \"speak!!\" I asked him if he has been going to PT and he stated that \"I know d*mn well that he was going to go to PT, his back imaging is too bad to go to PT.\" I stated that his appt with Dr Betancourt 4/1 is to discuss PT and will need to be canceled. He stated that I need to call PT and r/s for him because he will not. Patient promptly hung up the phone.

## 2024-10-10 ENCOUNTER — OFFICE VISIT (OUTPATIENT)
Dept: FAMILY MEDICINE CLINIC | Facility: CLINIC | Age: 54
End: 2024-10-10
Payer: COMMERCIAL

## 2024-10-10 VITALS
OXYGEN SATURATION: 98 % | TEMPERATURE: 98.5 F | BODY MASS INDEX: 27.85 KG/M2 | HEART RATE: 110 BPM | DIASTOLIC BLOOD PRESSURE: 94 MMHG | HEIGHT: 72 IN | SYSTOLIC BLOOD PRESSURE: 157 MMHG | WEIGHT: 205.6 LBS

## 2024-10-10 DIAGNOSIS — Z23 NEED FOR IMMUNIZATION AGAINST INFLUENZA: ICD-10-CM

## 2024-10-10 DIAGNOSIS — E11.43 TYPE 2 DIABETES MELLITUS WITH DIABETIC AUTONOMIC NEUROPATHY, WITHOUT LONG-TERM CURRENT USE OF INSULIN: Primary | ICD-10-CM

## 2024-10-10 DIAGNOSIS — E03.9 HYPOTHYROIDISM, UNSPECIFIED TYPE: ICD-10-CM

## 2024-10-10 DIAGNOSIS — I48.91 ATRIAL FIBRILLATION, UNSPECIFIED TYPE: ICD-10-CM

## 2024-10-10 DIAGNOSIS — Z98.84 HX OF BARIATRIC SURGERY: ICD-10-CM

## 2024-10-10 DIAGNOSIS — M54.16 LUMBAR RADICULOPATHY: ICD-10-CM

## 2024-10-10 DIAGNOSIS — I10 HYPERTENSION, UNSPECIFIED TYPE: ICD-10-CM

## 2024-10-10 DIAGNOSIS — Z12.11 SCREENING FOR COLON CANCER: ICD-10-CM

## 2024-10-10 DIAGNOSIS — Z23 FLU VACCINE NEED: ICD-10-CM

## 2024-10-10 DIAGNOSIS — E78.5 HYPERLIPIDEMIA, UNSPECIFIED HYPERLIPIDEMIA TYPE: ICD-10-CM

## 2024-10-10 LAB
ALBUMIN SERPL-MCNC: 4.2 G/DL (ref 3.5–5.2)
ALBUMIN/GLOB SERPL: 1.6 G/DL
ALP SERPL-CCNC: 60 U/L (ref 39–117)
ALT SERPL W P-5'-P-CCNC: 38 U/L (ref 1–41)
ANION GAP SERPL CALCULATED.3IONS-SCNC: 10 MMOL/L (ref 5–15)
AST SERPL-CCNC: 42 U/L (ref 1–40)
BACTERIA UR QL AUTO: NORMAL /HPF
BASOPHILS # BLD AUTO: 0.02 10*3/MM3 (ref 0–0.2)
BASOPHILS NFR BLD AUTO: 0.4 % (ref 0–1.5)
BILIRUB SERPL-MCNC: 0.2 MG/DL (ref 0–1.2)
BILIRUB UR QL STRIP: NEGATIVE
BUN SERPL-MCNC: 14 MG/DL (ref 6–20)
BUN/CREAT SERPL: 13.9 (ref 7–25)
CALCIUM SPEC-SCNC: 9.2 MG/DL (ref 8.6–10.5)
CHLORIDE SERPL-SCNC: 110 MMOL/L (ref 98–107)
CHOLEST SERPL-MCNC: 130 MG/DL (ref 0–200)
CLARITY UR: CLEAR
CO2 SERPL-SCNC: 23 MMOL/L (ref 22–29)
COLOR UR: YELLOW
CREAT SERPL-MCNC: 1.01 MG/DL (ref 0.76–1.27)
CREAT UR-MCNC: 78.6 MG/DL
DEPRECATED RDW RBC AUTO: 41 FL (ref 37–54)
EGFRCR SERPLBLD CKD-EPI 2021: 88.9 ML/MIN/1.73
EOSINOPHIL # BLD AUTO: 0.1 10*3/MM3 (ref 0–0.4)
EOSINOPHIL NFR BLD AUTO: 2.1 % (ref 0.3–6.2)
ERYTHROCYTE [DISTWIDTH] IN BLOOD BY AUTOMATED COUNT: 13.3 % (ref 12.3–15.4)
FERRITIN SERPL-MCNC: 12.9 NG/ML (ref 30–400)
FOLATE SERPL-MCNC: 12 NG/ML (ref 4.78–24.2)
GLOBULIN UR ELPH-MCNC: 2.7 GM/DL
GLUCOSE SERPL-MCNC: 49 MG/DL (ref 65–99)
GLUCOSE UR STRIP-MCNC: ABNORMAL MG/DL
HCT VFR BLD AUTO: 35.4 % (ref 37.5–51)
HDLC SERPL-MCNC: 73 MG/DL (ref 40–60)
HGB BLD-MCNC: 10.9 G/DL (ref 13–17.7)
HGB UR QL STRIP.AUTO: NEGATIVE
HYALINE CASTS UR QL AUTO: NORMAL /LPF
IMM GRANULOCYTES # BLD AUTO: 0.02 10*3/MM3 (ref 0–0.05)
IMM GRANULOCYTES NFR BLD AUTO: 0.4 % (ref 0–0.5)
IRON 24H UR-MRATE: 38 MCG/DL (ref 59–158)
IRON SATN MFR SERPL: 7 % (ref 20–50)
KETONES UR QL STRIP: NEGATIVE
LDLC SERPL CALC-MCNC: 44 MG/DL (ref 0–100)
LDLC/HDLC SERPL: 0.62 {RATIO}
LEUKOCYTE ESTERASE UR QL STRIP.AUTO: NEGATIVE
LYMPHOCYTES # BLD AUTO: 1.03 10*3/MM3 (ref 0.7–3.1)
LYMPHOCYTES NFR BLD AUTO: 21.6 % (ref 19.6–45.3)
MCH RBC QN AUTO: 26.3 PG (ref 26.6–33)
MCHC RBC AUTO-ENTMCNC: 30.8 G/DL (ref 31.5–35.7)
MCV RBC AUTO: 85.3 FL (ref 79–97)
MONOCYTES # BLD AUTO: 0.53 10*3/MM3 (ref 0.1–0.9)
MONOCYTES NFR BLD AUTO: 11.1 % (ref 5–12)
NEUTROPHILS NFR BLD AUTO: 3.06 10*3/MM3 (ref 1.7–7)
NEUTROPHILS NFR BLD AUTO: 64.4 % (ref 42.7–76)
NITRITE UR QL STRIP: NEGATIVE
NRBC BLD AUTO-RTO: 0 /100 WBC (ref 0–0.2)
PH UR STRIP.AUTO: 6 [PH] (ref 5–8)
PLATELET # BLD AUTO: 308 10*3/MM3 (ref 140–450)
PMV BLD AUTO: 11.3 FL (ref 6–12)
POTASSIUM SERPL-SCNC: 5 MMOL/L (ref 3.5–5.2)
PROT ?TM UR-MCNC: 6.6 MG/DL
PROT SERPL-MCNC: 6.9 G/DL (ref 6–8.5)
PROT UR QL STRIP: NEGATIVE
PROT/CREAT UR: 0.08 MG/G{CREAT}
RBC # BLD AUTO: 4.15 10*6/MM3 (ref 4.14–5.8)
RBC # UR STRIP: NORMAL /HPF
REF LAB TEST METHOD: NORMAL
SODIUM SERPL-SCNC: 143 MMOL/L (ref 136–145)
SP GR UR STRIP: 1.02 (ref 1–1.03)
SQUAMOUS #/AREA URNS HPF: NORMAL /HPF
T4 FREE SERPL-MCNC: 0.92 NG/DL (ref 0.92–1.68)
TIBC SERPL-MCNC: 530 MCG/DL (ref 298–536)
TRANSFERRIN SERPL-MCNC: 356 MG/DL (ref 200–360)
TRIGL SERPL-MCNC: 58 MG/DL (ref 0–150)
TSH SERPL DL<=0.05 MIU/L-ACNC: 9.78 UIU/ML (ref 0.27–4.2)
UROBILINOGEN UR QL STRIP: ABNORMAL
VIT B12 BLD-MCNC: 394 PG/ML (ref 211–946)
VLDLC SERPL-MCNC: 13 MG/DL (ref 5–40)
WBC # UR STRIP: NORMAL /HPF
WBC NRBC COR # BLD AUTO: 4.76 10*3/MM3 (ref 3.4–10.8)

## 2024-10-10 PROCEDURE — 1159F MED LIST DOCD IN RCRD: CPT | Performed by: STUDENT IN AN ORGANIZED HEALTH CARE EDUCATION/TRAINING PROGRAM

## 2024-10-10 PROCEDURE — 83540 ASSAY OF IRON: CPT | Performed by: STUDENT IN AN ORGANIZED HEALTH CARE EDUCATION/TRAINING PROGRAM

## 2024-10-10 PROCEDURE — 1160F RVW MEDS BY RX/DR IN RCRD: CPT | Performed by: STUDENT IN AN ORGANIZED HEALTH CARE EDUCATION/TRAINING PROGRAM

## 2024-10-10 PROCEDURE — 82570 ASSAY OF URINE CREATININE: CPT | Performed by: STUDENT IN AN ORGANIZED HEALTH CARE EDUCATION/TRAINING PROGRAM

## 2024-10-10 PROCEDURE — 82607 VITAMIN B-12: CPT | Performed by: STUDENT IN AN ORGANIZED HEALTH CARE EDUCATION/TRAINING PROGRAM

## 2024-10-10 PROCEDURE — 84439 ASSAY OF FREE THYROXINE: CPT | Performed by: STUDENT IN AN ORGANIZED HEALTH CARE EDUCATION/TRAINING PROGRAM

## 2024-10-10 PROCEDURE — 82728 ASSAY OF FERRITIN: CPT | Performed by: STUDENT IN AN ORGANIZED HEALTH CARE EDUCATION/TRAINING PROGRAM

## 2024-10-10 PROCEDURE — 84443 ASSAY THYROID STIM HORMONE: CPT | Performed by: STUDENT IN AN ORGANIZED HEALTH CARE EDUCATION/TRAINING PROGRAM

## 2024-10-10 PROCEDURE — 80061 LIPID PANEL: CPT | Performed by: STUDENT IN AN ORGANIZED HEALTH CARE EDUCATION/TRAINING PROGRAM

## 2024-10-10 PROCEDURE — 84466 ASSAY OF TRANSFERRIN: CPT | Performed by: STUDENT IN AN ORGANIZED HEALTH CARE EDUCATION/TRAINING PROGRAM

## 2024-10-10 PROCEDURE — 99204 OFFICE O/P NEW MOD 45 MIN: CPT | Performed by: STUDENT IN AN ORGANIZED HEALTH CARE EDUCATION/TRAINING PROGRAM

## 2024-10-10 PROCEDURE — 80053 COMPREHEN METABOLIC PANEL: CPT | Performed by: STUDENT IN AN ORGANIZED HEALTH CARE EDUCATION/TRAINING PROGRAM

## 2024-10-10 PROCEDURE — 84156 ASSAY OF PROTEIN URINE: CPT | Performed by: STUDENT IN AN ORGANIZED HEALTH CARE EDUCATION/TRAINING PROGRAM

## 2024-10-10 PROCEDURE — 85025 COMPLETE CBC W/AUTO DIFF WBC: CPT | Performed by: STUDENT IN AN ORGANIZED HEALTH CARE EDUCATION/TRAINING PROGRAM

## 2024-10-10 PROCEDURE — 81001 URINALYSIS AUTO W/SCOPE: CPT | Performed by: STUDENT IN AN ORGANIZED HEALTH CARE EDUCATION/TRAINING PROGRAM

## 2024-10-10 PROCEDURE — 82746 ASSAY OF FOLIC ACID SERUM: CPT | Performed by: STUDENT IN AN ORGANIZED HEALTH CARE EDUCATION/TRAINING PROGRAM

## 2024-10-10 PROCEDURE — 83036 HEMOGLOBIN GLYCOSYLATED A1C: CPT | Performed by: STUDENT IN AN ORGANIZED HEALTH CARE EDUCATION/TRAINING PROGRAM

## 2024-10-10 PROCEDURE — 90656 IIV3 VACC NO PRSV 0.5 ML IM: CPT | Performed by: STUDENT IN AN ORGANIZED HEALTH CARE EDUCATION/TRAINING PROGRAM

## 2024-10-10 PROCEDURE — 90471 IMMUNIZATION ADMIN: CPT | Performed by: STUDENT IN AN ORGANIZED HEALTH CARE EDUCATION/TRAINING PROGRAM

## 2024-10-10 RX ORDER — HYDROXYZINE HYDROCHLORIDE 25 MG/1
25 TABLET, FILM COATED ORAL 3 TIMES DAILY PRN
COMMUNITY

## 2024-10-10 RX ORDER — GABAPENTIN 800 MG/1
800 TABLET ORAL 3 TIMES DAILY
COMMUNITY

## 2024-10-10 RX ORDER — ATORVASTATIN CALCIUM 40 MG/1
40 TABLET, FILM COATED ORAL DAILY
Qty: 90 TABLET | Refills: 2 | Status: SHIPPED | OUTPATIENT
Start: 2024-10-10

## 2024-10-10 RX ORDER — IBUPROFEN 800 MG/1
800 TABLET, FILM COATED ORAL 3 TIMES DAILY
COMMUNITY
End: 2024-10-10

## 2024-10-10 RX ORDER — DULOXETIN HYDROCHLORIDE 60 MG/1
60 CAPSULE, DELAYED RELEASE ORAL 2 TIMES DAILY
COMMUNITY

## 2024-10-10 RX ORDER — IBUPROFEN 200 MG
200 TABLET ORAL EVERY 6 HOURS PRN
Qty: 90 TABLET | Refills: 2 | Status: SHIPPED | OUTPATIENT
Start: 2024-10-10

## 2024-10-10 RX ORDER — SENNOSIDES 8.6 MG
650 CAPSULE ORAL EVERY 8 HOURS PRN
Qty: 90 TABLET | Refills: 2 | Status: SHIPPED | OUTPATIENT
Start: 2024-10-10

## 2024-10-10 NOTE — PROGRESS NOTES
Chief Complaint  Establish Care (Check up)    Subjective      Campos Hale is a 54 y.o. male who presents to [facility]     Patient Care Team:  Annie Garza MD as PCP - General (Family Medicine)    Patient's current complaints are pt is here to establish care, he moved from [place] to [facility].  He has known history of hypertension, diabetes, hypothyroidism, insomnia, A-fib was previously on warfarin which was discontinued by his cardiologist, history of small bowel obstruction, history of bariatric surgery.    Type 2 diabetes  -Patient is currently on metformin 500 mg twice a day, he was previously taking Jardiance and fenofibrate  -No home blood sugar values    Hypertension  -Is currently on hydralazine 25 mg twice daily  -Denies chest pain, palpitations, headache, dizziness, lightheadedness or leg swelling    Hypothyroidism  -Patient is currently on Synthroid 300 mcg daily    Peripheral neuropathy and lumbar radiculopathy  -Takes Cymbalta 60 mg twice daily, gabapentin 800 mg 3 times daily, ibuprofen 800 mg 3 times daily and Zanaflex 4 mg every 8 hours  -Patient goes to pain management, got epidural injection recently, planning to get a neural stimulator in future      Review of Systems   HENT:  Negative for congestion and rhinorrhea.    Respiratory:  Negative for cough and shortness of breath.    Cardiovascular:  Negative for chest pain and palpitations.   Gastrointestinal:  Negative for constipation and diarrhea.   Genitourinary:  Negative for dysuria and hematuria.   Musculoskeletal:  Positive for arthralgias, back pain and myalgias.   Neurological:  Negative for dizziness, light-headedness and headaches.   Psychiatric/Behavioral:  The patient is not nervous/anxious.        Objective   Vital Signs:   Vitals:    10/10/24 1301   BP: 157/94   BP Location: Left arm   Patient Position: Sitting   Cuff Size: Adult   Pulse: 110   Temp: 98.5 °F (36.9 °C)   TempSrc: Temporal   SpO2:  "98%   Weight: 93.3 kg (205 lb 9.6 oz)   Height: 182.9 cm (72.01\")     Body mass index is 27.88 kg/m².    Wt Readings from Last 3 Encounters:   10/10/24 93.3 kg (205 lb 9.6 oz)   11/15/23 92.5 kg (204 lb)   09/22/20 93.4 kg (206 lb)     BP Readings from Last 3 Encounters:   10/10/24 157/94   11/15/23 154/82   09/22/20 132/82       Health Maintenance   Topic Date Due    BMI FOLLOWUP  Never done    COLORECTAL CANCER SCREENING  Never done    DIABETIC EYE EXAM  Never done    Hepatitis B (1 of 3 - 19+ 3-dose series) Never done    HEPATITIS C SCREENING  Never done    ANNUAL PHYSICAL  Never done    URINE MICROALBUMIN  04/24/2019    DIABETIC FOOT EXAM  08/23/2020    LIPID PANEL  12/12/2020    HEMOGLOBIN A1C  02/12/2021    INFLUENZA VACCINE  08/01/2024    ZOSTER VACCINE (1 of 2) 10/10/2024 (Originally 2/26/2021)    COVID-19 Vaccine (4 - 2023-24 season) 12/29/2024 (Originally 9/1/2024)    TDAP/TD VACCINES (4 - Td or Tdap) 09/22/2030    Pneumococcal Vaccine 0-64 (3 of 3 - PPSV23 or PCV20) 02/26/2036       Physical Exam  Constitutional:       Appearance: Normal appearance.   HENT:      Head: Normocephalic and atraumatic.      Mouth/Throat:      Mouth: Mucous membranes are moist.   Eyes:      Pupils: Pupils are equal, round, and reactive to light.   Cardiovascular:      Rate and Rhythm: Normal rate and regular rhythm.      Pulses: Normal pulses.      Heart sounds: Normal heart sounds.   Pulmonary:      Effort: Pulmonary effort is normal.      Breath sounds: Normal breath sounds.   Abdominal:      Palpations: Abdomen is soft.   Musculoskeletal:         General: Normal range of motion.   Skin:     General: Skin is warm.   Neurological:      General: No focal deficit present.      Mental Status: He is alert and oriented to person, place, and time.   Psychiatric:         Mood and Affect: Mood normal.         Behavior: Behavior normal.         Result Review   The following data was reviewed by: Annie Garza MD on 10/10/2024:  [x]  " Tests & Results  []  Hospitalization/Emergency Department/Urgent Care  []  Internal/External Consultant Notes      ASSESSMENT/PLAN  Diagnoses and all orders for this visit:    1. Type 2 diabetes mellitus with diabetic autonomic neuropathy, without long-term current use of insulin (Primary)  -Advised patient to continue metformin 500 mg twice daily, ordered Jardiance 25 mg daily, Lipitor 40 mg daily  -Patient lost more than 200 pounds after his bariatric surgery, advised him to continue eating healthy diet and daily exercises  -     CBC & Differential; Future  -     Comprehensive Metabolic Panel  -     Hemoglobin A1c; Future  -     Lipid Panel  -     Urinalysis With Microscopic - Urine, Clean Catch  -     Protein / Creatinine Ratio, Urine - Urine, Clean Catch; Future    2. Hypertension, unspecified type  -     CBC & Differential; Future  -     Comprehensive Metabolic Panel  -     Lipid Panel  -     Urinalysis With Microscopic - Urine, Clean Catch  -     Protein / Creatinine Ratio, Urine - Urine, Clean Catch; Future    3. Hypothyroidism, unspecified type        -    Continue Synthroid  -     CBC & Differential; Future  -     TSH Rfx On Abnormal To Free T4    4. Atrial fibrillation, unspecified type  -Found to have A-fib during his bowel surgery, was on warfarin for 6 months, currently on aspirin, we will continue    5.  History of bariatric surgery  -In addition to above labs I have ordered iron, ferritin, B12 and folate    BMI is >= 25 and <30. (Overweight) The following options were offered after discussion;: weight loss educational material (shared in after visit summary) and exercise counseling/recommendations patient had history of bariatric gastric bypass surgery, trying to eat healthy.       Campos Hale  reports that he has never smoked. He has never used smokeless tobacco.        FOLLOW UP  Return in about 4 weeks (around 11/7/2024).  Patient was given instructions and counseling regarding his condition or  for health maintenance advice. Please see specific information pulled into the AVS if appropriate.       Annie Garza MD  10/10/24  13:57 EDT

## 2024-10-11 ENCOUNTER — TELEPHONE (OUTPATIENT)
Dept: GASTROENTEROLOGY | Facility: CLINIC | Age: 54
End: 2024-10-11
Payer: COMMERCIAL

## 2024-10-11 ENCOUNTER — TELEPHONE (OUTPATIENT)
Dept: FAMILY MEDICINE CLINIC | Facility: CLINIC | Age: 54
End: 2024-10-11
Payer: COMMERCIAL

## 2024-10-11 DIAGNOSIS — E11.42 TYPE 2 DIABETES MELLITUS WITH DIABETIC POLYNEUROPATHY, WITHOUT LONG-TERM CURRENT USE OF INSULIN: ICD-10-CM

## 2024-10-11 DIAGNOSIS — E16.2 HYPOGLYCEMIA: Primary | ICD-10-CM

## 2024-10-11 LAB — HBA1C MFR BLD: 6.1 % (ref 4.8–5.6)

## 2024-10-11 RX ORDER — BLOOD-GLUCOSE METER
1 KIT MISCELLANEOUS AS NEEDED
Qty: 1 EACH | Refills: 0 | Status: SHIPPED | OUTPATIENT
Start: 2024-10-11

## 2024-10-11 RX ORDER — SYRING-NEEDL,DISP,INSUL,0.3 ML 30 GX5/16"
1 SYRINGE, EMPTY DISPOSABLE MISCELLANEOUS 3 TIMES DAILY
Qty: 100 EACH | Refills: 1 | Status: SHIPPED | OUTPATIENT
Start: 2024-10-11

## 2024-10-11 RX ORDER — GLUCAGON INJECTION, SOLUTION 1 MG/.2ML
1 INJECTION, SOLUTION SUBCUTANEOUS ONCE AS NEEDED
Qty: 0.4 ML | Refills: 1 | Status: SHIPPED | OUTPATIENT
Start: 2024-10-11

## 2024-10-11 NOTE — PROGRESS NOTES
I called pt around 3.30 AM about critically low glucose value, patient denied any symptoms of hypoglycemia, patient also did not have glucometer with him, I have advised him to take fast acting carbohydrates like glucose tablet, and to go to ER.  Patient verbalized understanding.

## 2024-10-11 NOTE — TELEPHONE ENCOUNTER
Attempted to contact pt to schedule a nurse/ma visit for Colon cancer screening. No vm set up. Pt is not established with GI and can be scheduled with next available.

## 2024-10-11 NOTE — PROGRESS NOTES
Please call and check with the patient if he went to the ER or rechecked his blood sugar, I am ordering a glucometer and  glucagon injection to the patient (needs to be taken for blood sugar below 50)  Also inform patient that his TSH is high but T4 is within normal limits that means he needs to continue his current thyroid medication at the same dose, we will recheck in 3 months.

## 2024-10-14 ENCOUNTER — TELEPHONE (OUTPATIENT)
Dept: GASTROENTEROLOGY | Facility: CLINIC | Age: 54
End: 2024-10-14

## 2024-10-14 NOTE — TELEPHONE ENCOUNTER
Hub staff attempted to follow warm transfer process and was unsuccessful     Caller: Campos Hale    Relationship to patient: Self    Best call back number: [phone]    Patient is needing: TO BE SCHEDULED FOR COLON SCREENING

## 2024-10-15 ENCOUNTER — TELEPHONE (OUTPATIENT)
Dept: GASTROENTEROLOGY | Facility: CLINIC | Age: 54
End: 2024-10-15
Payer: COMMERCIAL

## 2024-10-15 NOTE — TELEPHONE ENCOUNTER
Patient called to schedule a colon screening. I offered him the next phone screening appointment, he declined to scheduled. He will call back at a later time to scheduled the colon screening

## 2024-10-29 ENCOUNTER — LAB (OUTPATIENT)
Dept: LAB | Facility: HOSPITAL | Age: 54
End: 2024-10-29
Payer: COMMERCIAL

## 2024-10-29 ENCOUNTER — OFFICE VISIT (OUTPATIENT)
Dept: FAMILY MEDICINE CLINIC | Facility: CLINIC | Age: 54
End: 2024-10-29
Payer: COMMERCIAL

## 2024-10-29 VITALS
TEMPERATURE: 97.2 F | OXYGEN SATURATION: 100 % | DIASTOLIC BLOOD PRESSURE: 77 MMHG | HEART RATE: 58 BPM | WEIGHT: 202.2 LBS | SYSTOLIC BLOOD PRESSURE: 121 MMHG | BODY MASS INDEX: 27.39 KG/M2 | HEIGHT: 72 IN

## 2024-10-29 DIAGNOSIS — Z11.59 NEED FOR HEPATITIS C SCREENING TEST: ICD-10-CM

## 2024-10-29 DIAGNOSIS — M54.16 LUMBAR RADICULOPATHY: ICD-10-CM

## 2024-10-29 DIAGNOSIS — M51.379 DEGENERATIVE DISC DISEASE AT L5-S1 LEVEL: ICD-10-CM

## 2024-10-29 DIAGNOSIS — Z12.5 SCREENING PSA (PROSTATE SPECIFIC ANTIGEN): ICD-10-CM

## 2024-10-29 DIAGNOSIS — Z00.00 ANNUAL WELLNESS VISIT: Primary | ICD-10-CM

## 2024-10-29 DIAGNOSIS — E11.43 TYPE 2 DIABETES MELLITUS WITH DIABETIC AUTONOMIC NEUROPATHY, WITHOUT LONG-TERM CURRENT USE OF INSULIN: ICD-10-CM

## 2024-10-29 DIAGNOSIS — Z11.59 NEED FOR HEPATITIS B SCREENING TEST: ICD-10-CM

## 2024-10-29 DIAGNOSIS — N52.9 ERECTILE DYSFUNCTION, UNSPECIFIED ERECTILE DYSFUNCTION TYPE: ICD-10-CM

## 2024-10-29 LAB
GLUCOSE BLDC GLUCOMTR-MCNC: 102 MG/DL (ref 70–130)
HBV CORE IGM SERPL QL IA: NORMAL
HBV SURFACE AG SERPL QL IA: NORMAL
HCV AB SER QL: NORMAL
PSA SERPL-MCNC: 0.57 NG/ML (ref 0–4)

## 2024-10-29 PROCEDURE — 84402 ASSAY OF FREE TESTOSTERONE: CPT | Performed by: STUDENT IN AN ORGANIZED HEALTH CARE EDUCATION/TRAINING PROGRAM

## 2024-10-29 PROCEDURE — G0103 PSA SCREENING: HCPCS | Performed by: STUDENT IN AN ORGANIZED HEALTH CARE EDUCATION/TRAINING PROGRAM

## 2024-10-29 PROCEDURE — 80074 ACUTE HEPATITIS PANEL: CPT | Performed by: STUDENT IN AN ORGANIZED HEALTH CARE EDUCATION/TRAINING PROGRAM

## 2024-10-29 PROCEDURE — 36415 COLL VENOUS BLD VENIPUNCTURE: CPT | Performed by: STUDENT IN AN ORGANIZED HEALTH CARE EDUCATION/TRAINING PROGRAM

## 2024-10-29 PROCEDURE — 1159F MED LIST DOCD IN RCRD: CPT | Performed by: STUDENT IN AN ORGANIZED HEALTH CARE EDUCATION/TRAINING PROGRAM

## 2024-10-29 PROCEDURE — 84403 ASSAY OF TOTAL TESTOSTERONE: CPT | Performed by: STUDENT IN AN ORGANIZED HEALTH CARE EDUCATION/TRAINING PROGRAM

## 2024-10-29 PROCEDURE — 3044F HG A1C LEVEL LT 7.0%: CPT | Performed by: STUDENT IN AN ORGANIZED HEALTH CARE EDUCATION/TRAINING PROGRAM

## 2024-10-29 PROCEDURE — 1160F RVW MEDS BY RX/DR IN RCRD: CPT | Performed by: STUDENT IN AN ORGANIZED HEALTH CARE EDUCATION/TRAINING PROGRAM

## 2024-10-29 PROCEDURE — 99396 PREV VISIT EST AGE 40-64: CPT | Performed by: STUDENT IN AN ORGANIZED HEALTH CARE EDUCATION/TRAINING PROGRAM

## 2024-10-29 PROCEDURE — 82948 REAGENT STRIP/BLOOD GLUCOSE: CPT | Performed by: STUDENT IN AN ORGANIZED HEALTH CARE EDUCATION/TRAINING PROGRAM

## 2024-10-29 NOTE — PROGRESS NOTES
Adult Male Preventive Health Visit  DOS: 10/29/24    Patient: Campos Hale  :  1970  Age: 54 y.o.  Gender: male   MRN: [id number]    Chief Complaint:   Annual Health Maintenance  Leg Pain (Right leg pain [id number] worsening/Discuss medication)    Subjective   Patient is here for annual physical. Other complaints are   Type 2 DM    Erectile dysfunction    History of Present Illness  The patient presents for an annual wellness visit.    He has not been monitoring his blood sugar levels at home, despite having a glucometer. He is on a regimen of metformin twice daily and Jardiance. He reports no significant weight changes and maintains a balanced diet.    He has a history of atrial fibrillation following a prolonged surgery in , which required two months of oxygen therapy. He has not received the hepatitis B vaccine. His last eye examination was in mid-2024, with no reported vision issues. He has not undergone a colonoscopy or Cologuard test.    He has been experiencing right leg pain for the past three months, which radiates from his hip to his thigh when standing or walking. He has a history of back issues, for which he receives injections.    He is on levothyroxine 300 mcg for thyroid management. He is also on duloxetine for neuropathy and takes gabapentin three times daily.    He is experiencing erectile dysfunction and has requested a prescription for Viagra or Cialis. He has no history of heart problems and has used these medications in the past. He is currently sexually active and has difficulty maintaining erections.    He reports no symptoms of depression or anxiety.    Supplemental Information:  He is taking cholesterol medication. He had bariatric surgery.    SOCIAL HISTORY  He is not . He lives with his roommate. He works at [employer]. He has a history of marijuana abuse and alcohol abuse. He is sober for 18 months now. He was drinking 1/5 of alcohol every day for 15  years. He does not smoke.    FAMILY HISTORY  He denies any family history of colon cancer, Crohn's disease, or ulcerative colitis.       Allergies:   No Known Allergies  Medications:  Current Outpatient Medications on File Prior to Visit   Medication Sig Dispense Refill    acetaminophen (Tylenol 8 Hour) 650 MG 8 hr tablet Take 1 tablet by mouth Every 8 (Eight) Hours As Needed for Mild Pain. 90 tablet 2    atorvastatin (Lipitor) 40 MG tablet Take 1 tablet by mouth Daily. 90 tablet 2    DULoxetine (CYMBALTA) 60 MG capsule Take 1 capsule by mouth 2 (Two) Times a Day.      empagliflozin (Jardiance) 25 MG tablet tablet Take 1 tablet by mouth Daily. 60 tablet 2    gabapentin (NEURONTIN) 800 MG tablet Take 1 tablet by mouth 3 (Three) Times a Day.      glucose monitor monitoring kit Use 1 each As Needed (elevated sugar). 1 each 0    ibuprofen (Advil) 200 MG tablet Take 1 tablet by mouth Every 6 (Six) Hours As Needed for Mild Pain. 90 tablet 2    Lancet Device misc Use 1 Lancet 3 times a day. Prefer soft touch device 100 each 1    levothyroxine (SYNTHROID, LEVOTHROID) 300 MCG tablet Take 1 tablet by mouth daily      metFORMIN (GLUCOPHAGE) 500 MG tablet Take 1 tablet by mouth 2 (Two) Times a Day With Meals.      [DISCONTINUED] tiZANidine (ZANAFLEX) 4 MG tablet Every 8 (Eight) Hours.      glucose blood test strip Use as instructed 100 each 5    [DISCONTINUED] Glucagon (Gvoke HypoPen 2-Pack) 1 MG/0.2ML solution auto-injector Inject 1 mg under the skin into the appropriate area as directed 1 (One) Time As Needed (hypoglycemia) for up to 1 dose. (Patient not taking: Reported on 10/29/2024) 0.4 mL 1    [DISCONTINUED] hydrALAZINE (APRESOLINE) 25 MG tablet Take 1 tablet by mouth 2 (Two) Times a Day. (Patient not taking: Reported on 10/29/2024)      [DISCONTINUED] hydrOXYzine (ATARAX) 25 MG tablet Take 1 tablet by mouth 3 (Three) Times a Day As Needed for Itching. (Patient not taking: Reported on 10/29/2024)       No current  facility-administered medications on file prior to visit.      I have reviewed and updated as appropriate the past medical, surgical, family, and social history as summarized below:  Past Medical, Social and Family History:     Past Medical History:   Diagnosis Date    A-fib     Arthritis     Diabetes mellitus     Disease of thyroid gland     GERD (gastroesophageal reflux disease)     Hernia     Hyperlipidemia     Hypertension      Social History     Tobacco Use    Smoking status: Never    Smokeless tobacco: Never   Substance Use Topics    Alcohol use: No     Family History   Problem Relation Age of Onset    No Known Problems Mother     Diabetes Father     Hyperlipidemia Father     Hypertension Father     No Known Problems Sister     Cancer Brother      Immunization History   Administered Date(s) Administered    31-influenza Vac Quardvalent Preservativ 11/07/2017    COVID-19 (MODERNA) 1st,2nd,3rd Dose Monovalent 04/15/2021, 05/28/2021    COVID-19 (MODERNA) Monovalent Original Booster 12/22/2021    Flu Vaccine Quad PF >36MO 10/15/2016    Fluzone  >6mos 10/10/2024    Fluzone (or Fluarix & Flulaval for VFC) >6mos 01/04/2020, 10/28/2020    Influenza, Unspecified 10/15/2016    Pneumococcal Conjugate 13-Valent (PCV13) 04/27/2017    Pneumococcal Polysaccharide (PPSV23) 01/09/2018    Td (TDVAX) 06/20/2011    Tdap 04/27/2017, 09/22/2020       Review of Systems   HENT:  Negative for congestion and rhinorrhea.    Respiratory:  Negative for cough and shortness of breath.    Cardiovascular:  Negative for chest pain and palpitations.   Gastrointestinal:  Negative for constipation and diarrhea.   Genitourinary:  Negative for dysuria, hematuria and penile discharge.   Musculoskeletal:  Positive for arthralgias and back pain. Negative for joint swelling.   Neurological:  Negative for dizziness and light-headedness.   Psychiatric/Behavioral:  Negative for agitation and behavioral problems.        Objective   Vital Signs:   Vitals:     "10/29/24 0748   BP: 121/77   BP Location: Left arm   Patient Position: Sitting   Cuff Size: Adult   Pulse: 58   Temp: 97.2 °F (36.2 °C)   TempSrc: Temporal   SpO2: 100%   Weight: 91.7 kg (202 lb 3.2 oz)   Height: 182.9 cm (72.01\")     Body mass index is 27.42 kg/m².    Wt Readings from Last 3 Encounters:   10/29/24 91.7 kg (202 lb 3.2 oz)   10/10/24 93.3 kg (205 lb 9.6 oz)   11/15/23 92.5 kg (204 lb)     BP Readings from Last 3 Encounters:   10/29/24 121/77   10/10/24 157/94   11/15/23 154/82       Physical Exam  Constitutional:       Appearance: Normal appearance.   HENT:      Head: Normocephalic and atraumatic.      Mouth/Throat:      Mouth: Mucous membranes are moist.   Eyes:      Pupils: Pupils are equal, round, and reactive to light.   Cardiovascular:      Rate and Rhythm: Normal rate and regular rhythm.      Pulses: Normal pulses.           Dorsalis pedis pulses are 3+ on the right side and 3+ on the left side.        Posterior tibial pulses are 2+ on the right side and 2+ on the left side.      Heart sounds: Normal heart sounds.   Pulmonary:      Effort: Pulmonary effort is normal.      Breath sounds: Normal breath sounds.   Abdominal:      Palpations: Abdomen is soft.   Musculoskeletal:         General: Tenderness present. Normal range of motion.      Right foot: Normal range of motion. No deformity or bunion.      Left foot: Normal range of motion. No deformity or bunion.      Comments: Range of motion of back is limited secondary to pain   Feet:      Right foot:      Protective Sensation: 10 sites tested.  10 sites sensed.      Skin integrity: Skin integrity normal. No ulcer, blister or skin breakdown.      Toenail Condition: Right toenails are normal.      Left foot:      Protective Sensation: 10 sites tested.  10 sites sensed.      Skin integrity: Skin integrity normal. No ulcer, blister or skin breakdown.      Toenail Condition: Left toenails are normal.   Skin:     General: Skin is warm. "   Neurological:      General: No focal deficit present.      Mental Status: He is alert and oriented to person, place, and time.   Psychiatric:         Mood and Affect: Mood normal.         Behavior: Behavior normal.         Physical Exam         Health Maintenance   Topic Date Due    COLORECTAL CANCER SCREENING  Never done    Hepatitis B (1 of 3 - 19+ 3-dose series) Never done    HEPATITIS C SCREENING  Never done    ZOSTER VACCINE (1 of 2) Never done    COVID-19 Vaccine (4 - 2023-24 season) 12/29/2024 (Originally 9/1/2024)    HEMOGLOBIN A1C  04/10/2025    DIABETIC EYE EXAM  08/15/2025    LIPID PANEL  10/10/2025    URINE MICROALBUMIN  10/10/2025    BMI FOLLOWUP  10/10/2025    ANNUAL PHYSICAL  10/29/2025    DIABETIC FOOT EXAM  10/29/2025    TDAP/TD VACCINES (4 - Td or Tdap) 09/22/2030    Pneumococcal Vaccine 0-64 (3 of 3 - PPSV23 or PCV20) 02/26/2036    INFLUENZA VACCINE  Completed       Lifestyle:  Marital Status: not   Household members: has a roommate   Work Status: employed, works [employer]  Weight Concerns: No  Balanced diet: No  Regular Exercise: No    Social Screening Questions:  Campos Hale  reports that he has never smoked. He has never used smokeless tobacco.     Drug Use: History of marijuana abuse, sober for 18 months now  Alcohol Use: Follows drinking 5th of alcohol every day for 15 years, currently sober for 18 months  Sexually Active: Yes  Sexual Preference: heterosexual  Contraception:  no method    PHQ-9 Depression Screening  Little interest or pleasure in doing things? Not at all   Feeling down, depressed, or hopeless? Not at all   PHQ-2 Total Score 0   Trouble falling or staying asleep, or sleeping too much?     Feeling tired or having little energy?     Poor appetite or overeating?     Feeling bad about yourself - or that you are a failure or have let yourself or your family down?     Trouble concentrating on things, such as reading the newspaper or watching television?     Moving or speaking  so slowly that other people could have noticed? Or the opposite - being so fidgety or restless that you have been moving around a lot more than usual?     Thoughts that you would be better off dead, or of hurting yourself in some way?     PHQ-9 Total Score     If you checked off any problems, how difficult have these problems made it for you to do your work, take care of things at home, or get along with other people?         Vision/Hearing/Dental  Regular Dental Visits: Yes, had diabetic eye exam 2 months ago at [facility], per patient it was normal  Vision Problems: No  Hearing Loss: No    Health Screening  Colon Cancer Screening:   Last Completed Colonoscopy       This patient has no relevant Health Maintenance data.          Colon Screening Method: Colonoscopy pending  Lung Cancer Screening: N/A  Prostate Cancer Screening: PSA    Safety  Smoke Detectors in Home: Yes  Carbon Monoxide Detectors in Home: Yes  Guns in Home: No  Seatbelt use: Yes  Sunscreen Use: No    Results  Laboratory Studies  Fasting blood sugar is 105 in clinic today    Imaging  X-ray of lumbar spine in 2017 showed degenerative disc disease L5-S1, endplate spurring throughout the lumbar spine, and passive overgrowth in the mid and lower lumbar column, but no evidence of fracture.         Assessment   Diagnoses and all orders for this visit:    1. Annual wellness visit (Primary)    2. Type 2 diabetes mellitus with diabetic autonomic neuropathy, without long-term current use of insulin    3. Erectile dysfunction, unspecified erectile dysfunction type  -     PSA Screen; Future  -     Testosterone, Free, Total; Future    4. Screening PSA (prostate specific antigen)  -     PSA Screen; Future    5. Lumbar radiculopathy  -     Ambulatory Referral to Neurosurgery    6. Degenerative disc disease at L5-S1 level  -     Ambulatory Referral to Neurosurgery    7. Need for hepatitis C screening test  -     Hepatitis C Antibody    8. Need for hepatitis B  screening test  -     Hepatitis B Surface Antigen  -     Hepatitis B Core Antibody, IgM        Assessment & Plan  1. Annual wellness visit.  His fasting blood sugar this morning is 105. He had a diabetic eye exam in mid-August 2024 at [facility], which was normal. A diabetic foot exam was performed today and showed good circulation. Prostate and testosterone levels will be checked today. A complete hepatitis screening will be done to check for immunity to hepatitis B; if not immune, a hepatitis B vaccine will be administered. A shingles vaccine is recommended and can be obtained at [facility] or another pharmacy covered by his insurance.  I have reviewed the x-ray lumbar spine done in the past which showing degenerative disc disease at L5 and S1, placing referral to neurosurgery. He is advised to wear a lumbar brace for back support. Home blood sugar values are needed for the next visit.    2. Hypothyroidism.  He will continue taking levothyroxine at current dose as his thyroid numbers are stable on this dose, also requested patient to call back after going home with his current dose of thyroid medication to refill    3. Type 2 Diabetes Mellitus.  He will continue taking metformin twice daily and Jardiance. Blood sugar levels will be monitored at home, and adjustments to medication will be made based on these values.    4. Neuropathy.  He will continue taking duloxetine and gabapentin three times daily.    5. Hypertension.  He is not taking hydralazine as his blood pressure is well-controlled without it.    6. Erectile dysfunction.  A prescription for sildenafil will be sent to his pharmacy after checking his prostate and testosterone levels.    7. Right leg pain.  Reviewed lumbar spine x-ray done in 2017 , shows degenerative disc disease at L5-S1, patient also reports that he was advised to get lumbar surgery in the past, was also getting epidural injections for pain, a referral to a back specialist will be placed  for further evaluation.    8. History of substance abuse.  He has a history of marijuana and alcohol abuse but has been sober for 18 months.    Follow-up  Return in 1 month for follow-up.       Return in about 4 weeks (around 11/26/2024).    Patient Care Team:  Annie Garza MD as PCP - General (Family Medicine)    Electronically signed by Annie Garza MD, 10/29/24, 8:11 AM EDT.    Patient or patient representative verbalized consent for the use of Ambient Listening during the visit with  Annie Garza MD for chart documentation. 10/29/2024  08:20 EDT

## 2024-10-31 ENCOUNTER — TELEPHONE (OUTPATIENT)
Dept: FAMILY MEDICINE CLINIC | Facility: CLINIC | Age: 54
End: 2024-10-31
Payer: COMMERCIAL

## 2024-11-02 LAB
TESTOST FREE SERPL-MCNC: 14.9 PG/ML (ref 7.2–24)
TESTOST SERPL-MCNC: 887 NG/DL (ref 264–916)

## 2024-11-05 ENCOUNTER — TELEPHONE (OUTPATIENT)
Dept: FAMILY MEDICINE CLINIC | Facility: CLINIC | Age: 54
End: 2024-11-05
Payer: COMMERCIAL

## 2024-11-05 DIAGNOSIS — M54.16 LUMBAR RADICULOPATHY: Primary | ICD-10-CM

## 2024-11-05 DIAGNOSIS — M51.379 DEGENERATIVE DISC DISEASE AT L5-S1 LEVEL: ICD-10-CM

## 2024-11-05 NOTE — TELEPHONE ENCOUNTER
Caller: Campos Hale    Relationship: Self    Best call back number: [phone]    What test was performed: TESTOSTERONE LEVELS     When was the test performed: 10.30.24

## 2024-11-05 NOTE — TELEPHONE ENCOUNTER
Caller: Campos Hale      Relationship: Self     Best call back number:  [phone]     Who are you requesting to speak with (clinical staff, provider,  specific staff member): Dr. Garza     What was the call regarding:  Pt is stating Dr Garza was going to prescribe him a back brace per his last appt. But nothing has been sent as of today.  Pt would like a call back.

## 2024-11-05 NOTE — TELEPHONE ENCOUNTER
Attempted to call patient to inform him order is placed for back brace and he can  order or we can fax it to his place of choice. Most places will not run through insurance, will most likely be out of pocket.

## 2024-11-06 ENCOUNTER — APPOINTMENT (OUTPATIENT)
Dept: GENERAL RADIOLOGY | Facility: HOSPITAL | Age: 54
End: 2024-11-06
Payer: COMMERCIAL

## 2024-11-06 ENCOUNTER — HOSPITAL ENCOUNTER (EMERGENCY)
Facility: HOSPITAL | Age: 54
Discharge: HOME | End: 2024-11-06
Attending: EMERGENCY MEDICINE | Admitting: EMERGENCY MEDICINE
Payer: COMMERCIAL

## 2024-11-06 VITALS
WEIGHT: 216.27 LBS | SYSTOLIC BLOOD PRESSURE: 158 MMHG | HEART RATE: 73 BPM | DIASTOLIC BLOOD PRESSURE: 98 MMHG | TEMPERATURE: 98.2 F | HEIGHT: 72 IN | OXYGEN SATURATION: 100 % | BODY MASS INDEX: 29.29 KG/M2 | RESPIRATION RATE: 16 BRPM

## 2024-11-06 DIAGNOSIS — M54.16 LUMBAR RADICULOPATHY: Primary | ICD-10-CM

## 2024-11-06 DIAGNOSIS — M54.31 RIGHT SIDED SCIATICA: ICD-10-CM

## 2024-11-06 DIAGNOSIS — M43.16 SPONDYLOLISTHESIS OF LUMBAR REGION: ICD-10-CM

## 2024-11-06 DIAGNOSIS — M25.551 RIGHT HIP PAIN: ICD-10-CM

## 2024-11-06 PROCEDURE — 96372 THER/PROPH/DIAG INJ SC/IM: CPT

## 2024-11-06 PROCEDURE — 99283 EMERGENCY DEPT VISIT LOW MDM: CPT

## 2024-11-06 PROCEDURE — 97110 THERAPEUTIC EXERCISES: CPT | Performed by: PHYSICAL THERAPIST

## 2024-11-06 PROCEDURE — 97161 PT EVAL LOW COMPLEX 20 MIN: CPT | Performed by: PHYSICAL THERAPIST

## 2024-11-06 PROCEDURE — 72100 X-RAY EXAM L-S SPINE 2/3 VWS: CPT

## 2024-11-06 PROCEDURE — 25010000002 DEXAMETHASONE SODIUM PHOSPHATE 10 MG/ML SOLUTION

## 2024-11-06 PROCEDURE — 73502 X-RAY EXAM HIP UNI 2-3 VIEWS: CPT

## 2024-11-06 PROCEDURE — 25010000002 KETOROLAC TROMETHAMINE PER 15 MG

## 2024-11-06 RX ORDER — KETOROLAC TROMETHAMINE 30 MG/ML
60 INJECTION, SOLUTION INTRAMUSCULAR; INTRAVENOUS ONCE
Status: COMPLETED | OUTPATIENT
Start: 2024-11-06 | End: 2024-11-06

## 2024-11-06 RX ORDER — DEXAMETHASONE SODIUM PHOSPHATE 10 MG/ML
10 INJECTION, SOLUTION INTRAMUSCULAR; INTRAVENOUS ONCE
Status: COMPLETED | OUTPATIENT
Start: 2024-11-06 | End: 2024-11-06

## 2024-11-06 RX ORDER — PREDNISONE 50 MG/1
50 TABLET ORAL DAILY
Qty: 5 TABLET | Refills: 0 | Status: SHIPPED | OUTPATIENT
Start: 2024-11-06

## 2024-11-06 RX ADMIN — KETOROLAC TROMETHAMINE 60 MG: 30 INJECTION, SOLUTION INTRAMUSCULAR at 10:41

## 2024-11-06 RX ADMIN — DEXAMETHASONE SODIUM PHOSPHATE 10 MG: 10 INJECTION INTRAMUSCULAR; INTRAVENOUS at 10:42

## 2024-11-06 NOTE — THERAPY EVALUATION
Patient Name: Campos Hale  : 1970    MRN: [id number]                              Today's Date: 2024       Admit Date: 2024    Visit Dx:     ICD-10-CM ICD-9-CM   1. Lumbar radiculopathy  M54.16 724.4   2. Right hip pain  M25.551 719.45     Patient Active Problem List   Diagnosis    Dyspnea    Bronchitis    Pleuritic chest pain    Diabetes mellitus type 2 with neurological manifestations     Past Medical History:   Diagnosis Date    A-fib     Arthritis     Diabetes mellitus     Disease of thyroid gland     GERD (gastroesophageal reflux disease)     Hernia     Hyperlipidemia     Hypertension      Past Surgical History:   Procedure Laterality Date    GASTRIC SLEEVE LAPAROSCOPIC      NISSEN FUNDOPLICATION        General Information       Row Name 24 1024          Physical Therapy Time and Intention    Document Type evaluation  -LR     Mode of Treatment individual therapy  -LR       Row Name 24 1024          General Information    Patient Profile Reviewed yes  -LR     Prior Level of Function independent:  -LR               User Key  (r) = Recorded By, (t) = Taken By, (c) = Cosigned By      Initials Name Provider Type    LR Dee Dee Mayes, PT Physical Therapist                  History: Patient reports he has a history of low back pain but in the past month his back and his right leg pain have increased in intensity.  He states the pain is radiating into his right hip and down the front of his right thigh to his knee.  Reports of burning and tingling in his leg that is constant.  Pain is increased with walking.  He has taken Tylenol to help with the pain.  He also sees pain management and was given a steroid injection a month and a half ago.  He tried physical therapy a few years ago.  Pain is currently 7/10.  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  He states he has to get on/off of the [employer] frequently.    Objective:    Palpation: Tender to palpation at L3/4/5 spinous  processes, bilateral paraspinals and lumbar musculature; right greater trochanter and groin    ROM:  Lumbar ROM:  Flexion: WNL  Extension: WNL  L Side bending: WNL  R Side bending: WNL  L Rotation: WNL  R Rotation: WNL    R passive hip external rotation ROM: 75%  R passive hip flexion ROM: 90 degrees  R piriformis tightness  B knee and ankle ROM WNL  Normal mobility through lumbar spine with PA mobilizations     Strength:  L Hip MMT:   R Hip MMT:  Flexion: 4+/5  Flexion: 3-/5  Abduction: 5/5  Abduction: 4+/5  Adduction: 5/5  Adduction: 4/5    L Knee MMT:  R Knee MMT:  Flexion: 5/5  Flexion: 4+/5  Extension: 5/5  Extension: 4/5    L Ankle MMT:  R Ankle MMT:  DF: 5/5  DF: 4+/5  PF: 5/5   PF: 4+/5    Special Tests:  Quadrant Test: positive  Slump Test: NT  Straight Leg Raise Test: negative on L, positive on R  Contralateral Straight Leg Raise Test: negative B  Obers Test: NT  FABERs Test: negative on L, positive on R  Hip Scouring Test: negative on L, positive on R     Sensation: decreased sensation to light touch over R L3 and L4 dermatomes    Assessment/Plan:   Pt presents with a diagnosis of low back pain and R leg pain and has pain with lumbar ROM, decreased R hip ROM, and R LE weakness that are limiting his ability to stand and walk for long periods of time.  The patient attempted to perform exercises to improve mobility and pain in lumbar and hip region, however pt keeps falling asleep during treatment.  Pt was educated on all exercises and provided with a HEP handout.    Goals:   LTG 1: The patient will be independent in HEP in order to decrease pain and improve tolerance to functional activities.  STATUS: Met    Interventions:   Manual Therapy: not performed    Therapeutic Exercises: HEP: SKTC (2x20 seconds R), piriformis stretch, posterior pelvic tilt, prone femoral nerve glide, prone hip internal/external rotation (5x), LTR (5x)    Modalities: not performed      Outcome Measures       Row Name 11/06/24 1024           Optimal Instrument    Optimal Instrument Optimal - 3  -LR     Moving - lying to sitting 2  -LR     Bending/Stooping 2  -LR     Walking - short distance 3  -LR     From the list, choose the 3 activities you would most like to be able to do without any difficulty Bending/stooping;Moving -lying to sitting;Walking -short distance  -LR     Total Score Optimal - 3 5  -LR       Row Name 11/06/24 1024          Functional Assessment    Outcome Measure Options Optimal Instrument  -LR               User Key  (r) = Recorded By, (t) = Taken By, (c) = Cosigned By      Initials Name Provider Type    LR Dee Dee Mayes, PT Physical Therapist                     Time Calculation:   PT Evaluation Complexity  History, PT Evaluation Complexity: 3 or more personal factors and/or comorbidities  Examination of Body Systems (PT Eval Complexity): 1-2 elements  Clinical Presentation (PT Evaluation Complexity): stable  Clinical Decision Making (PT Evaluation Complexity): low complexity  Overall Complexity (PT Evaluation Complexity): low complexity     PT Charges       Row Name 11/06/24 1024             Time Calculation    PT Received On 11/06/24  -LR         Timed Charges    77633 - PT Therapeutic Exercise Minutes 8  -LR         Untimed Charges    PT Eval/Re-eval Minutes 17  -LR         Total Minutes    Timed Charges Total Minutes 8  -LR      Untimed Charges Total Minutes 17  -LR       Total Minutes 25  -LR                User Key  (r) = Recorded By, (t) = Taken By, (c) = Cosigned By      Initials Name Provider Type    LR Dee Dee Mayes, PT Physical Therapist                  Therapy Charges for Today       Code Description Service Date Service Provider Modifiers Qty    94752462942 HC PT THER PROC EA 15 MIN 11/6/2024 Dee Dee Mayes, PT GP 1    85374148766 HC PT EVAL LOW COMPLEXITY 2 11/6/2024 Dee Dee Mayes, PT GP 1            PT G-Codes  Outcome Measure Options: Optimal Instrument       Dee Dee Mayes, PT  11/6/2024

## 2024-11-06 NOTE — DISCHARGE INSTRUCTIONS
Your x-ray completed in the emergency department today does show some disc bulging at the L4 and 5 level.  Your primary care provider has already sent a referral to neurosurgery for you to follow-up for care of this, they should contact you for an appointment.  Take diclofenac and steroid as prescribed to improve your symptoms.  Do not take diclofenac in conjunction with other anti-inflammatories such as ibuprofen or naproxen.

## 2024-11-06 NOTE — ED PROVIDER NOTES
Time: 10:32 AM EST  Date of encounter:  11/6/2024  Independent Historian/Clinical History and Information was obtained by:   Patient    History is limited by: N/A    Chief Complaint: Back pain      History of Present Illness:  Patient is a 54 y.o. year old male who presents to the emergency department for evaluation of back pain.  Patient presents to the emergency department today for back pain and has been ongoing for approximately 1 month.  He states the pain will radiate down the right leg and cause worsening hip pain.  Patient states that he does do a lot of twisting at work.  Patient states he has not been seen or evaluated for the pain since it started.  Patient denies incontinence.      Patient Care Team  Primary Care Provider: Annie Garza MD    Past Medical History:     No Known Allergies  Past Medical History:   Diagnosis Date    A-fib     Arthritis     Diabetes mellitus     Disease of thyroid gland     GERD (gastroesophageal reflux disease)     Hernia     Hyperlipidemia     Hypertension      Past Surgical History:   Procedure Laterality Date    GASTRIC SLEEVE LAPAROSCOPIC      NISSEN FUNDOPLICATION       Family History   Problem Relation Age of Onset    No Known Problems Mother     Diabetes Father     Hyperlipidemia Father     Hypertension Father     No Known Problems Sister     Cancer Brother        Home Medications:  Prior to Admission medications    Medication Sig Start Date End Date Taking? Authorizing Provider   acetaminophen (Tylenol 8 Hour) 650 MG 8 hr tablet Take 1 tablet by mouth Every 8 (Eight) Hours As Needed for Mild Pain. 10/10/24   Annie Garza MD   atorvastatin (Lipitor) 40 MG tablet Take 1 tablet by mouth Daily. 10/10/24   Annie Garza MD   DULoxetine (CYMBALTA) 60 MG capsule Take 1 capsule by mouth 2 (Two) Times a Day.    Provider, MD Maddy   empagliflozin (Jardiance) 25 MG tablet tablet Take 1 tablet by mouth Daily. 10/10/24   Annie Garza MD   gabapentin  "(NEURONTIN) 800 MG tablet Take 1 tablet by mouth 3 (Three) Times a Day.    Maddy Hayes MD   glucose blood test strip Use as instructed 10/11/24   Annie Garza MD   glucose monitor monitoring kit Use 1 each As Needed (elevated sugar). 10/11/24   Annie Garza MD   ibuprofen (Advil) 200 MG tablet Take 1 tablet by mouth Every 6 (Six) Hours As Needed for Mild Pain. 10/10/24   Annie Garza MD   Lancet Device misc Use 1 Lancet 3 times a day. Prefer soft touch device 10/11/24   Annie Garza MD   levothyroxine (SYNTHROID, LEVOTHROID) 300 MCG tablet Take 1 tablet by mouth daily 6/4/16   Maddy Hayes MD   metFORMIN (GLUCOPHAGE) 500 MG tablet Take 1 tablet by mouth 2 (Two) Times a Day With Meals.    Maddy Hayes MD        Social History:   Social History     Tobacco Use    Smoking status: Never    Smokeless tobacco: Never   Vaping Use    Vaping status: Some Days   Substance Use Topics    Alcohol use: No    Drug use: No         Review of Systems:  Review of Systems   Genitourinary:  Negative for difficulty urinating.   Musculoskeletal:  Positive for arthralgias and back pain.   Neurological:  Positive for numbness.        Physical Exam:  /98 (BP Location: Left arm, Patient Position: Sitting)   Pulse 73   Temp 98.2 °F (36.8 °C) (Oral)   Resp 16   Ht 182.9 cm (72\")   Wt 98.1 kg (216 lb 4.3 oz)   SpO2 100%   BMI 29.33 kg/m²     Physical Exam  Vitals and nursing note reviewed.   Constitutional:       Appearance: Normal appearance. He is normal weight.   HENT:      Head: Normocephalic and atraumatic.      Nose: Nose normal.   Eyes:      Conjunctiva/sclera: Conjunctivae normal.   Cardiovascular:      Rate and Rhythm: Normal rate and regular rhythm.   Pulmonary:      Effort: Pulmonary effort is normal.      Breath sounds: Normal breath sounds.   Musculoskeletal:      Cervical back: Normal, normal range of motion and neck supple.      Thoracic back: Normal.      Lumbar back: " Normal.      Right hip: Tenderness present. No bony tenderness or crepitus. Decreased range of motion. Normal strength.      Left hip: Normal.   Skin:     General: Skin is warm and dry.   Neurological:      General: No focal deficit present.      Mental Status: He is alert and oriented to person, place, and time.   Psychiatric:         Mood and Affect: Mood normal.         Behavior: Behavior normal.         Thought Content: Thought content normal.         Judgment: Judgment normal.                Procedures:  Procedures      Medical Decision Making:    Comorbidities that affect care:    Diabetes mellitus, hypertension, hyperlipidemia, GERD, thyroid disorder, A-fib    External Notes reviewed:    Previous Clinic Note: Patient last seen by family medicine on 10-      The following orders were placed and all results were independently analyzed by me:  Orders Placed This Encounter   Procedures    XR Spine Lumbar 2 or 3 View    XR Hip With or Without Pelvis 2 - 3 View Right    PT Consult: Eval & Treat Functional Mobility Below Baseline    PT Plan of Care Cert / Re-Cert       Medications Given in the Emergency Department:  Medications   ketorolac (TORADOL) injection 60 mg (60 mg Intramuscular Given 11/6/24 1041)   dexAMETHasone sodium phosphate injection 10 mg (10 mg Intramuscular Given 11/6/24 1042)        ED Course:    ED Course as of 11/06/24 1055   Wed Nov 06, 2024   1033 Patient fell asleep during my initial evaluation.  After waking patient he states that he is just exhausted because this is his fifth overnight shift he has worked in a row [MD]      ED Course User Index  [MD] Hua Pena PA-C       Labs:    Lab Results (last 24 hours)       ** No results found for the last 24 hours. **             Imaging:    XR Hip With or Without Pelvis 2 - 3 View Right    Result Date: 11/6/2024  XR HIP W OR WO PELVIS 2-3 VIEW RIGHT Date of Exam: 11/6/2024 10:24 AM EST Indication: R hip pain Comparison: None  available. Findings: 3 views. Hip joints appear maintained and are symmetric. There is no fracture or dislocation. There are no significant degenerative changes. The bony pelvic ring is intact.     Impression: No significant findings. Electronically Signed: Tequila Smith MD  11/6/2024 10:36 AM EST  Workstation ID: [id number]    XR Spine Lumbar 2 or 3 View    Result Date: 11/6/2024  XR SPINE LUMBAR 2 OR 3 VW Date of Exam: 11/6/2024 10:23 AM EST Indication: lower back pain, shooting pain down right leg Comparison: 2/7/2017. Findings: 3 films. There are normal vertebral body heights. There is grade 1 spondylolisthesis of L4 on L5 which is new. There is moderate narrowing of the L4-5 disc, new from the prior study. There is mild narrowing of the L5-S1 disc. There is mild degenerative spurring throughout the lumbar spine.     Impression: Disc disease as detailed with mildly abnormal alignment as noted. Electronically Signed: Tequila Smith MD  11/6/2024 10:32 AM EST  Workstation ID: [id number]       Differential Diagnosis and Discussion:    Back Pain: The patient presents with back pain. My differential diagnosis includes but is not limited to acute spinal epidural abscess, acute spinal epidural bleed, cauda equina syndrome, abdominal aortic aneurysm, aortic dissection, kidney stone, pyelonephritis, musculoskeletal back pain, spinal fracture, and osteoarthritis.     All X-rays impressions were independently interpreted by me.    MDM  Number of Diagnoses or Management Options  Diagnosis management comments: Patient presented to the emergency department today for evaluation of back pain.  Pain is been ongoing for 1 month.  X-rays were obtained of the lumbar spine and right hip.  There are no acute findings but there is disc disease with mildly abnormal alignment on the lumbar spine. Will discharge patient home with diclofenac and steroid.       Amount and/or Complexity of Data Reviewed  Tests in the radiology section of  CPT®: reviewed and ordered    Risk of Complications, Morbidity, and/or Mortality  Presenting problems: moderate  Diagnostic procedures: low  Management options: low    Patient Progress  Patient progress: stable       Patient Care Considerations:    NARCOTICS: I considered prescribing opiate pain medication as an outpatient, however there is no fracture on x-ray and pain is chronic      Consultants/Shared Management Plan:    Patient evaluated by ED physical therapist who provided the patient with exercises and stretches    Social Determinants of Health:    Patient is independent, reliable, and has access to care.       Disposition and Care Coordination:    Discharged: The patient is suitable and stable for discharge with no need for consideration of admission.    I have explained the patient´s condition, diagnoses and treatment plan based on the information available to me at this time. I have answered questions and addressed any concerns. The patient has a good  understanding of the patient´s diagnosis, condition, and treatment plan as can be expected at this point. The vital signs have been stable. The patient´s condition is stable and appropriate for discharge from the emergency department.      The patient will pursue further outpatient evaluation with the primary care physician or other designated or consulting physician as outlined in the discharge instructions. They are agreeable to this plan of care and follow-up instructions have been explained in detail. The patient has received these instructions in written format and has expressed an understanding of the discharge instructions. The patient is aware that any significant change in condition or worsening of symptoms should prompt an immediate return to this or the closest emergency department or call to 911.  I have explained discharge medications and the need for follow up with the patient/caretakers. This was also printed in the discharge instructions.  Patient was discharged with the following medications and follow up:      Medication List        New Prescriptions      diclofenac 50 MG EC tablet  Commonly known as: VOLTAREN  Take 1 tablet by mouth 2 (Two) Times a Day As Needed (Moderate pain).     predniSONE 50 MG tablet  Commonly known as: DELTASONE  Take 1 tablet by mouth Daily.               Where to Get Your Medications        These medications [place] [facility] [place] [address] - [phone] [facility] [phone]  [address], [place] [address]      Phone: [phone]   diclofenac 50 MG EC tablet  predniSONE 50 MG tablet      Annie Garza MD  [address] [place] [address] [id number] [phone]             Final diagnoses:   Right sided sciatica   Spondylolisthesis of lumbar region        ED Disposition       ED Disposition   Discharge    Condition   Stable    Comment   --               This medical record created using voice recognition software.             Hua Pena PA-C  11/06/24 1832

## 2024-11-07 ENCOUNTER — HOSPITAL ENCOUNTER (OUTPATIENT)
Facility: HOSPITAL | Age: 54
Setting detail: OBSERVATION
LOS: 1 days | Discharge: HOME | End: 2024-11-08
Attending: EMERGENCY MEDICINE | Admitting: INTERNAL MEDICINE
Payer: COMMERCIAL

## 2024-11-07 ENCOUNTER — APPOINTMENT (OUTPATIENT)
Dept: CARDIOLOGY | Facility: HOSPITAL | Age: 54
End: 2024-11-07
Payer: COMMERCIAL

## 2024-11-07 ENCOUNTER — APPOINTMENT (OUTPATIENT)
Dept: CT IMAGING | Facility: HOSPITAL | Age: 54
End: 2024-11-07
Payer: COMMERCIAL

## 2024-11-07 ENCOUNTER — APPOINTMENT (OUTPATIENT)
Dept: GENERAL RADIOLOGY | Facility: HOSPITAL | Age: 54
End: 2024-11-07
Payer: COMMERCIAL

## 2024-11-07 DIAGNOSIS — R29.90 STROKE-LIKE SYMPTOMS: Primary | ICD-10-CM

## 2024-11-07 DIAGNOSIS — R26.2 DIFFICULTY IN WALKING: ICD-10-CM

## 2024-11-07 DIAGNOSIS — R13.10 DYSPHAGIA, UNSPECIFIED TYPE: ICD-10-CM

## 2024-11-07 DIAGNOSIS — Z78.9 DECREASED ACTIVITIES OF DAILY LIVING (ADL): ICD-10-CM

## 2024-11-07 PROBLEM — I63.9 ISCHEMIC STROKE: Status: ACTIVE | Noted: 2024-11-07

## 2024-11-07 LAB
ALBUMIN SERPL-MCNC: 4 G/DL (ref 3.5–5.2)
ALBUMIN/GLOB SERPL: 1.4 G/DL
ALP SERPL-CCNC: 44 U/L (ref 39–117)
ALT SERPL W P-5'-P-CCNC: 30 U/L (ref 1–41)
AMPHET+METHAMPHET UR QL: NEGATIVE
AMPHETAMINES UR QL: NEGATIVE
ANION GAP SERPL CALCULATED.3IONS-SCNC: 13 MMOL/L (ref 5–15)
AST SERPL-CCNC: 39 U/L (ref 1–40)
BARBITURATES UR QL SCN: NEGATIVE
BASOPHILS # BLD AUTO: 0.01 10*3/MM3 (ref 0–0.2)
BASOPHILS NFR BLD AUTO: 0.1 % (ref 0–1.5)
BENZODIAZ UR QL SCN: NEGATIVE
BILIRUB SERPL-MCNC: 0.2 MG/DL (ref 0–1.2)
BILIRUB UR QL STRIP: NEGATIVE
BUN SERPL-MCNC: 16 MG/DL (ref 6–20)
BUN/CREAT SERPL: 14.3 (ref 7–25)
BUPRENORPHINE SERPL-MCNC: NEGATIVE NG/ML
CALCIUM SPEC-SCNC: 8.9 MG/DL (ref 8.6–10.5)
CANNABINOIDS SERPL QL: NEGATIVE
CHLORIDE SERPL-SCNC: 106 MMOL/L (ref 98–107)
CLARITY UR: CLEAR
CO2 SERPL-SCNC: 21 MMOL/L (ref 22–29)
COCAINE UR QL: NEGATIVE
COLOR UR: YELLOW
CREAT SERPL-MCNC: 1.12 MG/DL (ref 0.76–1.27)
DEPRECATED RDW RBC AUTO: 42.7 FL (ref 37–54)
EGFRCR SERPLBLD CKD-EPI 2021: 78.6 ML/MIN/1.73
EOSINOPHIL # BLD AUTO: 0.02 10*3/MM3 (ref 0–0.4)
EOSINOPHIL NFR BLD AUTO: 0.2 % (ref 0.3–6.2)
ERYTHROCYTE [DISTWIDTH] IN BLOOD BY AUTOMATED COUNT: 13.9 % (ref 12.3–15.4)
FENTANYL UR-MCNC: NEGATIVE NG/ML
GEN 5 2HR TROPONIN T REFLEX: 13 NG/L
GLOBULIN UR ELPH-MCNC: 2.9 GM/DL
GLUCOSE BLDC GLUCOMTR-MCNC: 215 MG/DL (ref 70–99)
GLUCOSE BLDC GLUCOMTR-MCNC: 297 MG/DL (ref 70–99)
GLUCOSE BLDC GLUCOMTR-MCNC: 309 MG/DL (ref 70–99)
GLUCOSE SERPL-MCNC: 200 MG/DL (ref 65–99)
GLUCOSE UR STRIP-MCNC: NEGATIVE MG/DL
HCT VFR BLD AUTO: 34.8 % (ref 37.5–51)
HGB BLD-MCNC: 10.8 G/DL (ref 13–17.7)
HGB UR QL STRIP.AUTO: NEGATIVE
HOLD SPECIMEN: NORMAL
IMM GRANULOCYTES # BLD AUTO: 0.03 10*3/MM3 (ref 0–0.05)
IMM GRANULOCYTES NFR BLD AUTO: 0.4 % (ref 0–0.5)
KETONES UR QL STRIP: ABNORMAL
LEUKOCYTE ESTERASE UR QL STRIP.AUTO: NEGATIVE
LIPASE SERPL-CCNC: 16 U/L (ref 13–60)
LYMPHOCYTES # BLD AUTO: 0.72 10*3/MM3 (ref 0.7–3.1)
LYMPHOCYTES NFR BLD AUTO: 8.9 % (ref 19.6–45.3)
MAGNESIUM SERPL-MCNC: 1.5 MG/DL (ref 1.6–2.6)
MCH RBC QN AUTO: 26 PG (ref 26.6–33)
MCHC RBC AUTO-ENTMCNC: 31 G/DL (ref 31.5–35.7)
MCV RBC AUTO: 83.7 FL (ref 79–97)
METHADONE UR QL SCN: NEGATIVE
MONOCYTES # BLD AUTO: 0.3 10*3/MM3 (ref 0.1–0.9)
MONOCYTES NFR BLD AUTO: 3.7 % (ref 5–12)
NEUTROPHILS NFR BLD AUTO: 7.04 10*3/MM3 (ref 1.7–7)
NEUTROPHILS NFR BLD AUTO: 86.7 % (ref 42.7–76)
NITRITE UR QL STRIP: NEGATIVE
NRBC BLD AUTO-RTO: 0 /100 WBC (ref 0–0.2)
NT-PROBNP SERPL-MCNC: 621.9 PG/ML (ref 0–900)
OPIATES UR QL: NEGATIVE
OXYCODONE UR QL SCN: NEGATIVE
PCP UR QL SCN: NEGATIVE
PH UR STRIP.AUTO: 6 [PH] (ref 5–8)
PLATELET # BLD AUTO: 280 10*3/MM3 (ref 140–450)
PMV BLD AUTO: 10.2 FL (ref 6–12)
POTASSIUM SERPL-SCNC: 3.9 MMOL/L (ref 3.5–5.2)
PROT SERPL-MCNC: 6.9 G/DL (ref 6–8.5)
PROT UR QL STRIP: ABNORMAL
QT INTERVAL: 364 MS
QT INTERVAL: 385 MS
QTC INTERVAL: 395 MS
QTC INTERVAL: 412 MS
RBC # BLD AUTO: 4.16 10*6/MM3 (ref 4.14–5.8)
SODIUM SERPL-SCNC: 140 MMOL/L (ref 136–145)
SP GR UR STRIP: 1.02 (ref 1–1.03)
TRICYCLICS UR QL SCN: NEGATIVE
TROPONIN T DELTA: -6 NG/L
TROPONIN T SERPL HS-MCNC: 19 NG/L
UROBILINOGEN UR QL STRIP: ABNORMAL
WBC NRBC COR # BLD AUTO: 8.12 10*3/MM3 (ref 3.4–10.8)
WHOLE BLOOD HOLD COAG: NORMAL
WHOLE BLOOD HOLD COAG: NORMAL
WHOLE BLOOD HOLD SPECIMEN: NORMAL
WHOLE BLOOD HOLD SPECIMEN: NORMAL

## 2024-11-07 PROCEDURE — 83735 ASSAY OF MAGNESIUM: CPT | Performed by: EMERGENCY MEDICINE

## 2024-11-07 PROCEDURE — 99223 1ST HOSP IP/OBS HIGH 75: CPT | Performed by: INTERNAL MEDICINE

## 2024-11-07 PROCEDURE — 83690 ASSAY OF LIPASE: CPT | Performed by: EMERGENCY MEDICINE

## 2024-11-07 PROCEDURE — 36415 COLL VENOUS BLD VENIPUNCTURE: CPT

## 2024-11-07 PROCEDURE — 99285 EMERGENCY DEPT VISIT HI MDM: CPT

## 2024-11-07 PROCEDURE — 93005 ELECTROCARDIOGRAM TRACING: CPT | Performed by: PHYSICIAN ASSISTANT

## 2024-11-07 PROCEDURE — G0378 HOSPITAL OBSERVATION PER HR: HCPCS

## 2024-11-07 PROCEDURE — 80053 COMPREHEN METABOLIC PANEL: CPT | Performed by: EMERGENCY MEDICINE

## 2024-11-07 PROCEDURE — 70450 CT HEAD/BRAIN W/O DYE: CPT

## 2024-11-07 PROCEDURE — 71045 X-RAY EXAM CHEST 1 VIEW: CPT

## 2024-11-07 PROCEDURE — 83880 ASSAY OF NATRIURETIC PEPTIDE: CPT | Performed by: EMERGENCY MEDICINE

## 2024-11-07 PROCEDURE — 99291 CRITICAL CARE FIRST HOUR: CPT

## 2024-11-07 PROCEDURE — 85025 COMPLETE CBC W/AUTO DIFF WBC: CPT | Performed by: EMERGENCY MEDICINE

## 2024-11-07 PROCEDURE — 82948 REAGENT STRIP/BLOOD GLUCOSE: CPT

## 2024-11-07 PROCEDURE — 99203 OFFICE O/P NEW LOW 30 MIN: CPT

## 2024-11-07 PROCEDURE — 81003 URINALYSIS AUTO W/O SCOPE: CPT | Performed by: INTERNAL MEDICINE

## 2024-11-07 PROCEDURE — 84484 ASSAY OF TROPONIN QUANT: CPT | Performed by: EMERGENCY MEDICINE

## 2024-11-07 PROCEDURE — 80307 DRUG TEST PRSMV CHEM ANLYZR: CPT | Performed by: INTERNAL MEDICINE

## 2024-11-07 PROCEDURE — 93005 ELECTROCARDIOGRAM TRACING: CPT | Performed by: EMERGENCY MEDICINE

## 2024-11-07 PROCEDURE — 93306 TTE W/DOPPLER COMPLETE: CPT

## 2024-11-07 PROCEDURE — 93306 TTE W/DOPPLER COMPLETE: CPT | Performed by: INTERNAL MEDICINE

## 2024-11-07 RX ORDER — ASPIRIN 300 MG/1
300 SUPPOSITORY RECTAL DAILY
Status: DISCONTINUED | OUTPATIENT
Start: 2024-11-07 | End: 2024-11-07

## 2024-11-07 RX ORDER — ISOSORBIDE DINITRATE 30 MG/1
1 TABLET ORAL DAILY
COMMUNITY
Start: 2024-10-14

## 2024-11-07 RX ORDER — GABAPENTIN 400 MG/1
800 CAPSULE ORAL EVERY 8 HOURS SCHEDULED
Status: DISCONTINUED | OUTPATIENT
Start: 2024-11-07 | End: 2024-11-08 | Stop reason: HOSPADM

## 2024-11-07 RX ORDER — METOPROLOL TARTRATE 50 MG
1 TABLET ORAL DAILY
COMMUNITY
Start: 2024-10-14 | End: 2024-11-08 | Stop reason: HOSPADM

## 2024-11-07 RX ORDER — SODIUM CHLORIDE 0.9 % (FLUSH) 0.9 %
10 SYRINGE (ML) INJECTION AS NEEDED
Status: DISCONTINUED | OUTPATIENT
Start: 2024-11-07 | End: 2024-11-08 | Stop reason: HOSPADM

## 2024-11-07 RX ORDER — ATORVASTATIN CALCIUM 40 MG/1
80 TABLET, FILM COATED ORAL NIGHTLY
Status: DISCONTINUED | OUTPATIENT
Start: 2024-11-07 | End: 2024-11-07

## 2024-11-07 RX ORDER — ASPIRIN 81 MG/1
324 TABLET, CHEWABLE ORAL ONCE
Status: COMPLETED | OUTPATIENT
Start: 2024-11-07 | End: 2024-11-07

## 2024-11-07 RX ORDER — DEXTROSE MONOHYDRATE 25 G/50ML
25 INJECTION, SOLUTION INTRAVENOUS
Status: DISCONTINUED | OUTPATIENT
Start: 2024-11-07 | End: 2024-11-08 | Stop reason: HOSPADM

## 2024-11-07 RX ORDER — SODIUM CHLORIDE 0.9 % (FLUSH) 0.9 %
10 SYRINGE (ML) INJECTION EVERY 12 HOURS SCHEDULED
Status: DISCONTINUED | OUTPATIENT
Start: 2024-11-07 | End: 2024-11-08 | Stop reason: HOSPADM

## 2024-11-07 RX ORDER — GLUCAGON INJECTION, SOLUTION 1 MG/.2ML
1 INJECTION, SOLUTION SUBCUTANEOUS AS NEEDED
COMMUNITY
Start: 2024-10-29

## 2024-11-07 RX ORDER — ASPIRIN 325 MG
325 TABLET ORAL DAILY
Status: DISCONTINUED | OUTPATIENT
Start: 2024-11-07 | End: 2024-11-07

## 2024-11-07 RX ORDER — ASPIRIN 300 MG/1
300 SUPPOSITORY RECTAL DAILY
Status: DISCONTINUED | OUTPATIENT
Start: 2024-11-08 | End: 2024-11-08

## 2024-11-07 RX ORDER — SODIUM CHLORIDE 9 MG/ML
40 INJECTION, SOLUTION INTRAVENOUS AS NEEDED
Status: DISCONTINUED | OUTPATIENT
Start: 2024-11-07 | End: 2024-11-08 | Stop reason: HOSPADM

## 2024-11-07 RX ORDER — ATORVASTATIN CALCIUM 40 MG/1
80 TABLET, FILM COATED ORAL NIGHTLY
Status: DISCONTINUED | OUTPATIENT
Start: 2024-11-07 | End: 2024-11-08 | Stop reason: HOSPADM

## 2024-11-07 RX ORDER — ASPIRIN 325 MG
325 TABLET ORAL DAILY
Status: DISCONTINUED | OUTPATIENT
Start: 2024-11-08 | End: 2024-11-08

## 2024-11-07 RX ORDER — IBUPROFEN 600 MG/1
1 TABLET ORAL
Status: DISCONTINUED | OUTPATIENT
Start: 2024-11-07 | End: 2024-11-08 | Stop reason: HOSPADM

## 2024-11-07 RX ORDER — NITROGLYCERIN 0.4 MG/1
0.4 TABLET SUBLINGUAL
Status: DISCONTINUED | OUTPATIENT
Start: 2024-11-07 | End: 2024-11-08 | Stop reason: HOSPADM

## 2024-11-07 RX ORDER — BLOOD-GLUCOSE METER
EACH MISCELLANEOUS
COMMUNITY
Start: 2024-10-11 | End: 2024-11-07

## 2024-11-07 RX ORDER — NICOTINE POLACRILEX 4 MG
15 LOZENGE BUCCAL
Status: DISCONTINUED | OUTPATIENT
Start: 2024-11-07 | End: 2024-11-08 | Stop reason: HOSPADM

## 2024-11-07 RX ORDER — INSULIN LISPRO 100 [IU]/ML
2-7 INJECTION, SOLUTION INTRAVENOUS; SUBCUTANEOUS
Status: DISCONTINUED | OUTPATIENT
Start: 2024-11-07 | End: 2024-11-08 | Stop reason: HOSPADM

## 2024-11-07 RX ORDER — ACETAMINOPHEN 325 MG/1
650 TABLET ORAL EVERY 4 HOURS PRN
Status: DISCONTINUED | OUTPATIENT
Start: 2024-11-07 | End: 2024-11-08 | Stop reason: HOSPADM

## 2024-11-07 RX ORDER — ERGOCALCIFEROL 1.25 MG/1
1 CAPSULE, LIQUID FILLED ORAL WEEKLY
COMMUNITY
Start: 2024-10-14

## 2024-11-07 RX ADMIN — ASPIRIN 324 MG: 81 TABLET, CHEWABLE ORAL at 11:20

## 2024-11-07 RX ADMIN — GABAPENTIN 800 MG: 400 CAPSULE ORAL at 22:06

## 2024-11-07 RX ADMIN — Medication 10 ML: at 20:35

## 2024-11-07 RX ADMIN — ATORVASTATIN CALCIUM 80 MG: 40 TABLET, FILM COATED ORAL at 20:34

## 2024-11-07 RX ADMIN — NITROGLYCERIN 0.4 MG: 0.4 TABLET SUBLINGUAL at 19:29

## 2024-11-07 RX ADMIN — ACETAMINOPHEN 650 MG: 325 TABLET ORAL at 22:07

## 2024-11-07 NOTE — Clinical Note
[facility] EMERGENCY ROOM  [address] [place] [address]  Phone: [phone]  Fax: [phone]    Shirley Garcias accompanied Campos Hale to the emergency department on 11/7/2024. They may return to work on 11/07/2024.        Thank you for choosing [facility].    Christopher Nash MD

## 2024-11-07 NOTE — ED PROVIDER NOTES
Time: 12:44 PM EST  Date of encounter:  11/7/2024  Independent Historian/Clinical History and Information was obtained by:   Patient    History is limited by: N/A    Chief Complaint: Facial numbness and weakness      History of Present Illness:  Patient is a 54 y.o. year old male who presents to the emergency department for evaluation of facial numbness and left upper extremity weakness.  Patient has had no cough hemoptysis.  Patient is had no vomiting or diarrhea.  Patient reports that his symptoms started 30 minutes prior to ED arrival.      Patient Care Team  Primary Care Provider: Annie Garza MD    Past Medical History:     No Known Allergies  Past Medical History:   Diagnosis Date    A-fib     Arthritis     Diabetes mellitus     Disease of thyroid gland     GERD (gastroesophageal reflux disease)     Hernia     Hyperlipidemia     Hypertension      Past Surgical History:   Procedure Laterality Date    GASTRIC SLEEVE LAPAROSCOPIC      NISSEN FUNDOPLICATION       Family History   Problem Relation Age of Onset    No Known Problems Mother     Diabetes Father     Hyperlipidemia Father     Hypertension Father     No Known Problems Sister     Cancer Brother        Home Medications:  Prior to Admission medications    Medication Sig Start Date End Date Taking? Authorizing Provider   Blood Glucose Monitoring Suppl (ONE TOUCH ULTRA 2) w/Device kit USE AS DIRECTED FOR ELEVATED SUGAR 10/11/24  Yes ProviderMaddy MD   isosorbide dinitrate (ISORDIL) 30 MG tablet Take 1 tablet by mouth Daily. 10/14/24  Yes Maddy Hayes MD   metoprolol tartrate (LOPRESSOR) 50 MG tablet Take 1 tablet by mouth Daily. 10/14/24  Yes Maddy Hayes MD   vitamin D (ERGOCALCIFEROL) 1.25 MG (41587 UT) capsule capsule Take 1 capsule by mouth 1 (One) Time Per Week. 10/14/24  Yes Maddy Hayes MD   acetaminophen (Tylenol 8 Hour) 650 MG 8 hr tablet Take 1 tablet by mouth Every 8 (Eight) Hours As Needed for Mild Pain.  10/10/24   Annie Garza MD   atorvastatin (Lipitor) 40 MG tablet Take 1 tablet by mouth Daily. 10/10/24   Annie Garza MD   diclofenac (VOLTAREN) 50 MG EC tablet Take 1 tablet by mouth 2 (Two) Times a Day As Needed (Moderate pain). 11/6/24   Hua Pena PA-C   DULoxetine (CYMBALTA) 60 MG capsule Take 1 capsule by mouth 2 (Two) Times a Day.    Maddy Hayes MD   empagliflozin (Jardiance) 25 MG tablet tablet Take 1 tablet by mouth Daily. 10/10/24   Annie Garza MD   gabapentin (NEURONTIN) 800 MG tablet Take 1 tablet by mouth 3 (Three) Times a Day.    Maddy Hayes MD   glucose blood test strip Use as instructed 10/11/24   Annie Garza MD   glucose monitor monitoring kit Use 1 each As Needed (elevated sugar). 10/11/24   Annie Garza MD   ibuprofen (Advil) 200 MG tablet Take 1 tablet by mouth Every 6 (Six) Hours As Needed for Mild Pain. 10/10/24   Annie Garza MD   Lancet Device misc Use 1 Lancet 3 times a day. Prefer soft touch device 10/11/24   Annie Garza MD   levothyroxine (SYNTHROID, LEVOTHROID) 300 MCG tablet Take 1 tablet by mouth daily 6/4/16   Maddy Hayes MD   metFORMIN (GLUCOPHAGE) 500 MG tablet Take 1 tablet by mouth 2 (Two) Times a Day With Meals.    Maddy Hayes MD   predniSONE (DELTASONE) 50 MG tablet Take 1 tablet by mouth Daily. 11/6/24   Hua Pena PA-C        Social History:   Social History     Tobacco Use    Smoking status: Never    Smokeless tobacco: Never   Vaping Use    Vaping status: Every Day    Substances: Nicotine, Flavoring   Substance Use Topics    Alcohol use: No    Drug use: No         Review of Systems:  Review of Systems   Constitutional:  Negative for chills and fever.   HENT:  Negative for congestion, rhinorrhea and sore throat.    Eyes:  Negative for pain and visual disturbance.   Respiratory:  Negative for apnea, cough, chest tightness and shortness of breath.    Cardiovascular:  Negative for chest pain  "and palpitations.   Gastrointestinal:  Negative for abdominal pain, diarrhea, nausea and vomiting.   Genitourinary:  Negative for difficulty urinating and dysuria.   Musculoskeletal:  Negative for joint swelling and myalgias.   Skin:  Negative for color change.   Neurological:  Positive for facial asymmetry and weakness. Negative for seizures and headaches.   Psychiatric/Behavioral: Negative.     All other systems reviewed and are negative.       Physical Exam:  BP 95/50   Pulse 71   Temp 97.8 °F (36.6 °C)   Resp 16   Ht 182.9 cm (72\")   Wt 97.7 kg (215 lb 6.2 oz)   SpO2 93%   BMI 29.21 kg/m²     Physical Exam  Vitals and nursing note reviewed.   Constitutional:       General: He is not in acute distress.     Appearance: Normal appearance. He is not toxic-appearing.   HENT:      Head: Normocephalic and atraumatic.      Jaw: There is normal jaw occlusion.   Eyes:      General: Lids are normal.      Extraocular Movements: Extraocular movements intact.      Conjunctiva/sclera: Conjunctivae normal.      Pupils: Pupils are equal, round, and reactive to light.   Cardiovascular:      Rate and Rhythm: Normal rate and regular rhythm.      Pulses: Normal pulses.      Heart sounds: Normal heart sounds.   Pulmonary:      Effort: Pulmonary effort is normal. No respiratory distress.      Breath sounds: Normal breath sounds. No wheezing or rhonchi.   Abdominal:      General: Abdomen is flat.      Palpations: Abdomen is soft.      Tenderness: There is no abdominal tenderness. There is no guarding or rebound.   Musculoskeletal:         General: Normal range of motion.      Cervical back: Normal range of motion and neck supple.      Right lower leg: No edema.      Left lower leg: No edema.   Skin:     General: Skin is warm and dry.   Neurological:      Mental Status: He is alert and oriented to person, place, and time. Mental status is at baseline.   Psychiatric:         Mood and Affect: Mood normal.            "       Procedures:  Procedures      Medical Decision Making:      Comorbidities that affect care:    Previous stroke    External Notes reviewed:    Previous ED Note: Patient was seen in the ED yesterday for back pain.      The following orders were placed and all results were independently analyzed by me:  Orders Placed This Encounter   Procedures    XR Chest 1 View    CT Head Without Contrast    [place] Draw    High Sensitivity Troponin T    Comprehensive Metabolic Panel    Lipase    BNP    Magnesium    CBC Auto Differential    High Sensitivity Troponin T 2Hr    Urinalysis With Microscopic If Indicated (No Culture) - Urine, Clean Catch    Urine Drug Screen - Urine, Clean Catch    NPO Diet NPO Type: Strict NPO    Undress & Gown    Continuous Pulse Oximetry    Inpatient Hospitalist Consult    Oxygen Therapy- Nasal Cannula; Titrate 1-6 LPM Per SpO2; 90 - 95%    POC Glucose Once    ECG 12 Lead ED Triage Standing Order; Chest Pain    ECG 12 Lead ED Triage Standing Order; Chest Pain    Insert Peripheral IV    CBC & Differential    Green Top (Gel)    Lavender Top    Gold Top - SST    Light Blue Top       Medications Given in the Emergency Department:  Medications   sodium chloride 0.9 % flush 10 mL (has no administration in time range)   aspirin chewable tablet 324 mg (324 mg Oral Given 11/7/24 1120)        ED Course:         Labs:    Lab Results (last 24 hours)       Procedure Component Value Units Date/Time    POC Glucose Once [[id number]]  (Abnormal) Collected: 11/07/24 1036    Specimen: Blood Updated: 11/07/24 1038     Glucose 215 mg/dL      Comment: Serial Number: [id number]:  [id number]       High Sensitivity Troponin T [[id number]]  (Normal) Collected: 11/07/24 1136    Specimen: Blood Updated: 11/07/24 1202     HS Troponin T 19 ng/L     Narrative:      High Sensitive Troponin T Reference Range:  <14.0 ng/L- Negative Female for AMI  <22.0 ng/L- Negative Male for AMI  >=14 - Abnormal Female indicating possible  myocardial injury.  >=22 - Abnormal Male indicating possible myocardial injury.   Clinicians would have to utilize clinical acumen, EKG, Troponin, and serial changes to determine if it is an Acute Myocardial Infarction or myocardial injury due to an underlying chronic condition.         CBC & Differential [[id number]]  (Abnormal) Collected: 11/07/24 1136    Specimen: Blood Updated: 11/07/24 1144    Narrative:      The following orders were created for panel order CBC & Differential.  Procedure                               Abnormality         Status                     ---------                               -----------         ------                     CBC Auto Differential[[id number]]        Abnormal            Final result                 Please view results for these tests on the individual orders.    Comprehensive Metabolic Panel [[id number]]  (Abnormal) Collected: 11/07/24 1136    Specimen: Blood Updated: 11/07/24 1202     Glucose 200 mg/dL      BUN 16 mg/dL      Creatinine 1.12 mg/dL      Sodium 140 mmol/L      Potassium 3.9 mmol/L      Chloride 106 mmol/L      CO2 21.0 mmol/L      Calcium 8.9 mg/dL      Total Protein 6.9 g/dL      Albumin 4.0 g/dL      ALT (SGPT) 30 U/L      AST (SGOT) 39 U/L      Alkaline Phosphatase 44 U/L      Total Bilirubin 0.2 mg/dL      Globulin 2.9 gm/dL      A/G Ratio 1.4 g/dL      BUN/Creatinine Ratio 14.3     Anion Gap 13.0 mmol/L      eGFR 78.6 mL/min/1.73     Narrative:      GFR Normal >60  Chronic Kidney Disease <60  Kidney Failure <15      Lipase [[id number]]  (Normal) Collected: 11/07/24 1136    Specimen: Blood Updated: 11/07/24 1202     Lipase 16 U/L     BNP [[id number]]  (Normal) Collected: 11/07/24 1136    Specimen: Blood Updated: 11/07/24 1159     proBNP 621.9 pg/mL     Narrative:      This assay is used as an aid in the diagnosis of individuals suspected of having heart failure. It can be used as an aid in the diagnosis of acute decompensated heart failure (ADHF) in patients  presenting with signs and symptoms of ADHF to the emergency department (ED). In addition, NT-proBNP of <300 pg/mL indicates ADHF is not likely.    Age Range Result Interpretation  NT-proBNP Concentration (pg/mL:      <50             Positive            >450                   Gray                 300-450                    Negative             <300    50-75           Positive            >900                  Gray                300-900                  Negative            <300      >75             Positive            >1800                  Gray                300-1800                  Negative            <300    Magnesium [[id number]]  (Abnormal) Collected: 11/07/24 1136    Specimen: Blood Updated: 11/07/24 1202     Magnesium 1.5 mg/dL     CBC Auto Differential [[id number]]  (Abnormal) Collected: 11/07/24 1136    Specimen: Blood Updated: 11/07/24 1144     WBC 8.12 10*3/mm3      RBC 4.16 10*6/mm3      Hemoglobin 10.8 g/dL      Hematocrit 34.8 %      MCV 83.7 fL      MCH 26.0 pg      MCHC 31.0 g/dL      RDW 13.9 %      RDW-SD 42.7 fl      MPV 10.2 fL      Platelets 280 10*3/mm3      Neutrophil % 86.7 %      Lymphocyte % 8.9 %      Monocyte % 3.7 %      Eosinophil % 0.2 %      Basophil % 0.1 %      Immature Grans % 0.4 %      Neutrophils, Absolute 7.04 10*3/mm3      Lymphocytes, Absolute 0.72 10*3/mm3      Monocytes, Absolute 0.30 10*3/mm3      Eosinophils, Absolute 0.02 10*3/mm3      Basophils, Absolute 0.01 10*3/mm3      Immature Grans, Absolute 0.03 10*3/mm3      nRBC 0.0 /100 WBC              Imaging:    XR Chest 1 View    Result Date: 11/7/2024  XR CHEST 1 VW Date of Exam: 11/7/2024 11:14 AM EST Indication: Chest Pain Triage Protocol Comparison: 2/17/2020 Findings: Incomplete inspiration. Heart size borderline. Pulmonary vasculature within normal limits strandy opacities in the lung bases. Costophrenic angle sharp     Impression: Low lung volumes with bibasilar atelectasis Electronically Signed: Ezekiel Coronel   11/7/2024 11:25 AM EST  Workstation ID: [id number]    CT Head Without Contrast    Result Date: 11/7/2024  CT HEAD WO CONTRAST Date of Exam: 11/7/2024 10:36 AM EST Indication: stroke. Comparison: None available. Technique: Axial CT images were obtained of the head without contrast administration.  Reconstructed coronal and sagittal images were also obtained. Automated exposure control and iterative construction methods were used. Findings: There is no evidence of hemorrhage. There is no mass effect or midline shift. There is no extracerebral collection. Ventricles are normal in size and configuration for patient's stated age. Posterior fossa is within normal limits. Calvarium and skull base appear intact.  Visualized sinuses show no air fluid levels. Visualized orbits are unremarkable.     Impression: Unremarkable CT of the brain Electronically Signed: Van Pimentel MD  11/7/2024 10:53 AM EST  Workstation ID: [id number]       Differential Diagnosis and Discussion:    Paresthesia: Differential diagnosis includes but is not limited to acute stroke, electrolyte imbalance, anxiety, radiculopathy, autoimmune disorders, and endocrine disorders.    All labs were reviewed and interpreted by me.  CT scan radiology impression was interpreted by me.    MDM     The patient´s CBC that was reviewed and interpreted by me shows no abnormalities of critical concern. Of note, there is no anemia requiring a blood transfusion and the platelet count is acceptable.  The patient´s CMP that was reviewed and interpretted by me shows no abnormalities of critical concern. Of note, the patient´s sodium and potassium are acceptable. The patient´s liver enzymes are unremarkable. The patient´s renal function (creatinine) is preserved. The patient has a normal anion gap.  CT head is negative for acute intracranial abnormalities.      The patient presents with symptoms concerning for a stroke. The patient was evaluated by me immediately upon arrival.  Extensive history and physical was obtained. Family was not present to give further insight into patients onset of symptoms.  CT scan findings were discussed with radiology. The case was also discussed at length with telehealth neurology. Nursing staff was instructed on how to proceed with patient care. Patient was then placed on the cardiac monitor and monitored for arrhythmia that could be contributing to stroke like symptoms. EKG was performed and evaluated by me. Patient's blood pressure was monitored and controled consistent with stroke guidelines. Treatment option's for patient's symptoms include admission for stroke workup after inclusion and exclusion criteria was weighed. The patient's mental status and neurological symptoms were monitored throughout their stay for worsening decline.     Total Critical Care time of 35 minutes. Total critical care time documented does not include time spent on separately billed procedures for services of nurses or physician assistants. I personally saw and examined the patient. I have reviewed all diagnostic interpretations and treatment plans as written. I was present for the key portions of any procedures performed and the inclusive time noted in any critical care statement. Critical care time includes patient management by me, time spent at the patients bedside,  time to review lab and imaging results, discussing patient care, documentation in the medical record, and time spent with family or caregiver.          Patient Care Considerations:    None      Consultants/Shared Management Plan:    Patient discussed with Dr. Vo who agrees with admission.    Social Determinants of Health:    Patient is independent, reliable, and has access to care.       Disposition and Care Coordination:    Admit:   Through independent evaluation of the patient's history, physical, and imperical data, the patient meets criteria for inpatient admission to the hospital.        Final diagnoses:    Stroke-like symptoms        ED Disposition       ED Disposition   Decision to Admit    Condition   --    Comment   --               This medical record created using voice recognition software.             Christopher Nash MD  11/07/24 9269

## 2024-11-07 NOTE — ED NOTES
Pt became verbal after CT scan and facial drop and weakness in right arm subsided. Pt NIH 1 for decreased sensation in right face.

## 2024-11-07 NOTE — Clinical Note
Level of Care: Telemetry [5]   Diagnosis: Ischemic stroke [[id number]]   Certification: I Certify That Inpatient Hospital Services Are Medically Necessary For Greater Than 2 Midnights

## 2024-11-07 NOTE — CASE MANAGEMENT/SOCIAL WORK
Discharge Planning Assessment   Carrie     Patient Name: Campos Hale  MRN: [id number]  Today's Date: 11/7/2024    Admit Date: 11/7/2024        Discharge Needs Assessment       Row Name 11/07/24 1343       Living Environment    People in Home other (see comments);facility resident    Unique Family Situation currently at [facility]    Current Living Arrangements residential facility    Potentially Unsafe Housing Conditions none    In the past 12 months has the electric, gas, oil, or water company threatened to shut off services in your home? No    Primary Care Provided by self    Provides Primary Care For no one    Family Caregiver if Needed none    Quality of Family Relationships supportive    Able to Return to Prior Arrangements yes       Resource/Environmental Concerns    Resource/Environmental Concerns none    Transportation Concerns none       Transportation Needs    In the past 12 months, has lack of transportation kept you from medical appointments or from getting medications? no    In the past 12 months, has lack of transportation kept you from meetings, work, or from getting things needed for daily living? No       Food Insecurity    Within the past 12 months, you worried that your food would run out before you got the money to buy more. Never true    Within the past 12 months, the food you bought just didn't last and you didn't have money to get more. Never true       Transition Planning    Patient/Family Anticipates Transition to inpatient rehabilitation facility    Patient/Family Anticipated Services at Transition none    Transportation Anticipated family or friend will provide;public transportation       Discharge Needs Assessment    Readmission Within the Last 30 Days no previous admission in last 30 days    Equipment Currently Used at Home none    Concerns to be Addressed no discharge needs identified    Do you want help finding or keeping work or a job? I do not need or want help    Do you want  help with school or training? For example, starting or completing job training or getting a high school diploma, GED or equivalent No    Anticipated Changes Related to Illness none    Equipment Needed After Discharge other (see comments)  back brace                   Discharge Plan    Pt states that he needs a back brace for his chronic back pain              ANNELISE Isabel

## 2024-11-07 NOTE — H&P
[facility] HISTORY AND PHYSICAL  Date: 2024   Patient Name: Campos Hale  : 1970  MRN: [id number]  Primary Care Physician:  Annie Garza MD  Date of admission: 2024    Subjective   Subjective     Chief Complaint: Right-sided weakness    HPI:    Campos Hale is a 54 y.o. male past medical history of atrial fibrillation, type 2 diabetes, thyroidism, and previous polysubstance abuse who presents with right-sided weakness and a syncopal episode    Patient is at [facility].  He states today that he start having some right-sided weakness and maybe a syncopal episode.  He did not hit his head.  He states that almost all symptoms have resolved at this time.  As result he symptoms brought the ER for further evaluation.      In the emergency department the patient's vital signs are as follows: Temperature is 97.8 pulse 71, respiratory 16, blood pressure 95/50, 93% room air.  Labs show CBC with hemoglobin 10.8.  CMP shows a bicarb of 20 with no anion gap.  Glucose is 200.  Magnesium is 1.5.  Chest x-ray shows low lung volumes.  CT of the head shows unremarkable CT.  X-ray of the hip no significant findings.  Patient made hospital for Ischemic stroke    All systems reviewed abdomen as noted above    Personal History     Past Medical History:  Atrial fibrillation  Type 2 diabetes  Hypothyroidism  Dyslipidemia  Hypertension    Past Surgical History:  Gastric sleeve  Nissen fundoplication    Family History:   Cancer  Dyslipidemia    Social History:   Never smoked.  No alcohol    Home Medications:  DULoxetine, Lancet Device, ONE TOUCH ULTRA 2, acetaminophen, atorvastatin, diclofenac, empagliflozin, gabapentin, glucose blood, glucose monitor, ibuprofen, isosorbide dinitrate, levothyroxine, metFORMIN, metoprolol tartrate, predniSONE, and vitamin D    Allergies:  No Known Allergies        Objective   Objective     Vitals:   Temp:  [97.8 °F (36.6 °C)] 97.8 °F (36.6 °C)  Heart Rate:  [71]  71  Resp:  [16] 16  BP: (95)/(50) 95/50    Physical Exam    Constitutional: Awake, alert, no acute distress   Eyes: Pupils equal, sclerae anicteric, no conjunctival injection   HENT: NCAT, mucous membranes moist   Neck: Supple, no thyromegaly, no lymphadenopathy, trachea midline   Respiratory: Clear to auscultation bilaterally, nonlabored respirations    Cardiovascular: RRR, no murmurs, rubs, or gallops, palpable pedal pulses bilaterally   Gastrointestinal: Positive bowel sounds, soft, nontender, nondistended   Musculoskeletal: No bilateral ankle edema, no clubbing or cyanosis to extremities   Psychiatric: Appropriate affect, cooperative   Neurologic: Oriented x 3, strength symmetric in all extremities, Cranial Nerves grossly intact to confrontation, speech clear   Skin: No rashes     Result Review    Result Review:  I have personally reviewed the results from the time of this admission to 11/7/2024 12:42 EST and agree with these findings:  Imaging and labs reviewed    Assessment & Plan   Assessment / Plan     Assessment/Plan:   Right-sided weakness with was concern for ischemic stroke  Possible syncopal episode  Previous history of polysubstance abuse  Type 2 diabetes with hyperglycemia  Hypothyroidism    Plan:  --Admit to hospital service  -- Consult neurology  -- Admit to telemetry rule out A-fib  -- Aspirin and statin  -- A1c and lipid panel  -- SLP/OT/PT  -- Urinalysis and UDS  -- Echocardiogram  -- Sliding scale for type 2 diabetes    VTE Prophylaxis:  SCDs        CODE STATUS:     Full code    Admission Status:  I believe this patient meets admission status.    Electronically signed by Don Vo MD, 11/07/24, 12:42 PM EST.

## 2024-11-07 NOTE — CONSULTS
[facility]  [facility]      Patient Name:   Campos Hale  YOB: 1970  Identification Number:   MRN - [id number]  Date of Service:   11/07/2024 10:38:30    Diagnosis:        G93.41 - Encephalopathy Metabolic    Impression:       Pt presenting with drowsiness and right sided weakness and numbness in face and hand. This is improving during ED stay and on my exam only right face and hand numbness and slowed responses. He was seen in the ED yesterday for back pain and may have taken new meds or muscle relaxer today. He is unsure. Was given steroids and NSAIDs in ED yesterday. In any case, focal deficits worrisome for small stroke or TIA. Would not give lytics as most of sxs resolved and low NIHSS.     I will defer further workup, management, and referrals to [facility] hospitalist inpatient services.    Our recommendations are outlined below.    Recommendations:          Stroke/Telemetry Floor        Neuro Checks        Bedside Swallow Eval        DVT Prophylaxis        IV Fluids, Normal Saline        Head of Bed 30 Degrees        Euglycemia and Avoid Hyperthermia (PRN Acetaminophen)        Initiate or continue Aspirin 325 MG daily    Sign Out:        Called but unable to reach ED MD.  Please call with questions.        ------------------------------------------------------------------------------    Advanced Imaging:  Advanced Imaging Deferred because:    Non-disabling symptoms as verified by the patient; no cortical signs so not consistent with LVO      Metrics:  Last Known Well: 11/07/2024 10:00:00  Dispatch Time: 11/07/2024 10:38:11  Arrival Time: 11/07/2024 10:25:00  Initial Response Time: 11/07/2024 10:42:08  Symptoms: right sided weakness.  Initial patient interaction: 11/07/2024 10:44:22  NIHSS Assessment Completed: 11/07/2024 10:58:25  Patient is not a candidate for Thrombolytic.  Thrombolytic Medical Decision: 11/07/2024 10:58:26  Patient was not deemed  candidate for Thrombolytic because of following reasons:  Stroke severity too mild (non-disabling) .    CT head showed no acute hemorrhage or acute core infarct.    Primary Provider Notified of Diagnostic Impression and Management Plan on: 11/07/2024 11:05:11        ------------------------------------------------------------------------------    History of Present Illness:  Patient is a 54 year old Male.    Patient was brought by private transportation with symptoms of right sided weakness.  Pt presented to the ED with back pain yesterday. He was drowsy at that time he stated related to working overnight shifts. Was treated with NSAIDs and given steroid pack. Today presented with onset of right sided facial droop and right arm weakness. On my evaluation in CT, he is drowsy appearing but otherwise appropriate with no noted facial droop or weakness.       Past Medical History:       Hypertension       Diabetes Mellitus       Hyperlipidemia       Atrial Fibrillation    Medications:    No Anticoagulant use   No Antiplatelet use  Reviewed EMR for current medications    Allergies:   Reviewed    Social History:  Smoking: No  Alcohol Use: No    Family History:    There is no family history of premature cerebrovascular disease pertinent to this consultation    ROS :  14 Points Review of Systems was performed and was negative except mentioned in HPI.    Past Surgical History:  There Is No Surgical History Contributory To Today’s Visit         Examination:  BP(158/98), Pulse(73), Blood Glucose(215)  1A: Level of Consciousness - Alert; keenly responsive + 0  1B: Ask Month and Age - Both Questions Right + 0  1C: Blink Eyes & Squeeze Hands - Performs Both Tasks + 0  2: Test Horizontal Extraocular Movements - Normal + 0  3: Test Visual Fields - No Visual Loss + 0  4: Test Facial Palsy (Use Grimace if Obtunded) - Normal symmetry + 0  5A: Test Left Arm Motor Drift - No Drift for 10 Seconds + 0  5B: Test Right Arm Motor Drift - No  Drift for 10 Seconds + 0  6A: Test Left Leg Motor Drift - No Drift for 5 Seconds + 0  6B: Test Right Leg Motor Drift - No Drift for 5 Seconds + 0  7: Test Limb Ataxia (FNF/Heel-Shin) - No Ataxia + 0  8: Test Sensation - Mild-Moderate Loss: Less Sharp/More Dull + 1  9: Test Language/Aphasia - Normal; No aphasia + 0  10: Test Dysarthria - Normal + 0  11: Test Extinction/Inattention - No abnormality + 0    NIHSS Score: 1      Pre-Morbid Modified Bahama Scale:  0 Points = No symptoms at all    Spoke with : ED MD  I reviewed the available imaging via Rapid and initiated discussion with the primary provider    This consult was conducted in real time using interactive audio and video technology. Patient was informed of the technology being used for this visit and agreed to proceed. Patient located in hospital and provider located at home/office setting.      Patient is being evaluated for possible acute neurologic impairment and high probability of imminent or life-threatening deterioration. I spent total of 25 minutes providing care to this patient, including time for face to face visit via telemedicine, review of medical records, imaging studies and discussion of findings with providers, the patient and/or family.      Dr Delfina Dorantes      [facility]  For Inpatient follow-up with [facility] physician please call [facility] at [phone]. As we are not an outpatient service for any post hospital discharge needs please contact the hospital for assistance.  If you have any questions for the [facility] physicians or need to reconsult for clinical or diagnostic changes please contact us via [facility] at [phone].

## 2024-11-08 ENCOUNTER — APPOINTMENT (OUTPATIENT)
Dept: MRI IMAGING | Facility: HOSPITAL | Age: 54
End: 2024-11-08
Payer: COMMERCIAL

## 2024-11-08 ENCOUNTER — APPOINTMENT (OUTPATIENT)
Dept: CT IMAGING | Facility: HOSPITAL | Age: 54
End: 2024-11-08
Payer: COMMERCIAL

## 2024-11-08 ENCOUNTER — READMISSION MANAGEMENT (OUTPATIENT)
Dept: CALL CENTER | Facility: HOSPITAL | Age: 54
End: 2024-11-08
Payer: COMMERCIAL

## 2024-11-08 VITALS
TEMPERATURE: 98.1 F | BODY MASS INDEX: 27.41 KG/M2 | OXYGEN SATURATION: 98 % | HEIGHT: 72 IN | DIASTOLIC BLOOD PRESSURE: 90 MMHG | WEIGHT: 202.38 LBS | SYSTOLIC BLOOD PRESSURE: 147 MMHG | HEART RATE: 65 BPM | RESPIRATION RATE: 18 BRPM

## 2024-11-08 LAB
ALBUMIN SERPL-MCNC: 3.5 G/DL (ref 3.5–5.2)
ALBUMIN/GLOB SERPL: 1.5 G/DL
ALP SERPL-CCNC: 38 U/L (ref 39–117)
ALT SERPL W P-5'-P-CCNC: 22 U/L (ref 1–41)
ANION GAP SERPL CALCULATED.3IONS-SCNC: 6.8 MMOL/L (ref 5–15)
AST SERPL-CCNC: 24 U/L (ref 1–40)
BASOPHILS # BLD AUTO: 0.01 10*3/MM3 (ref 0–0.2)
BASOPHILS NFR BLD AUTO: 0.2 % (ref 0–1.5)
BH CV ECHO MEAS - AO MAX PG: 11 MMHG
BH CV ECHO MEAS - AO MEAN PG: 5.5 MMHG
BH CV ECHO MEAS - AO ROOT DIAM: 2.8 CM
BH CV ECHO MEAS - AO V2 MAX: 165.6 CM/SEC
BH CV ECHO MEAS - AO V2 VTI: 37.4 CM
BH CV ECHO MEAS - AVA(I,D): 2.28 CM2
BH CV ECHO MEAS - EDV(CUBED): 116.1 ML
BH CV ECHO MEAS - EDV(MOD-SP2): 72.1 ML
BH CV ECHO MEAS - EDV(MOD-SP4): 101 ML
BH CV ECHO MEAS - EF(MOD-BP): 61.4 %
BH CV ECHO MEAS - EF(MOD-SP2): 58.1 %
BH CV ECHO MEAS - EF(MOD-SP4): 64.6 %
BH CV ECHO MEAS - ESV(CUBED): 38.9 ML
BH CV ECHO MEAS - ESV(MOD-SP2): 30.2 ML
BH CV ECHO MEAS - ESV(MOD-SP4): 35.8 ML
BH CV ECHO MEAS - FS: 30.5 %
BH CV ECHO MEAS - IVS/LVPW: 1.01 CM
BH CV ECHO MEAS - IVSD: 1.12 CM
BH CV ECHO MEAS - LA DIMENSION: 4.3 CM
BH CV ECHO MEAS - LAT PEAK E' VEL: 10.7 CM/SEC
BH CV ECHO MEAS - LV MASS(C)D: 202.9 GRAMS
BH CV ECHO MEAS - LV MAX PG: 2.8 MMHG
BH CV ECHO MEAS - LV MEAN PG: 1.71 MMHG
BH CV ECHO MEAS - LV V1 MAX: 84 CM/SEC
BH CV ECHO MEAS - LV V1 VTI: 26.2 CM
BH CV ECHO MEAS - LVIDD: 4.9 CM
BH CV ECHO MEAS - LVIDS: 3.4 CM
BH CV ECHO MEAS - LVOT AREA: 3.3 CM2
BH CV ECHO MEAS - LVOT DIAM: 2.04 CM
BH CV ECHO MEAS - LVPWD: 1.11 CM
BH CV ECHO MEAS - MED PEAK E' VEL: 7.1 CM/SEC
BH CV ECHO MEAS - MV A MAX VEL: 77.1 CM/SEC
BH CV ECHO MEAS - MV DEC SLOPE: 348.5 CM/SEC2
BH CV ECHO MEAS - MV DEC TIME: 0.23 SEC
BH CV ECHO MEAS - MV E MAX VEL: 81 CM/SEC
BH CV ECHO MEAS - MV E/A: 1.05
BH CV ECHO MEAS - MV MAX PG: 4.3 MMHG
BH CV ECHO MEAS - MV MEAN PG: 1.45 MMHG
BH CV ECHO MEAS - MV V2 VTI: 28.2 CM
BH CV ECHO MEAS - MVA(VTI): 3 CM2
BH CV ECHO MEAS - SV(LVOT): 85.4 ML
BH CV ECHO MEAS - SV(MOD-SP2): 41.9 ML
BH CV ECHO MEAS - SV(MOD-SP4): 65.2 ML
BH CV ECHO MEASUREMENTS AVERAGE E/E' RATIO: 9.1
BH CV ECHO SHUNT ASSESSMENT PERFORMED (HIDDEN SCRIPTING): 1
BILIRUB SERPL-MCNC: <0.2 MG/DL (ref 0–1.2)
BUN SERPL-MCNC: 16 MG/DL (ref 6–20)
BUN/CREAT SERPL: 18.8 (ref 7–25)
CALCIUM SPEC-SCNC: 8.5 MG/DL (ref 8.6–10.5)
CHLORIDE SERPL-SCNC: 108 MMOL/L (ref 98–107)
CHOLEST SERPL-MCNC: 141 MG/DL (ref 0–200)
CO2 SERPL-SCNC: 24.2 MMOL/L (ref 22–29)
CREAT SERPL-MCNC: 0.85 MG/DL (ref 0.76–1.27)
DEPRECATED RDW RBC AUTO: 42.9 FL (ref 37–54)
EGFRCR SERPLBLD CKD-EPI 2021: 103.9 ML/MIN/1.73
EOSINOPHIL # BLD AUTO: 0.01 10*3/MM3 (ref 0–0.4)
EOSINOPHIL NFR BLD AUTO: 0.2 % (ref 0.3–6.2)
ERYTHROCYTE [DISTWIDTH] IN BLOOD BY AUTOMATED COUNT: 13.9 % (ref 12.3–15.4)
GLOBULIN UR ELPH-MCNC: 2.4 GM/DL
GLUCOSE BLDC GLUCOMTR-MCNC: 256 MG/DL (ref 70–99)
GLUCOSE BLDC GLUCOMTR-MCNC: 68 MG/DL (ref 70–99)
GLUCOSE BLDC GLUCOMTR-MCNC: 78 MG/DL (ref 70–99)
GLUCOSE SERPL-MCNC: 113 MG/DL (ref 65–99)
HBA1C MFR BLD: 7 % (ref 4.8–5.6)
HCT VFR BLD AUTO: 30.2 % (ref 37.5–51)
HDLC SERPL-MCNC: 68 MG/DL (ref 40–60)
HGB BLD-MCNC: 9.4 G/DL (ref 13–17.7)
IMM GRANULOCYTES # BLD AUTO: 0.03 10*3/MM3 (ref 0–0.05)
IMM GRANULOCYTES NFR BLD AUTO: 0.5 % (ref 0–0.5)
LDLC SERPL CALC-MCNC: 62 MG/DL (ref 0–100)
LDLC/HDLC SERPL: 0.92 {RATIO}
LEFT ATRIUM VOLUME INDEX: 47.7 ML/M2
LYMPHOCYTES # BLD AUTO: 1.24 10*3/MM3 (ref 0.7–3.1)
LYMPHOCYTES NFR BLD AUTO: 20.6 % (ref 19.6–45.3)
MAGNESIUM SERPL-MCNC: 1.5 MG/DL (ref 1.6–2.6)
MCH RBC QN AUTO: 26 PG (ref 26.6–33)
MCHC RBC AUTO-ENTMCNC: 31.1 G/DL (ref 31.5–35.7)
MCV RBC AUTO: 83.4 FL (ref 79–97)
MONOCYTES # BLD AUTO: 0.43 10*3/MM3 (ref 0.1–0.9)
MONOCYTES NFR BLD AUTO: 7.1 % (ref 5–12)
NEUTROPHILS NFR BLD AUTO: 4.3 10*3/MM3 (ref 1.7–7)
NEUTROPHILS NFR BLD AUTO: 71.4 % (ref 42.7–76)
NRBC BLD AUTO-RTO: 0 /100 WBC (ref 0–0.2)
PLATELET # BLD AUTO: 267 10*3/MM3 (ref 140–450)
PMV BLD AUTO: 10.5 FL (ref 6–12)
POTASSIUM SERPL-SCNC: 3.7 MMOL/L (ref 3.5–5.2)
PROT SERPL-MCNC: 5.9 G/DL (ref 6–8.5)
QT INTERVAL: 371 MS
QTC INTERVAL: 406 MS
RBC # BLD AUTO: 3.62 10*6/MM3 (ref 4.14–5.8)
SODIUM SERPL-SCNC: 139 MMOL/L (ref 136–145)
TRIGL SERPL-MCNC: 52 MG/DL (ref 0–150)
VLDLC SERPL-MCNC: 11 MG/DL (ref 5–40)
WBC NRBC COR # BLD AUTO: 6.02 10*3/MM3 (ref 3.4–10.8)

## 2024-11-08 PROCEDURE — 70498 CT ANGIOGRAPHY NECK: CPT

## 2024-11-08 PROCEDURE — 70496 CT ANGIOGRAPHY HEAD: CPT

## 2024-11-08 PROCEDURE — 92610 EVALUATE SWALLOWING FUNCTION: CPT

## 2024-11-08 PROCEDURE — 63710000001 PREDNISONE PER 5 MG: Performed by: FAMILY MEDICINE

## 2024-11-08 PROCEDURE — G0378 HOSPITAL OBSERVATION PER HR: HCPCS

## 2024-11-08 PROCEDURE — 83036 HEMOGLOBIN GLYCOSYLATED A1C: CPT | Performed by: INTERNAL MEDICINE

## 2024-11-08 PROCEDURE — 80053 COMPREHEN METABOLIC PANEL: CPT | Performed by: INTERNAL MEDICINE

## 2024-11-08 PROCEDURE — 80061 LIPID PANEL: CPT | Performed by: INTERNAL MEDICINE

## 2024-11-08 PROCEDURE — 63710000001 PREDNISONE PER 1 MG: Performed by: FAMILY MEDICINE

## 2024-11-08 PROCEDURE — 25510000001 IOPAMIDOL PER 1 ML: Performed by: FAMILY MEDICINE

## 2024-11-08 PROCEDURE — 82948 REAGENT STRIP/BLOOD GLUCOSE: CPT | Performed by: INTERNAL MEDICINE

## 2024-11-08 PROCEDURE — 83735 ASSAY OF MAGNESIUM: CPT | Performed by: INTERNAL MEDICINE

## 2024-11-08 PROCEDURE — 97165 OT EVAL LOW COMPLEX 30 MIN: CPT

## 2024-11-08 PROCEDURE — 99213 OFFICE O/P EST LOW 20 MIN: CPT | Performed by: PSYCHIATRY & NEUROLOGY

## 2024-11-08 PROCEDURE — 70551 MRI BRAIN STEM W/O DYE: CPT

## 2024-11-08 PROCEDURE — 85025 COMPLETE CBC W/AUTO DIFF WBC: CPT | Performed by: INTERNAL MEDICINE

## 2024-11-08 PROCEDURE — 36415 COLL VENOUS BLD VENIPUNCTURE: CPT | Performed by: INTERNAL MEDICINE

## 2024-11-08 PROCEDURE — 99239 HOSP IP/OBS DSCHRG MGMT >30: CPT | Performed by: FAMILY MEDICINE

## 2024-11-08 PROCEDURE — 82948 REAGENT STRIP/BLOOD GLUCOSE: CPT

## 2024-11-08 RX ORDER — POTASSIUM CHLORIDE 750 MG/1
30 CAPSULE, EXTENDED RELEASE ORAL
Status: COMPLETED | OUTPATIENT
Start: 2024-11-08 | End: 2024-11-08

## 2024-11-08 RX ORDER — ASPIRIN 81 MG/1
81 TABLET ORAL DAILY
Status: DISCONTINUED | OUTPATIENT
Start: 2024-11-09 | End: 2024-11-08 | Stop reason: HOSPADM

## 2024-11-08 RX ORDER — ASPIRIN 81 MG/1
81 TABLET ORAL DAILY
Qty: 30 TABLET | Refills: 0 | Status: SHIPPED | OUTPATIENT
Start: 2024-11-09 | End: 2024-12-09

## 2024-11-08 RX ORDER — FAMOTIDINE 40 MG/1
40 TABLET, FILM COATED ORAL DAILY
Qty: 30 TABLET | Refills: 0 | Status: SHIPPED | OUTPATIENT
Start: 2024-11-08 | End: 2024-12-08

## 2024-11-08 RX ORDER — IOPAMIDOL 755 MG/ML
100 INJECTION, SOLUTION INTRAVASCULAR
Status: COMPLETED | OUTPATIENT
Start: 2024-11-08 | End: 2024-11-08

## 2024-11-08 RX ORDER — METOPROLOL TARTRATE 25 MG/1
12.5 TABLET, FILM COATED ORAL 2 TIMES DAILY
Qty: 30 TABLET | Refills: 0 | Status: SHIPPED | OUTPATIENT
Start: 2024-11-08 | End: 2024-12-08

## 2024-11-08 RX ADMIN — PREDNISONE 50 MG: 20 TABLET ORAL at 12:30

## 2024-11-08 RX ADMIN — ACETAMINOPHEN 650 MG: 325 TABLET ORAL at 12:30

## 2024-11-08 RX ADMIN — POTASSIUM CHLORIDE 30 MEQ: 750 CAPSULE, EXTENDED RELEASE ORAL at 12:30

## 2024-11-08 RX ADMIN — Medication 10 ML: at 09:24

## 2024-11-08 RX ADMIN — ASPIRIN 325 MG: 325 TABLET ORAL at 09:24

## 2024-11-08 RX ADMIN — Medication 800 MG: at 09:24

## 2024-11-08 RX ADMIN — DICLOFENAC SODIUM 4 G: 10 GEL TOPICAL at 12:30

## 2024-11-08 RX ADMIN — IOPAMIDOL 100 ML: 755 INJECTION, SOLUTION INTRAVENOUS at 08:22

## 2024-11-08 RX ADMIN — GABAPENTIN 800 MG: 400 CAPSULE ORAL at 13:42

## 2024-11-08 RX ADMIN — GABAPENTIN 800 MG: 400 CAPSULE ORAL at 05:10

## 2024-11-08 RX ADMIN — ACETAMINOPHEN 650 MG: 325 TABLET ORAL at 05:13

## 2024-11-08 NOTE — PLAN OF CARE
Goal Outcome Evaluation:           Progress: improving  Outcome Evaluation: Patient deemed medically stable for discharge per Dr. Betancourt. Patient is discharging home.

## 2024-11-08 NOTE — PLAN OF CARE
Goal Outcome Evaluation:  Plan of Care Reviewed With: patient        Progress: no change  Outcome Evaluation: NIH 0. Neuro checks completed per order. Patient had complaints of midsternal chest pain to left side slightly, described as tightness. Nitro SL given x1 which relieved pain slightly; However, patient refused additional dose of nitro SL. Medicated x 1 for lower back pain per mar, no further complaints at this time. Patient is resting, call light within reach.

## 2024-11-08 NOTE — THERAPY EVALUATION
Acute Care - Physical Therapy Initial Evaluation   [facility]     Patient Name: Campos Hale  : 1970  MRN: [id number]  Today's Date: 2024   Onset of Illness/Injury or Date of Surgery: (P) 24  Visit Dx:     ICD-10-CM ICD-9-CM   1. Stroke-like symptoms  R29.90 781.99   2. Dysphagia, unspecified type  R13.10 787.20   3. Difficulty in walking  R26.2 719.7     Patient Active Problem List   Diagnosis    Dyspnea    Bronchitis    Pleuritic chest pain    Diabetes mellitus type 2 with neurological manifestations    Ischemic stroke     Past Medical History:   Diagnosis Date    A-fib     Arthritis     Diabetes mellitus     Disease of thyroid gland     GERD (gastroesophageal reflux disease)     Hernia     Hyperlipidemia     Hypertension      Past Surgical History:   Procedure Laterality Date    GASTRIC SLEEVE LAPAROSCOPIC      NISSEN FUNDOPLICATION       PT Assessment (Last 12 Hours)       PT Evaluation and Treatment       Row Name 24 1355          Physical Therapy Time and Intention    Subjective Information complains of;pain (P)   Sciatic nerve/leg pain  -EW     Document Type evaluation (P)   -EW     Mode of Treatment individual therapy;physical therapy (P)   -EW     Total Minutes, Physical Therapy 35 (P)   -EW     Patient Effort good (P)   -EW     Symptoms Noted During/After Treatment none (P)   -EW       Row Name 24 1353          General Information    Patient Profile Reviewed yes (P)   -EW     Onset of Illness/Injury or Date of Surgery 24 (P)   -EW     Patient Observations alert;cooperative;agree to therapy (P)   -EW     Equipment Currently Used at Home none (P)   -EW     Existing Precautions/Restrictions no known precautions/restrictions (P)   -EW     Benefits Reviewed patient:;improve function;increase independence;increase strength;increase balance;decrease pain (P)   -EW     Barriers to Rehab none identified (P)   -EW       Row Name 24 1350          Living Environment     Current Living Arrangements residential facility (P)   -EW     People in Home other (see comments) (P)   Resident at [facility]  -EW     Primary Care Provided by self (P)   -EW       Row Name 11/08/24 1351          Home Use of Assistive/Adaptive Equipment    Equipment Currently Used at Home none (P)   -EW       Row Name 11/08/24 1351          Range of Motion (ROM)    Range of Motion bilateral lower extremities;ROM is WFL (P)   -EW       Row Name 11/08/24 1351          Strength (Manual Muscle Testing)    Strength (Manual Muscle Testing) bilateral lower extremities;strength is WFL (P)   B LE: 5/5 all MMT  -EW       Row Name 11/08/24 1351          Bed Mobility    Bed Mobility bed mobility (all) activities (P)   -EW     All Activities, Crawford (Bed Mobility) independent (P)   -EW     Assistive Device (Bed Mobility) head of bed elevated (P)   -EW       Row Name 11/08/24 1351          Transfers    Transfers sit-stand transfer;stand-sit transfer (P)   -EW       Row Name 11/08/24 1351          Sit-Stand Transfer    Sit-Stand Crawford (Transfers) independent (P)   -EW     Assistive Device (Sit-Stand Transfers) other (see comments) (P)   No AD  -EW       Row Name 11/08/24 1351          Stand-Sit Transfer    Stand-Sit Crawford (Transfers) independent (P)   -EW     Assistive Device (Stand-Sit Transfers) other (see comments) (P)   No AD  -EW       Row Name 11/08/24 1351          Gait/Stairs (Locomotion)    Gait/Stairs Locomotion gait/ambulation independence (P)   -EW     Crawford Level (Gait) standby assist (P)   -EW     Assistive Device (Gait) other (see comments) (P)   No AD  -EW     Patient was able to Ambulate yes (P)   -EW     Distance in Feet (Gait) 225 (P)   -EW     Bilateral Gait Deviations -- (P)   Slight limp due to pain, pt reports this is his baseline  -EW       Row Name 11/08/24 1351          Safety Issues/Impairments Affecting Functional Mobility    Impairments Affecting Function (Mobility) pain  (P)   -EW       Row Name 11/08/24 [facility]          Balance    Balance Assessment sitting static balance;standing dynamic balance (P)   -EW     Static Sitting Balance independent (P)   -EW     Position, Sitting Balance unsupported;sitting edge of bed (P)   -EW     Dynamic Standing Balance standby assist (P)   -EW     Position/Device Used, Standing Balance unsupported (P)   No AD  -EW       Row Name 11/08/24 [facility]          Plan of Care Review    Plan of Care Reviewed With patient (P)   -EW     Outcome Evaluation Pt presents with no deficits in B LE strength as well as no issues with general mobility, balance, ambulation, or transfers. No skilled PT intervention is necessary at this time. Recommend d/c to home. (P)   -EW       Row Name 11/08/24 [facility]          Therapy Assessment/Plan (PT)    Criteria for Skilled Interventions Met (PT) no problems identified which require skilled intervention (P)   -EW     Therapy Frequency (PT) evaluation only (P)   -EW       Row Name 11/08/24 [facility]          Therapy Plan Review/Discharge Plan (PT)    Therapy Plan Review (PT) evaluation/treatment results reviewed;participants included;patient (P)   -EW       Row Name 11/08/24 [facility]          Physical Therapy Goals    Problem Specific Goal Selection (PT) problem specific goal 1, PT (P)   -EW       Row Name 11/08/24 [facility]          Problem Specific Goal 1 (PT)    Problem Specific Goal 1 (PT) Complete PT eval (P)   -EW     Time Frame (Problem Specific Goal 1, PT) short-term goal (STG);1 day (P)   -EW     Progress/Outcome (Problem Specific Goal 1, PT) goal met (P)   -EW               User Key  (r) = Recorded By, (t) = Taken By, (c) = Cosigned By      Initials Name Provider Type    EW Juan Jose Knapp, PT Student PT Student                    Physical Therapy Education       Title: PT OT SLP Therapies (Done)       Topic: Physical Therapy (Done)       Point: Mobility training (Done)       Learning Progress Summary            Patient Acceptance, E,TB, VU  by EW at 11/8/2024 1357                      Point: Home exercise program (Done)       Learning Progress Summary            Patient Acceptance, E,TB, VU by EW at 11/8/2024 1357                      Point: Body mechanics (Done)       Learning Progress Summary            Patient Acceptance, E,TB, VU by EW at 11/8/2024 1357                      Point: Precautions (Done)       Learning Progress Summary            Patient Acceptance, E,TB, VU by EW at 11/8/2024 1357                                      User Key       Initials Effective Dates Name Provider Type Discipline     08/27/24 -  Juan Jose Knapp, PT Student PT Student PT                  PT Recommendation and Plan  Anticipated Discharge Disposition (PT): (P) home  Therapy Frequency (PT): (P) evaluation only  Plan of Care Reviewed With: (P) patient  Outcome Evaluation: (P) Pt presents with no deficits in B LE strength as well as no issues with general mobility, balance, ambulation, or transfers. No skilled PT intervention is necessary at this time. Recommend d/c to home.   Outcome Measures       Row Name 11/08/24 1300             How much help from another person do you currently need...    Turning from your back to your side while in flat bed without using bedrails? 4 (P)   -EW      Moving from lying on back to sitting on the side of a flat bed without bedrails? 4 (P)   -EW      Moving to and from a bed to a chair (including a wheelchair)? 4 (P)   -EW      Standing up from a chair using your arms (e.g., wheelchair, bedside chair)? 4 (P)   -EW      Climbing 3-5 steps with a railing? 4 (P)   -EW      To walk in hospital room? 4 (P)   -EW      AM-PAC 6 Clicks Score (PT) 24 (P)   -EW         Functional Assessment    Outcome Measure Options AM-PAC 6 Clicks Basic Mobility (PT) (P)   -EW                User Key  (r) = Recorded By, (t) = Taken By, (c) = Cosigned By      Initials Name Provider Type    EW Juan Jose Knapp, PT Student PT Student                     Time  Calculation:    PT Charges       Row Name 11/08/24 1351             Time Calculation    PT Received On 11/08/24 (P)   -EW         Untimed Charges    PT Eval/Re-eval Minutes 35 (P)   -EW         Total Minutes    Untimed Charges Total Minutes 35 (P)   -EW       Total Minutes 35 (P)   -EW                User Key  (r) = Recorded By, (t) = Taken By, (c) = Cosigned By      Initials Name Provider Type    Juan Jose Antonio, PT Student PT Student                      PT G-Codes  Outcome Measure Options: (P) AM-PAC 6 Clicks Basic Mobility (PT)  AM-PAC 6 Clicks Score (PT): (P) 24    Juan Jose Knapp PT Student  11/8/2024

## 2024-11-08 NOTE — THERAPY EVALUATION
Patient Name: Campos Hale  : 1970    MRN: [id number]                              Today's Date: 2024       Admit Date: 2024    Visit Dx:     ICD-10-CM ICD-9-CM   1. Stroke-like symptoms  R29.90 781.99   2. Dysphagia, unspecified type  R13.10 787.20   3. Difficulty in walking  R26.2 719.7   4. Decreased activities of daily living (ADL)  Z78.9 V49.89     Patient Active Problem List   Diagnosis    Dyspnea    Bronchitis    Pleuritic chest pain    Diabetes mellitus type 2 with neurological manifestations    Ischemic stroke     Past Medical History:   Diagnosis Date    A-fib     Arthritis     Diabetes mellitus     Disease of thyroid gland     GERD (gastroesophageal reflux disease)     Hernia     Hyperlipidemia     Hypertension      Past Surgical History:   Procedure Laterality Date    GASTRIC SLEEVE LAPAROSCOPIC      NISSEN FUNDOPLICATION        General Information       Row Name 24 1425          OT Time and Intention    Document Type evaluation  -ES     Mode of Treatment individual therapy;occupational therapy  -ES     Patient Effort excellent  -ES       Row Name 24 1425          General Information    Patient Profile Reviewed yes  -ES     Prior Level of Function independent:;ADL's;all household mobility;community mobility  Patient independent with B and IADLs at baseline. Patient is employed. No device for functional mobility. Standing shower and grooming completion. No home O2 use. Patient denies recent falls.  -ES     Existing Precautions/Restrictions no known precautions/restrictions  -ES     Barriers to Rehab none identified  -ES       Row Name 24 1425          Occupational Profile    Reason for Services/Referral (Occupational Profile) Patient is 54 yr old male admitted to [facility] on 2024 with reports of right sided weakness and syncopal episode. OT evaluation and treatment ordered d/t recent decline in ADLs/transfer ability and discharge planning  recommendations. No previous OT services for current condition.  -ES       Row Name 11/08/24 1425          Living Environment    [facility] facility resident  [facility]  -ES       Row Name 11/08/24 1425          Cognition    Orientation Status (Cognition) oriented x 3  patient pleasant and cooperative, agreeable to therapy evaluation  -ES               User Key  (r) = Recorded By, (t) = Taken By, (c) = Cosigned By      Initials Name Provider Type    ES Anabella Cooper, OTR/L, CSRS Occupational Therapist                     Mobility/ADL's       Row Name 11/08/24 1430          Bed Mobility    Bed Mobility supine-sit;sit-supine  -ES     Supine-Sit [place] (Bed Mobility) independent  -ES     Sit-Supine [place] (Bed Mobility) independent  -ES       Row Name 11/08/24 1430          Transfers    Transfers sit-stand transfer;stand-sit transfer  -ES       Row Name 11/08/24 1430          Sit-Stand Transfer    Sit-Stand [place] (Transfers) independent  -ES     Assistive Device (Sit-Stand Transfers) other (see comments)  no assistive device  -ES       Row Name 11/08/24 1430          Stand-Sit Transfer    Stand-Sit [place] (Transfers) independent  -ES     Assistive Device (Stand-Sit Transfers) other (see comments)  no assistive device  -ES       Row Name 11/08/24 1430          Functional Mobility    Functional Mobility- Ind. Level independent  -ES     Functional Mobility- Device other (see comments)  no assistive device  -ES       Row Name 11/08/24 1430          Activities of Daily Living    BADL Assessment/Intervention bathing;upper body dressing;lower body dressing;grooming;feeding;toileting  -ES       Row Name 11/08/24 1430          Bathing Assessment/Intervention    [place] Level (Bathing) bathing skills;independent  -ES       Row Name 11/08/24 1430          Upper Body Dressing Assessment/Training    [place] Level (Upper Body Dressing) upper body dressing skills;independent  -ES       Row  Name 11/08/24 1430          Lower Body Dressing Assessment/Training    [place] Level (Lower Body Dressing) lower body dressing skills;independent  -ES       Row Name 11/08/24 1430          Grooming Assessment/Training    [place] Level (Grooming) grooming skills;independent  -ES       Row Name 11/08/24 1430          Self-Feeding Assessment/Training    [place] Level (Feeding) feeding skills;independent  -ES       Row Name 11/08/24 1430          Toileting Assessment/Training    [place] Level (Toileting) toileting skills;independent  -ES               User Key  (r) = Recorded By, (t) = Taken By, (c) = Cosigned By      Initials Name Provider Type    ES Anabella Cooper, OTR/L, CSRS Occupational Therapist                   Obj/Interventions       Row Name 11/08/24 1433          Sensory Assessment (Somatosensory)    Sensory Assessment (Somatosensory) sensation intact  -ES     Sensory Assessment bilateral upper extremity sensation intact to light and heavy touch  -ES       Row Name 11/08/24 1433          Vision Assessment/Intervention    Visual Impairment/Limitations corrective lenses full-time  -ES     Vision Assessment Comment quick vision screen intact to all four visual quadrants with tracking of object  -ES       Row Name 11/08/24 1433          Motor Skills    Motor Skills functional endurance  -ES     Functional Endurance good  -ES       Row Name 11/08/24 1433          Balance    Balance Assessment sitting dynamic balance;standing dynamic balance  -ES     Dynamic Sitting Balance independent  -ES     Position, Sitting Balance unsupported  -ES     Dynamic Standing Balance independent  -ES     Position/Device Used, Standing Balance unsupported  no assistive device  -ES               User Key  (r) = Recorded By, (t) = Taken By, (c) = Cosigned By      Initials Name Provider Type    ES Anabella Cooper, OTR/L, CSRS Occupational Therapist                   Goals/Plan    No documentation.                   Clinical Impression       Row Name 11/08/24 [facility]7          Pain Assessment    Pretreatment Pain Rating 0/10 - no pain  -ES     Posttreatment Pain Rating 0/10 - no pain  -ES       Row Name 11/08/24 1437          Plan of Care Review    Plan of Care Reviewed With patient  -ES     Outcome Evaluation Patient presents at or near baseline functional status at time of evaluation with no identified functional deficits that impede patient independence with activities of daily living.  No indicated need for skilled occupational therapy intervention in the acute care setting.  Occupational therapy will sign off at this time.  -ES       Row Name 11/08/24 1437          Therapy Assessment/Plan (OT)    Criteria for Skilled Therapeutic Interventions Met (OT) no problems identified which require skilled intervention  -ES     Therapy Frequency (OT) evaluation only  -ES       Row Name 11/08/24 1437          Therapy Plan Review/Discharge Plan (OT)    Anticipated Discharge Disposition (OT) home  -ES       Row Name 11/08/24 1437          Positioning and Restraints    Pre-Treatment Position in bed  -ES     Post Treatment Position bed  -ES               User Key  (r) = Recorded By, (t) = Taken By, (c) = Cosigned By      Initials Name Provider Type    ES Anabella Cooper, OTR/L, CSRS Occupational Therapist                   Outcome Measures       Row Name 11/08/24 1437          How much help from another is currently needed...    Putting on and taking off regular lower body clothing? 4  -ES     Bathing (including washing, rinsing, and drying) 4  -ES     Toileting (which includes using toilet bed pan or urinal) 4  -ES     Putting on and taking off regular upper body clothing 4  -ES     Taking care of personal grooming (such as brushing teeth) 4  -ES     Eating meals 4  -ES     AM-PAC 6 Clicks Score (OT) 24  -ES       Row Name 11/08/24 1300 11/08/24 0745       How much help from another person do you currently need...    Turning from your back to  your side while in flat bed without using bedrails? 4  -SILVERIO (r) EW (t) SILVERIO (c) 4  -CR    Moving from lying on back to sitting on the side of a flat bed without bedrails? 4  -SILVERIO (r) EW (t) SILVERIO (c) 4  -CR    Moving to and from a bed to a chair (including a wheelchair)? 4  -SILVERIO (r) EW (t) SILVERIO (c) 3  -CR    Standing up from a chair using your arms (e.g., wheelchair, bedside chair)? 4  -SILVERIO (r) EW (t) SILVERIO (c) 3  -CR    Climbing 3-5 steps with a railing? 4  -SILVERIO (r) EW (t) SILVERIO (c) 3  -CR    To walk in hospital room? 4  -SILVERIO (r) EW (t) SILVERIO (c) 3  -CR    AM-PAC 6 Clicks Score (PT) 24  -SILVERIO (r) EW (t) 20  -CR    Highest Level of Mobility Goal 8 --> Walked 250 feet or more  -SILVERIO (r) EW (t) 6 --> Walk 10 steps or more  -CR      Row Name 11/08/24 1437 11/08/24 1300       Functional Assessment    Outcome Measure Options AM-PAC 6 Clicks Daily Activity (OT)  -ES AM-PAC 6 Clicks Basic Mobility (PT)  -SILVERIO (r) EW (t) SILVERIO (c)              User Key  (r) = Recorded By, (t) = Taken By, (c) = Cosigned By      Initials Name Provider Type    Lui Suero, PT Physical Therapist    Anabella Herndon, OTR/L, CSRS Occupational Therapist    CR Reyes, Celina, RN Registered Nurse    Juan Jose Antonio, PT Student PT Student                    Occupational Therapy Education        No education to display                  OT Recommendation and Plan  Therapy Frequency (OT): evaluation only  Plan of Care Review  Plan of Care Reviewed With: patient  Outcome Evaluation: Patient presents at or near baseline functional status at time of evaluation with no identified functional deficits that impede patient independence with activities of daily living.  No indicated need for skilled occupational therapy intervention in the acute care setting.  Occupational therapy will sign off at this time.     Time Calculation:   Evaluation Complexity (OT)  Review Occupational Profile/Medical/Therapy History Complexity: brief/low complexity  Assessment, Occupational  Performance/Identification of Deficit Complexity: 1-3 performance deficits  Clinical Decision Making Complexity (OT): problem focused assessment/low complexity  Overall Complexity of Evaluation (OT): low complexity     Time Calculation- OT       Row Name 11/08/24 1438             Time Calculation- OT    OT Received On 11/08/24  -ES         Untimed Charges    OT Eval/Re-eval Minutes 31  -ES         Total Minutes    Untimed Charges Total Minutes 31  -ES       Total Minutes 31  -ES                User Key  (r) = Recorded By, (t) = Taken By, (c) = Cosigned By      Initials Name Provider Type    Anabella Herndon, OTR/L, CSRS Occupational Therapist                  Therapy Charges for Today       Code Description Service Date Service Provider Modifiers Qty    [id number] HC OT EVAL LOW COMPLEXITY 3 11/8/2024 Anabella Cooper OTR/L, CSRS GO 1                 ADALBERTO Wise/L, CSRS  11/8/2024

## 2024-11-08 NOTE — PLAN OF CARE
Goal Outcome Evaluation:  Plan of Care Reviewed With: patient           Outcome Evaluation: Patient presents at or near baseline functional status at time of evaluation with no identified functional deficits that impede patient independence with activities of daily living.  No indicated need for skilled occupational therapy intervention in the acute care setting.  Occupational therapy will sign off at this time.    Anticipated Discharge Disposition (OT): home

## 2024-11-08 NOTE — PLAN OF CARE
Goal Outcome Evaluation:  Plan of Care Reviewed With: (P) patient           Outcome Evaluation: (P) Pt presents with no deficits in B LE strength as well as no issues with general mobility, balance, ambulation, or transfers. No skilled PT intervention is necessary at this time. Recommend d/c to home.    Anticipated Discharge Disposition (PT): (P) home

## 2024-11-08 NOTE — DISCHARGE SUMMARY
[facility]         HOSPITALIST  DISCHARGE SUMMARY    Patient Name: Campos Hale  : 1970  MRN: [id number]    Date of Admission: 2024  Date of Discharge: 2024  Primary Care Physician: Annie Garza MD    Consults       Date and Time Order Name Status Description    2024  4:30 PM Inpatient Neurology Consult Stroke      2024 12:16 PM Inpatient Hospitalist Consult              Active and Resolved Hospital Problems:  Right-sided weakness with was concern for ischemic stroke  Possible syncopal episode  Previous history of polysubstance abuse  Type 2 diabetes with hyperglycemia  Hypothyroidism  Active Hospital Problems    Diagnosis POA    **Ischemic stroke [I63.9] Yes      Resolved Hospital Problems   No resolved problems to display.       Hospital Course     Hospital Course:  Campos Hale is a 54 y.o. male  past medical history of atrial fibrillation, type 2 diabetes, thyroidism, and previous polysubstance abuse who presents with right-sided weakness and a syncopal episode. Patient is at [facility].  He states today that he start having some right-sided weakness and maybe a syncopal episode.  He did not hit his head.  He states that almost all symptoms have resolved at this time.  As result he symptoms brought the ER for further evaluation. In the emergency department the patient's vital signs are as follows: Temperature is 97.8 pulse 71, respiratory 16, blood pressure 95/50, 93% room air.  Labs show CBC with hemoglobin 10.8.  CMP shows a bicarb of 20 with no anion gap.  Glucose is 200.  Magnesium is 1.5.  Chest x-ray shows low lung volumes.  CT of the head shows unremarkable CT.  X-ray of the hip no significant findings.  Patient admitted for concern of ischemic stroke.  Seen by neurology.  Started on aspirin.  Continued on statin therapy.  Home metoprolol held.  Blood pressure improved.  Remains sinus rhythm on telemetry.  MRI brain negative for acute abnormalities.   CT angiogram head and neck negative for hemodynamically significant stenosis.  Patient's neurologic status returned to baseline.  Worked well with PT OT thought safe to return home.  Patient seen and evaluated on date of discharge and thought stable for discharge back [facility] on decreased metoprolol as well as aspirin to follow-up with his primary care provider and neurology as an outpatient.        DISCHARGE Follow Up Recommendations for labs and diagnostics: As above      Day of Discharge     Vital Signs:  Temp:  [97.7 °F (36.5 °C)-98.1 °F (36.7 °C)] 98.1 °F (36.7 °C)  Heart Rate:  [54-76] 65  Resp:  [16-18] 18  BP: (120-166)/(55-96) 147/90  Physical Exam:   Gen. well-developed appearing stated age in no acute distress  HEENT: Normocephalic atraumatic moist membranes pupils equal round reactive light, no scleral icterus no conjunctival injection  Cardiovascular: regular rate and rhythm no murmurs rubs or gallops S1-S2, no lower extremity edema appreciated  Pulmonary: Clear to auscultation bilaterally no wheezes rales or rhonchi symmetric chest expansion, unlabored, no conversational dyspnea appreciated  Gastrointestinal: Soft nontender nondistended positive bowel sounds all 4 quadrants no rebound or guarding  Musculoskeletal: No clubbing cyanosis, warm and well-perfused, calves soft symmetric nontender bilaterally  Skin: Clean dry without rashs  Neuro: Cranial nerves II through XII intact grossly no sensorimotor deficits appreciated bilateral upper and lower extremities  Psych: Patient is calm cooperative and appropriate with exam not responding to internal stimuli  : No Durbin catheter no bladder distention no suprapubic tenderness      Discharge Details        Discharge Medications        New Medications        Instructions Start Date   aspirin 81 MG EC tablet   81 mg, Oral, Daily   Start Date: November 9, 2024     Diclofenac Sodium 1 % gel gel  Commonly known as: VOLTAREN   4 g, Topical, 4 Times  Daily      famotidine 40 MG tablet  Commonly known as: Pepcid   40 mg, Oral, Daily      magnesium oxide 400 tablet tablet  Commonly known as: MAG-OX   400 mg, Oral, Daily             Changes to Medications        Instructions Start Date   atorvastatin 40 MG tablet  Commonly known as: Lipitor  What changed: when to take this   40 mg, Oral, Daily      metoprolol tartrate 25 MG tablet  Commonly known as: LOPRESSOR  What changed:   medication strength  how much to take  when to take this   12.5 mg, Oral, 2 Times Daily             Continue These Medications        Instructions Start Date   acetaminophen 650 MG 8 hr tablet  Commonly known as: Tylenol 8 Hour   650 mg, Oral, Every 8 Hours PRN      DULoxetine 60 MG capsule  Commonly known as: CYMBALTA   60 mg, 2 Times Daily      gabapentin 800 MG tablet  Commonly known as: NEURONTIN   800 mg, 3 Times Daily      Gvoke HypoPen 2-Pack 1 MG/0.2ML solution auto-injector  Generic drug: Glucagon   Inject 1 mg under the skin into the appropriate area as directed As Needed (HYPOGLYCEMIA). INJECT 1MG UNDER THE SKIN ONE TIME AS NEEDED HYPOGLYCEMIA FOR UP TO ONE DOSE      isosorbide dinitrate 30 MG tablet  Commonly known as: ISORDIL   1 tablet, Daily      levothyroxine 50 MCG tablet  Commonly known as: SYNTHROID, LEVOTHROID   Take 1 tablet by mouth Daily.      predniSONE 50 MG tablet  Commonly known as: DELTASONE   50 mg, Oral, Daily      vitamin D 1.25 MG (17503 UT) capsule capsule  Commonly known as: ERGOCALCIFEROL   1 capsule, Oral, Weekly, Thursdays             Stop These Medications      diclofenac 50 MG EC tablet  Commonly known as: VOLTAREN              No Known Allergies    Discharge Disposition:  Home or Self Care    Diet:  Hospital:  Diet Order   Procedures    Diet: Cardiac, Diabetic; Healthy Heart (2-3 Na+); Consistent Carbohydrate; Fluid Consistency: Thin (IDDSI 0)       Discharge Activity:   Activity Instructions       Gradually Increase Activity Until at  Pre-Hospitalization Level              CODE STATUS:  Code Status and Medical Interventions: CPR (Attempt to Resuscitate); Full Support   Ordered at: 11/08/24 0907     Code Status (Patient has no pulse and is not breathing):    CPR (Attempt to Resuscitate)     Medical Interventions (Patient has pulse or is breathing):    Full Support         Future Appointments   Date Time Provider Department Center   11/14/2024  2:15 PM Annie Garza MD [facility]   12/5/2024 11:00 AM Annie Garza MD [facility]       Additional Instructions for the Follow-ups that You Need to Schedule       Ambulatory Referral to Neurology   As directed      Hospital discharge follow up 3-4 weeks per neurology dx tia    Discharge Follow-up with PCP   As directed       Currently Documented PCP:    Annie Garza MD    PCP Phone Number:    [phone]     Follow Up Details: hospital discharge follow up 1-2 weeks                Pertinent  and/or Most Recent Results     PROCEDURES:   None    LAB RESULTS:      Lab 11/08/24  0429 11/07/24  1136   WBC 6.02 8.12   HEMOGLOBIN 9.4* 10.8*   HEMATOCRIT 30.2* 34.8*   PLATELETS 267 280   NEUTROS ABS 4.30 7.04*   IMMATURE GRANS (ABS) 0.03 0.03   LYMPHS ABS 1.24 0.72   MONOS ABS 0.43 0.30   EOS ABS 0.01 0.02   MCV 83.4 83.7         Lab 11/08/24  0429 11/07/24  1136   SODIUM 139 140   POTASSIUM 3.7 3.9   CHLORIDE 108* 106   CO2 24.2 21.0*   ANION GAP 6.8 13.0   BUN 16 16   CREATININE 0.85 1.12   EGFR 103.9 78.6   GLUCOSE 113* 200*   CALCIUM 8.5* 8.9   MAGNESIUM 1.5* 1.5*   HEMOGLOBIN A1C 7.00*  --          Lab 11/08/24  0429 11/07/24  1136   TOTAL PROTEIN 5.9* 6.9   ALBUMIN 3.5 4.0   GLOBULIN 2.4 2.9   ALT (SGPT) 22 30   AST (SGOT) 24 39   BILIRUBIN <0.2 0.2   ALK PHOS 38* 44   LIPASE  --  16         Lab 11/07/24  1411 11/07/24  1136   PROBNP  --  621.9   HSTROP T 13 19         Lab 11/08/24  0429   CHOLESTEROL 141   LDL CHOL 62   HDL CHOL 68*   TRIGLYCERIDES 52             Brief Urine Lab Results   (Last result in the past 365 days)        Color   Clarity   Blood   Leuk Est   Nitrite   Protein   CREAT   Urine HCG        11/07/24 1343 Yellow   Clear   Negative   Negative   Negative   Trace                 Microbiology Results (last 10 days)       ** No results found for the last 240 hours. **            CT Angiogram Head w AI Analysis of LVO    Result Date: 11/8/2024  Impression: 1.No hemodynamically significant stenosis, large vessel cut or aneurysms in intracranial circulation 2.No hemodynamically significant stenosis, dissection or aneurysms in extracranial circulation. Electronically Signed: Paula Chance MD  11/8/2024 9:16 AM EST  Workstation ID: [id number]    CT Angiogram Neck    Result Date: 11/8/2024  Impression: 1.No hemodynamically significant stenosis, large vessel cut or aneurysms in intracranial circulation 2.No hemodynamically significant stenosis, dissection or aneurysms in extracranial circulation. Electronically Signed: Paula Chance MD  11/8/2024 9:16 AM EST  Workstation ID: [id number]    MRI Brain Without Contrast    Result Date: 11/8/2024  Impression: Impression: Normal MRI of the brain Electronically Signed: Shabbir Gallegos MD  11/8/2024 8:00 AM EST  Workstation ID: [id number]    XR Chest 1 View    Result Date: 11/7/2024  Impression: Impression: Low lung volumes with bibasilar atelectasis Electronically Signed: Ezekiel Coronel  11/7/2024 11:25 AM EST  Workstation ID: [id number]    CT Head Without Contrast    Result Date: 11/7/2024  Impression: Impression: Unremarkable CT of the brain Electronically Signed: Van Pimentel MD  11/7/2024 10:53 AM EST  Workstation ID: [id number]    XR Hip With or Without Pelvis 2 - 3 View Right    Result Date: 11/6/2024  Impression: Impression: No significant findings. Electronically Signed: Tequila Smith MD  11/6/2024 10:36 AM EST  Workstation ID: [id number]    XR Spine Lumbar 2 or 3 View    Result Date: 11/6/2024  Impression: Impression: Disc disease as detailed with  mildly abnormal alignment as noted. Electronically Signed: Tequila Smith MD  11/6/2024 10:32 AM EST  Workstation ID: [id number]             Results for orders placed during the hospital encounter of 11/07/24    Adult Transthoracic Echo Complete W/ Cont if Necessary Per Protocol (With Agitated Saline)    Interpretation Summary    Left ventricular ejection fraction appears to be 66 - 70%.  Left ventricular function appears mildly hyperdynamic with subsequent small intracavitary gradient.    Left ventricular diastolic function is consistent with (grade I) impaired relaxation.    The left atrial cavity is mildly dilated.    No significant valvular disease.    Bubble study shows no obvious right-to-left shunt.      Labs Pending at Discharge:        Time spent on Discharge including face to face service: Greater than 35 minutes    Electronically signed by Donavon Betancourt MD, 11/08/24, 5:35 PM EST.

## 2024-11-08 NOTE — CONSULTS
Consult received per Stroke Protocol. Patient with a noted DM diagnosis, with a current HbA1c of 7%, and an estimated average glucose of 154 mg/dl. Patient's home regimen consists of Jardiance 25 mg daily and Metformin 500 mg BID.

## 2024-11-08 NOTE — OUTREACH NOTE
Prep Survey      Flowsheet Row Responses   [facility] patient discharged from? [facility]   Is LACE score < 7 ? No   Eligibility [facility]   Date of Admission 11/07/24   Date of Discharge 11/08/24   Discharge Disposition Home or Self Care   Discharge diagnosis Ischemic stroke   Does the patient have one of the following disease processes/diagnoses(primary or secondary)? Stroke   Does the patient have Home health ordered? No   Is there a DME ordered? No   General alerts for this patient return to [facility]   Prep survey completed? Yes            Natalie BRODERICK - Registered Nurse

## 2024-11-08 NOTE — PROGRESS NOTES
"[facility] Tele[facility]urology Progress Note    Date of Service 11/8/2024      Presentation:    Based on previous neurology note(s)  11/7 : \" Pt presenting with drowsiness and right sided weakness and numbness in face and hand. This is improving during ED stay and on my exam only right face and hand numbness and slowed responses. He was seen in the ED yesterday for back pain and may have taken new meds or muscle relaxer today. He is unsure. Was given steroids and NSAIDs in ED yesterday. In any case, focal deficits worrisome for small stroke or TIA.       Initial NIHSS 1    Not on Antiplatelet therapy or anticoagulation prior to admission”    Interval history:        11/8/2024: symptoms resolved     Impression:    History of stroke (had left sided symptoms but no residual)    Transient Drowsiness and right sided weakness and numbness , likely toxic metabolic but cannot rule out TIA (Transient Ischemic Attack)      Recommendations:   (Primary Team to order Controlled Medications)  Unless specifically noted I Agree with Impression and Plan from previous Neurology note/consult.    1- Risk factor management If stroke is confirmed:  Statin for Goal LDL<70, primary team to order    Goal HbA1c<7;     Blood pressure management:  target normotension.  avoid hypotension or rapid decrease in Blood Pressure.      2- Work up and Results:   Brain MRI: No Acute Intracranial Abnormality.  Head and neck Vascular imaging: No Critical large vessel Stenosis or Occlusion.  ECHO: TTE: EF Normal, no PFO, no Thrombus reported   LDL and HbA1c: 62/7    3- Antithrombotic regimen:  A- started on Aspirin, lowered dose to 81mg daily, I don't have strong evidence to recommend dual Antiplatelet therapy.    4- Nursing recommendations:  IV Fluids, avoid dextrose containing fluids  Maintain euglycemia  Neuro checks every 4 hours for 24 hours and then per shift  Head of bed 30 degrees    5- Life Style modifications for stroke prevention should be " followed:  Diet. Mediterranean diet rich in fruits, vegetables, whole grains, fish, legumes, plant-based oils preferred over animal fats. Reduce sodium intake as directed by primary care physician.  Exercise. Aim for 150-minutes of moderate to intense exercise per week in sessions of at least 10 minutes duration as tolerated.  Tobacco. Tobacco cessation with counseling and/or pharmacologic management  Alcohol. Reduce alcohol consumption as directed by primary care physician.  Hormone therapy. Avoid estrogen and testosterone containing medications  Sleep. Evaluation and treatment of sleep apnea  Return precautions. Seek emergency medical attention if you cannot see, speak, move or feel as you do normally for any abnormal length of time as these may be signs of a stroke      Neurology will sign off.  Follow up with outpatient neurology in 2-3 weeks.    Please contact [facility] to reach me if further questions/concerns arise.      Examination:    awake, alert  speech no aphasia  Extraocular movements intact  face symmetric  arms no drift (10s)  legs no drift (5s)  coordination intact in finger to nose    -----------------------------------------------------------  Patient / Family was informed the Neurology Consult would occur via TeleHealth consult by way of interactive audio and video telecommunications and consented to receiving care in this manner.     Patient is being evaluated for possible acute neurologic impairment and high probability of imminent or life - threatening deterioration. I spent total of 12 minutes providing care to this patient, including time for face to face visit via telemedicine, review of medical records, imaging studies and discussion of findings with providers, the patient and / or family.        Dr Vahid Behravan        [facility]  For Inpatient follow-up with [facility] physician please call [facility] [phone]. This is not an outpatient service. Post hospital  discharge, please contact hospital directly.     Please do not communicate with [facility] physicians via secure chat. If you have any questions, Please contact [facility].  Please call or reconsult our service if there are any clinical or diagnostic changes.
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
## 2024-11-11 ENCOUNTER — TRANSITIONAL CARE MANAGEMENT TELEPHONE ENCOUNTER (OUTPATIENT)
Dept: CALL CENTER | Facility: HOSPITAL | Age: 54
End: 2024-11-11
Payer: COMMERCIAL

## 2024-11-11 NOTE — OUTREACH NOTE
Call Center TCM Note      Flowsheet Row Responses   [facility] facility patient discharged from? [facility]   Does the patient have one of the following disease processes/diagnoses(primary or secondary)? Stroke   TCM attempt successful? No   Unsuccessful attempts Attempt 2            John Jauregui RN    11/11/2024, 13:57 EST

## 2024-11-12 ENCOUNTER — TRANSITIONAL CARE MANAGEMENT TELEPHONE ENCOUNTER (OUTPATIENT)
Dept: CALL CENTER | Facility: HOSPITAL | Age: 54
End: 2024-11-12
Payer: COMMERCIAL

## 2024-11-12 DIAGNOSIS — M51.379 DEGENERATIVE DISC DISEASE AT L5-S1 LEVEL: Primary | ICD-10-CM

## 2024-11-12 NOTE — OUTREACH NOTE
Call Center TCM Note      Flowsheet Row Responses   [facility] facility patient discharged from? [facility]   Does the patient have one of the following disease processes/diagnoses(primary or secondary)? Stroke   TCM attempt successful? No  [no names listed on verbal release]   Unsuccessful attempts Attempt 3            Svetlana Mao RN    11/12/2024, 11:24 EST

## 2024-11-20 ENCOUNTER — TELEPHONE (OUTPATIENT)
Dept: FAMILY MEDICINE CLINIC | Facility: CLINIC | Age: 54
End: 2024-11-20
Payer: COMMERCIAL

## 2024-11-20 DIAGNOSIS — E03.9 HYPOTHYROIDISM, UNSPECIFIED TYPE: ICD-10-CM

## 2024-11-20 DIAGNOSIS — E83.42 HYPOMAGNESEMIA: ICD-10-CM

## 2024-11-20 DIAGNOSIS — E11.43 TYPE 2 DIABETES MELLITUS WITH DIABETIC AUTONOMIC NEUROPATHY, WITHOUT LONG-TERM CURRENT USE OF INSULIN: Primary | ICD-10-CM

## 2024-11-20 DIAGNOSIS — E78.5 HYPERLIPIDEMIA, UNSPECIFIED HYPERLIPIDEMIA TYPE: ICD-10-CM

## 2024-11-20 DIAGNOSIS — I48.91 ATRIAL FIBRILLATION, UNSPECIFIED TYPE: ICD-10-CM

## 2024-11-20 RX ORDER — METOPROLOL TARTRATE 25 MG/1
12.5 TABLET, FILM COATED ORAL 2 TIMES DAILY
Qty: 60 TABLET | Refills: 0 | Status: SHIPPED | OUTPATIENT
Start: 2024-11-20 | End: 2024-11-22 | Stop reason: SDUPTHER

## 2024-11-20 RX ORDER — ATORVASTATIN CALCIUM 40 MG/1
40 TABLET, FILM COATED ORAL NIGHTLY
Qty: 90 TABLET | Refills: 2 | Status: SHIPPED | OUTPATIENT
Start: 2024-11-20 | End: 2024-11-22 | Stop reason: SDUPTHER

## 2024-11-20 RX ORDER — LEVOTHYROXINE SODIUM 50 UG/1
50 TABLET ORAL DAILY
Qty: 90 TABLET | Refills: 1 | Status: SHIPPED | OUTPATIENT
Start: 2024-11-20 | End: 2024-11-22 | Stop reason: SDUPTHER

## 2024-11-20 RX ORDER — DULOXETIN HYDROCHLORIDE 60 MG/1
60 CAPSULE, DELAYED RELEASE ORAL 2 TIMES DAILY
Qty: 90 CAPSULE | Refills: 1 | Status: SHIPPED | OUTPATIENT
Start: 2024-11-20 | End: 2024-11-22 | Stop reason: SDUPTHER

## 2024-11-20 NOTE — TELEPHONE ENCOUNTER
Called pat to let pt know Dr Garza said she could not send in any refills at this time due to the pt missed his last appointment and will need to come in for an appointment to get more refills

## 2024-11-22 ENCOUNTER — TELEPHONE (OUTPATIENT)
Dept: FAMILY MEDICINE CLINIC | Facility: CLINIC | Age: 54
End: 2024-11-22
Payer: COMMERCIAL

## 2024-11-22 DIAGNOSIS — E11.43 TYPE 2 DIABETES MELLITUS WITH DIABETIC AUTONOMIC NEUROPATHY, WITHOUT LONG-TERM CURRENT USE OF INSULIN: ICD-10-CM

## 2024-11-22 DIAGNOSIS — E78.5 HYPERLIPIDEMIA, UNSPECIFIED HYPERLIPIDEMIA TYPE: ICD-10-CM

## 2024-11-22 DIAGNOSIS — E83.42 HYPOMAGNESEMIA: ICD-10-CM

## 2024-11-22 DIAGNOSIS — E03.9 HYPOTHYROIDISM, UNSPECIFIED TYPE: ICD-10-CM

## 2024-11-22 RX ORDER — METOPROLOL TARTRATE 25 MG/1
12.5 TABLET, FILM COATED ORAL 2 TIMES DAILY
Qty: 60 TABLET | Refills: 0 | Status: SHIPPED | OUTPATIENT
Start: 2024-11-22

## 2024-11-22 RX ORDER — ATORVASTATIN CALCIUM 40 MG/1
40 TABLET, FILM COATED ORAL NIGHTLY
Qty: 90 TABLET | Refills: 2 | Status: SHIPPED | OUTPATIENT
Start: 2024-11-22

## 2024-11-22 RX ORDER — LEVOTHYROXINE SODIUM 50 UG/1
50 TABLET ORAL DAILY
Qty: 90 TABLET | Refills: 1 | Status: SHIPPED | OUTPATIENT
Start: 2024-11-22

## 2024-11-22 RX ORDER — DULOXETIN HYDROCHLORIDE 60 MG/1
60 CAPSULE, DELAYED RELEASE ORAL 2 TIMES DAILY
Qty: 90 CAPSULE | Refills: 1 | Status: SHIPPED | OUTPATIENT
Start: 2024-11-22

## 2024-11-22 NOTE — TELEPHONE ENCOUNTER
Name: Campos Hale    Relationship: Self    Best Callback Number: [phone]     HUB PROVIDED THE RELAY MESSAGE FROM THE OFFICE   PATIENT VOICED UNDERSTANDING AND HAS NO FURTHER QUESTIONS AT THIS TIME

## 2024-11-22 NOTE — TELEPHONE ENCOUNTER
Caller: Campos Hale    Relationship to patient: Self    Best call back number: [phone]    Patient is needing: PATIENT CALLED STATING ALL HIS MEDICATIONS THAT WERE SENT ON 11.20.24 WERE SENT INTO THE WRONG PHARMACY. PATIENT STATES THESE ARE NEEDING TO GO TO [facility]. PLEASE CALL ONCE COMPLETED.

## 2024-12-05 ENCOUNTER — OFFICE VISIT (OUTPATIENT)
Dept: FAMILY MEDICINE CLINIC | Facility: CLINIC | Age: 54
End: 2024-12-05
Payer: COMMERCIAL

## 2024-12-05 VITALS
OXYGEN SATURATION: 100 % | SYSTOLIC BLOOD PRESSURE: 139 MMHG | WEIGHT: 224.6 LBS | HEIGHT: 72 IN | HEART RATE: 74 BPM | TEMPERATURE: 97.2 F | DIASTOLIC BLOOD PRESSURE: 80 MMHG | BODY MASS INDEX: 30.42 KG/M2

## 2024-12-05 DIAGNOSIS — E11.40 TYPE 2 DIABETES MELLITUS WITH DIABETIC NEUROPATHY, UNSPECIFIED WHETHER LONG TERM INSULIN USE: Primary | ICD-10-CM

## 2024-12-05 LAB
CREAT UR-MCNC: 222.3 MG/DL
GLUCOSE BLDC GLUCOMTR-MCNC: 93 MG/DL (ref 70–130)
PROT ?TM UR-MCNC: 21.6 MG/DL
PROT/CREAT UR: 0.1 MG/G{CREAT}

## 2024-12-05 PROCEDURE — 1160F RVW MEDS BY RX/DR IN RCRD: CPT | Performed by: STUDENT IN AN ORGANIZED HEALTH CARE EDUCATION/TRAINING PROGRAM

## 2024-12-05 PROCEDURE — 3051F HG A1C>EQUAL 7.0%<8.0%: CPT | Performed by: STUDENT IN AN ORGANIZED HEALTH CARE EDUCATION/TRAINING PROGRAM

## 2024-12-05 PROCEDURE — 82570 ASSAY OF URINE CREATININE: CPT | Performed by: STUDENT IN AN ORGANIZED HEALTH CARE EDUCATION/TRAINING PROGRAM

## 2024-12-05 PROCEDURE — 1159F MED LIST DOCD IN RCRD: CPT | Performed by: STUDENT IN AN ORGANIZED HEALTH CARE EDUCATION/TRAINING PROGRAM

## 2024-12-05 PROCEDURE — 84156 ASSAY OF PROTEIN URINE: CPT | Performed by: STUDENT IN AN ORGANIZED HEALTH CARE EDUCATION/TRAINING PROGRAM

## 2024-12-05 PROCEDURE — 82948 REAGENT STRIP/BLOOD GLUCOSE: CPT | Performed by: STUDENT IN AN ORGANIZED HEALTH CARE EDUCATION/TRAINING PROGRAM

## 2024-12-05 PROCEDURE — 99214 OFFICE O/P EST MOD 30 MIN: CPT | Performed by: STUDENT IN AN ORGANIZED HEALTH CARE EDUCATION/TRAINING PROGRAM

## 2024-12-05 RX ORDER — METFORMIN HYDROCHLORIDE 500 MG/1
500 TABLET, EXTENDED RELEASE ORAL
Qty: 90 TABLET | Refills: 1 | Status: SHIPPED | OUTPATIENT
Start: 2024-12-05

## 2024-12-05 RX ORDER — IBUPROFEN 200 MG/1
200 TABLET, FILM COATED ORAL EVERY 6 HOURS PRN
COMMUNITY
Start: 2024-12-02

## 2024-12-05 RX ORDER — IBUPROFEN 800 MG/1
1 TABLET, FILM COATED ORAL 3 TIMES DAILY
COMMUNITY
Start: 2024-12-02

## 2024-12-05 NOTE — PROGRESS NOTES
Chief Complaint  Diabetes (Follow up, would like to discuss increasing Tizanidine /) and Leg Pain (Right leg pain)    Subjective      Campos Hale is a 54 y.o. male who presents to [facility]     History of Present Illness  The patient presents for evaluation of multiple medical concerns.    He reports that his diabetes is well-managed, with a recent blood sugar level of 91. However, he has discontinued the use of Jardiance and metformin due to episodes of hypoglycemia, with blood sugar levels dropping below 40. He underwent a diabetic eye exam three months ago and received new glasses.    He is currently on Lipitor for cholesterol management. He does not monitor his blood pressure at home but is taking isosorbide and metoprolol for blood pressure control. He experienced a stroke like episode on 11/08/2024 admitted to the hospital, his MRI brain was negative for acute abnormalities, CT angiogram head and neck negative for hemodynamically significant stenosis.     He was tested for hepatitis B at his facility a month ago and tested negative    He has an appointment with neurosurgery on 12/20/2024.       Patient Care Team:  Annie Garza MD as PCP - General (Family Medicine)      Review of Systems   Constitutional:  Negative for activity change and appetite change.   HENT:  Negative for congestion and rhinorrhea.    Respiratory:  Negative for cough and shortness of breath.    Cardiovascular:  Negative for chest pain and palpitations.   Gastrointestinal:  Negative for constipation and diarrhea.   Genitourinary:  Negative for dysuria and hematuria.   Musculoskeletal:  Positive for arthralgias and back pain. Negative for myalgias.   Neurological:  Negative for dizziness and headaches.   Psychiatric/Behavioral:  Negative for agitation and behavioral problems.          Objective   Vital Signs:   Vitals:    12/05/24 0736 12/05/24 0809   BP: 147/85 139/80   BP Location: Left arm Left  "arm   Patient Position: Sitting    Cuff Size: Adult    Pulse: 74    Temp: 97.2 °F (36.2 °C)    TempSrc: Temporal    SpO2: 100%    Weight: 102 kg (224 lb 9.6 oz)    Height: 182.9 cm (72.01\")      Body mass index is 30.45 kg/m².    Wt Readings from Last 3 Encounters:   12/05/24 102 kg (224 lb 9.6 oz)   11/07/24 91.8 kg (202 lb 6.1 oz)   11/06/24 98.1 kg (216 lb 4.3 oz)     BP Readings from Last 3 Encounters:   12/05/24 139/80   11/08/24 147/90   11/06/24 158/98       Health Maintenance   Topic Date Due    COLORECTAL CANCER SCREENING  Never done    Hepatitis B (1 of 3 - 19+ 3-dose series) 12/05/2024 (Originally 2/26/1989)    ZOSTER VACCINE (1 of 2) 12/05/2024 (Originally 2/26/2021)    COVID-19 Vaccine (4 - 2024-25 season) 12/29/2024 (Originally 9/1/2024)    HEMOGLOBIN A1C  05/08/2025    DIABETIC EYE EXAM  08/15/2025    URINE MICROALBUMIN  10/10/2025    BMI FOLLOWUP  10/10/2025    ANNUAL PHYSICAL  10/29/2025    LIPID PANEL  11/08/2025    TDAP/TD VACCINES (4 - Td or Tdap) 09/22/2030    Pneumococcal Vaccine 0-64 (3 of 3 - PPSV23 or PCV20) 02/26/2036    HEPATITIS C SCREENING  Completed    INFLUENZA VACCINE  Completed       Physical Exam  Constitutional:       Appearance: Normal appearance.   HENT:      Head: Normocephalic and atraumatic.      Mouth/Throat:      Mouth: Mucous membranes are moist.   Eyes:      Pupils: Pupils are equal, round, and reactive to light.   Cardiovascular:      Rate and Rhythm: Normal rate and regular rhythm.      Pulses: Normal pulses.      Heart sounds: Normal heart sounds.   Pulmonary:      Effort: Pulmonary effort is normal.      Breath sounds: Normal breath sounds.   Skin:     General: Skin is warm.   Neurological:      General: No focal deficit present.      Mental Status: He is alert and oriented to person, place, and time.   Psychiatric:         Mood and Affect: Mood normal.         Behavior: Behavior normal.         Physical Exam  Lungs are normal.  Heart is normal.    Vital Signs  Blood " pressure is 139/80.       Result Review   The following data was reviewed by: Annie Garza MD on 12/05/2024:  [x]  Tests & Results  []  Hospitalization/Emergency Department/Urgent Care  []  Internal/External Consultant Notes      Results  Laboratory Studies  A1c was 7. Blood sugar was 91. Cholesterol numbers are good.    Imaging  X-ray of lumbar spine showed degenerative disc disease.       ASSESSMENT/PLAN  Diagnoses and all orders for this visit:    1. Type 2 diabetes mellitus with diabetic neuropathy, unspecified whether long term insulin use (Primary)  -     POCT Glucose  -     metFORMIN ER (GLUCOPHAGE-XR) 500 MG 24 hr tablet; Take 1 tablet by mouth Daily With Breakfast.  Dispense: 90 tablet; Refill: 1  -     Protein / Creatinine Ratio, Urine - Urine, Clean Catch; Future  -     Ibuprofen 3 %, Gabapentin 10 %, Baclofen 2 %, lidocaine 4 %, Ketamine HCl 4 %; Apply 1-2 g topically to the appropriate area as directed 3 (Three) to 4 (Four) times daily.  Dispense: 90 g; Refill: 1  -     Protein / Creatinine Ratio, Urine - Urine, Clean Catch          Assessment & Plan  1. Diabetes Mellitus.  His A1c has increased from 6.1 to 7. He is currently not on any diabetes medication. His blood sugar was 91 today. Metformin extended release 500 mg once daily with food has been prescribed. He is advised to monitor his blood sugar levels whenever he feels dizzy or lightheaded. Dietary recommendations include consuming small meals ,more frequently. Regular exercise has been encouraged. A urine test will be conducted today to check for proteinuria. If his blood pressure remains in the 140s and his urine test shows protein, a small dose of ACE/ARB's will be initiated.    2. Hypertension.  His blood pressure is slightly elevated today, with a reading of 139/80. Previous readings have been inconsistent, ranging from 121/77 to 158/94. He does not have a blood pressure machine at home.  Discussed DASH diet and daily exercises    3.  Degenerative Disc Disease.  Following pain management and getting back injections, recommended core strengthening exercises, prescribed Rx pain cream for topical application    4. Hyperlipidemia.  His cholesterol levels are within normal range. He is currently on Lipitor.    6. Neuropathy.  A prescription for neuropathic pain cream has been provided. He is currently on Zanaflex       Follow-up  Return in 3 months for follow up.          FOLLOW UP  Return in about 3 months (around 3/5/2025).  Patient was given instructions and counseling regarding his condition or for health maintenance advice. Please see specific information pulled into the AVS if appropriate.       Annie Garza MD  12/05/24  10:21 EST    Patient or patient representative verbalized consent for the use of Ambient Listening during the visit with  Annie Garza MD for chart documentation. 12/5/2024  10:21 EST

## 2024-12-17 ENCOUNTER — TELEPHONE (OUTPATIENT)
Dept: FAMILY MEDICINE CLINIC | Facility: CLINIC | Age: 54
End: 2024-12-17
Payer: COMMERCIAL

## 2024-12-17 NOTE — TELEPHONE ENCOUNTER
Called and spoke with patient that he had an appointment with them already for 1/13/2025 and would mail him a reminder.  I asked him if used his my chart he said yes I told him he could also look for his appointments in there

## 2024-12-17 NOTE — TELEPHONE ENCOUNTER
Pt called stated he  requested appt information regarding neurology but did not receive information for his referral to neurology appt. Would like call back. [phone]

## 2025-01-01 ENCOUNTER — HOSPITAL ENCOUNTER (EMERGENCY)
Facility: HOSPITAL | Age: 55
Discharge: HOME | End: 2025-01-01
Attending: EMERGENCY MEDICINE
Payer: COMMERCIAL

## 2025-01-01 ENCOUNTER — APPOINTMENT (OUTPATIENT)
Dept: GENERAL RADIOLOGY | Facility: HOSPITAL | Age: 55
End: 2025-01-01
Payer: COMMERCIAL

## 2025-01-01 VITALS
DIASTOLIC BLOOD PRESSURE: 72 MMHG | WEIGHT: 224.87 LBS | BODY MASS INDEX: 30.46 KG/M2 | TEMPERATURE: 98.2 F | HEART RATE: 88 BPM | HEIGHT: 72 IN | RESPIRATION RATE: 18 BRPM | SYSTOLIC BLOOD PRESSURE: 134 MMHG | OXYGEN SATURATION: 99 %

## 2025-01-01 DIAGNOSIS — R07.9 CHEST PAIN, UNSPECIFIED TYPE: ICD-10-CM

## 2025-01-01 DIAGNOSIS — Z00.8 MEDICAL CLEARANCE FOR INCARCERATION: Primary | ICD-10-CM

## 2025-01-01 DIAGNOSIS — F10.920 ACUTE ALCOHOLIC INTOXICATION WITHOUT COMPLICATION: ICD-10-CM

## 2025-01-01 LAB
ALBUMIN SERPL-MCNC: 4.7 G/DL (ref 3.5–5.2)
ALBUMIN/GLOB SERPL: 1.4 G/DL
ALP SERPL-CCNC: 73 U/L (ref 39–117)
ALT SERPL W P-5'-P-CCNC: 38 U/L (ref 1–41)
ANION GAP SERPL CALCULATED.3IONS-SCNC: 19.7 MMOL/L (ref 5–15)
AST SERPL-CCNC: 53 U/L (ref 1–40)
BASOPHILS # BLD AUTO: 0.02 10*3/MM3 (ref 0–0.2)
BASOPHILS NFR BLD AUTO: 0.5 % (ref 0–1.5)
BILIRUB SERPL-MCNC: 0.2 MG/DL (ref 0–1.2)
BUN SERPL-MCNC: 14 MG/DL (ref 6–20)
BUN/CREAT SERPL: 14.7 (ref 7–25)
CALCIUM SPEC-SCNC: 9.2 MG/DL (ref 8.6–10.5)
CHLORIDE SERPL-SCNC: 104 MMOL/L (ref 98–107)
CO2 SERPL-SCNC: 17.3 MMOL/L (ref 22–29)
CREAT SERPL-MCNC: 0.95 MG/DL (ref 0.76–1.27)
DEPRECATED RDW RBC AUTO: 48.5 FL (ref 37–54)
EGFRCR SERPLBLD CKD-EPI 2021: 95.7 ML/MIN/1.73
EOSINOPHIL # BLD AUTO: 0.07 10*3/MM3 (ref 0–0.4)
EOSINOPHIL NFR BLD AUTO: 1.8 % (ref 0.3–6.2)
ERYTHROCYTE [DISTWIDTH] IN BLOOD BY AUTOMATED COUNT: 16.8 % (ref 12.3–15.4)
ETHANOL BLD-MCNC: 149 MG/DL (ref 0–10)
ETHANOL UR QL: 0.15 %
GLOBULIN UR ELPH-MCNC: 3.4 GM/DL
GLUCOSE SERPL-MCNC: 119 MG/DL (ref 65–99)
HCT VFR BLD AUTO: 38.2 % (ref 37.5–51)
HGB BLD-MCNC: 11.7 G/DL (ref 13–17.7)
HOLD SPECIMEN: NORMAL
HOLD SPECIMEN: NORMAL
IMM GRANULOCYTES # BLD AUTO: 0.02 10*3/MM3 (ref 0–0.05)
IMM GRANULOCYTES NFR BLD AUTO: 0.5 % (ref 0–0.5)
LIPASE SERPL-CCNC: 42 U/L (ref 13–60)
LYMPHOCYTES # BLD AUTO: 0.98 10*3/MM3 (ref 0.7–3.1)
LYMPHOCYTES NFR BLD AUTO: 25.5 % (ref 19.6–45.3)
MAGNESIUM SERPL-MCNC: 1.7 MG/DL (ref 1.6–2.6)
MCH RBC QN AUTO: 24.8 PG (ref 26.6–33)
MCHC RBC AUTO-ENTMCNC: 30.6 G/DL (ref 31.5–35.7)
MCV RBC AUTO: 81.1 FL (ref 79–97)
MONOCYTES # BLD AUTO: 0.23 10*3/MM3 (ref 0.1–0.9)
MONOCYTES NFR BLD AUTO: 6 % (ref 5–12)
NEUTROPHILS NFR BLD AUTO: 2.53 10*3/MM3 (ref 1.7–7)
NEUTROPHILS NFR BLD AUTO: 65.7 % (ref 42.7–76)
NRBC BLD AUTO-RTO: 0 /100 WBC (ref 0–0.2)
NT-PROBNP SERPL-MCNC: 55.4 PG/ML (ref 0–900)
PLATELET # BLD AUTO: 230 10*3/MM3 (ref 140–450)
PMV BLD AUTO: 10.6 FL (ref 6–12)
POTASSIUM SERPL-SCNC: 3.9 MMOL/L (ref 3.5–5.2)
PROT SERPL-MCNC: 8.1 G/DL (ref 6–8.5)
RBC # BLD AUTO: 4.71 10*6/MM3 (ref 4.14–5.8)
SODIUM SERPL-SCNC: 141 MMOL/L (ref 136–145)
TROPONIN T SERPL HS-MCNC: 18 NG/L
WBC NRBC COR # BLD AUTO: 3.85 10*3/MM3 (ref 3.4–10.8)
WHOLE BLOOD HOLD COAG: NORMAL
WHOLE BLOOD HOLD SPECIMEN: NORMAL

## 2025-01-01 PROCEDURE — 80053 COMPREHEN METABOLIC PANEL: CPT | Performed by: EMERGENCY MEDICINE

## 2025-01-01 PROCEDURE — 93005 ELECTROCARDIOGRAM TRACING: CPT | Performed by: EMERGENCY MEDICINE

## 2025-01-01 PROCEDURE — 83735 ASSAY OF MAGNESIUM: CPT | Performed by: EMERGENCY MEDICINE

## 2025-01-01 PROCEDURE — 36415 COLL VENOUS BLD VENIPUNCTURE: CPT

## 2025-01-01 PROCEDURE — 82077 ASSAY SPEC XCP UR&BREATH IA: CPT | Performed by: EMERGENCY MEDICINE

## 2025-01-01 PROCEDURE — 85025 COMPLETE CBC W/AUTO DIFF WBC: CPT | Performed by: EMERGENCY MEDICINE

## 2025-01-01 PROCEDURE — 99284 EMERGENCY DEPT VISIT MOD MDM: CPT

## 2025-01-01 PROCEDURE — 83690 ASSAY OF LIPASE: CPT | Performed by: EMERGENCY MEDICINE

## 2025-01-01 PROCEDURE — 71045 X-RAY EXAM CHEST 1 VIEW: CPT

## 2025-01-01 PROCEDURE — 84484 ASSAY OF TROPONIN QUANT: CPT | Performed by: EMERGENCY MEDICINE

## 2025-01-01 PROCEDURE — 83880 ASSAY OF NATRIURETIC PEPTIDE: CPT | Performed by: EMERGENCY MEDICINE

## 2025-01-01 RX ORDER — ASPIRIN 81 MG/1
324 TABLET, CHEWABLE ORAL ONCE
Status: DISCONTINUED | OUTPATIENT
Start: 2025-01-01 | End: 2025-01-01 | Stop reason: HOSPADM

## 2025-01-01 RX ORDER — SODIUM CHLORIDE 0.9 % (FLUSH) 0.9 %
10 SYRINGE (ML) INJECTION AS NEEDED
Status: DISCONTINUED | OUTPATIENT
Start: 2025-01-01 | End: 2025-01-01 | Stop reason: HOSPADM

## 2025-01-01 NOTE — ED PROVIDER NOTES
Time: 5:28 PM EST  Date of encounter:  1/1/2025  Independent Historian/Clinical History and Information was obtained by:   Patient and Police    History is limited by: N/A    Chief Complaint: Medical clearance for incarceration      History of Present Illness:  Patient is a 54 y.o. year old male who presents to the emergency department for evaluation of medical clearance prior to incarceration.  Patient also complained of chest pain.      Patient Care Team  Primary Care Provider: Annie Garza MD    Past Medical History:     No Known Allergies  Past Medical History:   Diagnosis Date    A-fib     Arthritis     Diabetes mellitus     Disease of thyroid gland     GERD (gastroesophageal reflux disease)     Hernia     Hyperlipidemia     Hypertension      Past Surgical History:   Procedure Laterality Date    GASTRIC SLEEVE LAPAROSCOPIC      NISSEN FUNDOPLICATION       Family History   Problem Relation Age of Onset    No Known Problems Mother     Diabetes Father     Hyperlipidemia Father     Hypertension Father     No Known Problems Sister     Cancer Brother        Home Medications:  Prior to Admission medications    Medication Sig Start Date End Date Taking? Authorizing Provider   acetaminophen (Tylenol 8 Hour) 650 MG 8 hr tablet Take 1 tablet by mouth Every 8 (Eight) Hours As Needed for Mild Pain. 10/10/24   Annie Garza MD   atorvastatin (Lipitor) 40 MG tablet Take 1 tablet by mouth Every Night. 11/22/24   Annie Garza MD   Diclofenac Sodium (VOLTAREN) 1 % gel gel Apply 4 g topically to the appropriate area as directed 4 (Four) Times a Day. 11/8/24   Donavon Betancourt MD   DULoxetine (CYMBALTA) 60 MG capsule Take 1 capsule by mouth 2 (Two) Times a Day. 11/22/24   Annie Garza MD   FT Ibuprofen 200 MG tablet Take 1 tablet by mouth Every 6 (Six) Hours As Needed for Mild Pain. 12/2/24   Provider, MD Maddy   gabapentin (NEURONTIN) 800 MG tablet Take 1 tablet by mouth 3 (Three) Times a Day.    Provider,  MD Maddy   Gvoke HypoPen 2-Pack 1 MG/0.2ML solution auto-injector Inject 1 mg under the skin into the appropriate area as directed As Needed (HYPOGLYCEMIA). INJECT 1MG UNDER THE SKIN ONE TIME AS NEEDED HYPOGLYCEMIA FOR UP TO ONE DOSE 10/29/24   Maddy Hayes MD   ibuprofen (ADVIL,MOTRIN) 800 MG tablet Take 1 tablet by mouth 3 times a day. 12/2/24   Maddy Hayes MD   Ibuprofen 3 %, Gabapentin 10 %, Baclofen 2 %, lidocaine 4 %, Ketamine HCl 4 % Apply 1-2 g topically to the appropriate area as directed 3 (Three) to 4 (Four) times daily. 12/5/24 12/5/25  Annie Garza MD   isosorbide dinitrate (ISORDIL) 30 MG tablet Take 1 tablet by mouth Daily. 10/14/24   Maddy Hayes MD   levothyroxine (SYNTHROID, LEVOTHROID) 50 MCG tablet Take 1 tablet by mouth Daily. 11/22/24   Annie Garza MD   metFORMIN ER (GLUCOPHAGE-XR) 500 MG 24 hr tablet Take 1 tablet by mouth Daily With Breakfast. 12/5/24   Annie Garza MD   metoprolol tartrate (LOPRESSOR) 25 MG tablet Take 0.5 tablets by mouth 2 (Two) Times a Day. 11/22/24   Annie Garza MD   tiZANidine (ZANAFLEX) 4 MG tablet Take 1 tablet by mouth 3 times a day. 11/25/24   Maddy Hayes MD   vitamin D (ERGOCALCIFEROL) 1.25 MG (94687 UT) capsule capsule Take 1 capsule by mouth 1 (One) Time Per Week. Thursdays 10/14/24   Maddy Hayes MD        Social History:   Social History     Tobacco Use    Smoking status: Never    Smokeless tobacco: Never   Vaping Use    Vaping status: Every Day    Substances: Nicotine, Flavoring    Devices: Disposable   Substance Use Topics    Alcohol use: Yes    Drug use: No         Review of Systems:  Review of Systems   Constitutional:  Negative for chills and fever.   HENT:  Negative for congestion, rhinorrhea and sore throat.    Eyes:  Negative for pain and visual disturbance.   Respiratory:  Negative for apnea, cough, chest tightness and shortness of breath.    Cardiovascular:  Negative for chest pain  "and palpitations.   Gastrointestinal:  Negative for abdominal pain, diarrhea, nausea and vomiting.   Genitourinary:  Negative for difficulty urinating and dysuria.   Musculoskeletal:  Negative for joint swelling and myalgias.   Skin:  Negative for color change.   Neurological:  Negative for seizures and headaches.   Psychiatric/Behavioral: Negative.     All other systems reviewed and are negative.       Physical Exam:  /72   Pulse 88   Temp 98.2 °F (36.8 °C) (Oral)   Resp 18   Ht 182.9 cm (72\")   Wt 102 kg (224 lb 13.9 oz)   SpO2 99%   BMI 30.50 kg/m²     Physical Exam  Vitals and nursing note reviewed.   Constitutional:       General: He is not in acute distress.     Appearance: Normal appearance. He is not toxic-appearing.      Comments: Smells of EtOH   HENT:      Head: Normocephalic and atraumatic.      Jaw: There is normal jaw occlusion.   Eyes:      General: Lids are normal.      Extraocular Movements: Extraocular movements intact.      Conjunctiva/sclera: Conjunctivae normal.      Pupils: Pupils are equal, round, and reactive to light.   Cardiovascular:      Rate and Rhythm: Normal rate and regular rhythm.      Pulses: Normal pulses.      Heart sounds: Normal heart sounds.   Pulmonary:      Effort: Pulmonary effort is normal. No respiratory distress.      Breath sounds: Normal breath sounds. No wheezing or rhonchi.   Abdominal:      General: Abdomen is flat.      Palpations: Abdomen is soft.      Tenderness: There is no abdominal tenderness. There is no guarding or rebound.   Musculoskeletal:         General: Normal range of motion.      Cervical back: Normal range of motion and neck supple.      Right lower leg: No edema.      Left lower leg: No edema.   Skin:     General: Skin is warm and dry.   Neurological:      Mental Status: He is alert and oriented to person, place, and time. Mental status is at baseline.   Psychiatric:         Mood and Affect: Mood normal.                    Medical " Decision Making:      Comorbidities that affect care:    Stroke, diabetes    External Notes reviewed:    Previous Clinic Note: Family medicine office visit for general medical management including diabetes      The following orders were placed and all results were independently analyzed by me:  Orders Placed This Encounter   Procedures    XR Chest 1 View    Ethanol    [place] Draw    High Sensitivity Troponin T    Comprehensive Metabolic Panel    Lipase    BNP    Magnesium    CBC Auto Differential    NPO Diet NPO Type: Strict NPO    Undress & Gown    Continuous Pulse Oximetry    Oxygen Therapy- Nasal Cannula; Titrate 1-6 LPM Per SpO2; 90 - 95%    ECG 12 Lead ED Triage Standing Order; Chest Pain    ECG 12 Lead ED Triage Standing Order; Chest Pain    Insert Peripheral IV    CBC & Differential    Green Top (Gel)    Lavender Top    Gold Top - SST    Light Blue Top       Medications Given in the Emergency Department:  Medications   sodium chloride 0.9 % flush 10 mL (has no administration in time range)   aspirin chewable tablet 324 mg (324 mg Oral Not Given 1/1/25 1659)        ED Course:         Labs:    Lab Results (last 24 hours)       Procedure Component Value Units Date/Time    Ethanol [[id number]]  (Abnormal) Collected: 01/01/25 1658    Specimen: Blood Updated: 01/01/25 1722     Ethanol 149 mg/dL      Ethanol % 0.149 %     Narrative:      Ethanol (Plasma)  <10 Essentially Negative    Toxic Concentrations           mg/dL    Flushing, slowing of reflexes    Impaired visual activity         Depression of CNS              >100  Possible Coma                  >300       High Sensitivity Troponin T [[id number]]  (Normal) Collected: 01/01/25 1658    Specimen: Blood Updated: 01/01/25 1722     HS Troponin T 18 ng/L     Narrative:      High Sensitive Troponin T Reference Range:  <14.0 ng/L- Negative Female for AMI  <22.0 ng/L- Negative Male for AMI  >=14 - Abnormal Female indicating possible myocardial injury.  >=22  - Abnormal Male indicating possible myocardial injury.   Clinicians would have to utilize clinical acumen, EKG, Troponin, and serial changes to determine if it is an Acute Myocardial Infarction or myocardial injury due to an underlying chronic condition.         CBC & Differential [[id number]]  (Abnormal) Collected: 01/01/25 1658    Specimen: Blood Updated: 01/01/25 1702    Narrative:      The following orders were created for panel order CBC & Differential.  Procedure                               Abnormality         Status                     ---------                               -----------         ------                     CBC Auto Differential[[id number]]        Abnormal            Final result                 Please view results for these tests on the individual orders.    Comprehensive Metabolic Panel [[id number]]  (Abnormal) Collected: 01/01/25 1658    Specimen: Blood Updated: 01/01/25 1723     Glucose 119 mg/dL      BUN 14 mg/dL      Creatinine 0.95 mg/dL      Sodium 141 mmol/L      Potassium 3.9 mmol/L      Chloride 104 mmol/L      CO2 17.3 mmol/L      Calcium 9.2 mg/dL      Total Protein 8.1 g/dL      Albumin 4.7 g/dL      ALT (SGPT) 38 U/L      AST (SGOT) 53 U/L      Alkaline Phosphatase 73 U/L      Total Bilirubin 0.2 mg/dL      Globulin 3.4 gm/dL      A/G Ratio 1.4 g/dL      BUN/Creatinine Ratio 14.7     Anion Gap 19.7 mmol/L      eGFR 95.7 mL/min/1.73     Narrative:      GFR Categories in Chronic Kidney Disease (CKD)      GFR Category          GFR (mL/min/1.73)    Interpretation  G1                     90 or greater         Normal or high (1)  G2                      60-89                Mild decrease (1)  G3a                   45-59                Mild to moderate decrease  G3b                   30-44                Moderate to severe decrease  G4                    15-29                Severe decrease  G5                    14 or less           Kidney failure          (1)In the absence of evidence  of kidney disease, neither GFR category G1 or G2 fulfill the criteria for CKD.    eGFR calculation 2021 CKD-EPI creatinine equation, which does not include race as a factor    Lipase [[id number]]  (Normal) Collected: 01/01/25 1658    Specimen: Blood Updated: 01/01/25 1722     Lipase 42 U/L     BNP [[id number]]  (Normal) Collected: 01/01/25 1658    Specimen: Blood Updated: 01/01/25 1721     proBNP 55.4 pg/mL     Narrative:      This assay is used as an aid in the diagnosis of individuals suspected of having heart failure. It can be used as an aid in the diagnosis of acute decompensated heart failure (ADHF) in patients presenting with signs and symptoms of ADHF to the emergency department (ED). In addition, NT-proBNP of <300 pg/mL indicates ADHF is not likely.    Age Range Result Interpretation  NT-proBNP Concentration (pg/mL:      <50             Positive            >450                   Gray                 300-450                    Negative             <300    50-75           Positive            >900                  Gray                300-900                  Negative            <300      >75             Positive            >1800                  Gray                300-1800                  Negative            <300    Magnesium [[id number]]  (Normal) Collected: 01/01/25 1658    Specimen: Blood Updated: 01/01/25 1723     Magnesium 1.7 mg/dL     CBC Auto Differential [[id number]]  (Abnormal) Collected: 01/01/25 1658    Specimen: Blood Updated: 01/01/25 1702     WBC 3.85 10*3/mm3      RBC 4.71 10*6/mm3      Hemoglobin 11.7 g/dL      Hematocrit 38.2 %      MCV 81.1 fL      MCH 24.8 pg      MCHC 30.6 g/dL      RDW 16.8 %      RDW-SD 48.5 fl      MPV 10.6 fL      Platelets 230 10*3/mm3      Neutrophil % 65.7 %      Lymphocyte % 25.5 %      Monocyte % 6.0 %      Eosinophil % 1.8 %      Basophil % 0.5 %      Immature Grans % 0.5 %      Neutrophils, Absolute 2.53 10*3/mm3      Lymphocytes, Absolute 0.98 10*3/mm3       Monocytes, Absolute 0.23 10*3/mm3      Eosinophils, Absolute 0.07 10*3/mm3      Basophils, Absolute 0.02 10*3/mm3      Immature Grans, Absolute 0.02 10*3/mm3      nRBC 0.0 /100 WBC              Imaging:    XR Chest 1 View    Result Date: 1/1/2025  XR CHEST 1 VW Date of Exam: 1/1/2025 5:08 PM EST Indication: Chest pain Comparison: 11/7/2024. Findings: The heart size is normal. The pulmonary vascular markings are normal. The lungs and pleural spaces are clear of active disease. There is degenerative spondylosis within the thoracic spine. There is a partially imaged exostosis extending off the medial aspect of the right humerus.     Impression: 1.No active pulmonary disease. 2.Incidental findings as noted above. Electronically Signed: Ozzy Betts MD  1/1/2025 5:33 PM EST  Workstation ID: [id number]       Differential Diagnosis and Discussion:    Chest Pain:  Based on the patient's signs and symptoms, I considered aortic dissection, myocardial infaction, pulmonary embolism, cardiac tamponade, pericarditis, pneumothorax, musculoskeletal chest pain and other differential diagnosis as an etiology of the patient's chest pain.   Metabolic: Differential diagnosis includes but is not limited to hypertension, hyperglycemia, hyperkalemia, hypocalcemia, metabolic acidosis, hypokalemia, hypoglycemia, malnutrition, hypothyroidism, hyperthyroidism, and adrenal insufficiency.     PROCEDURES:    Labs were collected in the emergency department and all labs were reviewed and interpreted by me.  X-ray were performed in the emergency department and all X-ray impressions were independently interpreted by me.  An EKG was performed and the EKG was interpreted by me.    ECG 12 Lead ED Triage Standing Order; Chest Pain   Preliminary Result   HEART RATE=76  bpm   RR Rrcujyvy=659  ms   VA Dapxzfeh=796  ms   P Horizontal Axis=-7  deg   P Front Axis=62  deg   QRSD Interval=81  ms   QT Irpsauba=154  ms   AOmK=968  ms   QRS Axis=45  deg   T Wave  Axis=-81  deg   - BORDERLINE ECG -   Sinus rhythm   Probable left atrial enlargement   Borderline repolarization abnormality   Date and Time of Study:2025-01-01 17:07:23        My interpretation of EKG shows sinus rhythm, normal rate, no acute ischemia, normal QT    Procedures    MDM  Number of Diagnoses or Management Options  Acute alcoholic intoxication without complication  Chest pain, unspecified type  Medical clearance for incarceration  Diagnosis management comments: In summary this is a 54-year-old male patient who presents to the Emergency Department for evaluation.  He does complain of chest pain upon arrival to assisted.  CBC independently reviewed and interpreted by me and shows no critical abnormalities.  CMP independently reviewed and interpreted by me and shows no critical abnormalities.  High-sensitivity troponin Phenergan department is negative for acute pathology.  Chest x-ray reviewed by me is unremarkable for acute pathology.  Alcohol level is elevated based my dependent review interpretation.  Very strict return to ER and follow-up instructions have been provided to the patient.  Low risk heart score                       Patient Care Considerations:    CONSULT: I considered consulting cardiology, however negative cardiac workup      Consultants/Shared Management Plan:    None    Social Determinants of Health:    Patient is in police custody and will have reliable access to healthcare      Disposition and Care Coordination:    Discharged: I considered escalation of care by admitting this patient to the hospital, however negative cardiac workup    I have explained the patient´s condition, diagnoses and treatment plan based on the information available to me at this time. I have answered questions and addressed any concerns. The patient has a good  understanding of the patient´s diagnosis, condition, and treatment plan as can be expected at this point. The vital signs have been stable. The patient´s  condition is stable and appropriate for discharge from the emergency department.      The patient will pursue further outpatient evaluation with the primary care physician or other designated or consulting physician as outlined in the discharge instructions. They are agreeable to this plan of care and follow-up instructions have been explained in detail. The patient has received these instructions in written format and has expressed an understanding of the discharge instructions. The patient is aware that any significant change in condition or worsening of symptoms should prompt an immediate return to this or the closest emergency department or call to 911.  I have explained discharge medications and the need for follow up with the patient/caretakers. This was also printed in the discharge instructions. Patient was discharged with the following medications and follow up:      Medication List      No changes were made to your prescriptions during this visit.      Annie Garza MD  [address] [place] [address]  [phone]    In 1 week         Final diagnoses:   Medical clearance for incarceration   Acute alcoholic intoxication without complication   Chest pain, unspecified type        ED Disposition       ED Disposition   Discharge    Condition   Stable    Comment   --               This medical record created using voice recognition software.             Fredy White MD  01/01/25 [id number]

## 2025-01-02 LAB
QT INTERVAL: 359 MS
QTC INTERVAL: 404 MS

## 2025-01-08 NOTE — OUTREACH NOTE
Call Center TCM Note      Flowsheet Row Responses   [facility] facility patient discharged from? [facility]   Does the patient have one of the following disease processes/diagnoses(primary or secondary)? Stroke   TCM attempt successful? No   Unsuccessful attempts Attempt 1   Call Status Left message            John Jauregui RN    11/11/2024, 09:46 EST         [de-identified] : [facility] US EVALUATION LEFT SHOULDER  ANTERIOR SHOULDER  LONG HEAD BICEPS TENDON IN BICIPITAL GROOVE: PROXIMAL SWELLING AND LIKELY INTERSTITIAL TEARING SUBSCAPULARIS TENDON: LARGELY INTACT W/O DEMONSTRABLE TEAR; NO SUBCORACOID FLUID COLLECTION ANTERIOR SUPRASPINATUS TENDON (LONGITUDINAL): AREAS OF CALCIFIC TENDINOPATHY WITHIN BACKGROUND OF MODERATE TENDINOSIS (IE, SWELLING, HYPOECHOGENECITY) AND PARTIAL THICKNESS ARTICULAR SIDED TEARING.  LATERAL SHOULDER  ROTATOR INTERVAL (TRANSVERSE AXIS)  - SGHL: INTACT  - LHBT: INTACT  - CHL: INTACT  - Supraspinatus tendon: TENDINOSIS  - Subscapularis tendon  SUPRASPINATUS TENDON   LONG AXIS   - SUBACROMIAL/SUBDELTOID BURSA: NON-DISTENDED  - DISTAL FOOTPRINT: HIGH GRADE TENDINOSIS/PARTIAL THICKNESS TEARING  - BURSAL SURFACE TEAR: NONE  - ARTICULAR SURFACE TEAR: AS ABOVE  - INTRASUBSTANCE TEAR: NONE   TRANSVERSE AXIS   - BURSAL SURFACE TEAR: NONE  - ARTICULAR SURFACE TEAR: HIGH GRADE TENDINOSIS/PARTIAL THICKNESS TEARING  - INTRASUBSTANCE TEAR: NONE  POSTERIOR SHOULDER  INFRASPINATUS TENDON: INSERTIONAL CALCIFIC TENDINOPATHY POSTERIOR GLENOID: INTACT POSTERIOR LABRUM: INTACT EFFUSION: NONE

## 2025-01-21 ENCOUNTER — TELEPHONE (OUTPATIENT)
Dept: FAMILY MEDICINE CLINIC | Facility: CLINIC | Age: 55
End: 2025-01-21
Payer: COMMERCIAL

## 2025-01-21 DIAGNOSIS — E11.40 TYPE 2 DIABETES MELLITUS WITH DIABETIC NEUROPATHY, UNSPECIFIED WHETHER LONG TERM INSULIN USE: ICD-10-CM

## 2025-01-22 DIAGNOSIS — E11.43 TYPE 2 DIABETES MELLITUS WITH DIABETIC AUTONOMIC NEUROPATHY, WITHOUT LONG-TERM CURRENT USE OF INSULIN: ICD-10-CM

## 2025-01-22 DIAGNOSIS — E11.40 TYPE 2 DIABETES MELLITUS WITH DIABETIC NEUROPATHY, UNSPECIFIED WHETHER LONG TERM INSULIN USE: ICD-10-CM

## 2025-01-22 DIAGNOSIS — E78.5 HYPERLIPIDEMIA, UNSPECIFIED HYPERLIPIDEMIA TYPE: ICD-10-CM

## 2025-01-22 DIAGNOSIS — M54.16 LUMBAR RADICULOPATHY: ICD-10-CM

## 2025-01-22 DIAGNOSIS — E03.9 HYPOTHYROIDISM, UNSPECIFIED TYPE: ICD-10-CM

## 2025-01-22 RX ORDER — ISOSORBIDE DINITRATE 30 MG/1
30 TABLET ORAL DAILY
OUTPATIENT
Start: 2025-01-22

## 2025-01-22 RX ORDER — IBUPROFEN 800 MG/1
800 TABLET, FILM COATED ORAL 3 TIMES DAILY
OUTPATIENT
Start: 2025-01-22

## 2025-01-22 RX ORDER — SENNOSIDES 8.6 MG
650 CAPSULE ORAL EVERY 8 HOURS PRN
Qty: 90 TABLET | Refills: 2 | OUTPATIENT
Start: 2025-01-22

## 2025-01-22 RX ORDER — GABAPENTIN 800 MG/1
800 TABLET ORAL 3 TIMES DAILY
OUTPATIENT
Start: 2025-01-22

## 2025-01-22 RX ORDER — ATORVASTATIN CALCIUM 40 MG/1
40 TABLET, FILM COATED ORAL NIGHTLY
Qty: 90 TABLET | Refills: 2 | OUTPATIENT
Start: 2025-01-22

## 2025-01-22 RX ORDER — DULOXETIN HYDROCHLORIDE 60 MG/1
60 CAPSULE, DELAYED RELEASE ORAL 2 TIMES DAILY
Qty: 90 CAPSULE | Refills: 1 | OUTPATIENT
Start: 2025-01-22

## 2025-01-22 RX ORDER — ERGOCALCIFEROL 1.25 MG/1
50000 CAPSULE, LIQUID FILLED ORAL WEEKLY
Qty: 5 CAPSULE | OUTPATIENT
Start: 2025-01-22

## 2025-01-22 RX ORDER — GLUCAGON INJECTION, SOLUTION 1 MG/.2ML
1 INJECTION, SOLUTION SUBCUTANEOUS AS NEEDED
OUTPATIENT
Start: 2025-01-22

## 2025-01-22 RX ORDER — METOPROLOL TARTRATE 25 MG/1
12.5 TABLET, FILM COATED ORAL 2 TIMES DAILY
Qty: 60 TABLET | Refills: 0 | OUTPATIENT
Start: 2025-01-22

## 2025-01-22 RX ORDER — LEVOTHYROXINE SODIUM 50 UG/1
50 TABLET ORAL DAILY
Qty: 90 TABLET | Refills: 1 | OUTPATIENT
Start: 2025-01-22

## 2025-01-22 RX ORDER — IBUPROFEN 200 MG/1
200 TABLET, FILM COATED ORAL EVERY 6 HOURS PRN
OUTPATIENT
Start: 2025-01-22

## 2025-01-22 RX ORDER — METFORMIN HYDROCHLORIDE 500 MG/1
500 TABLET, EXTENDED RELEASE ORAL
Qty: 90 TABLET | Refills: 1 | OUTPATIENT
Start: 2025-01-22

## 2025-01-22 NOTE — TELEPHONE ENCOUNTER
Additional details provided by patient: THIS WAS SENT TO [facility] IN [place]. HOWEVER, IT MUST BE SENT TO [facility]   
Caller: Campos Hale    Relationship: Self    Best call back number: [phone]    Requested Prescriptions:   Requested Prescriptions     Pending Prescriptions Disp Refills    Ibuprofen 3 %, Gabapentin 10 %, Baclofen 2 %, lidocaine 4 %, Ketamine HCl 4 % 90 g 1     Sig: Apply 1-2 g topically to the appropriate area as directed 3 (Three) to 4 (Four) times daily.        Pharmacy where request should be sent: [facility] - [place] - [address] [facility] [address] - [phone] [facility] [phone] [facility]     Last office visit with prescribing clinician: 12/5/2024   Last telemedicine visit with prescribing clinician: Visit date not found   Next office visit with prescribing clinician: 3/26/2025     Additional details provided by patient: THIS WAS SENT TO [facility] IN [place]. HOWEVER, IT MUST BE SENT TO [facility]    Does the patient have less than a 3 day supply:  [] Yes  [] No    Would you like a call back once the refill request has been completed: [x] Yes [] No    If the office needs to give you a call back, can they leave a voicemail: [x] Yes [] No    Valerie Prutit Rep   01/21/25 12:37 EST   
sent
no

## 2025-01-22 NOTE — TELEPHONE ENCOUNTER
Patient missed multiple appointments in the past, I am not even sure which medications he is currently on.  Patient needs an appointment for refills

## 2025-01-22 NOTE — TELEPHONE ENCOUNTER
Caller: Campos Hale    Relationship: Self    Best call back number: [phone]     What medication are you requesting: ALL MEDICATIONS         If a prescription is needed, what is your preferred pharmacy and phone number: [facility] - [place] - [address] [facility] [address] - [phone] [facility] [phone]      Additional notes:PATIENT CALLED BACK STATING THE PAIN MEDICATION     Ibuprofen 3 %, Gabapentin 10 %, Baclofen 2 %, lidocaine 4 %, Ketamine HCl 4 %   WAS SENT AGAIN TO THE WRONG PHARMACY. PLEASE RESEND TO THE PHARMACY ABOVE.    PATIENT ALSO STATES THAT ALL HIS MEDICATION NEED TO BE SENT TO [facility] HE IS NO LONGER USING THE OTHER PHARMACY.

## 2025-02-03 ENCOUNTER — APPOINTMENT (OUTPATIENT)
Dept: CT IMAGING | Facility: HOSPITAL | Age: 55
End: 2025-02-03
Payer: COMMERCIAL

## 2025-02-03 ENCOUNTER — APPOINTMENT (OUTPATIENT)
Dept: MRI IMAGING | Facility: HOSPITAL | Age: 55
End: 2025-02-03
Payer: COMMERCIAL

## 2025-02-03 ENCOUNTER — APPOINTMENT (OUTPATIENT)
Dept: GENERAL RADIOLOGY | Facility: HOSPITAL | Age: 55
End: 2025-02-03
Payer: COMMERCIAL

## 2025-02-03 ENCOUNTER — HOSPITAL ENCOUNTER (OUTPATIENT)
Facility: HOSPITAL | Age: 55
Setting detail: OBSERVATION
Discharge: HOME | End: 2025-02-04
Attending: EMERGENCY MEDICINE | Admitting: INTERNAL MEDICINE
Payer: COMMERCIAL

## 2025-02-03 DIAGNOSIS — I63.9 CEREBROVASCULAR ACCIDENT (CVA), UNSPECIFIED MECHANISM: Primary | ICD-10-CM

## 2025-02-03 DIAGNOSIS — R07.81 PLEURITIC CHEST PAIN: ICD-10-CM

## 2025-02-03 DIAGNOSIS — R26.2 DIFFICULTY WALKING: ICD-10-CM

## 2025-02-03 DIAGNOSIS — R13.10 DYSPHAGIA, UNSPECIFIED TYPE: ICD-10-CM

## 2025-02-03 PROBLEM — G45.9 TIA (TRANSIENT ISCHEMIC ATTACK): Status: ACTIVE | Noted: 2025-02-03

## 2025-02-03 PROBLEM — I10 HTN (HYPERTENSION): Status: ACTIVE | Noted: 2025-02-03

## 2025-02-03 LAB
ALBUMIN SERPL-MCNC: 3.6 G/DL (ref 3.5–5.2)
ALBUMIN/GLOB SERPL: 1.5 G/DL
ALP SERPL-CCNC: 47 U/L (ref 39–117)
ALT SERPL W P-5'-P-CCNC: 19 U/L (ref 1–41)
ANION GAP SERPL CALCULATED.3IONS-SCNC: 10.8 MMOL/L (ref 5–15)
APTT PPP: 26.2 SECONDS (ref 24.2–34.2)
AST SERPL-CCNC: 35 U/L (ref 1–40)
BASOPHILS # BLD AUTO: 0.02 10*3/MM3 (ref 0–0.2)
BASOPHILS NFR BLD AUTO: 0.5 % (ref 0–1.5)
BILIRUB SERPL-MCNC: <0.2 MG/DL (ref 0–1.2)
BILIRUB UR QL STRIP: NEGATIVE
BUN SERPL-MCNC: 14 MG/DL (ref 6–20)
BUN/CREAT SERPL: 12.6 (ref 7–25)
CALCIUM SPEC-SCNC: 8.7 MG/DL (ref 8.6–10.5)
CHLORIDE SERPL-SCNC: 109 MMOL/L (ref 98–107)
CHOLEST SERPL-MCNC: 128 MG/DL (ref 0–200)
CLARITY UR: CLEAR
CO2 SERPL-SCNC: 18.2 MMOL/L (ref 22–29)
COLOR UR: YELLOW
CREAT SERPL-MCNC: 1.11 MG/DL (ref 0.76–1.27)
DEPRECATED RDW RBC AUTO: 48.5 FL (ref 37–54)
EGFRCR SERPLBLD CKD-EPI 2021: 79.4 ML/MIN/1.73
EOSINOPHIL # BLD AUTO: 0.11 10*3/MM3 (ref 0–0.4)
EOSINOPHIL NFR BLD AUTO: 2.8 % (ref 0.3–6.2)
ERYTHROCYTE [DISTWIDTH] IN BLOOD BY AUTOMATED COUNT: 16.3 % (ref 12.3–15.4)
GLOBULIN UR ELPH-MCNC: 2.4 GM/DL
GLUCOSE BLDC GLUCOMTR-MCNC: 84 MG/DL (ref 70–99)
GLUCOSE SERPL-MCNC: 68 MG/DL (ref 65–99)
GLUCOSE UR STRIP-MCNC: ABNORMAL MG/DL
HBA1C MFR BLD: 7 % (ref 4.8–5.6)
HCT VFR BLD AUTO: 29 % (ref 37.5–51)
HDLC SERPL-MCNC: 70 MG/DL (ref 40–60)
HGB BLD-MCNC: 8.5 G/DL (ref 13–17.7)
HGB UR QL STRIP.AUTO: NEGATIVE
HOLD SPECIMEN: NORMAL
HOLD SPECIMEN: NORMAL
IMM GRANULOCYTES # BLD AUTO: 0.01 10*3/MM3 (ref 0–0.05)
IMM GRANULOCYTES NFR BLD AUTO: 0.3 % (ref 0–0.5)
INR PPP: 0.97 (ref 0.86–1.15)
KETONES UR QL STRIP: NEGATIVE
LDLC SERPL CALC-MCNC: 44 MG/DL (ref 0–100)
LDLC/HDLC SERPL: 0.64 {RATIO}
LEUKOCYTE ESTERASE UR QL STRIP.AUTO: NEGATIVE
LYMPHOCYTES # BLD AUTO: 1.42 10*3/MM3 (ref 0.7–3.1)
LYMPHOCYTES NFR BLD AUTO: 35.9 % (ref 19.6–45.3)
MCH RBC QN AUTO: 23.8 PG (ref 26.6–33)
MCHC RBC AUTO-ENTMCNC: 29.3 G/DL (ref 31.5–35.7)
MCV RBC AUTO: 81.2 FL (ref 79–97)
MONOCYTES # BLD AUTO: 0.51 10*3/MM3 (ref 0.1–0.9)
MONOCYTES NFR BLD AUTO: 12.9 % (ref 5–12)
NEUTROPHILS NFR BLD AUTO: 1.89 10*3/MM3 (ref 1.7–7)
NEUTROPHILS NFR BLD AUTO: 47.6 % (ref 42.7–76)
NITRITE UR QL STRIP: NEGATIVE
NRBC BLD AUTO-RTO: 0 /100 WBC (ref 0–0.2)
PH UR STRIP.AUTO: 5.5 [PH] (ref 5–8)
PLATELET # BLD AUTO: 286 10*3/MM3 (ref 140–450)
PMV BLD AUTO: 10 FL (ref 6–12)
POTASSIUM SERPL-SCNC: 4.2 MMOL/L (ref 3.5–5.2)
PROT SERPL-MCNC: 6 G/DL (ref 6–8.5)
PROT UR QL STRIP: ABNORMAL
PROTHROMBIN TIME: 13 SECONDS (ref 11.8–14.9)
RBC # BLD AUTO: 3.57 10*6/MM3 (ref 4.14–5.8)
SODIUM SERPL-SCNC: 138 MMOL/L (ref 136–145)
SP GR UR STRIP: >1.03 (ref 1–1.03)
TRIGL SERPL-MCNC: 65 MG/DL (ref 0–150)
TSH SERPL DL<=0.05 MIU/L-ACNC: 2.78 UIU/ML (ref 0.27–4.2)
UROBILINOGEN UR QL STRIP: ABNORMAL
VLDLC SERPL-MCNC: 14 MG/DL (ref 5–40)
WBC NRBC COR # BLD AUTO: 3.96 10*3/MM3 (ref 3.4–10.8)
WHOLE BLOOD HOLD COAG: NORMAL
WHOLE BLOOD HOLD SPECIMEN: NORMAL

## 2025-02-03 PROCEDURE — 82948 REAGENT STRIP/BLOOD GLUCOSE: CPT

## 2025-02-03 PROCEDURE — 99291 CRITICAL CARE FIRST HOUR: CPT

## 2025-02-03 PROCEDURE — 85730 THROMBOPLASTIN TIME PARTIAL: CPT | Performed by: EMERGENCY MEDICINE

## 2025-02-03 PROCEDURE — 96374 THER/PROPH/DIAG INJ IV PUSH: CPT

## 2025-02-03 PROCEDURE — G0378 HOSPITAL OBSERVATION PER HR: HCPCS

## 2025-02-03 PROCEDURE — 85610 PROTHROMBIN TIME: CPT | Performed by: EMERGENCY MEDICINE

## 2025-02-03 PROCEDURE — 93005 ELECTROCARDIOGRAM TRACING: CPT | Performed by: EMERGENCY MEDICINE

## 2025-02-03 PROCEDURE — 36415 COLL VENOUS BLD VENIPUNCTURE: CPT

## 2025-02-03 PROCEDURE — 0042T HC CT CEREBRAL PERFUSION W/WO CONTRAST: CPT

## 2025-02-03 PROCEDURE — 84443 ASSAY THYROID STIM HORMONE: CPT | Performed by: FAMILY MEDICINE

## 2025-02-03 PROCEDURE — 80053 COMPREHEN METABOLIC PANEL: CPT | Performed by: EMERGENCY MEDICINE

## 2025-02-03 PROCEDURE — 83036 HEMOGLOBIN GLYCOSYLATED A1C: CPT | Performed by: FAMILY MEDICINE

## 2025-02-03 PROCEDURE — 81003 URINALYSIS AUTO W/O SCOPE: CPT | Performed by: EMERGENCY MEDICINE

## 2025-02-03 PROCEDURE — 70496 CT ANGIOGRAPHY HEAD: CPT

## 2025-02-03 PROCEDURE — 99285 EMERGENCY DEPT VISIT HI MDM: CPT

## 2025-02-03 PROCEDURE — 71045 X-RAY EXAM CHEST 1 VIEW: CPT

## 2025-02-03 PROCEDURE — 99223 1ST HOSP IP/OBS HIGH 75: CPT | Performed by: FAMILY MEDICINE

## 2025-02-03 PROCEDURE — 25010000002 KETOROLAC TROMETHAMINE PER 15 MG: Performed by: FAMILY MEDICINE

## 2025-02-03 PROCEDURE — 25510000001 IOPAMIDOL PER 1 ML: Performed by: EMERGENCY MEDICINE

## 2025-02-03 PROCEDURE — 85025 COMPLETE CBC W/AUTO DIFF WBC: CPT | Performed by: EMERGENCY MEDICINE

## 2025-02-03 PROCEDURE — 70551 MRI BRAIN STEM W/O DYE: CPT

## 2025-02-03 PROCEDURE — 70498 CT ANGIOGRAPHY NECK: CPT

## 2025-02-03 PROCEDURE — 80061 LIPID PANEL: CPT | Performed by: FAMILY MEDICINE

## 2025-02-03 PROCEDURE — 70450 CT HEAD/BRAIN W/O DYE: CPT

## 2025-02-03 PROCEDURE — 99214 OFFICE O/P EST MOD 30 MIN: CPT | Performed by: PSYCHIATRY & NEUROLOGY

## 2025-02-03 RX ORDER — BISACODYL 10 MG
10 SUPPOSITORY, RECTAL RECTAL DAILY PRN
Status: DISCONTINUED | OUTPATIENT
Start: 2025-02-03 | End: 2025-02-04 | Stop reason: HOSPADM

## 2025-02-03 RX ORDER — SODIUM CHLORIDE 0.9 % (FLUSH) 0.9 %
10 SYRINGE (ML) INJECTION AS NEEDED
Status: DISCONTINUED | OUTPATIENT
Start: 2025-02-03 | End: 2025-02-04 | Stop reason: HOSPADM

## 2025-02-03 RX ORDER — ENOXAPARIN SODIUM 100 MG/ML
40 INJECTION SUBCUTANEOUS DAILY
Status: DISCONTINUED | OUTPATIENT
Start: 2025-02-04 | End: 2025-02-04 | Stop reason: HOSPADM

## 2025-02-03 RX ORDER — POLYETHYLENE GLYCOL 3350 17 G/17G
17 POWDER, FOR SOLUTION ORAL DAILY PRN
Status: DISCONTINUED | OUTPATIENT
Start: 2025-02-03 | End: 2025-02-04 | Stop reason: HOSPADM

## 2025-02-03 RX ORDER — ASPIRIN 81 MG/1
1 TABLET, COATED ORAL DAILY
COMMUNITY
Start: 2025-01-13

## 2025-02-03 RX ORDER — ATORVASTATIN CALCIUM 40 MG/1
40 TABLET, FILM COATED ORAL NIGHTLY
Status: DISCONTINUED | OUTPATIENT
Start: 2025-02-03 | End: 2025-02-04 | Stop reason: HOSPADM

## 2025-02-03 RX ORDER — BISACODYL 5 MG/1
5 TABLET, DELAYED RELEASE ORAL DAILY PRN
Status: DISCONTINUED | OUTPATIENT
Start: 2025-02-03 | End: 2025-02-04 | Stop reason: HOSPADM

## 2025-02-03 RX ORDER — SODIUM CHLORIDE 0.9 % (FLUSH) 0.9 %
10 SYRINGE (ML) INJECTION EVERY 12 HOURS SCHEDULED
Status: DISCONTINUED | OUTPATIENT
Start: 2025-02-03 | End: 2025-02-04 | Stop reason: HOSPADM

## 2025-02-03 RX ORDER — IOPAMIDOL 755 MG/ML
100 INJECTION, SOLUTION INTRAVASCULAR
Status: COMPLETED | OUTPATIENT
Start: 2025-02-03 | End: 2025-02-03

## 2025-02-03 RX ORDER — ASPIRIN 300 MG/1
300 SUPPOSITORY RECTAL DAILY
Status: DISCONTINUED | OUTPATIENT
Start: 2025-02-03 | End: 2025-02-04 | Stop reason: HOSPADM

## 2025-02-03 RX ORDER — AMOXICILLIN 250 MG
2 CAPSULE ORAL 2 TIMES DAILY PRN
Status: DISCONTINUED | OUTPATIENT
Start: 2025-02-03 | End: 2025-02-04 | Stop reason: HOSPADM

## 2025-02-03 RX ORDER — ASPIRIN 81 MG/1
81 TABLET, CHEWABLE ORAL DAILY
Status: DISCONTINUED | OUTPATIENT
Start: 2025-02-03 | End: 2025-02-04 | Stop reason: HOSPADM

## 2025-02-03 RX ORDER — SODIUM CHLORIDE 9 MG/ML
40 INJECTION, SOLUTION INTRAVENOUS AS NEEDED
Status: DISCONTINUED | OUTPATIENT
Start: 2025-02-03 | End: 2025-02-04 | Stop reason: HOSPADM

## 2025-02-03 RX ORDER — HYDROXYZINE HYDROCHLORIDE 25 MG/1
25 TABLET, FILM COATED ORAL 3 TIMES DAILY PRN
COMMUNITY
Start: 2025-01-13 | End: 2025-02-06

## 2025-02-03 RX ORDER — KETOROLAC TROMETHAMINE 30 MG/ML
15 INJECTION, SOLUTION INTRAMUSCULAR; INTRAVENOUS ONCE
Status: COMPLETED | OUTPATIENT
Start: 2025-02-03 | End: 2025-02-03

## 2025-02-03 RX ORDER — GABAPENTIN 400 MG/1
800 CAPSULE ORAL EVERY 8 HOURS SCHEDULED
Status: DISCONTINUED | OUTPATIENT
Start: 2025-02-03 | End: 2025-02-04 | Stop reason: HOSPADM

## 2025-02-03 RX ORDER — ONDANSETRON 2 MG/ML
4 INJECTION INTRAMUSCULAR; INTRAVENOUS EVERY 6 HOURS PRN
Status: DISCONTINUED | OUTPATIENT
Start: 2025-02-03 | End: 2025-02-04 | Stop reason: HOSPADM

## 2025-02-03 RX ORDER — MIRTAZAPINE 7.5 MG/1
7.5 TABLET, FILM COATED ORAL NIGHTLY
COMMUNITY
Start: 2025-01-30

## 2025-02-03 RX ADMIN — ATORVASTATIN CALCIUM 40 MG: 40 TABLET, FILM COATED ORAL at 21:09

## 2025-02-03 RX ADMIN — Medication 10 ML: at 21:09

## 2025-02-03 RX ADMIN — GABAPENTIN 800 MG: 400 CAPSULE ORAL at 21:09

## 2025-02-03 RX ADMIN — ASPIRIN 81 MG CHEWABLE TABLET 81 MG: 81 TABLET CHEWABLE at 21:09

## 2025-02-03 RX ADMIN — IOPAMIDOL 40 ML: 755 INJECTION, SOLUTION INTRAVENOUS at 15:56

## 2025-02-03 RX ADMIN — KETOROLAC TROMETHAMINE 15 MG: 30 INJECTION, SOLUTION INTRAMUSCULAR; INTRAVENOUS at 21:09

## 2025-02-03 RX ADMIN — IOPAMIDOL 100 ML: 755 INJECTION, SOLUTION INTRAVENOUS at 15:59

## 2025-02-03 NOTE — CONSULTS
[facility]      Patient Name:   Campos Hale  YOB: 1970  Identification Number:   MRN - [id number]  Date of Service:   02/03/2025 15:23:58    Diagnosis:        R55 - Syncope (blackout, fainting, vasovagal attack)    Impression:       55 yo male w r sided numbness drift of r arm - heavy feeling r leg w syncopal episode earlier at dr - visit at 12      [facility] 2 presently- not entirely clear timeline for TNK and fall / CT head - for hemorrhage CT stable for ASA IN ED      Likely not tnk candidate but doing advance diagnostics to exclude left hemispheric ischemia ctp= no ischemia present cta no lvo nih 2      Low nih as discussed w pt and - imaging studies here where risk may outweigh benefit w fall and neck trauma in c collar presently at 4 h since syncopal event at  w sx hat came prior to visit      mri brain best for exclusion of small vessel ischemia / ctp entirely -      =Full work up would include detailed longer term cardiac monitoring, cardiac echo, full stroke and serologic risk review including UA thyroid studies etc/ if any lapses on consciousness not explained by metabolci causes suggest EEG review - no lu sz activity noted or recorded     Our recommendations are outlined below.    Recommendations:          Stroke/Telemetry Floor        Neuro Checks        Bedside Swallow Eval        DVT Prophylaxis        IV Fluids, Normal Saline        Head of Bed 30 Degrees        Euglycemia and Avoid Hyperthermia (PRN Acetaminophen)    Sign Out:        Discussed with Emergency Department Provider        ------------------------------------------------------------------------------    Advanced Imaging:  CTA Head and Neck Completed.    CTP Completed.    LVO:No    Patient in not a candidate for MARICEL      Metrics:  Last Known Well: 02/03/2025 12:00:35  Dispatch Time: 02/03/2025 15:22:38  Arrival Time: 02/03/2025 15:25:28  Initial Response Time: 02/03/2025  15:27:27  Symptoms: R sided numbness / weakness was reason pt went to Dr at 1200pm - then at dr had syncopal episode - unclear LOC not well documented byt sx go back prior to 12 entered dr at 12 per ems and ER info related .  Initial patient interaction: 02/03/2025 15:27:35  NIHSS Assessment Completed: 02/03/2025 16:03:19  Patient is not a candidate for Thrombolytic.  Thrombolytic Medical Decision: 02/03/2025 16:03:20  Patient was not deemed candidate for Thrombolytic because of following reasons:  Stroke severity too mild (non-disabling) .    I personally Reviewed the CT Head and it Showed no hemorrhage    Primary Provider Notified of Diagnostic Impression and Management Plan on: 02/03/2025 16:04:03        ------------------------------------------------------------------------------    History of Present Illness:  Patient is a 54 year old Male.    Patient was brought by EMS for symptoms of R sided numbness / weakness was reason pt went to Dr at 1200pm - then at dr had syncopal episode - unclear LOC not well documented byt sx go back prior to 12 entered dr at 12 per ems and ER info related .  55 yo male w r sided numbness drift of r arm - heavy feeling r leg w syncopal episode earlier at dr Fred olmstead at 12    [facility] 2 presently- not callierjae clear timeline for TNK and fall / CT head - for hemorrhage    LIkely not tnk candidate but doing advance diagnostics to exclude left hemispheric ischemia      Past Medical History:       Hypertension       Diabetes Mellitus       Hyperlipidemia       Atrial Fibrillation       Coronary Artery Disease       Stroke       There is no history of Covid-19       There is no history of Seizures       There is no history of Migraine Headaches       There is no history of Dementia/MCI  Other PMH:  prev mi and stroke ,gastric bypass    Medications:    No Anticoagulant use   No Antiplatelet use  Reviewed EMR for current medications    Allergies:   Reviewed    Social History:  Smoking:  No  Alcohol Use: Yes  Drug Use: No    Family History:    There is no family history of premature cerebrovascular disease pertinent to this consultation    ROS :  14 Points Review of Systems was performed and was negative except mentioned in HPI.    Past Surgical History:  There Is No Surgical History Contributory To Today’s Visit        Examination:  BP(114/67), Pulse(79), Blood Glucose(220)  1A: Level of Consciousness - Alert; keenly responsive + 0  1B: Ask Month and Age - Both Questions Right + 0  1C: Blink Eyes & Squeeze Hands - Performs Both Tasks + 0  2: Test Horizontal Extraocular Movements - Normal + 0  3: Test Visual Fields - No Visual Loss + 0  4: Test Facial Palsy (Use Grimace if Obtunded) - Normal symmetry + 0  5A: Test Left Arm Motor Drift - No Drift for 10 Seconds + 0  5B: Test Right Arm Motor Drift - No Drift for 10 Seconds + 0  6A: Test Left Leg Motor Drift - No Drift for 5 Seconds + 0  6B: Test Right Leg Motor Drift - Drift, but doesn't hit bed + 1  7: Test Limb Ataxia (FNF/Heel-Shin) - No Ataxia + 0  8: Test Sensation - Mild-Moderate Loss: Less Sharp/More Dull + 1  9: Test Language/Aphasia - Normal; No aphasia + 0  10: Test Dysarthria - Normal + 0  11: Test Extinction/Inattention - No abnormality + 0    NIHSS Score: 2      Pre-Morbid Modified [place] Scale:  1 Points = No significant disability despite symptoms; able to carry out all usual duties and activities    Spoke with : ed provider - no response x 3 attempts to ED  I reviewed the available imaging via Rapid and initiated discussion with the primary provider    This consult was conducted in real time using interactive audio and video technology. Patient was informed of the technology being used for this visit and agreed to proceed. Patient located in hospital and provider located at home/office setting.      Patient is being evaluated for possible acute neurologic impairment and high probability of imminent or life-threatening deterioration. I  spent total of 35 minutes providing care to this patient, including time for face to face visit via telemedicine, review of medical records, imaging studies and discussion of findings with providers, the patient and/or family.      Dr Shabbir Carver      [facility]  For Inpatient follow-up with [facility] physician please call [facility] at [phone]. As we are not an outpatient service for any post hospital discharge needs please contact the hospital for assistance.  If you have any questions for the [facility] physicians or need to reconsult for clinical or diagnostic changes please contact us via [facility] at [phone].

## 2025-02-03 NOTE — ED PROVIDER NOTES
Time: 6:13 PM EST  Date of encounter:  2/3/2025  Independent Historian/Clinical History and Information was obtained by:   Patient and EMS    History is limited by: N/A    Chief Complaint: Stroke symptoms      History of Present Illness:  Patient is a 54 y.o. year old male who presents to the emergency department for evaluation of stroke symptoms with right-sided arm and leg weakness      Patient Care Team  Primary Care Provider: Annie Garza MD    Past Medical History:     No Known Allergies  Past Medical History:   Diagnosis Date    A-fib     Arthritis     Diabetes mellitus     Disease of thyroid gland     GERD (gastroesophageal reflux disease)     Hernia     Hyperlipidemia     Hypertension      Past Surgical History:   Procedure Laterality Date    GASTRIC SLEEVE LAPAROSCOPIC      NISSEN FUNDOPLICATION       Family History   Problem Relation Age of Onset    No Known Problems Mother     Diabetes Father     Hyperlipidemia Father     Hypertension Father     No Known Problems Sister     Cancer Brother        Home Medications:  Prior to Admission medications    Medication Sig Start Date End Date Taking? Authorizing Provider   acetaminophen (Tylenol 8 Hour) 650 MG 8 hr tablet Take 1 tablet by mouth Every 8 (Eight) Hours As Needed for Mild Pain. 10/10/24   Annie Garza MD   atorvastatin (Lipitor) 40 MG tablet Take 1 tablet by mouth Every Night. 11/22/24   Annie Garza MD   Diclofenac Sodium (VOLTAREN) 1 % gel gel Apply 4 g topically to the appropriate area as directed 4 (Four) Times a Day. 11/8/24   Donavon Betancourt MD   DULoxetine (CYMBALTA) 60 MG capsule Take 1 capsule by mouth 2 (Two) Times a Day. 11/22/24   Annie Garza MD   FT Ibuprofen 200 MG tablet Take 1 tablet by mouth Every 6 (Six) Hours As Needed for Mild Pain. 12/2/24   ProviderMaddy MD   gabapentin (NEURONTIN) 800 MG tablet Take 1 tablet by mouth 3 (Three) Times a Day.    ProviderMaddy MD   Gvoke HypoPen 2-Pack 1 MG/0.2ML  solution auto-injector Inject 1 mg under the skin into the appropriate area as directed As Needed (HYPOGLYCEMIA). INJECT 1MG UNDER THE SKIN ONE TIME AS NEEDED HYPOGLYCEMIA FOR UP TO ONE DOSE 10/29/24   Maddy Hayes MD   ibuprofen (ADVIL,MOTRIN) 800 MG tablet Take 1 tablet by mouth 3 times a day. 12/2/24   Maddy Hayes MD   Ibuprofen 3 %, Gabapentin 10 %, Baclofen 2 %, lidocaine 4 %, Ketamine HCl 4 % Apply 1-2 g topically to the appropriate area as directed 3 (Three) to 4 (Four) times daily. 1/22/25 1/22/26  Annie Garza MD   isosorbide dinitrate (ISORDIL) 30 MG tablet Take 1 tablet by mouth Daily. 10/14/24   Maddy Hayes MD   levothyroxine (SYNTHROID, LEVOTHROID) 50 MCG tablet Take 1 tablet by mouth Daily. 11/22/24   Annie Garza MD   metFORMIN ER (GLUCOPHAGE-XR) 500 MG 24 hr tablet Take 1 tablet by mouth Daily With Breakfast. 12/5/24   Annie Garza MD   metoprolol tartrate (LOPRESSOR) 25 MG tablet Take 0.5 tablets by mouth 2 (Two) Times a Day. 11/22/24   Annie Garza MD   tiZANidine (ZANAFLEX) 4 MG tablet Take 1 tablet by mouth 3 times a day. 11/25/24   Maddy Hayes MD   vitamin D (ERGOCALCIFEROL) 1.25 MG (39327 UT) capsule capsule Take 1 capsule by mouth 1 (One) Time Per Week. Thursdays 10/14/24   Maddy Hayes MD        Social History:   Social History     Tobacco Use    Smoking status: Never    Smokeless tobacco: Never   Vaping Use    Vaping status: Every Day    Substances: Nicotine, Flavoring    Devices: Disposable   Substance Use Topics    Alcohol use: Yes    Drug use: No         Review of Systems:  Review of Systems   Constitutional:  Negative for chills and fever.   HENT:  Negative for congestion, rhinorrhea and sore throat.    Eyes:  Negative for pain and visual disturbance.   Respiratory:  Negative for apnea, cough, chest tightness and shortness of breath.    Cardiovascular:  Negative for chest pain and palpitations.   Gastrointestinal:  Negative  "for abdominal pain, diarrhea, nausea and vomiting.   Genitourinary:  Negative for difficulty urinating and dysuria.   Musculoskeletal:  Negative for joint swelling and myalgias.   Skin:  Negative for color change.   Neurological:  Positive for weakness. Negative for seizures and headaches.   Psychiatric/Behavioral: Negative.     All other systems reviewed and are negative.       Physical Exam:  /98 (BP Location: Left arm, Patient Position: Lying)   Pulse 66   Temp 97.5 °F (36.4 °C) (Oral)   Resp 16   Ht 182.9 cm (72\")   Wt 99.2 kg (218 lb 11.1 oz)   SpO2 100%   BMI 29.66 kg/m²     Physical Exam  Vitals and nursing note reviewed.   Constitutional:       General: He is not in acute distress.     Appearance: Normal appearance. He is not toxic-appearing.   HENT:      Head: Normocephalic and atraumatic.      Jaw: There is normal jaw occlusion.   Eyes:      General: Lids are normal.      Extraocular Movements: Extraocular movements intact.      Conjunctiva/sclera: Conjunctivae normal.      Pupils: Pupils are equal, round, and reactive to light.   Cardiovascular:      Rate and Rhythm: Normal rate and regular rhythm.      Pulses: Normal pulses.      Heart sounds: Normal heart sounds.   Pulmonary:      Effort: Pulmonary effort is normal. No respiratory distress.      Breath sounds: Normal breath sounds. No wheezing or rhonchi.   Abdominal:      General: Abdomen is flat.      Palpations: Abdomen is soft.      Tenderness: There is no abdominal tenderness. There is no guarding or rebound.   Musculoskeletal:         General: Normal range of motion.      Cervical back: Normal range of motion and neck supple.      Right lower leg: No edema.      Left lower leg: No edema.   Skin:     General: Skin is warm and dry.   Neurological:      Mental Status: He is alert and oriented to person, place, and time.      Comments: Right-sided arm and leg weakness with right tongue deviation   Psychiatric:         Mood and Affect: " Mood normal.                    Medical Decision Making:      Comorbidities that affect care:    Diabetes, thyroid disease    External Notes reviewed:    None      The following orders were placed and all results were independently analyzed by me:  Orders Placed This Encounter   Procedures    CT Head Without Contrast Stroke Protocol    CT Angiogram Head w AI Analysis of LVO    CT Angiogram Neck    CT CEREBRAL PERFUSION WITH & WITHOUT CONTRAST    XR Chest 1 View    MRI Brain Without Contrast    [place] Draw    Comprehensive Metabolic Panel    Protime-INR    aPTT    Urinalysis With Microscopic If Indicated (No Culture) - Urine, Clean Catch    CBC Auto Differential    Hemoglobin A1c    Lipid Panel    TSH Rfx On Abnormal To Free T4    Diet: Cardiac, Diabetic; Healthy Heart (2-3 Na+); Consistent Carbohydrate; Fluid Consistency: Thin (IDDSI 0)    Initiate Department's Acute Stroke Process (Team D, Code 19, etc)    Perform NIH Stroke Scale    Measure Actual Weight    Head of Bed 30 Degrees or Less    Undress and Gown    Vital Signs    Neuro Checks    Notify Provider for SBP <80 or >200    Notify Provider for SBP >140 (For Hemorrhagic Stroke)    No Hypotonic Fluids    Nursing Dysphagia Screening (Complete Prior to Giving anything PO)    RN to Place Order SLP Consult (IF swallow screen failed) - Eval & Treat Choosing Reason of RN Dysphagia Screen Failed    Vital Signs    Intake & Output    Weigh Patient    Oral Care    Vital Signs    Continuous Pulse Oximetry    Maintain IV Access    Telemetry - Place Orders & Notify Provider of Results When Patient Experiences Acute Chest Pain, Dysrhythmia or Respiratory Distress    Notify Provider    Nursing Dysphagia Screening (Complete Prior to Giving Anything By Mouth)    RN to Place Order SLP Consult - Eval & Treat Choosing Reason of RN Dysphagia Screen Failed    Nurse to Call MD or Nutrition Services for Diet if Patient Passes Dysphagia Screen    Intake and Output    Neuro Checks     NIHSS Assessment    Activity As Tolerated    Code Status and Medical Interventions: CPR (Attempt to Resuscitate); Full Support    Inpatient Hospitalist Consult    Inpatient Case Management  Consult    Inpatient Diabetes Educator Consult    Inpatient Neurology Consult Stroke    OT Consult: Eval & Treat    PT Consult: Eval & Treat As Tolerated    Oxygen Therapy- Nasal Cannula; Titrate 1-6 LPM Per SpO2; 90 - 95%    SLP Consult: Eval & Treat Communication Disorder    POC Glucose Once    POC Glucose Once    POC Glucose Q6H    ECG 12 Lead ED Triage Standing Order; Acute Stroke (Onset <12 hrs)    EEG    Insert Large Bore Peripheral IV - Right AC Preferred    Insert Peripheral IV    Initiate Observation Status    CBC & Differential    Green Top (Gel)    Lavender Top    Gold Top - SST    Light Blue Top       Medications Given in the Emergency Department:  Medications   sodium chloride 0.9 % flush 10 mL (has no administration in time range)   atorvastatin (LIPITOR) tablet 40 mg (40 mg Oral Given 2/3/25 2109)   gabapentin (NEURONTIN) capsule 800 mg (800 mg Oral Given 2/3/25 2109)   sodium chloride 0.9 % flush 10 mL (10 mL Intravenous Given 2/3/25 2109)   sodium chloride 0.9 % flush 10 mL (has no administration in time range)   sodium chloride 0.9 % infusion 40 mL (has no administration in time range)   Enoxaparin Sodium (LOVENOX) syringe 40 mg (has no administration in time range)   aspirin chewable tablet 81 mg (81 mg Oral Given 2/3/25 2109)     Or   aspirin suppository 300 mg ( Rectal Not Given:  See Alt 2/3/25 2109)   sennosides-docusate (PERICOLACE) 8.6-50 MG per tablet 2 tablet (has no administration in time range)     And   polyethylene glycol (MIRALAX) packet 17 g (has no administration in time range)     And   bisacodyl (DULCOLAX) EC tablet 5 mg (has no administration in time range)     And   bisacodyl (DULCOLAX) suppository 10 mg (has no administration in time range)   ondansetron (ZOFRAN) injection 4  mg (has no administration in time range)   iopamidol (ISOVUE-370) 76 % injection 100 mL (40 mL Intravenous Given 2/3/25 1556)   iopamidol (ISOVUE-370) 76 % injection 100 mL (100 mL Intravenous Given 2/3/25 1559)   ketorolac (TORADOL) injection 15 mg (15 mg Intravenous Given 2/3/25 2109)        ED Course:         Labs:    Lab Results (last 24 hours)       Procedure Component Value Units Date/Time    CBC & Differential [[id number]]  (Abnormal) Collected: 02/03/25 1623    Specimen: Blood from Arm, Left Updated: 02/03/25 1629    Narrative:      The following orders were created for panel order CBC & Differential.  Procedure                               Abnormality         Status                     ---------                               -----------         ------                     CBC Auto Differential[[id number]]        Abnormal            Final result                 Please view results for these tests on the individual orders.    Comprehensive Metabolic Panel [[id number]]  (Abnormal) Collected: 02/03/25 1623    Specimen: Blood from Arm, Left Updated: 02/03/25 1648     Glucose 68 mg/dL      BUN 14 mg/dL      Creatinine 1.11 mg/dL      Sodium 138 mmol/L      Potassium 4.2 mmol/L      Chloride 109 mmol/L      CO2 18.2 mmol/L      Calcium 8.7 mg/dL      Total Protein 6.0 g/dL      Albumin 3.6 g/dL      ALT (SGPT) 19 U/L      AST (SGOT) 35 U/L      Alkaline Phosphatase 47 U/L      Total Bilirubin <0.2 mg/dL      Globulin 2.4 gm/dL      A/G Ratio 1.5 g/dL      BUN/Creatinine Ratio 12.6     Anion Gap 10.8 mmol/L      eGFR 79.4 mL/min/1.73     Narrative:      GFR Categories in Chronic Kidney Disease (CKD)      GFR Category          GFR (mL/min/1.73)    Interpretation  G1                     90 or greater         Normal or high (1)  G2                      60-89                Mild decrease (1)  G3a                   45-59                Mild to moderate decrease  G3b                   30-44                Moderate to severe  decrease  G4                    15-29                Severe decrease  G5                    14 or less           Kidney failure          (1)In the absence of evidence of kidney disease, neither GFR category G1 or G2 fulfill the criteria for CKD.    eGFR calculation 2021 CKD-EPI creatinine equation, which does not include race as a factor    Protime-INR [[id number]]  (Normal) Collected: 02/03/25 1623    Specimen: Blood from Arm, Left Updated: 02/03/25 1657     Protime 13.0 Seconds      INR 0.97    Narrative:      Suggested Therapeutic Ranges For Oral Anticoagulant Therapy:  Level of Therapy                      INR Target Range  Standard Dose                            2.0-3.0  High Dose                                2.5-3.5  Patients not receiving anticoagulant  Therapy Normal Range                     0.86-1.15    aPTT [[id number]]  (Normal) Collected: 02/03/25 1623    Specimen: Blood from Arm, Left Updated: 02/03/25 1657     PTT 26.2 seconds     CBC Auto Differential [[id number]]  (Abnormal) Collected: 02/03/25 1623    Specimen: Blood from Arm, Left Updated: 02/03/25 1629     WBC 3.96 10*3/mm3      RBC 3.57 10*6/mm3      Hemoglobin 8.5 g/dL      Hematocrit 29.0 %      MCV 81.2 fL      MCH 23.8 pg      MCHC 29.3 g/dL      RDW 16.3 %      RDW-SD 48.5 fl      MPV 10.0 fL      Platelets 286 10*3/mm3      Neutrophil % 47.6 %      Lymphocyte % 35.9 %      Monocyte % 12.9 %      Eosinophil % 2.8 %      Basophil % 0.5 %      Immature Grans % 0.3 %      Neutrophils, Absolute 1.89 10*3/mm3      Lymphocytes, Absolute 1.42 10*3/mm3      Monocytes, Absolute 0.51 10*3/mm3      Eosinophils, Absolute 0.11 10*3/mm3      Basophils, Absolute 0.02 10*3/mm3      Immature Grans, Absolute 0.01 10*3/mm3      nRBC 0.0 /100 WBC     Hemoglobin A1c [[id number]]  (Abnormal) Collected: 02/03/25 1623    Specimen: Blood from Arm, Left Updated: 02/03/25 2224     Hemoglobin A1C 7.00 %     Narrative:      Hemoglobin A1C Ranges:    Increased Risk for  Diabetes  5.7% to 6.4%  Diabetes                     >= 6.5%  Diabetic Goal                < 7.0%    Lipid Panel [[id number]]  (Abnormal) Collected: 02/03/25 1623    Specimen: Blood from Arm, Left Updated: 02/03/25 1916     Total Cholesterol 128 mg/dL      Triglycerides 65 mg/dL      HDL Cholesterol 70 mg/dL      LDL Cholesterol  44 mg/dL      VLDL Cholesterol 14 mg/dL      LDL/HDL Ratio 0.64    Narrative:      Cholesterol Reference Ranges  (U.S. Department of Health and Human Services ATP III Classifications)    Desirable          <200 mg/dL  Borderline High    200-239 mg/dL  High Risk          >240 mg/dL      Triglyceride Reference Ranges  (U.S. Department of Health and Human Services ATP III Classifications)    Normal           <150 mg/dL  Borderline High  150-199 mg/dL  High             200-499 mg/dL  Very High        >500 mg/dL    HDL Reference Ranges  (U.S. Department of Health and Human Services ATP III Classifications)    Low     <40 mg/dl (major risk factor for CHD)  High    >60 mg/dl ('negative' risk factor for CHD)        LDL Reference Ranges  (U.S. Department of Health and Human Services ATP III Classifications)    Optimal          <100 mg/dL  Near Optimal     100-129 mg/dL  Borderline High  130-159 mg/dL  High             160-189 mg/dL  Very High        >189 mg/dL    LDL is calculated using the NIH LDL-C calculation.      TSH Rfx On Abnormal To Free T4 [[id number]]  (Normal) Collected: 02/03/25 1623    Specimen: Blood from Arm, Left Updated: 02/03/25 2117     TSH 2.780 uIU/mL     Urinalysis With Microscopic If Indicated (No Culture) - Urine, Clean Catch [[id number]]  (Abnormal) Collected: 02/03/25 1652    Specimen: Urine, Clean Catch Updated: 02/03/25 1659     Color, UA Yellow     Appearance, UA Clear     pH, UA 5.5     Specific Gravity, UA >1.030     Glucose,  mg/dL (2+)     Ketones, UA Negative     Bilirubin, UA Negative     Blood, UA Negative     Protein, UA Trace     Leuk Esterase, UA Negative      Nitrite, UA Negative     Urobilinogen, UA 0.2 E.U./dL    Narrative:      Urine microscopic not indicated.    POC Glucose Once [[id number]]  (Normal) Collected: 02/03/25 1725    Specimen: Blood Updated: 02/03/25 1726     Glucose 84 mg/dL      Comment: Serial Number: [id number]:  [id number]                Imaging:    MRI Brain Without Contrast    Result Date: 2/3/2025  MRI BRAIN WO CONTRAST Date of Exam: 2/3/2025 9:38 PM EST Indication: CVA Rule out.  Comparison: CT head without contrast, CT perfusion and CT angiogram head and neck 2/3/2025 brain MRI 11/8/2024 Technique:  Routine multiplanar/multisequence sequence images of the brain were obtained without contrast administration. Findings: No restricted diffusion to suggest acute or subacute infarct. No intracranial hemorrhage. Brain volume is appropriate for patient's age. Sulci ventricles are symmetric. Gray-white matter differentiation is intact. No abnormal increased FLAIR T2 signal intensity. The cerebellar pontine angles are normal in appearance. There are normal flow voids in the intracranial vessels and dural venous sinuses on T2 weighted sequences. Globes and extraocular muscles are normal in appearance. Paranasal sinuses and mastoid air cells are well aerated. Craniocervical junction is normal. Pituitary gland is normal size and signal intensity for patient age and gender. Corpus callosum is normal.     Impression: No findings of restricted diffusion to suggest acute or subacute infarct. Normal MRI appearance of the brain. Electronically Signed: Lucinda Bryan MD  2/3/2025 10:06 PM EST  Workstation ID: [id number]    CT Angiogram Head w AI Analysis of LVO    Result Date: 2/3/2025  CT ANGIOGRAM HEAD W AI ANALYSIS OF LVO, CT ANGIOGRAM NECK Date of Exam: 2/3/2025 3:55 PM EST Indication: Neuro Deficit, acute, Stroke suspected Neuro deficit, acute, stroke suspected. Comparison: None available. Technique: CTA of the head was and neck performed after the uneventful  intravenous administration of iodinated contrast. Reconstructed coronal and sagittal images were also obtained. In addition, a 3-D volume rendered image was created for interpretation. Automated exposure control and iterative reconstruction methods were used. Findings: The lung apices are clear. Evaluation of the neck soft tissues demonstrates no pathologic cervical adenopathy or unexpected aerodigestive tract mass. The osseous structures demonstrate no evidence of acute fracture or aggressive osseous lesion. Patent aortic arch with common origin of the brachiocephalic and left common carotid arteries. The subclavian arteries appear patent bilaterally. There is no significant atherosclerotic narrowing of the ICA origins, 0% stenosis present by NASCET criteria bilaterally. The vertebral arteries are normal in course and caliber. Intracranially, the carotid siphons demonstrate calcific atherosclerotic changes, with some mild narrowing of the right cavernous segment. The anterior cerebral arteries appear normal in course and caliber. The right and left middle cerebral arteries are normal in course and caliber. The vertebral basilar system is patent. The posterior cerebral arteries appear somewhat attenuated bilaterally, without definite focal high-grade stenosis or occlusion.     Impression: No evidence of significant atherosclerotic narrowing in the neck. Intracranial atherosclerotic changes are noted as above, without evidence of associated high-grade stenosis, large vessel occlusion or aneurysm. Electronically Signed: Andrea Nichols MD  2/3/2025 5:01 PM EST  Workstation ID: [id number]    CT Angiogram Neck    Result Date: 2/3/2025  CT ANGIOGRAM HEAD W AI ANALYSIS OF LVO, CT ANGIOGRAM NECK Date of Exam: 2/3/2025 3:55 PM EST Indication: Neuro Deficit, acute, Stroke suspected Neuro deficit, acute, stroke suspected. Comparison: None available. Technique: CTA of the head was and neck performed after the uneventful  intravenous administration of iodinated contrast. Reconstructed coronal and sagittal images were also obtained. In addition, a 3-D volume rendered image was created for interpretation. Automated exposure control and iterative reconstruction methods were used. Findings: The lung apices are clear. Evaluation of the neck soft tissues demonstrates no pathologic cervical adenopathy or unexpected aerodigestive tract mass. The osseous structures demonstrate no evidence of acute fracture or aggressive osseous lesion. Patent aortic arch with common origin of the brachiocephalic and left common carotid arteries. The subclavian arteries appear patent bilaterally. There is no significant atherosclerotic narrowing of the ICA origins, 0% stenosis present by NASCET criteria bilaterally. The vertebral arteries are normal in course and caliber. Intracranially, the carotid siphons demonstrate calcific atherosclerotic changes, with some mild narrowing of the right cavernous segment. The anterior cerebral arteries appear normal in course and caliber. The right and left middle cerebral arteries are normal in course and caliber. The vertebral basilar system is patent. The posterior cerebral arteries appear somewhat attenuated bilaterally, without definite focal high-grade stenosis or occlusion.     Impression: No evidence of significant atherosclerotic narrowing in the neck. Intracranial atherosclerotic changes are noted as above, without evidence of associated high-grade stenosis, large vessel occlusion or aneurysm. Electronically Signed: Andrea Nichols MD  2/3/2025 5:01 PM EST  Workstation ID: [id number]    XR Chest 1 View    Result Date: 2/3/2025  XR CHEST 1 VW Date of Exam: 2/3/2025 4:18 PM EST Indication: Acute Stroke Protocol (Onset < 12 hrs) Comparison: 1/1/2025 Findings: There are low lung volumes. The heart size is normal for the projection. The pulmonary vascular markings are normal. Lungs appear clear with no acute process  identified.     Impression: Stable chest, no acute process identified Electronically Signed: Shabbir Gallegos MD  2/3/2025 4:35 PM EST  Workstation ID: [id number]    CT CEREBRAL PERFUSION WITH & WITHOUT CONTRAST    Result Date: 2/3/2025  CT CEREBRAL PERFUSION W WO CONTRAST Date of Exam: 2/3/2025 3:54 PM EST Indication: Neuro Deficit, acute, Stroke suspected Neuro deficit, acute, stroke suspected.  Comparison: Head CT and CTA of the head Technique: Axial CT images of the brain were obtained prior to and after the uneventful intravenous administration of iodinated contrast. Core blood volume, core blood flow, mean transit time, and Tmax images were obtained utilizing the Rapid software protocol. A limited CT angiogram of the head was also performed to measure the blood vessel density. The radiation dose reduction device was turned on for each scan per the ALARA (As Low as Reasonably Achievable) protocol. Findings: CBF less than 30%: 0 mL Tmax greater than 6 seconds: 0 Mismatch volume: 0 mL Mismatch ratio: None Adequate perfusion images were obtained. Adequate ROIs were obtained. No significant motion artifact     Impression: No CT perfusion evidence of acute infarct or ischemia. Electronically Signed: Gilberto Dominguez DO  2/3/2025 4:28 PM EST  Workstation ID: [id number]    CT Head Without Contrast Stroke Protocol    Result Date: 2/3/2025  CT HEAD WO CONTRAST STROKE PROTOCOL Date of Exam: 2/3/2025 3:32 PM EST Indication: Neuro Deficit, acute, Stroke suspected Neuro deficit, acute, stroke suspected. Comparison: CT head without contrast 11/7/2024 Technique: Axial CT images were obtained of the head without contrast administration.  Reconstructed coronal and sagittal images were also obtained. Automated exposure control and iterative construction methods were used. Findings: No evidence of intracranial hemorrhage, mass, or midline shift.  Sulci and ventricles are symmetric.  Brain volume is appropriate for patient's age.  The  gray white matter differentiation is intact. No focal hypodensities to suggest acute or subacute infarct. The mastoid air cells and paranasal sinuses are well aerated.  Globes and extraocular muscles are normal.  No displaced or depressed skull fractures.  No suspicious lytic or sclerotic osseous lesions.     No acute intracranial abnormality by head CT. Brain MRI is more sensitive to evaluate for acute or subacute infarcts and to evaluate for intracranial metastatic disease. Electronically Signed: Lucinda Bryan MD  2/3/2025 3:38 PM EST  Workstation ID: [id number]       Differential Diagnosis and Discussion:    Stroke: Differential diagnosis in this patient with signs of possible ischemic stroke include TIA or ischemic stroke, hemorrhagic stroke, hypoglycemic episode, toxic or metabolic encephalopathy, paresthesias.    PROCEDURES:    Labs were collected in the emergency department and all labs were reviewed and interpreted by me.  X-ray were performed in the emergency department and all X-ray impressions were independently interpreted by me.  An EKG was performed and the EKG was interpreted by me.  CT scan was performed in the emergency department and the CT scan radiology impression was interpreted by me.    ECG 12 Lead ED Triage Standing Order; Acute Stroke (Onset <12 hrs)   Preliminary Result   HEART RATE=67  bpm   RR Kfitymjc=081  ms   MI Ebrmpvon=252  ms   P Horizontal Axis=61  deg   P Front Axis=58  deg   QRSD Interval=82  ms   QT Dftlfatg=741  ms   RJwN=740  ms   QRS Axis=37  deg   T Wave Axis=32  deg   - BORDERLINE ECG -   Sinus rhythm   Borderline T abnormalities, lateral leads   Date and Time of Study:2025-02-03 16:33:51        My interpretation of EKG shows sinus rhythm, normal rate, normal QT, no acute ischemia    Procedures    MDM  Number of Diagnoses or Management Options  Cerebrovascular accident (CVA), unspecified mechanism  Diagnosis management comments: In summary this is a 54-year-old male patient who  arrives to the emerged department by EMS for evaluation of strokelike symptoms.  CBC independently reviewed and interpreted by me and shows no critical abnormalities.  CMP independently reviewed and interpreted by me and shows no critical abnormalities.  CT brain reviewed by me is unremarkable and negative for acute pathology.  Stroke neurology was consulted and has seen and evaluated the patient recommend admission for further stroke workup.  Patient case has been discussed with the hospitalist team who will admit to the hospital for further evaluation and continuation of treatment.             The patient presents with symptoms concerning for a stroke. The patient was evaluated by me immediately upon arrival. Extensive history and physical was obtained. Family not present to give further insight into patients onset of symptoms.  CT scan findings were discussed with radiology. The case was also discussed at length with telehealth neurology. Nursing staff was instructed on how to proceed with patient care. Patient was then placed on the cardiac monitor and monitored for arrhythmia that could be contributing to stroke like symptoms. EKG was performed and evaluated by me. Patient's blood pressure was monitored and controled consistent with stroke guidelines. Treatment option's for patient's symptoms include TNKase however after inclusion and exclusion criteria was weighed unclear exactly how long symptoms have been present for. The patient's mental status and neurological symptoms were monitored throughout their stay for worsening decline.     Total Critical Care time of 35 minutes. Total critical care time documented does not include time spent on separately billed procedures for services of nurses or physician assistants. I personally saw and examined the patient. I have reviewed all diagnostic interpretations and treatment plans as written. I was present for the key portions of any procedures performed and the  inclusive time noted in any critical care statement. Critical care time includes patient management by me, time spent at the patients bedside,  time to review lab and imaging results, discussing patient care, documentation in the medical record, and time spent with family or caregiver.          Patient Care Considerations:    MRI: I considered ordering an MRI however this can be accomplished inpatient      Consultants/Shared Management Plan:    Hospitalist: I have discussed the case with Dr. Freeman who agrees to accept the patient for admission.  Consultant: I have discussed the case with teleneurology who agrees to consult on the patient.    Social Determinants of Health:    Patient is independent, reliable, and has access to care.       Disposition and Care Coordination:    Admit:   Through independent evaluation of the patient's history, physical, and imperical data, the patient meets criteria for inpatient admission to the hospital.        Final diagnoses:   Cerebrovascular accident (CVA), unspecified mechanism        ED Disposition       ED Disposition   Decision to Admit    Condition   --    Comment   Level of Care: Telemetry [5]   Diagnosis: TIA (transient ischemic attack) [714685]   Admitting Physician: YVONNE FREEMAN [[id number]]                 This medical record created using voice recognition software.             Fredy White MD  02/03/25 [id number]

## 2025-02-04 ENCOUNTER — READMISSION MANAGEMENT (OUTPATIENT)
Dept: CALL CENTER | Facility: HOSPITAL | Age: 55
End: 2025-02-04
Payer: COMMERCIAL

## 2025-02-04 ENCOUNTER — APPOINTMENT (OUTPATIENT)
Dept: NEUROLOGY | Facility: HOSPITAL | Age: 55
End: 2025-02-04
Payer: COMMERCIAL

## 2025-02-04 VITALS
BODY MASS INDEX: 29.62 KG/M2 | OXYGEN SATURATION: 100 % | WEIGHT: 218.7 LBS | HEART RATE: 66 BPM | RESPIRATION RATE: 18 BRPM | SYSTOLIC BLOOD PRESSURE: 178 MMHG | HEIGHT: 72 IN | DIASTOLIC BLOOD PRESSURE: 94 MMHG | TEMPERATURE: 98.1 F

## 2025-02-04 LAB
GLUCOSE BLDC GLUCOMTR-MCNC: 107 MG/DL (ref 70–99)
QT INTERVAL: 370 MS
QTC INTERVAL: 392 MS

## 2025-02-04 PROCEDURE — G0378 HOSPITAL OBSERVATION PER HR: HCPCS

## 2025-02-04 PROCEDURE — 82948 REAGENT STRIP/BLOOD GLUCOSE: CPT

## 2025-02-04 PROCEDURE — 96376 TX/PRO/DX INJ SAME DRUG ADON: CPT

## 2025-02-04 PROCEDURE — 95819 EEG AWAKE AND ASLEEP: CPT | Performed by: PSYCHIATRY & NEUROLOGY

## 2025-02-04 PROCEDURE — 95816 EEG AWAKE AND DROWSY: CPT

## 2025-02-04 PROCEDURE — 25010000002 ENOXAPARIN PER 10 MG: Performed by: FAMILY MEDICINE

## 2025-02-04 PROCEDURE — 25010000002 KETOROLAC TROMETHAMINE PER 15 MG: Performed by: FAMILY MEDICINE

## 2025-02-04 PROCEDURE — 97165 OT EVAL LOW COMPLEX 30 MIN: CPT

## 2025-02-04 PROCEDURE — 97161 PT EVAL LOW COMPLEX 20 MIN: CPT

## 2025-02-04 PROCEDURE — 99214 OFFICE O/P EST MOD 30 MIN: CPT | Performed by: PSYCHIATRY & NEUROLOGY

## 2025-02-04 PROCEDURE — 99239 HOSP IP/OBS DSCHRG MGMT >30: CPT | Performed by: INTERNAL MEDICINE

## 2025-02-04 PROCEDURE — 92610 EVALUATE SWALLOWING FUNCTION: CPT

## 2025-02-04 PROCEDURE — 96372 THER/PROPH/DIAG INJ SC/IM: CPT

## 2025-02-04 RX ORDER — ACETAMINOPHEN 325 MG/1
650 TABLET ORAL EVERY 6 HOURS PRN
Status: DISCONTINUED | OUTPATIENT
Start: 2025-02-04 | End: 2025-02-04 | Stop reason: HOSPADM

## 2025-02-04 RX ORDER — LEVETIRACETAM 500 MG/1
500 TABLET ORAL EVERY 12 HOURS SCHEDULED
Status: DISCONTINUED | OUTPATIENT
Start: 2025-02-04 | End: 2025-02-04 | Stop reason: HOSPADM

## 2025-02-04 RX ORDER — DULOXETIN HYDROCHLORIDE 30 MG/1
60 CAPSULE, DELAYED RELEASE ORAL 2 TIMES DAILY
Status: DISCONTINUED | OUTPATIENT
Start: 2025-02-04 | End: 2025-02-04 | Stop reason: HOSPADM

## 2025-02-04 RX ORDER — DEXTROSE MONOHYDRATE 25 G/50ML
25 INJECTION, SOLUTION INTRAVENOUS
Status: DISCONTINUED | OUTPATIENT
Start: 2025-02-04 | End: 2025-02-04 | Stop reason: HOSPADM

## 2025-02-04 RX ORDER — NICOTINE POLACRILEX 4 MG
15 LOZENGE BUCCAL
Status: DISCONTINUED | OUTPATIENT
Start: 2025-02-04 | End: 2025-02-04 | Stop reason: HOSPADM

## 2025-02-04 RX ORDER — KETOROLAC TROMETHAMINE 30 MG/ML
15 INJECTION, SOLUTION INTRAMUSCULAR; INTRAVENOUS ONCE
Status: COMPLETED | OUTPATIENT
Start: 2025-02-04 | End: 2025-02-04

## 2025-02-04 RX ORDER — IBUPROFEN 600 MG/1
1 TABLET ORAL
Status: DISCONTINUED | OUTPATIENT
Start: 2025-02-04 | End: 2025-02-04 | Stop reason: HOSPADM

## 2025-02-04 RX ORDER — INSULIN LISPRO 100 [IU]/ML
2-7 INJECTION, SOLUTION INTRAVENOUS; SUBCUTANEOUS
Status: DISCONTINUED | OUTPATIENT
Start: 2025-02-04 | End: 2025-02-04 | Stop reason: HOSPADM

## 2025-02-04 RX ORDER — LEVOTHYROXINE SODIUM 50 UG/1
50 TABLET ORAL DAILY
Status: DISCONTINUED | OUTPATIENT
Start: 2025-02-04 | End: 2025-02-04 | Stop reason: HOSPADM

## 2025-02-04 RX ORDER — LEVETIRACETAM 500 MG/1
500 TABLET ORAL EVERY 12 HOURS SCHEDULED
Qty: 60 TABLET | Refills: 0 | Status: SHIPPED | OUTPATIENT
Start: 2025-02-04 | End: 2025-03-06

## 2025-02-04 RX ORDER — TRAMADOL HYDROCHLORIDE 50 MG/1
50 TABLET ORAL EVERY 6 HOURS PRN
Qty: 8 TABLET | Refills: 0 | Status: SHIPPED | OUTPATIENT
Start: 2025-02-04 | End: 2025-02-06

## 2025-02-04 RX ADMIN — GABAPENTIN 800 MG: 400 CAPSULE ORAL at 05:40

## 2025-02-04 RX ADMIN — ASPIRIN 81 MG CHEWABLE TABLET 81 MG: 81 TABLET CHEWABLE at 09:37

## 2025-02-04 RX ADMIN — LEVOTHYROXINE SODIUM 50 MCG: 0.05 TABLET ORAL at 09:37

## 2025-02-04 RX ADMIN — DULOXETINE HYDROCHLORIDE 60 MG: 30 CAPSULE, DELAYED RELEASE ORAL at 09:37

## 2025-02-04 RX ADMIN — LEVETIRACETAM 500 MG: 500 TABLET, FILM COATED ORAL at 14:26

## 2025-02-04 RX ADMIN — GABAPENTIN 800 MG: 400 CAPSULE ORAL at 14:26

## 2025-02-04 RX ADMIN — ACETAMINOPHEN 650 MG: 325 TABLET ORAL at 05:40

## 2025-02-04 RX ADMIN — KETOROLAC TROMETHAMINE 15 MG: 30 INJECTION, SOLUTION INTRAMUSCULAR; INTRAVENOUS at 11:53

## 2025-02-04 RX ADMIN — ENOXAPARIN SODIUM 40 MG: 100 INJECTION SUBCUTANEOUS at 09:37

## 2025-02-04 RX ADMIN — Medication 10 ML: at 09:38

## 2025-02-04 NOTE — PLAN OF CARE
Goal Outcome Evaluation:  Plan of Care Reviewed With: patient        Progress: no change (Evaluation completed)  Outcome Evaluation: Patient does not demonstrate any deficits that will prevent him from safely returning home to complete his ADL/IADL routine. Occupational therapy plans to discharge services.  Please reconsult if new needs arise.

## 2025-02-04 NOTE — DISCHARGE SUMMARY
[facility] HOSPITALIST DISCHARGE SUMMARY        Patient ID:  Campos Hale  [id number]  54 y.o.  1970    Admit date: 2/3/2025    Discharge date and time: 02/04/25    Admitting Physician: Doug Marquez MD    Discharge Physician: Shay Avila    Admission Diagnoses: TIA (transient ischemic attack) [G45.9]  Cerebrovascular accident (CVA), unspecified mechanism [I63.9]    Discharge Diagnoses: TIA  Syncope    Admission Condition: poor    Discharged Condition: fair    Indication for Admission: neuro eval    Hospital Course: Campos Hale is a 54 y.o. male with past medical history of diabetes, hypertension, hyperlipidemia, A-fib not on AC, alcohol abuse in remission, and GERD presented to the ED from PCPs office after a syncopal episode followed by right-sided weakness.  Patient states that he went from his alcohol rehab center to a routine visit to primary care and while on the patient patient passed out.  Afterwards patient was slurring his words and had a right facial weakness as well as right upper extremity weakness.  Patient was then brought by EMS to the ED for further evaluation.  When seen patient stated that he did not remember the events but did admit to not feeling well for the last day or so.  In the ED patient's vitals were all within normal limits on room air.  Labs showed significant anemia with hemoglobin of 8.5 with remaining labs being unremarkable.  EKG showed sinus rhythm with no significant ST changes.  CT head without contrast was negative for any acute intracranial abnormalities, CTA of the head and neck was negative for any significant stenosis or large vessel occlusion and perfusion study was also unremarkable.  When seen patient was at baseline with no slurred speech or weakness but did complain of some numbness to the right side of his face.  He also confirmed that he does not remember much after being in the doctor's office.  When asked he denied any recent fevers,  "chills, chest pain, palpitation, shortness of breath, cough, abdominal pain, nausea, vomiting, diarrhea, constipation, dysuria, hematuria, hematochezia, melena, or anxiety.  Patient admitted for further evaluation and treatment.     Patient was monitored with no acute event.  CVA work up unrevealing.  Had EEG which was abdnormal.  Seen by neurology who recommends keppra with outpatient follow up.  Patient requesting dc at this time and is in agreement with this plan    Consults: neurology    Significant Diagnostic Studies:  as above    Treatments:  as above    Discharge Exam:  /94 (BP Location: Right arm, Patient Position: Lying)   Pulse 66   Temp 98.1 °F (36.7 °C) (Oral)   Resp 18   Ht 182.9 cm (72\")   Wt 99.2 kg (218 lb 11.1 oz)   SpO2 100%   BMI 29.66 kg/m²   General appearance: alert, appears stated age, and cooperative  Lungs: clear to auscultation bilaterally  Heart: regular rate and rhythm, S1, S2 normal, no murmur, click, rub or gallop  Abdomen: soft, non-tender; bowel sounds normal; no masses,  no organomegaly  Neurologic: Grossly normal    Disposition: Home or Self Care    Patient Instructions:       Discharge Medications        New Medications        Instructions Start Date   levETIRAcetam 500 MG tablet  Commonly known as: KEPPRA   500 mg, Oral, Every 12 Hours Scheduled      traMADol 50 MG tablet  Commonly known as: ULTRAM   50 mg, Oral, Every 6 Hours PRN             Changes to Medications        Instructions Start Date   FT Ibuprofen 200 MG tablet  Generic drug: ibuprofen  What changed: Another medication with the same name was removed. Continue taking this medication, and follow the directions you see here.   200 mg, Every 6 Hours PRN             Continue These Medications        Instructions Start Date   acetaminophen 650 MG 8 hr tablet  Commonly known as: Tylenol 8 Hour   650 mg, Oral, Every 8 Hours PRN      Aspirin EC Adult Low Dose 81 MG EC tablet  Generic drug: aspirin   1 tablet, " Daily      atorvastatin 40 MG tablet  Commonly known as: Lipitor   40 mg, Oral, Nightly      DULoxetine 60 MG capsule  Commonly known as: CYMBALTA   60 mg, Oral, 2 Times Daily      gabapentin 800 MG tablet  Commonly known as: NEURONTIN   800 mg, 3 Times Daily      Gvoke HypoPen 2-Pack 1 MG/0.2ML solution auto-injector  Generic drug: Glucagon   Inject 1 mg under the skin into the appropriate area as directed As Needed (HYPOGLYCEMIA). INJECT 1MG UNDER THE SKIN ONE TIME AS NEEDED HYPOGLYCEMIA FOR UP TO ONE DOSE      hydrOXYzine 25 MG tablet  Commonly known as: ATARAX   25 mg, 3 Times Daily PRN      Ibuprofen 3 %, Gabapentin 10 %, Baclofen 2 %, lidocaine 4 %, Ketamine HCl 4 %   1-2 g, Topical, 3 to 4 Times Daily      levothyroxine 50 MCG tablet  Commonly known as: SYNTHROID, LEVOTHROID   50 mcg, Oral, Daily      metFORMIN  MG 24 hr tablet  Commonly known as: GLUCOPHAGE-XR   500 mg, Oral, Daily With Breakfast      metoprolol tartrate 25 MG tablet  Commonly known as: LOPRESSOR   12.5 mg, Oral, 2 Times Daily      mirtazapine 7.5 MG tablet  Commonly known as: REMERON   7.5 mg, Nightly      tiZANidine 4 MG tablet  Commonly known as: ZANAFLEX   1 tablet, 3 times daily      vitamin D 1.25 MG (34235 UT) capsule capsule  Commonly known as: ERGOCALCIFEROL   1 capsule, Weekly             Stop These Medications      Diclofenac Sodium 1 % gel gel  Commonly known as: VOLTAREN            Activity:  no driving until cleared by neurology  Diet: diabetic diet  Wound Care: none needed    Follow-up with PCP 3 days and neuro in 1 month.    Discharge process took 36 minutes.    Pending Labs at Discharge:       The ASCVD Risk score (Steve BUITRAGO, et al., 2019) failed to calculate for the following reasons:    Risk score cannot be calculated because patient has a medical history suggesting prior/existing ASCVD     Signed:  Shay Avila Jr, MD  2/4/2025  14:18 EST

## 2025-02-04 NOTE — PLAN OF CARE
Goal Outcome Evaluation:  Plan of Care Reviewed With: patient        Progress: improving  Outcome Evaluation: Patient new admit from ER this shift. NIH 0. VSS. Medicated for neck pain x1 with relief. MRI completed. Patient rested well overnight. Call light within reach.

## 2025-02-04 NOTE — OUTREACH NOTE
Prep Survey      Flowsheet Row Responses   [facility], [place], operated by [facility] patient discharged from? [facility]   Is LACE score < 7 ? Yes   Eligibility [facility]   Date of Admission 02/03/25   Date of Discharge 02/04/25   Discharge diagnosis TIA (transient ischemic attack)   Does the patient have one of the following disease processes/diagnoses(primary or secondary)? Stroke   Prep survey completed? Yes            Rosa THURMAN - Registered Nurse

## 2025-02-04 NOTE — PROGRESS NOTES
"    [facility] HOSPITALIST PROGRESS NOTE    S: patient seen and examined.  Complains of neck pain. No CP or SOB    O: /99 (BP Location: Left arm, Patient Position: Lying)   Pulse 66   Temp 97.9 °F (36.6 °C) (Oral)   Resp 16   Ht 182.9 cm (72\")   Wt 99.2 kg (218 lb 11.1 oz)   SpO2 100%   BMI 29.66 kg/m²     GENERAL:  age appropriate, NAD  EARS,NOSE, MOUTH, THROAT:  MMM, good dentition  EYES: PERRLA, EOMI  CV:  NL S1/S2  RESPIRATORY:  CTAB no w/c/r  GI:  S/NT/ND +BS  SKIN: No rash or lesions. No nodules.  INT: No edema. No joint deformity.  PSYCH:  Normal affect, A+O x 3  NEURO: Alert and oriented, grossly nonfocal.    Scheduled Meds:aspirin, 81 mg, Oral, Daily   Or  aspirin, 300 mg, Rectal, Daily  atorvastatin, 40 mg, Oral, Nightly  DULoxetine, 60 mg, Oral, BID  enoxaparin, 40 mg, Subcutaneous, Daily  gabapentin, 800 mg, Oral, Q8H  ketorolac, 15 mg, Intravenous, Once  levothyroxine, 50 mcg, Oral, Daily  sodium chloride, 10 mL, Intravenous, Q12H      Continuous Infusions:   PRN Meds:.  acetaminophen    senna-docusate sodium **AND** polyethylene glycol **AND** bisacodyl **AND** bisacodyl    ondansetron    sodium chloride    sodium chloride    sodium chloride    Labs: Laboratory information reviewed and reconciled.    Results from last 7 days   Lab Units 02/03/25  1623   WBC 10*3/mm3 3.96         Results from last 7 days   Lab Units 02/03/25  1623   CO2 mmol/L 18.2*   BUN mg/dL 14   CREATININE mg/dL 1.11   GLUCOSE mg/dL 68         Results from last 7 days   Lab Units 02/03/25  1623   INR  0.97         Imaging (last 24 hours):    MRI Brain Without Contrast    Result Date: 2/3/2025  Impression: No findings of restricted diffusion to suggest acute or subacute infarct. Normal MRI appearance of the brain. Electronically Signed: Lucinda Bryan MD  2/3/2025 10:06 PM EST  Workstation ID: [id number]    CT Angiogram Head w AI Analysis of LVO    Result Date: 2/3/2025  Impression: No evidence of significant " atherosclerotic narrowing in the neck. Intracranial atherosclerotic changes are noted as above, without evidence of associated high-grade stenosis, large vessel occlusion or aneurysm. Electronically Signed: Andrea Nichols MD  2/3/2025 5:01 PM EST  Workstation ID: [id number]    CT Angiogram Neck    Result Date: 2/3/2025  Impression: No evidence of significant atherosclerotic narrowing in the neck. Intracranial atherosclerotic changes are noted as above, without evidence of associated high-grade stenosis, large vessel occlusion or aneurysm. Electronically Signed: Andrea Nichols MD  2/3/2025 5:01 PM EST  Workstation ID: [id number]    XR Chest 1 View    Result Date: 2/3/2025  Impression: Stable chest, no acute process identified Electronically Signed: Shabbir Gallegos MD  2/3/2025 4:35 PM EST  Workstation ID: [id number]    CT CEREBRAL PERFUSION WITH & WITHOUT CONTRAST    Result Date: 2/3/2025  Impression: No CT perfusion evidence of acute infarct or ischemia. Electronically Signed: Gilberto Dominguez DO  2/3/2025 4:28 PM EST  Workstation ID: [id number]    CT Head Without Contrast Stroke Protocol    Result Date: 2/3/2025  No acute intracranial abnormality by head CT. Brain MRI is more sensitive to evaluate for acute or subacute infarcts and to evaluate for intracranial metastatic disease. Electronically Signed: Lucinda Bryan MD  2/3/2025 3:38 PM EST  Workstation ID: [id number]    Assessment/plan:  # TIA?  - MRI negative  - neuro following  - tele  - neurochecks  - ASA/statin    # HTN  - resume home meds once ok with neuro    # DM2  - ISS    # Afib?, not on AC  - I can not find an  EKG or documentation to support this   - may need AC for stroke ppx      DVT Prophylaxis:lovenox  Disposition: Continue to monitor  Discharge disposition: Home when stable  Prognosis: fair    Shay Avila Jr, MD  [facility] [place] Hospitalist  02/04/25  08:30 EST

## 2025-02-04 NOTE — H&P
[facility]   HISTORY AND PHYSICAL    Patient Name: Campos Hale  : 1970  MRN: [id number]  Primary Care Physician:  Annie Garza MD  Date of admission: 2/3/2025    Subjective   Subjective     Chief Complaint: Syncope, right-sided weakness    HPI:    Campos Hale is a 54 y.o. male with past medical history of diabetes, hypertension, hyperlipidemia, A-fib not on AC, alcohol abuse in remission, and GERD presented to the ED from PCPs office after a syncopal episode followed by right-sided weakness.  Patient states that he went from his alcohol rehab center to a routine visit to primary care and while on the patient patient passed out.  Afterwards patient was slurring his words and had a right facial weakness as well as right upper extremity weakness.  Patient was then brought by EMS to the ED for further evaluation.  When seen patient stated that he did not remember the events but did admit to not feeling well for the last day or so.  In the ED patient's vitals were all within normal limits on room air.  Labs showed significant anemia with hemoglobin of 8.5 with remaining labs being unremarkable.  EKG showed sinus rhythm with no significant ST changes.  CT head without contrast was negative for any acute intracranial abnormalities, CTA of the head and neck was negative for any significant stenosis or large vessel occlusion and perfusion study was also unremarkable.  When seen patient was at baseline with no slurred speech or weakness but did complain of some numbness to the right side of his face.  He also confirmed that he does not remember much after being in the doctor's office.  When asked he denied any recent fevers, chills, chest pain, palpitation, shortness of breath, cough, abdominal pain, nausea, vomiting, diarrhea, constipation, dysuria, hematuria, hematochezia, melena, or anxiety.  Patient admitted for further evaluation and treatment.    Review of Systems   As per HPI    Personal History      Past Medical History:   Diagnosis Date    A-fib     Arthritis     Diabetes mellitus     Disease of thyroid gland     GERD (gastroesophageal reflux disease)     Hernia     Hyperlipidemia     Hypertension        Past Surgical History:   Procedure Laterality Date    GASTRIC SLEEVE LAPAROSCOPIC      NISSEN FUNDOPLICATION         Family History: family history includes Cancer in his brother; Diabetes in his father; Hyperlipidemia in his father; Hypertension in his father; No Known Problems in his mother and sister. Otherwise pertinent FHx was reviewed and not pertinent to current issue.    Social History:  reports that he has never smoked. He has never used smokeless tobacco. He reports current alcohol use. He reports that he does not use drugs.    Home Medications:  DULoxetine, Diclofenac Sodium, Glucagon, (Ibuprofen 3 %, Gabapentin 10 %, Baclofen 2 %, lidocaine 4 %, Ketamine HCl 4 %), acetaminophen, aspirin, atorvastatin, gabapentin, hydrOXYzine, ibuprofen, isosorbide dinitrate, levothyroxine, metFORMIN ER, metoprolol tartrate, mirtazapine, tiZANidine, and vitamin D      Allergies:  No Known Allergies    Objective   Objective     Vitals:   Temp:  [97.5 °F (36.4 °C)-98.6 °F (37 °C)] 97.5 °F (36.4 °C)  Heart Rate:  [65-79] 66  Resp:  [15-18] 16  BP: ()/() 163/98  Physical Exam    Constitutional: Awake, alert   Eyes: PERRLA, sclerae anicteric, no conjunctival injection   HENT: NCAT, mucous membranes moist   Neck: Supple, no thyromegaly, no lymphadenopathy, trachea midline   Respiratory: Clear to auscultation bilaterally, nonlabored respirations    Cardiovascular: RRR, no murmurs, rubs, or gallops, palpable pedal pulses bilaterally   Gastrointestinal: Positive bowel sounds, soft, nontender, nondistended   Musculoskeletal: No bilateral ankle edema, no clubbing or cyanosis to extremities   Psychiatric: Appropriate affect, cooperative   Neurologic: Oriented x 3, strength symmetric in all extremities, Cranial  Nerves grossly intact to confrontation, speech clear   Skin: No rashes     Result Review    Result Review:  I have personally reviewed the results from the time of this admission to 2/3/2025 20:26 EST and agree with these findings:  [x]  Laboratory list / accordion  []  Microbiology  [x]  Radiology  [x]  EKG/Telemetry   []  Cardiology/Vascular   []  Pathology  []  Old records  []  Other:  Most notable findings include: anemia with hemoglobin of 8.5 with remaining labs being unremarkable.  EKG showed sinus rhythm with no significant ST changes.  CT head without contrast was negative for any acute intracranial abnormalities, CTA of the head and neck was negative for any significant stenosis or large vessel occlusion and perfusion study was also unremarkab      Assessment & Plan   Assessment / Plan     Brief Patient Summary:  Campos Hale is a 54 y.o. male with past medical history of diabetes, hypertension, hyperlipidemia, A-fib not on AC, alcohol abuse in remission, and GERD presented to the ED from PCPs office after a syncopal episode followed by right-sided weakness.    Active Hospital Problems:  Active Hospital Problems    Diagnosis     **TIA (transient ischemic attack)     HTN (hypertension)     Diabetes mellitus type 2 with neurological manifestations      Plan:     TIA/CVA Rule out  -Admit to telemetry   -Symptoms resolved when seen  -CT of the head negative for any acute findings  -CT of the head and neck negative for any significant stenosis  -MRI ordered and pending  -Neurochecks  -Aspirin statin  -PT OT  -Bedside swallow, diet if passed  -Echo with bubble study  -Neurology consulted  -Supportive care    HTN  -Currently well controlled  -PRN BP meds  -Resume home meds when available  -Titrate if needed    Diabetes  -Insulin sliding scale  -Levemir at bedtime  -Titrate as needed      A-fib not on AC  Alcohol abuse    GI ppx  DVT ppx          VTE Prophylaxis:  Pharmacologic VTE prophylaxis orders are  present.        CODE STATUS:    Level Of Support Discussed With: Patient  Code Status (Patient has no pulse and is not breathing): CPR (Attempt to Resuscitate)  Medical Interventions (Patient has pulse or is breathing): Full Support    Admission Status:  I believe this patient meets observation status.      Electronically signed by Doug Marquez MD, 02/03/25, 8:26 PM EST.

## 2025-02-04 NOTE — THERAPY EVALUATION
Acute Care - Physical Therapy Initial Evaluation   Carrie     Patient Name: Campos Hale  : 1970  MRN: [id number]  Today's Date: 2025      Visit Dx:     ICD-10-CM ICD-9-CM   1. Cerebrovascular accident (CVA), unspecified mechanism  I63.9 434.91   2. Dysphagia, unspecified type  R13.10 787.20   3. Difficulty walking  R26.2 719.7     Patient Active Problem List   Diagnosis    Dyspnea    Bronchitis    Pleuritic chest pain    Diabetes mellitus type 2 with neurological manifestations    Ischemic stroke    TIA (transient ischemic attack)    HTN (hypertension)     Past Medical History:   Diagnosis Date    A-fib     Arthritis     Diabetes mellitus     Disease of thyroid gland     GERD (gastroesophageal reflux disease)     Hernia     Hyperlipidemia     Hypertension      Past Surgical History:   Procedure Laterality Date    GASTRIC SLEEVE LAPAROSCOPIC      NISSEN FUNDOPLICATION       PT Assessment (Last 12 Hours)       PT Evaluation and Treatment       Row Name 25 1300          Physical Therapy Time and Intention    Subjective Information no complaints  -CS     Document Type evaluation  -CS     Mode of Treatment individual therapy;physical therapy  -CS     Patient Effort good  -CS     Symptoms Noted During/After Treatment none  -CS       Row Name 25 1300          General Information    Patient Profile Reviewed yes  -CS     Patient Observations alert;agree to therapy;cooperative  -CS     Prior Level of Function independent:;all household mobility;gait;transfer;bed mobility;ADL's  -CS     Equipment Currently Used at Home none  -CS       Row Name 25 1300          Living Environment    Current Living Arrangements home  -CS     People in Home facility resident  [facility]  -     Primary Care Provided by self  -CS       Row Name 25 1300          Pain    Pretreatment Pain Rating 0/10 - no pain  -CS     Posttreatment Pain Rating 0/10 - no pain  -CS       Row Name 25 1300           Cognition    Orientation Status (Cognition) oriented x 3  -CS       Row Name 02/04/25 1300          Range of Motion Comprehensive    General Range of Motion bilateral lower extremity ROM WFL  -CS       Row Name 02/04/25 1300          Strength Comprehensive (MMT)    General Manual Muscle Testing (MMT) Assessment no strength deficits identified  -       Row Name 02/04/25 1300          Bed Mobility    Bed Mobility bed mobility (all) activities  -CS     All Activities, [place] (Bed Mobility) independent  -       Row Name 02/04/25 1300          Transfers    Comment, (Transfers) Pt complete all functional transfers independently  -       Row Name 02/04/25 1300          Gait/Stairs (Locomotion)    Gait/Stairs Locomotion gait/ambulation independence  -CS     [place] Level (Gait) independent  -CS     Assistive Device (Gait) --  no AD  -CS     Patient was able to Ambulate yes  -CS     Distance in Feet (Gait) 200  -CS     Pattern (Gait) step-through  -       Row Name 02/04/25 1300          Safety Issues/Impairments Affecting Functional Mobility    Impairments Affecting Function (Mobility) --  none identified  -       Row Name 02/04/25 1300          Balance    Balance Assessment standing dynamic balance  -CS     Dynamic Standing Balance independent  -CS     Position/Device Used, Standing Balance unsupported  -       Row Name 02/04/25 1300          Plan of Care Review    Plan of Care Reviewed With patient  -CS     Progress no change  -CS     Outcome Evaluation Pt presents with no physical limitations that impede his ability to return home independently or with assist from family as needed. Pt was encouraged to continue ambulating as tolerated while here at the hospital. He will be discharged from PT caseload.  -       Row Name 02/04/25 1300          Positioning and Restraints    Pre-Treatment Position in bed  -CS     Post Treatment Position bed  -CS     In Bed fowlers;call light within reach  -CS        Row Name 02/04/25 1300          Therapy Assessment/Plan (PT)    Criteria for Skilled Interventions Met (PT) no problems identified which require skilled intervention  -CS     Therapy Frequency (PT) evaluation only  -CS       Row Name 02/04/25 1300          PT Evaluation Complexity    History, PT Evaluation Complexity no personal factors and/or comorbidities  -CS     Examination of Body Systems (PT Eval Complexity) total of 4 or more elements  -CS     Clinical Presentation (PT Evaluation Complexity) stable  -CS     Clinical Decision Making (PT Evaluation Complexity) low complexity  -CS     Overall Complexity (PT Evaluation Complexity) low complexity  -CS       Row Name 02/04/25 1300          Therapy Plan Review/Discharge Plan (PT)    Therapy Plan Review (PT) evaluation/treatment results reviewed;patient  -CS       Row Name 02/04/25 1300          Physical Therapy Goals    Problem Specific Goal Selection (PT) problem specific goal 1, PT  -CS       Row Name 02/04/25 1300          Problem Specific Goal 1 (PT)    Problem Specific Goal 1 (PT) Complete PT evaluation  -CS     Time Frame (Problem Specific Goal 1, PT) by discharge  -CS     Progress/Outcome (Problem Specific Goal 1, PT) goal met  -CS               User Key  (r) = Recorded By, (t) = Taken By, (c) = Cosigned By      Initials Name Provider Type    CS Samantha Mascorro, PT Physical Therapist                    Physical Therapy Education        No education to display                  PT Recommendation and Plan  Anticipated Discharge Disposition (PT): home  Therapy Frequency (PT): evaluation only  Plan of Care Reviewed With: patient  Progress: no change  Outcome Evaluation: Pt presents with no physical limitations that impede his ability to return home independently or with assist from family as needed. Pt was encouraged to continue ambulating as tolerated while here at the hospital. He will be discharged from PT caseload.   Outcome Measures       Row Name 02/04/25  1300             How much help from another person do you currently need...    Turning from your back to your side while in flat bed without using bedrails? 4  -CS      Moving from lying on back to sitting on the side of a flat bed without bedrails? 4  -CS      Moving to and from a bed to a chair (including a wheelchair)? 4  -CS      Standing up from a chair using your arms (e.g., wheelchair, bedside chair)? 4  -CS      Climbing 3-5 steps with a railing? 4  -CS      To walk in hospital room? 4  -CS      AM-PAC 6 Clicks Score (PT) 24  -CS         Functional Assessment    Outcome Measure Options AM-PAC 6 Clicks Basic Mobility (PT)  -CS                User Key  (r) = Recorded By, (t) = Taken By, (c) = Cosigned By      Initials Name Provider Type    Samantha Malone PT Physical Therapist                     Time Calculation:    PT Charges       Row Name 02/04/25 1354             Time Calculation    PT Received On 02/04/25  -CS         Untimed Charges    PT Eval/Re-eval Minutes 25  -CS         Total Minutes    Untimed Charges Total Minutes 25  -CS       Total Minutes 25  -CS                User Key  (r) = Recorded By, (t) = Taken By, (c) = Cosigned By      Initials Name Provider Type    Samantha Malone PT Physical Therapist                  Therapy Charges for Today       Code Description Service Date Service Provider Modifiers Qty    90353672702 HC PT EVAL LOW COMPLEXITY 2 2/4/2025 Samantha Mascorro PT GP 1            PT G-Codes  Outcome Measure Options: AM-PAC 6 Clicks Basic Mobility (PT)  AM-PAC 6 Clicks Score (PT): 24    Samantha Mascorro PT  2/4/2025

## 2025-02-04 NOTE — CONSULTS
Consult received per Stroke Protocol. Patient with a noted DM diagnosis, with a current HbA1c of 7%, and an estimated average glucose of 154 mg/dl. Patient's home regimen consists of Metformin  mg daily.

## 2025-02-04 NOTE — THERAPY EVALUATION
Acute Care - Speech Language Pathology   Swallow Initial Evaluation BRAYDON Butler     Patient Name: Campso Hale  : 1970  MRN: [id number]  Today's Date: 2025               Admit Date: 2/3/2025    Visit Dx:     ICD-10-CM ICD-9-CM   1. Cerebrovascular accident (CVA), unspecified mechanism  I63.9 434.91   2. Dysphagia, unspecified type  R13.10 787.20     Patient Active Problem List   Diagnosis    Dyspnea    Bronchitis    Pleuritic chest pain    Diabetes mellitus type 2 with neurological manifestations    Ischemic stroke    TIA (transient ischemic attack)    HTN (hypertension)     Past Medical History:   Diagnosis Date    A-fib     Arthritis     Diabetes mellitus     Disease of thyroid gland     GERD (gastroesophageal reflux disease)     Hernia     Hyperlipidemia     Hypertension      Past Surgical History:   Procedure Laterality Date    GASTRIC SLEEVE LAPAROSCOPIC      NISSEN FUNDOPLICATION           Inpatient Speech Pathology Dysphagia Evaluation        PAIN SCALE: None indicated.    PRECAUTIONS/CONTRAINDICATIONS: Standard    SUSPECTED ABUSE/NEGLECT/EXPLOITATION: None indicated.    SOCIAL/PSYCHOLOGICAL NEEDS/BARRIERS: None indicated.    PAST SOCIAL HISTORY: 54-year-old male living at a facility    PRIOR FUNCTION: Independent    PATIENT GOALS/EXPECTATIONS: Return home    HISTORY: 54-year-old male with the above diagnosis is referred for speech therapy evaluation due to possible stroke.  No previous speech pathology services are reported.    CURRENT DIET LEVEL: Regular, thin    OBJECTIVE:    TEST ADMINISTERED: Clinical dysphagia evaluation    COGNITION/SAFETY AWARENESS:  patient followed directions and answered questions without difficulty.    BEHAVIORAL OBSERVATIONS: Alert and cooperative    ORAL MOTOR EXAM:Within functional limits    VOICE QUALITY: Adequate    REFLEX EXAM: Deferred    POSTURE: Sitting upright in bed    FEEDING/SWALLOWING FUNCTION: Assessed with  thin liquids, puréed solids, crunchy  solid.    CLINICAL OBSERVATIONS:  Thin liquid by cup and by straw appeared timely with vocal quality remaining clear to cervical auscultation.  Purée solid with swallow completed with laryngeal elevation noted to palpation.  Crunchy solid with adequate chewing followed by swallow completed clearing the oral cavity.    DYSPHAGIA CRITERIA: Swallow appears functional for nutritional needs.  No overt clinical signs or symptoms of aspiration were noted at the bedside.    FUNCTIONAL ASSESSMENT INSTRUMENT: Patient currently scored a level 7 of 7 on Functional Communication Measures for swallowing indicating a 0% limitation in function.    ASSESSMENT/ PLAN OF CARE:  No direct speech therapy is recommended at this time for dysphagia. Recommend rereferral should patient demonstrate change in status.    RECOMMENDATIONS:   1.   DIET: Regular solids, thin liquid.    2.  POSITION: Positioning fully upright for all p.o. intake and 30 minutes following.    3.  COMPENSATORY STRATEGIES: Alternate small bites and small sips of solids and liquids at a slow rate.    Pt/responsible party agrees with plan of care and has been informed of all alternatives, risks and benefits.                            Anticipated Discharge Disposition (SLP): home (02/04/25 0919)                                                               EDUCATION  The patient has been educated in the following areas:   Modified Diet Instruction.                Time Calculation:    Time Calculation- SLP       Row Name 02/04/25 0920             Time Calculation- SLP    SLP Start Time 0700  -TB      SLP Stop Time 0800  -TB      SLP Time Calculation (min) 60 min  -TB      SLP Received On 02/04/25  -TB         Untimed Charges    SLP Eval/Re-eval  ST Eval Oral Pharyng Swallow - 33710  -TB      89243-GM Eval Oral Pharyng Swallow Minutes 60  -TB         Total Minutes    Untimed Charges Total Minutes 60  -TB       Total Minutes 60  -TB                User Key  (r) = Recorded  By, (t) = Taken By, (c) = Cosigned By      Initials Name Provider Type    TB Valery Amaral SLP Speech and Language Pathologist                    Therapy Charges for Today       Code Description Service Date Service Provider Modifiers Qty    33613939939  ST EVAL ORAL PHARYNG SWALLOW 4 2/4/2025 Valery Amaral SLP GN 1                 OTIS Alexandra  2/4/2025

## 2025-02-04 NOTE — PLAN OF CARE
Goal Outcome Evaluation:  Plan of Care Reviewed With: patient        Progress: improving  Outcome Evaluation: Patient deemed medically stable for discharge per Dr. Avila. Patient is discharging home.

## 2025-02-04 NOTE — THERAPY EVALUATION
Patient Name: Campos Hale  : 1970    MRN: [id number]                              Today's Date: 2025       Admit Date: 2/3/2025    Visit Dx:     ICD-10-CM ICD-9-CM   1. Cerebrovascular accident (CVA), unspecified mechanism  I63.9 434.91   2. Dysphagia, unspecified type  R13.10 787.20   3. Difficulty walking  R26.2 719.7   4. Pleuritic chest pain  R07.81 786.52     Patient Active Problem List   Diagnosis    Dyspnea    Bronchitis    Pleuritic chest pain    Diabetes mellitus type 2 with neurological manifestations    Ischemic stroke    TIA (transient ischemic attack)    HTN (hypertension)     Past Medical History:   Diagnosis Date    A-fib     Arthritis     Diabetes mellitus     Disease of thyroid gland     GERD (gastroesophageal reflux disease)     Hernia     Hyperlipidemia     Hypertension      Past Surgical History:   Procedure Laterality Date    GASTRIC SLEEVE LAPAROSCOPIC      NISSEN FUNDOPLICATION        General Information       Row Name 25 1549          OT Time and Intention    Document Type evaluation  -SC     Mode of Treatment individual therapy;occupational therapy  -SC     Patient Effort good  -SC       Row Name 25 1549          General Information    Patient Profile Reviewed yes  -SC     Prior Level of Function independent:  PLOF is independence in the community. Patient does not use an assistive device for mobility and does not require home oxygen. He stands to shower in a tub/shower combo.  -SC     Existing Precautions/Restrictions no known precautions/restrictions  -SC     Barriers to Rehab none identified  -SC       Row Name 25 1549          Occupational Profile    Reason for Services/Referral (Occupational Profile) Patient is a 54 year-old male admitted to [facility] on 2/3/2025.  OT consulted due to a potential decline in function and mobility.  No previous OT services for current condition.  -SC     Patient Goals (Occupational Profile) Patient wishes to return home at prior  level of function  -SC       Row Name 02/04/25 1549          Living Environment    People in Home alone  -SC       Row Name 02/04/25 1549          Home Main Entrance    Number of Stairs, Main Entrance two  -SC     Stair Railings, Main Entrance none  -SC       Row Name 02/04/25 1549          Stairs Within Home, Primary    Number of Stairs, Within Home, Primary none  -SC     Stair Railings, Within Home, Primary none  -SC       Row Name 02/04/25 1549          Cognition    Orientation Status (Cognition) oriented x 4  -SC               User Key  (r) = Recorded By, (t) = Taken By, (c) = Cosigned By      Initials Name Provider Type    SC Holley Rodríguez OT Occupational Therapist                     Mobility/ADL's       Row Name 02/04/25 1551          Bed Mobility    Bed Mobility bed mobility (all) activities  -SC     All Activities, [place] (Bed Mobility) independent  -SC       Row Name 02/04/25 1551          Transfers    Transfers bed-chair transfer  Patient demonstrates independence for all functional transfers including bed to chair and toilet.  -[facility] Name 02/04/25 1551          Functional Mobility    Functional Mobility- Comment Patient ambulated from bed to bathroom at an independent level.  No balance issues were noted  -SC       Row Name 02/04/25 1551          Activities of Daily Living    BADL Assessment/Intervention --  Patient currently independent in all basic ADLs  -SC               User Key  (r) = Recorded By, (t) = Taken By, (c) = Cosigned By      Initials Name Provider Type    SC Holley Rodríguez OT Occupational Therapist                   Obj/Interventions       Row Name 02/04/25 1552          Sensory Assessment (Somatosensory)    Sensory Assessment (Somatosensory) sensation intact  -SC       Row Name 02/04/25 1552          Vision Assessment/Intervention    Visual Impairment/Limitations WFL  -SC       Row Name 02/04/25 1552          Range of Motion Comprehensive    General Range of Motion no range  of motion deficits identified  -[facility] Name 02/04/25 1552          Strength Comprehensive (MMT)    General Manual Muscle Testing (MMT) Assessment no strength deficits identified  -[facility] Name 02/04/25 1552          Motor Skills    Motor Skills coordination;functional endurance  -SC     Coordination WFL  -SC     Functional Endurance Good  -[facility] Name 02/04/25 1552          Balance    Balance Assessment standing static balance;standing dynamic balance  -SC     Static Standing Balance independent  -SC     Dynamic Standing Balance independent  -SC               User Key  (r) = Recorded By, (t) = Taken By, (c) = Cosigned By      Initials Name Provider Type    Holley Soliman OT Occupational Therapist                   Goals/Plan    No documentation.                  Clinical Impression       [facility] Name 02/04/25 1553          Pain Assessment    Pretreatment Pain Rating 0/10 - no pain  -SC     Posttreatment Pain Rating 0/10 - no pain  -SC       Row Name 02/04/25 1553          Plan of Care Review    Plan of Care Reviewed With patient  -SC     Progress no change  Evaluation completed  -SC     Outcome Evaluation Patient does not demonstrate any deficits that will prevent him from safely returning home to complete his ADL/IADL routine. Occupational therapy plans to discharge services.  Please reconsult if new needs arise.  -[facility] Name 02/04/25 1553          Therapy Assessment/Plan (OT)    Rehab Potential (OT) good  -SC     Therapy Frequency (OT) evaluation only  -SC       Row Name 02/04/25 1553          Positioning and Restraints    Pre-Treatment Position in bed  -SC     Post Treatment Position bed  -SC     In Bed call light within reach;encouraged to call for assist  -SC               User Key  (r) = Recorded By, (t) = Taken By, (c) = Cosigned By      Initials Name Provider Type    Holley Soliman OT Occupational Therapist                   Outcome Measures       [facility] Name 02/04/25 1554          How  much help from another is currently needed...    Putting on and taking off regular lower body clothing? 4  -SC     Bathing (including washing, rinsing, and drying) 4  -SC     Toileting (which includes using toilet bed pan or urinal) 4  -SC     Putting on and taking off regular upper body clothing 4  -SC     Taking care of personal grooming (such as brushing teeth) 4  -SC     Eating meals 4  -SC     AM-PAC 6 Clicks Score (OT) 24  -SC       Row Name 02/04/25 1300 02/04/25 0725       How much help from another person do you currently need...    Turning from your back to your side while in flat bed without using bedrails? 4  -CS 4  -CR    Moving from lying on back to sitting on the side of a flat bed without bedrails? 4  -CS 4  -CR    Moving to and from a bed to a chair (including a wheelchair)? 4  -CS 4  -CR    Standing up from a chair using your arms (e.g., wheelchair, bedside chair)? 4  -CS 4  -CR    Climbing 3-5 steps with a railing? 4  -CS 4  -CR    To walk in hospital room? 4  -CS 4  -CR    AM-PAC 6 Clicks Score (PT) 24  -CS 24  -CR    Highest Level of Mobility Goal 8 --> Walked 250 feet or more  -CS 8 --> Walked 250 feet or more  -CR      Row Name 02/04/25 1554 02/04/25 1300       Functional Assessment    Outcome Measure Options AM-PAC 6 Clicks Daily Activity (OT);Optimal Instrument  -SC AM-PAC 6 Clicks Basic Mobility (PT)  -CS      Row Name 02/04/25 1554          Optimal Instrument    Optimal Instrument Optimal - 3  -SC     Bending/Stooping 1  -SC     Standing 1  -SC     Reaching 1  -SC     From the list, choose the 3 activities you would most like to be able to do without any difficulty Standing;Reaching;Bending/stooping  -SC     Total Score Optimal - 3 3  -SC               User Key  (r) = Recorded By, (t) = Taken By, (c) = Cosigned By      Initials Name Provider Type    CS Samantha Mascorro, PT Physical Therapist    CR Reyes, Celina, RN Registered Nurse    Holley Soliman, OT Occupational Therapist                     Occupational Therapy Education       Title: PT OT SLP Therapies (Done)       Topic: Occupational Therapy (Done)       Point: ADL training (Done)       Description:   Instruct learner(s) on proper safety adaptation and remediation techniques during self care or transfers.   Instruct in proper use of assistive devices.                  Learning Progress Summary            Patient Acceptance, E, VU by SC at 2/4/2025 1554                      Point: Home exercise program (Done)       Description:   Instruct learner(s) on appropriate technique for monitoring, assisting and/or progressing therapeutic exercises/activities.                  Learning Progress Summary            Patient Acceptance, E, VU by SC at 2/4/2025 1554                      Point: Precautions (Done)       Description:   Instruct learner(s) on prescribed precautions during self-care and functional transfers.                  Learning Progress Summary            Patient Acceptance, E, VU by SC at 2/4/2025 1554                      Point: Body mechanics (Done)       Description:   Instruct learner(s) on proper positioning and spine alignment during self-care, functional mobility activities and/or exercises.                  Learning Progress Summary            Patient Acceptance, E, VU by SC at 2/4/2025 1554                                      User Key       Initials Effective Dates Name Provider Type Discipline    SC 02/05/24 -  Holley Rodríguez OT Occupational Therapist OT                  OT Recommendation and Plan  Therapy Frequency (OT): evaluation only  Plan of Care Review  Plan of Care Reviewed With: patient  Progress: no change (Evaluation completed)  Outcome Evaluation: Patient does not demonstrate any deficits that will prevent him from safely returning home to complete his ADL/IADL routine. Occupational therapy plans to discharge services.  Please reconsult if new needs arise.     Time Calculation:   Evaluation Complexity (OT)  Review  Occupational Profile/Medical/Therapy History Complexity: brief/low complexity  Assessment, Occupational Performance/Identification of Deficit Complexity: 1-3 performance deficits  Clinical Decision Making Complexity (OT): problem focused assessment/low complexity  Overall Complexity of Evaluation (OT): low complexity     Time Calculation- OT       Row Name 02/04/25 1555             Time Calculation- OT    OT Received On 02/04/25  -SC         Untimed Charges    OT Eval/Re-eval Minutes 26  -SC         Total Minutes    Untimed Charges Total Minutes 26  -SC       Total Minutes 26  -SC                User Key  (r) = Recorded By, (t) = Taken By, (c) = Cosigned By      Initials Name Provider Type    SC Holley Rodríguez OT Occupational Therapist                  Therapy Charges for Today       Code Description Service Date Service Provider Modifiers Qty    [id number]  OT EVAL LOW COMPLEXITY 2 2/4/2025 Holley Rodríguez OT GO 1                 Holley Rodríguez OT  2/4/2025

## 2025-02-04 NOTE — PROCEDURES
This is an inpatient,   Digitally recorded multi-montage EEG with leads placed according to the international 10/20 system  Photic stimulation was done  Hyperventilation was not done    With the patient fully aroused, though it was for a brief time, he had some alpha activity but no good alpha rhythm  Also time he was drowsy or asleep    Mostly asleep with spindles  Occasionally there was asymmetry with rhythmic theta activity more so in the frontotemporal regions on the left side    Throughout the record there was some subtle changes seen, though not classic phase reversing interictal spike but there was some subtle phase reversing between F7 and T3, between C3 and P3 and rarely on the other side  No clinical events    Photic stimulation was attempted in intermittent stepwise pattern up to the flash frequencies of 30 Hz but I did not see any driving, asymmetry or paroxysmal activity    Impression:  This is an adult awake and drowsy EEG which shows some subtle changes on the left side.  Not classic interictal discharges but I am concerned about some phase reversing on the left side so please clinically correlate with the patient history and imaging

## 2025-02-04 NOTE — CONSULTS
"Primary Care Provider: Provider, No Known     Consult requested by: Admitting team    Reason for Consultation: Neurological evaluation /syncope/seizure    History taken from: patient chart RN    Chief complaint: Passing out episode       SUBJECTIVE:    History of present illness: Background per H&P: Campos Hale is a 54 y.o. male who was evaluated in room 212 bed 2 at [facility]    Source of information is the patient and the medical records  The patient's nurse was present throughout  He is a very pleasant 54-year-old gentleman who is visiting his doctor and as soon as he was checking in he felt \"dizzy \"and the next thing he knew EMS was coming through the door    So he was down for quite some time  He did report slurring of speech and right-sided weakness    So he was worked up for stroke and he was not tenecteplase or intervention candidate    His CTA and MRI were unremarkable    He has alcohol related issues and apparently is in remission    He did have EEG which was showing subtle abnormalities on left side    He is doing great right now    Before this he never had any seizures or passing out episode or unexplained injuries or loss of bowel or bladder control or falls or time lapses      Nothing suggesting CNS infection      Vital signs stable, though the blood pressure is very variable, as high as 178 systolic and as low as 93 systolic  Hemoglobin A1c 7, LDL 44  And MRI brain negative    CTA negative  On aspirin and Lipitor at home    EEG abnormal with subtle changes on the left side    [facility]        Patient Name:   Campos Hale  YOB: 1970  Identification Number:   MRN - [id number]  Date of Service:   02/03/2025 15:23:58     Diagnosis:        R55 - Syncope (blackout, fainting, vasovagal attack)     Impression:       55 yo male w r sided numbness drift of r arm - heavy feeling r leg w syncopal episode earlier at dr - visit at [facility] 2 " presently- not entirely clear timeline for TNK and fall / CT head - for hemorrhage CT stable for ASA IN ED      Likely not tnk candidate but doing advance diagnostics to exclude left hemispheric ischemia ctp= no ischemia present cta no lvo nih 2      Low nih as discussed w pt and - imaging studies here where risk may outweigh benefit w fall and neck trauma in c collar presently at 4 h since syncopal event at dr collins sx hat came prior to visit      mri brain best for exclusion of small vessel ischemia / ctp entirely -      =Full work up would include detailed longer term cardiac monitoring, cardiac echo, full stroke and serologic risk review including UA thyroid studies etc/ if any lapses on consciousness not explained by metabolci causes suggest EEG review - no lu sz activity noted or recorded      Our recommendations are outlined below.     Recommendations:           Stroke/Telemetry Floor        Neuro Checks        Bedside Swallow Eval        DVT Prophylaxis        IV Fluids, Normal Saline        Head of Bed 30 Degrees        Euglycemia and Avoid Hyperthermia (PRN Acetaminophen)     Sign Out:        Discussed with Emergency Department Provider           ------------------------------------------------------------------------------     Advanced Imaging:  CTA Head and Neck Completed.     CTP Completed.     LVO:No     Patient in not a candidate for MARICEL        Metrics:  Last Known Well: 02/03/2025 12:00:35  Dispatch Time: 02/03/2025 15:22:38  Arrival Time: 02/03/2025 15:25:28  Initial Response Time: 02/03/2025 15:27:27  Symptoms: R sided numbness / weakness was reason pt went to  at 1200pm - then at dr had syncopal episode - unclear LOC not well documented byt sx go back prior to 12 entered dr at 12 per ems and ER info related .  Initial patient interaction: 02/03/2025 15:27:35  NIHSS Assessment Completed: 02/03/2025 16:03:19  Patient is not a candidate for Thrombolytic.  Thrombolytic Medical Decision: 02/03/2025  16:03:20  Patient was not deemed candidate for Thrombolytic because of following reasons:  Stroke severity too mild (non-disabling) .     I personally Reviewed the CT Head and it Showed no hemorrhage     Primary Provider Notified of Diagnostic Impression and Management Plan on: 02/03/2025 16:04:03           ------------------------------------------------------------------------------     History of Present Illness:  Patient is a 54 year old Male.     Patient was brought by EMS for symptoms of R sided numbness / weakness was reason pt went to  at 1200pm - then at dr had syncopal episode - unclear LOC not well documented byt sx go back prior to 12 entered  at 12 per ems and ER info related .  55 yo male w r sided numbness drift of r arm - heavy feeling r leg w syncopal episode earlier at  - vist at 12     [facility] 2 presently- not entirley clear timeline for TNK and fall / CT head - for hemorrhage     LIkely not tnk candidate but doing advance diagnostics to exclude left hemispheric ischemia        Past Medical History:       Hypertension       Diabetes Mellitus       Hyperlipidemia       Atrial Fibrillation       Coronary Artery Disease       Stroke       There is no history of Covid-19       There is no history of Seizures       There is no history of Migraine Headaches       There is no history of Dementia/MCI  Other PMH:  prev mi and stroke ,gastric bypass     Medications:     No Anticoagulant use   No Antiplatelet use  Reviewed EMR for current medications     Allergies:   Reviewed     Social History:  Smoking: No  Alcohol Use: Yes  Drug Use: No     Family History:     There is no family history of premature cerebrovascular disease pertinent to this consultation     ROS :  14 Points Review of Systems was performed and was negative except mentioned in HPI.     Past Surgical History:  There Is No Surgical History Contributory To Today’s Visit           Examination:  BP(114/67), Pulse(79), Blood Glucose(220)  1A:  Level of Consciousness - Alert; keenly responsive + 0  1B: Ask Month and Age - Both Questions Right + 0  1C: Blink Eyes & Squeeze Hands - Performs Both Tasks + 0  2: Test Horizontal Extraocular Movements - Normal + 0  3: Test Visual Fields - No Visual Loss + 0  4: Test Facial Palsy (Use Grimace if Obtunded) - Normal symmetry + 0  5A: Test Left Arm Motor Drift - No Drift for 10 Seconds + 0  5B: Test Right Arm Motor Drift - No Drift for 10 Seconds + 0  6A: Test Left Leg Motor Drift - No Drift for 5 Seconds + 0  6B: Test Right Leg Motor Drift - Drift, but doesn't hit bed + 1  7: Test Limb Ataxia (FNF/Heel-Shin) - No Ataxia + 0  8: Test Sensation - Mild-Moderate Loss: Less Sharp/More Dull + 1  9: Test Language/Aphasia - Normal; No aphasia + 0  10: Test Dysarthria - Normal + 0  11: Test Extinction/Inattention - No abnormality + 0     NIHSS Score: 2        Pre-Morbid Modified [place] Scale:  1 Points = No significant disability despite symptoms; able to carry out all usual duties and activities     Spoke with : ed provider - no response x 3 attempts to ED  I reviewed the available imaging via Rapid and initiated discussion with the primary provider     This consult was conducted in real time using interactive audio and video technology. Patient was informed of the technology being used for this visit and agreed to proceed. Patient located in hospital and provider located at home/office setting.        Patient is being evaluated for possible acute neurologic impairment and high probability of imminent or life-threatening deterioration. I spent total of 35 minutes providing care to this patient, including time for face to face visit via telemedicine, review of medical records, imaging studies and discussion of findings with providers, the patient and/or family.        Dr Shabbir Carver              - Portions of the above HPI were copied from previous encounters and edited as appropriate. PMH as detailed below.     Review of  Systems   No fever chills rigors or sweats  No weight issues  No sleep problems  HEENT:  No speech problem, vision changes, facial asymmetry or pain, or neck problem  Chest: No chest pain, clubbing, cyanosis, orthopnea history of A-fib pulmonary:  No shortness of air, cough or expectoration  Abdomen:  No swelling/tension, constipation,diarrhea or pain, GERD  No genitourinary symptoms  Extremity problems as discussed  No back problem  No psychotic issues  Neurologic issues as discussed  No hematologic, dermatologic or endocrine problems, diabetes        PATIENT HISTORY:  Past Medical History:   Diagnosis Date    A-fib     Arthritis     Diabetes mellitus     Disease of thyroid gland     GERD (gastroesophageal reflux disease)     Hernia     Hyperlipidemia     Hypertension    ,   Past Surgical History:   Procedure Laterality Date    GASTRIC SLEEVE LAPAROSCOPIC      NISSEN FUNDOPLICATION     ,   Family History   Problem Relation Age of Onset    No Known Problems Mother     Diabetes Father     Hyperlipidemia Father     Hypertension Father     No Known Problems Sister     Cancer Brother    ,   Social History     Tobacco Use    Smoking status: Never    Smokeless tobacco: Never   Vaping Use    Vaping status: Every Day    Substances: Nicotine, Flavoring    Devices: Disposable   Substance Use Topics    Alcohol use: Yes    Drug use: No   ,   Prior to Admission medications    Medication Sig Start Date End Date Taking? Authorizing Provider   acetaminophen (Tylenol 8 Hour) 650 MG 8 hr tablet Take 1 tablet by mouth Every 8 (Eight) Hours As Needed for Mild Pain. 10/10/24  Yes Annie Garza MD   Aspirin EC Adult Low Dose 81 MG EC tablet Take 1 tablet by mouth Daily. 1/13/25  Yes ProviderMaddy MD   atorvastatin (Lipitor) 40 MG tablet Take 1 tablet by mouth Every Night. 11/22/24  Yes Annie Garza MD   Diclofenac Sodium (VOLTAREN) 1 % gel gel Apply 4 g topically to the appropriate area as directed 4 (Four) Times a Day.  11/8/24  Yes Donavon Betancourt MD   DULoxetine (CYMBALTA) 60 MG capsule Take 1 capsule by mouth 2 (Two) Times a Day. 11/22/24  Yes Annie Garza MD   FT Ibuprofen 200 MG tablet Take 1 tablet by mouth Every 6 (Six) Hours As Needed for Mild Pain. 12/2/24  Yes Maddy Hayes MD   gabapentin (NEURONTIN) 800 MG tablet Take 1 tablet by mouth 3 (Three) Times a Day.   Yes Maddy Hayes MD   Gvoke HypoPen 2-Pack 1 MG/0.2ML solution auto-injector Inject 1 mg under the skin into the appropriate area as directed As Needed (HYPOGLYCEMIA). INJECT 1MG UNDER THE SKIN ONE TIME AS NEEDED HYPOGLYCEMIA FOR UP TO ONE DOSE 10/29/24  Yes Maddy Hayes MD   hydrOXYzine (ATARAX) 25 MG tablet Take 1 tablet by mouth 3 (Three) Times a Day As Needed. for anxiety 1/13/25  Yes Maddy Hayes MD   ibuprofen (ADVIL,MOTRIN) 800 MG tablet Take 1 tablet by mouth Every 8 (Eight) Hours As Needed. 12/2/24  Yes Maddy Hayes MD   Ibuprofen 3 %, Gabapentin 10 %, Baclofen 2 %, lidocaine 4 %, Ketamine HCl 4 % Apply 1-2 g topically to the appropriate area as directed 3 (Three) to 4 (Four) times daily. 1/22/25 1/22/26 Yes Annie Garza MD   levothyroxine (SYNTHROID, LEVOTHROID) 50 MCG tablet Take 1 tablet by mouth Daily. 11/22/24  Yes Annie Garza MD   metFORMIN ER (GLUCOPHAGE-XR) 500 MG 24 hr tablet Take 1 tablet by mouth Daily With Breakfast. 12/5/24  Yes Annie Garza MD   metoprolol tartrate (LOPRESSOR) 25 MG tablet Take 0.5 tablets by mouth 2 (Two) Times a Day. 11/22/24  Yes Annie Garza MD   mirtazapine (REMERON) 7.5 MG tablet Take 1 tablet by mouth Every Night. 1/30/25  Yes Maddy Hayes MD   tiZANidine (ZANAFLEX) 4 MG tablet Take 1 tablet by mouth 3 times a day. 11/25/24  Yes Maddy Hayes MD   vitamin D (ERGOCALCIFEROL) 1.25 MG (70044 UT) capsule capsule Take 1 capsule by mouth 1 (One) Time Per Week. Thursdays 10/14/24  Yes Provider, Historical, MD    Allergies:  Patient has no known  allergies.    Current Facility-Administered Medications   Medication Dose Route Frequency Provider Last Rate Last Admin    acetaminophen (TYLENOL) tablet 650 mg  650 mg Oral Q6H PRN Vera Small MD   650 mg at 02/04/25 0540    aspirin chewable tablet 81 mg  81 mg Oral Daily Doug Marquez MD   81 mg at 02/04/25 0937    Or    aspirin suppository 300 mg  300 mg Rectal Daily Doug Marquez MD        atorvastatin (LIPITOR) tablet 40 mg  40 mg Oral Nightly Doug Marquez MD   40 mg at 02/03/25 2109    sennosides-docusate (PERICOLACE) 8.6-50 MG per tablet 2 tablet  2 tablet Oral BID PRN Doug Marquez MD        And    polyethylene glycol (MIRALAX) packet 17 g  17 g Oral Daily PRN Doug Marquez MD        And    bisacodyl (DULCOLAX) EC tablet 5 mg  5 mg Oral Daily PRN Doug Marquez MD        And    bisacodyl (DULCOLAX) suppository 10 mg  10 mg Rectal Daily PRN Doug Marquez MD        dextrose (D50W) (25 g/50 mL) IV injection 25 g  25 g Intravenous Q15 Min PRN Shay Avila Jr., MD        dextrose (GLUTOSE) oral gel 15 g  15 g Oral Q15 Min PRN Shay Avila Jr., MD        DULoxetine (CYMBALTA) DR capsule 60 mg  60 mg Oral BID Shay Avila Jr., MD   60 mg at 02/04/25 0937    Enoxaparin Sodium (LOVENOX) syringe 40 mg  40 mg Subcutaneous Daily Doug Marquez MD   40 mg at 02/04/25 0937    gabapentin (NEURONTIN) capsule 800 mg  800 mg Oral Q8H Doug Marquez MD   800 mg at 02/04/25 0540    glucagon (GLUCAGEN) injection 1 mg  1 mg Intramuscular Q15 Min PRN hSay Avila Jr., MD        Insulin Lispro (humaLOG) injection 2-7 Units  2-7 Units Subcutaneous 4x Daily AC & at Bedtime Shay Avila Jr., MD        ketorolac (TORADOL) injection 15 mg  15 mg Intravenous Once Doug Marquez MD        levothyroxine (SYNTHROID, LEVOTHROID) tablet 50 mcg  50 mcg Oral Daily Shay Avila Jr., MD   50 mcg at 02/04/25 0937    ondansetron (ZOFRAN) injection 4 mg  4 mg Intravenous Q6H PRN Doug Marquez,  MD        sodium chloride 0.9 % flush 10 mL  10 mL Intravenous PRN Fredy White MD        sodium chloride 0.9 % flush 10 mL  10 mL Intravenous Q12H Doug Marquez MD   10 mL at 02/04/25 0938    sodium chloride 0.9 % flush 10 mL  10 mL Intravenous PRN Doug Marquez MD        sodium chloride 0.9 % infusion 40 mL  40 mL Intravenous PRN Doug Marquez MD            ________________________________________________________        OBJECTIVE:  Upon today's exam, the patient is resting comfortably in bed in no acute distress      The patient is awake and alert and oriented x3  Normal speech  Cranial nerve examination demonstrate:  Full fields of vision to confrontation  Pupils are round, reactive to light and accommodation and size of about 3 mm  No ptosis or nystagmus  Funduscopic examination was not successful  Eye movements are conjugate     Sensation on the face and scalp are normal  Muscles of mastication are normal and symmetric  Muscles of  facial expression are normal and symmetric  Hearing is intact bilaterally  Head turning and shoulder shrugs were unremarkable  Tongue was midline  I could not visualize  oropharynx or uvula     Motor examination:  Normal bulk, tone and strength was 5/5  No fasciculations     Sensory examination:  Intact for soft touch, pain   Romberg was not evaluated     Reflexes:  0/4     Coordination:  Normal finger-to-nose to finger, rapid alternating movements and toe to finger     Gait:  Deferred     Toe signs:  Mute      NIH Stroke Scale  Interval: baseline  1a. Level of Consciousness: 0-->Alert, keenly responsive  1b. LOC Questions: 0-->Answers both questions correctly  1c. LOC Commands: 0-->Performs both tasks correctly  2. Best Gaze: 0-->Normal  3. Visual: 0-->No visual loss  4. Facial Palsy: 0-->Normal symmetrical movements  5a. Motor Arm, Left: 0-->No drift, limb holds 90 (or 45) degrees for full 10 secs  5b. Motor Arm, Right: 0-->No drift, limb holds 90 (or 45) degrees for  full 10 secs  6a. Motor Leg, Left: 0-->No drift, leg holds 30 degree position for full 5 secs  6b. Motor Leg, Right: 0-->No drift, leg holds 30 degree position for full 5 secs  7. Limb Ataxia: 0-->Absent  8. Sensory: 0-->Normal, no sensory loss  9. Best Language: 0-->No aphasia, normal  10. Dysarthria: 0-->Normal  11. Extinction and Inattention (formerly Neglect): 0-->No abnormality  Total (NIH Stroke Scale): 0         ________________________________________________________   RESULTS REVIEW:    VITAL SIGNS:   Temp:  [97.3 °F (36.3 °C)-98.6 °F (37 °C)] 97.9 °F (36.6 °C)  Heart Rate:  [65-83] 66  Resp:  [15-18] 16  BP: ()/() 165/99     LABS:      Lab 02/03/25  1623   WBC 3.96   HEMOGLOBIN 8.5*   HEMATOCRIT 29.0*   PLATELETS 286   NEUTROS ABS 1.89   IMMATURE GRANS (ABS) 0.01   LYMPHS ABS 1.42   MONOS ABS 0.51   EOS ABS 0.11   MCV 81.2   PROTIME 13.0   APTT 26.2         Lab 02/03/25  1623   SODIUM 138   POTASSIUM 4.2   CHLORIDE 109*   CO2 18.2*   ANION GAP 10.8   BUN 14   CREATININE 1.11   EGFR 79.4   GLUCOSE 68   CALCIUM 8.7   HEMOGLOBIN A1C 7.00*   TSH 2.780         Lab 02/03/25  1623   TOTAL PROTEIN 6.0   ALBUMIN 3.6   GLOBULIN 2.4   ALT (SGPT) 19   AST (SGOT) 35   BILIRUBIN <0.2   ALK PHOS 47         Lab 02/03/25  1623   PROTIME 13.0   INR 0.97         Lab 02/03/25  1623   CHOLESTEROL 128   LDL CHOL 44   HDL CHOL 70*   TRIGLYCERIDES 65             UA          10/10/2024    14:14 11/7/2024    13:43 2/3/2025    16:52   Urinalysis   Squamous Epithelial Cells, UA None Seen      Specific Spencer, UA 1.023  1.024  >1.030    Ketones, UA Negative  Trace  Negative    Blood, UA Negative  Negative  Negative    Leukocytes, UA Negative  Negative  Negative    Nitrite, UA Negative  Negative  Negative    RBC, UA 0-2      WBC, UA None Seen      Bacteria, UA None Seen          Lab Results   Component Value Date    TSH 2.780 02/03/2025    LDL 44 02/03/2025    HGBA1C 7.00 (H) 02/03/2025    [id number] 394 10/10/2024        IMAGING STUDIES:  MRI Brain Without Contrast    Result Date: 2/3/2025  Impression: No findings of restricted diffusion to suggest acute or subacute infarct. Normal MRI appearance of the brain. Electronically Signed: Lucinda Bryan MD  2/3/2025 10:06 PM EST  Workstation ID: [id number]    CT Angiogram Head w AI Analysis of LVO    Result Date: 2/3/2025  Impression: No evidence of significant atherosclerotic narrowing in the neck. Intracranial atherosclerotic changes are noted as above, without evidence of associated high-grade stenosis, large vessel occlusion or aneurysm. Electronically Signed: Andrea Nichols MD  2/3/2025 5:01 PM EST  Workstation ID: [id number]    CT Angiogram Neck    Result Date: 2/3/2025  Impression: No evidence of significant atherosclerotic narrowing in the neck. Intracranial atherosclerotic changes are noted as above, without evidence of associated high-grade stenosis, large vessel occlusion or aneurysm. Electronically Signed: Andrea Nichols MD  2/3/2025 5:01 PM EST  Workstation ID: [id number]    XR Chest 1 View    Result Date: 2/3/2025  Impression: Stable chest, no acute process identified Electronically Signed: Shabbir Gallegos MD  2/3/2025 4:35 PM EST  Workstation ID: [id number]    CT CEREBRAL PERFUSION WITH & WITHOUT CONTRAST    Result Date: 2/3/2025  Impression: No CT perfusion evidence of acute infarct or ischemia. Electronically Signed: Gilberto Dominguez DO  2/3/2025 4:28 PM EST  Workstation ID: [id number]    CT Head Without Contrast Stroke Protocol    Result Date: 2/3/2025  No acute intracranial abnormality by head CT. Brain MRI is more sensitive to evaluate for acute or subacute infarcts and to evaluate for intracranial metastatic disease. Electronically Signed: Lucinda Bryan MD  2/3/2025 3:38 PM EST  Workstation ID: [id number]     I reviewed the patient's new clinical results.    ________________________________________________________     PROBLEM LIST:    TIA (transient ischemic attack)    Diabetes  mellitus type 2 with neurological manifestations    HTN (hypertension)            ASSESSMENT/PLAN:  Seizure  Normal EEG but not typical    I talked to him in detail and it looks like this is not a provoked event    So I will start him on Keppra 500 mg twice daily    Have him follow-up with neurology as outpatient    Not a stroke or TIA nothing else to change        Okay to DC to follow-up with neurology as outpatient      - Fall, syncope precautions (see below)    SEIZURE/SYNCOPE INSTRUCTIONS:  -Recommended to observe all seizure precautions, including, but not limited to:   -No driving until seizure free for more than 3 months- as per State driving regulation / law;   -Avoid all high-risk activity, Take showers instead of baths, Avoid swimming without observation, Avoid open heat sources, Avoid working at heights, and Avoid engaging in all potentially hazardous activities.   -Patient expressed clear understanding.        I discussed the patient's findings and my recommendations with patient and nursing staff    Sondra Garcias MD  02/04/25  10:22 EST

## 2025-02-05 ENCOUNTER — TRANSITIONAL CARE MANAGEMENT TELEPHONE ENCOUNTER (OUTPATIENT)
Dept: CALL CENTER | Facility: HOSPITAL | Age: 55
End: 2025-02-05
Payer: COMMERCIAL

## 2025-02-05 NOTE — OUTREACH NOTE
Call Center TCM Note      Flowsheet Row Responses   [facility] patient discharged from? [facility]   Does the patient have one of the following disease processes/diagnoses(primary or secondary)? Stroke   TCM attempt successful? Yes  [no names on verbal release]   Call start time 1045   Call end time 1047   Discharge diagnosis TIA (transient ischemic attack)   Meds reviewed with patient/caregiver? Yes   Is the patient having any side effects they believe may be caused by any medication additions or changes? No   Does the patient have all medications ordered at discharge? Yes   Is the patient taking all medications as directed (includes completed medication regime)? Yes   Comments Hospital f/u 2/6/25@0745am   Does the patient have an appointment with their PCP within 7-14 days of discharge? Yes   Has home health visited the patient within 72 hours of discharge? N/A   Does the patient require any assistance with activities of daily living such as eating, bathing, dressing, walking, etc.? No   Does the patient have any residual symptoms from stroke/TIA? No   Did the patient receive a copy of their discharge instructions? Yes   Nursing interventions Reviewed instructions with patient   What is the patient's perception of their health status since discharge? Same  [Pt reports he feels lightheaded from new meds and will discuss with PCP at f/u tomorrow,  reviewed stroke type s/s with pt.  No questions at time of call.  Unable to complete call in full regarding risk factors as pt in [employer] in.]   Nursing interventions Nurse provided patient education   Is the patient able to teach back FAST for Stroke? B alance: Watch for sudden loss of balance, E yes: Check for vision loss, F ace: Look for an uneven smile, A rm: Check if one arm is weak, S peech: Listen for slurred speech, T yarely: Call 9-1-1 right away  [reviewed]   TCM call completed? Yes   Call end time 1047            Svetlana Mao RN    2/5/2025, 10:51  EST

## 2025-02-06 ENCOUNTER — OFFICE VISIT (OUTPATIENT)
Dept: FAMILY MEDICINE CLINIC | Facility: CLINIC | Age: 55
End: 2025-02-06
Payer: COMMERCIAL

## 2025-02-06 VITALS
HEIGHT: 72 IN | TEMPERATURE: 97.6 F | OXYGEN SATURATION: 97 % | BODY MASS INDEX: 29.88 KG/M2 | DIASTOLIC BLOOD PRESSURE: 78 MMHG | SYSTOLIC BLOOD PRESSURE: 128 MMHG | WEIGHT: 220.6 LBS | HEART RATE: 82 BPM

## 2025-02-06 DIAGNOSIS — D50.9 IRON DEFICIENCY ANEMIA, UNSPECIFIED IRON DEFICIENCY ANEMIA TYPE: ICD-10-CM

## 2025-02-06 DIAGNOSIS — N52.9 ERECTILE DYSFUNCTION, UNSPECIFIED ERECTILE DYSFUNCTION TYPE: ICD-10-CM

## 2025-02-06 DIAGNOSIS — Z11.59 NEED FOR HEPATITIS B SCREENING TEST: ICD-10-CM

## 2025-02-06 DIAGNOSIS — E78.5 HYPERLIPIDEMIA, UNSPECIFIED HYPERLIPIDEMIA TYPE: ICD-10-CM

## 2025-02-06 DIAGNOSIS — E11.40 TYPE 2 DIABETES MELLITUS WITH DIABETIC NEUROPATHY, UNSPECIFIED WHETHER LONG TERM INSULIN USE: Primary | ICD-10-CM

## 2025-02-06 DIAGNOSIS — E11.649 TYPE 2 DIABETES MELLITUS WITH HYPOGLYCEMIA WITHOUT COMA, WITHOUT LONG-TERM CURRENT USE OF INSULIN: ICD-10-CM

## 2025-02-06 DIAGNOSIS — Z23 ENCOUNTER FOR ADMINISTRATION OF VACCINE: ICD-10-CM

## 2025-02-06 LAB
BASOPHILS # BLD AUTO: 0.01 10*3/MM3 (ref 0–0.2)
BASOPHILS NFR BLD AUTO: 0.2 % (ref 0–1.5)
DEPRECATED RDW RBC AUTO: 42.3 FL (ref 37–54)
EOSINOPHIL # BLD AUTO: 0.04 10*3/MM3 (ref 0–0.4)
EOSINOPHIL NFR BLD AUTO: 0.8 % (ref 0.3–6.2)
ERYTHROCYTE [DISTWIDTH] IN BLOOD BY AUTOMATED COUNT: 15 % (ref 12.3–15.4)
HBV SURFACE AB SER RIA-ACNC: NORMAL
HBV SURFACE AG SERPL QL IA: NORMAL
HCT VFR BLD AUTO: 32.5 % (ref 37.5–51)
HGB BLD-MCNC: 10.3 G/DL (ref 13–17.7)
IMM GRANULOCYTES # BLD AUTO: 0.01 10*3/MM3 (ref 0–0.05)
IMM GRANULOCYTES NFR BLD AUTO: 0.2 % (ref 0–0.5)
LYMPHOCYTES # BLD AUTO: 0.78 10*3/MM3 (ref 0.7–3.1)
LYMPHOCYTES NFR BLD AUTO: 15.3 % (ref 19.6–45.3)
MCH RBC QN AUTO: 24.9 PG (ref 26.6–33)
MCHC RBC AUTO-ENTMCNC: 31.7 G/DL (ref 31.5–35.7)
MCV RBC AUTO: 78.5 FL (ref 79–97)
MONOCYTES # BLD AUTO: 0.32 10*3/MM3 (ref 0.1–0.9)
MONOCYTES NFR BLD AUTO: 6.3 % (ref 5–12)
NEUTROPHILS NFR BLD AUTO: 3.95 10*3/MM3 (ref 1.7–7)
NEUTROPHILS NFR BLD AUTO: 77.2 % (ref 42.7–76)
NRBC BLD AUTO-RTO: 0 /100 WBC (ref 0–0.2)
PLATELET # BLD AUTO: 355 10*3/MM3 (ref 140–450)
PMV BLD AUTO: 10.9 FL (ref 6–12)
RBC # BLD AUTO: 4.14 10*6/MM3 (ref 4.14–5.8)
WBC NRBC COR # BLD AUTO: 5.11 10*3/MM3 (ref 3.4–10.8)

## 2025-02-06 PROCEDURE — 3074F SYST BP LT 130 MM HG: CPT | Performed by: STUDENT IN AN ORGANIZED HEALTH CARE EDUCATION/TRAINING PROGRAM

## 2025-02-06 PROCEDURE — 36415 COLL VENOUS BLD VENIPUNCTURE: CPT | Performed by: STUDENT IN AN ORGANIZED HEALTH CARE EDUCATION/TRAINING PROGRAM

## 2025-02-06 PROCEDURE — 1160F RVW MEDS BY RX/DR IN RCRD: CPT | Performed by: STUDENT IN AN ORGANIZED HEALTH CARE EDUCATION/TRAINING PROGRAM

## 2025-02-06 PROCEDURE — 87340 HEPATITIS B SURFACE AG IA: CPT | Performed by: STUDENT IN AN ORGANIZED HEALTH CARE EDUCATION/TRAINING PROGRAM

## 2025-02-06 PROCEDURE — 85025 COMPLETE CBC W/AUTO DIFF WBC: CPT | Performed by: STUDENT IN AN ORGANIZED HEALTH CARE EDUCATION/TRAINING PROGRAM

## 2025-02-06 PROCEDURE — 1159F MED LIST DOCD IN RCRD: CPT | Performed by: STUDENT IN AN ORGANIZED HEALTH CARE EDUCATION/TRAINING PROGRAM

## 2025-02-06 PROCEDURE — 99496 TRANSJ CARE MGMT HIGH F2F 7D: CPT | Performed by: STUDENT IN AN ORGANIZED HEALTH CARE EDUCATION/TRAINING PROGRAM

## 2025-02-06 PROCEDURE — 3078F DIAST BP <80 MM HG: CPT | Performed by: STUDENT IN AN ORGANIZED HEALTH CARE EDUCATION/TRAINING PROGRAM

## 2025-02-06 PROCEDURE — 91320 SARSCV2 VAC 30MCG TRS-SUC IM: CPT | Performed by: STUDENT IN AN ORGANIZED HEALTH CARE EDUCATION/TRAINING PROGRAM

## 2025-02-06 PROCEDURE — 3051F HG A1C>EQUAL 7.0%<8.0%: CPT | Performed by: STUDENT IN AN ORGANIZED HEALTH CARE EDUCATION/TRAINING PROGRAM

## 2025-02-06 PROCEDURE — 90677 PCV20 VACCINE IM: CPT | Performed by: STUDENT IN AN ORGANIZED HEALTH CARE EDUCATION/TRAINING PROGRAM

## 2025-02-06 PROCEDURE — 90480 ADMN SARSCOV2 VAC 1/ONLY CMP: CPT | Performed by: STUDENT IN AN ORGANIZED HEALTH CARE EDUCATION/TRAINING PROGRAM

## 2025-02-06 PROCEDURE — 86706 HEP B SURFACE ANTIBODY: CPT | Performed by: STUDENT IN AN ORGANIZED HEALTH CARE EDUCATION/TRAINING PROGRAM

## 2025-02-06 RX ORDER — FAMOTIDINE 40 MG/1
1 TABLET, FILM COATED ORAL DAILY
COMMUNITY
End: 2025-02-06

## 2025-02-06 RX ORDER — HYDRALAZINE HYDROCHLORIDE 50 MG/1
TABLET, FILM COATED ORAL
COMMUNITY
End: 2025-02-06

## 2025-02-06 RX ORDER — TADALAFIL 5 MG/1
5 TABLET ORAL DAILY PRN
Qty: 6 TABLET | Refills: 0 | Status: SHIPPED | OUTPATIENT
Start: 2025-02-06

## 2025-02-06 RX ORDER — GLUCAGON INJECTION, SOLUTION 1 MG/.2ML
1 INJECTION, SOLUTION SUBCUTANEOUS AS NEEDED
Qty: 0.6 ML | Refills: 0 | Status: SHIPPED | OUTPATIENT
Start: 2025-02-06

## 2025-02-06 RX ORDER — LORATADINE 10 MG/1
TABLET ORAL
COMMUNITY
End: 2025-02-06

## 2025-02-06 RX ORDER — PREDNISONE 50 MG/1
1 TABLET ORAL DAILY
COMMUNITY
End: 2025-02-06

## 2025-02-06 RX ORDER — ONDANSETRON 8 MG/1
TABLET, FILM COATED ORAL
COMMUNITY
End: 2025-02-06

## 2025-02-06 NOTE — PROGRESS NOTES
Chief Complaint  Hospital Follow Up Visit, Shoulder Pain, and Neck Pain    Subjective      Campos Hale is a 54 y.o. male who presents to [facility]     History of Present Illness  The patient presents for evaluation of iron deficiency, diabetes mellitus, erectile dysfunction, and medication management.    He reports a persistent lack of energy over the past few weeks, despite adequate sleep. He has not observed any blood in his stool or dark stools. He was hospitalized on 02/03/2025 for CVA and difficulty walking.  His CVA workup was unrevealing, CT head and CTA head and neck were negative for significant findings.  His EEG was abnormal, neurology recommended Keppra with outpatient follow-up .he was discharged from the hospital on 02/04/2025.  His hemoglobin was found to be low at 8.5 while he was in the hospital, he denies any blood in stools or dark stools.    He is taking Synthroid for his thyroid condition. He is taking duloxetine twice daily. He is taking atorvastatin for his cholesterol. He is interested in getting a pneumonia vaccine, COVID-19 vaccine, and shingles vaccine.  He is taking levetiracetam 500 mg twice daily. He was prescribed mirtazapine by his behavioral therapist. He was also given samples of Vrylar, which he has been taking regularly, with the last dose taken this morning. He was advised to inform the therapist if he ran out of the medication.    He is currently on metformin for diabetes management and monitors his blood glucose levels at home. His highest recorded blood glucose level was 147, with fasting levels ranging between 78 and 79. He has not been taking Jardiance as it was not refilled.    He is seeking treatment for erectile dysfunction.      Patient Care Team:  Annie Garza MD as PCP - General (Family Medicine)    Review of Systems  Per HPI    Objective   Vital Signs:   Vitals:    02/06/25 0751   BP: 128/78   BP Location: Left arm   Patient  "Position: Sitting   Cuff Size: Adult   Pulse: 82   Temp: 97.6 °F (36.4 °C)   TempSrc: Temporal   SpO2: 97%   Weight: 100 kg (220 lb 9.6 oz)   Height: 182.9 cm (72.01\")     Body mass index is 29.91 kg/m².    Wt Readings from Last 3 Encounters:   02/06/25 100 kg (220 lb 9.6 oz)   02/03/25 99.2 kg (218 lb 11.1 oz)   01/01/25 102 kg (224 lb 13.9 oz)     BP Readings from Last 3 Encounters:   02/06/25 128/78   02/04/25 178/94   01/01/25 134/72       Health Maintenance   Topic Date Due    COLORECTAL CANCER SCREENING  Never done    Hepatitis B (1 of 3 - 19+ 3-dose series) Never done    ZOSTER VACCINE (1 of 2) 02/06/2025 (Originally 2/26/2021)    HEMOGLOBIN A1C  08/03/2025    DIABETIC EYE EXAM  08/15/2025    BMI FOLLOWUP  10/10/2025    ANNUAL PHYSICAL  10/29/2025    URINE MICROALBUMIN  12/05/2025    LIPID PANEL  02/03/2026    TDAP/TD VACCINES (4 - Td or Tdap) 09/22/2030    HEPATITIS C SCREENING  Completed    COVID-19 Vaccine  Completed    Pneumococcal Vaccine 0-64  Completed    INFLUENZA VACCINE  Completed       Physical Exam  Constitutional:       Appearance: Normal appearance.   HENT:      Head: Normocephalic and atraumatic.      Mouth/Throat:      Mouth: Mucous membranes are moist.   Eyes:      Pupils: Pupils are equal, round, and reactive to light.   Cardiovascular:      Rate and Rhythm: Normal rate and regular rhythm.      Pulses: Normal pulses.      Heart sounds: Normal heart sounds.   Pulmonary:      Effort: Pulmonary effort is normal.      Breath sounds: Normal breath sounds.   Abdominal:      Palpations: Abdomen is soft.   Neurological:      General: No focal deficit present.      Mental Status: He is alert and oriented to person, place, and time.   Psychiatric:         Mood and Affect: Mood normal.         Behavior: Behavior normal.       Physical Exam  Lungs were auscultated.    Vital Signs  Blood pressure is normal.       Result Review   The following data was reviewed by: Annie Garza MD on 02/06/2025:  [x]  " Tests & Results  []  Hospitalization/Emergency Department/Urgent Care  []  Internal/External Consultant Notes      Results  Laboratory Studies  Hemoglobin decreased from 11.7 to 8.5 within 1 month. A1c increased to 7. Blood glucose levels range from 78-79 fasting to a high of 147. Alcohol levels were high at 149. Testosterone and PSA levels were normal. Protein to creatinine ratio is normal.       ASSESSMENT/PLAN  Diagnoses and all orders for this visit:    1. Type 2 diabetes mellitus with diabetic neuropathy, unspecified whether long term insulin use (Primary)  -     Ibuprofen 3 %, Gabapentin 10 %, Baclofen 2 %, lidocaine 4 %, Ketamine HCl 4 %; Apply 1-2 g topically to the appropriate area as directed 3 (Three) to 4 (Four) times daily.  Dispense: 90 g; Refill: 1  -     empagliflozin (Jardiance) 25 MG tablet tablet; Take 1 tablet by mouth Daily.  Dispense: 90 tablet; Refill: 1    2. Hyperlipidemia, unspecified hyperlipidemia type    3. Erectile dysfunction, unspecified erectile dysfunction type  -     tadalafil (CIALIS) 5 MG tablet; Take 1 tablet by mouth Daily As Needed for Erectile Dysfunction (for sexual intercourse). Take it 30 mins before sexual activity, don't take more than one in 24  hrs  Dispense: 6 tablet; Refill: 0    4. Iron deficiency anemia, unspecified iron deficiency anemia type  -     CBC w AUTO Differential; Future  -     CBC w AUTO Differential    5. Need for hepatitis B screening test  -     Hepatitis B Surface Antibody  -     Hepatitis B Surface Antigen; Future  -     Hepatitis B Surface Antigen    6. Type 2 diabetes mellitus with hypoglycemia without coma, without long-term current use of insulin  -     Gvoke HypoPen 2-Pack 1 MG/0.2ML solution auto-injector; Inject 1 mg under the skin into the appropriate area as directed As Needed (HYPOGLYCEMIA).  Dispense: 0.6 mL; Refill: 0    7. Encounter for administration of vaccine  -     Pneumococcal Conjugate Vaccine 20-Valent (PCV20)  -     COVID-19  (Pfizer) 12yrs+ (COMIRNATY)          Assessment & Plan  1. Iron deficiency.  His hemoglobin levels have decreased from 11.7 to 8.5 within a month. He reports no bleeding or blood in stools. A CBC will be ordered today to recheck his hemoglobin levels. He is advised to undergo colon cancer screening. If his hemoglobin levels remain low, a referral to a gastroenterologist will be made for an endoscopy and colonoscopy to investigate potential sources of bleeding.    2. Diabetes mellitus.  His A1c level has increased to 7. He reports fasting blood sugar levels around 78-79 and the highest being 147. He is advised to continue taking metformin and Jardiance. A1c levels will be rechecked in 3 months. A prescription for Gvoke pen has been provided for hypoglycemia management, to be used when glucose levels drop below 50.    3. Erectile dysfunction.  A prescription for medication to manage erectile dysfunction has been provided, with instructions to take one tablet 30 minutes prior to sexual activity, not exceeding one tablet per day. The medication's effects can last up to 36 hours.    4. Medication management.  Prescriptions for Jardiance, Gvoke pen, and the compound cream have been refilled.  He is also advised to continue taking Cymbalta, ibuprofen, gabapentin, Keppra, thyroid medication, metformin, and atorvastatin.    5. Health maintenance.  He will receive the PCV20 and COVID-19 vaccines today. A screening for hepatitis B immunity will be ordered.     Follow-up  The patient will follow up in 3 months.            Campos Hale  reports that he has never smoked. He has never used smokeless tobacco. I have educated him on the risk of diseases from using tobacco products such as cancer, COPD, and heart disease.     I advised him to quit and he is not willing to quit.       FOLLOW UP  Return in about 3 months (around 5/6/2025).  Patient was given instructions and counseling regarding his condition or for health  maintenance advice. Please see specific information pulled into the AVS if appropriate.       Annie Garza MD  02/06/25  12:35 EST    Patient or patient representative verbalized consent for the use of Ambient Listening during the visit with  Annie Garza MD for chart documentation. 2/6/2025  12:35 EST

## 2025-02-07 ENCOUNTER — TELEPHONE (OUTPATIENT)
Dept: FAMILY MEDICINE CLINIC | Facility: CLINIC | Age: 55
End: 2025-02-07

## 2025-02-07 DIAGNOSIS — D50.9 IRON DEFICIENCY ANEMIA, UNSPECIFIED IRON DEFICIENCY ANEMIA TYPE: Primary | ICD-10-CM

## 2025-02-07 RX ORDER — FERROUS SULFATE 325(65) MG
325 TABLET, DELAYED RELEASE (ENTERIC COATED) ORAL
Qty: 90 TABLET | Refills: 1 | Status: SHIPPED | OUTPATIENT
Start: 2025-02-07

## 2025-02-07 NOTE — TELEPHONE ENCOUNTER
Caller: Campos Hale    Relationship to patient: Self      Best call back number:     [phone]       Provider: MARLYN EISENEBRG    Medication PA needed: tadalafil (CIALIS) 5 MG tablet     Reason for call/Prior Auth:

## 2025-02-09 DIAGNOSIS — M54.16 RADICULOPATHY, LUMBAR REGION: ICD-10-CM

## 2025-02-10 RX ORDER — IBUPROFEN 200 MG/1
200 TABLET, FILM COATED ORAL EVERY 6 HOURS PRN
Qty: 90 TABLET | Refills: 2 | Status: SHIPPED | OUTPATIENT
Start: 2025-02-10

## 2025-02-13 DIAGNOSIS — M54.16 LUMBAR RADICULOPATHY: ICD-10-CM

## 2025-02-13 RX ORDER — ACETAMINOPHEN 650 MG/1
650 TABLET, EXTENDED RELEASE ORAL EVERY 8 HOURS PRN
Qty: 90 TABLET | Refills: 2 | OUTPATIENT
Start: 2025-02-13

## 2025-02-14 ENCOUNTER — READMISSION MANAGEMENT (OUTPATIENT)
Dept: CALL CENTER | Facility: HOSPITAL | Age: 55
End: 2025-02-14
Payer: COMMERCIAL

## 2025-02-14 NOTE — OUTREACH NOTE
Stroke Week 2 Survey      Flowsheet Row Responses   [facility] facility patient discharged from? [facility]   Does the patient have one of the following disease processes/diagnoses(primary or secondary)? Stroke   Week 2 attempt successful? No   Unsuccessful attempts Attempt 1            Jonelle Ibarra Registered Nurse

## 2025-02-19 ENCOUNTER — READMISSION MANAGEMENT (OUTPATIENT)
Dept: CALL CENTER | Facility: HOSPITAL | Age: 55
End: 2025-02-19
Payer: COMMERCIAL

## 2025-02-19 NOTE — OUTREACH NOTE
Stroke Week 2 Survey      Flowsheet Row Responses   [facility] facility patient discharged from? [facility]   Does the patient have one of the following disease processes/diagnoses(primary or secondary)? Stroke   Week 2 attempt successful? No   Call start time 1000   Unsuccessful attempts Attempt 2            Annamarie BRODERICK - Registered Nurse  
Statement Selected

## 2025-04-25 ENCOUNTER — TELEPHONE (OUTPATIENT)
Dept: INTERNAL MEDICINE | Age: 55
End: 2025-04-25

## (undated) DEVICE — DRAPE,UTILITY,XL,4/PK,STERILE: Brand: MEDLINE

## (undated) DEVICE — MAJOR CDS

## (undated) DEVICE — GLOVE SURG SZ 75 CRM LTX FREE POLYISOPRENE POLYMER BEAD ANTI

## (undated) DEVICE — TIBURON LAPAROTOMY DRAPE: Brand: CONVERTORS

## (undated) DEVICE — DRESSING FOAM W4XL12IN SIL RECT ADH WTRPRF FLM BK W/ BORD

## (undated) DEVICE — TUBE ET 7.5MM NSL ORAL BASIC CUF INTMED MURPHY EYE RADPQ

## (undated) DEVICE — GLOVE SURG SZ 7 CRM LTX FREE POLYISOPRENE POLYMER BEAD ANTI

## (undated) DEVICE — DRAIN JACKSON PRATT ROUND 15FR: Brand: CARDINAL HEALTH

## (undated) DEVICE — SUTURE PERMAHAND SZ 0 L30IN NONABSORBABLE BLK SILK BRAID A306H

## (undated) DEVICE — SUTURE VCRL SZ 3-0 L18IN ABSRB UD L26MM SH 1/2 CIR J864D

## (undated) DEVICE — SUTURE ETHLN SZ 2-0 L30IN NONABSORBABLE BLK L36MM FSLX 3/8 1674H

## (undated) DEVICE — SUTURE PERMAHAND SZ 2-0 L18IN NONABSORBABLE BLK L26MM SH C012D

## (undated) DEVICE — SYRINGE IRRIG 60ML SFT PLIABLE BLB EZ TO GRP 1 HND USE W/

## (undated) DEVICE — GOWN,PREVENTION PLUS,XL,ST,24/CS: Brand: MEDLINE

## (undated) DEVICE — YANKAUER,POOLE TIP,STERILE,50/CS: Brand: MEDLINE

## (undated) DEVICE — STAPLER SKIN L39MM DIA0.53MM CRWN 5.7MM S STL FIX HD PROX

## (undated) DEVICE — GOWN,PREVENTION PLUS,2XL,ST,22/CS: Brand: MEDLINE

## (undated) DEVICE — SOLUTION IV IRRIG POUR BRL 0.9% SODIUM CHL 2F7124

## (undated) DEVICE — ROYAL SILK SURGICAL GOWN, XXL: Brand: CONVERTORS

## (undated) DEVICE — MEDI-VAC YANK SUCT HNDL W/TPRD BULBOUS TIP: Brand: CARDINAL HEALTH

## (undated) DEVICE — BINDER ABD UNISX 4 PNL PREM 6INX6INX12IN L XL 4

## (undated) DEVICE — SUTURE VCRL + SZ 3-0 L18IN ABSRB UD SH 1/2 CIR TAPERCUT NDL VCP864D

## (undated) DEVICE — PACK,UNIVERSAL,NO GOWNS: Brand: MEDLINE

## (undated) DEVICE — LARYNGOSCOPE VID MILLER 2 MTL BLADE M HNDL CURAPLEX

## (undated) DEVICE — YANKAUER,TAPERED BULBOUS TIP,W/O VENT: Brand: MEDLINE

## (undated) DEVICE — SUTURE PERMAHAND SZ 3-0 L18IN NONABSORBABLE BLK L26MM SH C013D

## (undated) DEVICE — MEDI-TRACE CADENCE ADULT DEFIBRILLATION ELECTRODE (10 PR/PK) - PHYSIO-CONTROL: Brand: MEDI-TRACE CADENCE

## (undated) DEVICE — GLOVE SURG SZ 85 L12IN FNGR ORTHO 126MIL CRM LTX FREE

## (undated) DEVICE — SUTURE PDS + SZ 1 L96IN ABSRB VLT L65MM TP-1 1/2 CIR PDP880G

## (undated) DEVICE — SUTURE ABSORBABLE BRAIDED 0 CT-1 8X27 IN UD VICRYL JJ41G

## (undated) DEVICE — Z DISCONTINUED USE 2429233 DRESSING FOAM W10XL10CM 5 LAYR SELF ADH VERSATILE SAFETAC

## (undated) DEVICE — JACKSON-PRATT 100CC BULB RESERVOIR: Brand: CARDINAL HEALTH

## (undated) DEVICE — SUTURE VCRL SZ 3-0 L36IN ABSRB UD L36MM CT-1 1/2 CIR J944H

## (undated) DEVICE — GLOVE SURG SZ 75 L12IN FNGR THK94MIL TRNSLUC YEL LTX

## (undated) DEVICE — TOTAL TRAY, 16FR 10ML SIL FOLEY, URN: Brand: MEDLINE

## (undated) DEVICE — TOWEL,OR,DSP,ST,BLUE,DLX,4/PK,20PK/CS: Brand: MEDLINE